# Patient Record
Sex: MALE | Race: WHITE | NOT HISPANIC OR LATINO
[De-identification: names, ages, dates, MRNs, and addresses within clinical notes are randomized per-mention and may not be internally consistent; named-entity substitution may affect disease eponyms.]

---

## 2021-04-09 ENCOUNTER — APPOINTMENT (OUTPATIENT)
Dept: UROLOGY | Facility: CLINIC | Age: 61
End: 2021-04-09
Payer: COMMERCIAL

## 2021-04-09 PROCEDURE — 99203 OFFICE O/P NEW LOW 30 MIN: CPT

## 2021-04-09 PROCEDURE — 99072 ADDL SUPL MATRL&STAF TM PHE: CPT

## 2021-04-09 NOTE — REVIEW OF SYSTEMS
[Feeling Tired] : feeling tired [Recent Weight Gain (___ Lbs)] : recent [unfilled] ~Ulb weight gain [Fever] : no fever [Chills] : no chills [Chest Pain] : no chest pain [Shortness Of Breath] : no shortness of breath [Cough] : no cough [Abdominal Pain] : no abdominal pain [Vomiting] : no vomiting [Constipation] : no constipation [Diarrhea] : no diarrhea [Confused] : no confusion [Dizziness] : no dizziness

## 2021-04-09 NOTE — ASSESSMENT
[FreeTextEntry1] : Patient is a 60 year old male with chief complaint of low sex drive. Patient states he is able to get erections without medications. Patient states he is able to maintain the erections. \par \par Patient reports recent weight gain and low energy. \par \par PSA is followed by primary care doctor. \par \par No urinary complaints at this time. \par \par mild erectile dysfunction

## 2021-04-09 NOTE — HISTORY OF PRESENT ILLNESS
[None] : no symptoms [FreeTextEntry1] : Patient is a 60 year old male with chief complaint of low sex drive. Patient states he is able to get erections without medications. Patient states he is able to maintain the erections. \par \par Patient reports recent weight gain and low energy. \par \par PSA is followed by primary care doctor. \par \par No urinary complaints at this time. \par \par mild erectile dysfunction \par no morning erections

## 2021-04-09 NOTE — PHYSICAL EXAM
[Normal Appearance] : normal appearance [Well Groomed] : well groomed [General Appearance - In No Acute Distress] : no acute distress [Edema] : no peripheral edema [] : no respiratory distress [Normal Station and Gait] : the gait and station were normal for the patient's age [Skin Color & Pigmentation] : normal skin color and pigmentation [No Focal Deficits] : no focal deficits [Oriented To Time, Place, And Person] : oriented to person, place, and time

## 2021-04-16 LAB
TESTOST SERPL-MCNC: 401 NG/DL
TSH SERPL-ACNC: 0.32 UIU/ML

## 2021-04-26 ENCOUNTER — EMERGENCY (EMERGENCY)
Facility: HOSPITAL | Age: 61
LOS: 0 days | Discharge: HOME | End: 2021-04-26
Attending: EMERGENCY MEDICINE
Payer: OTHER MISCELLANEOUS

## 2021-04-26 VITALS
DIASTOLIC BLOOD PRESSURE: 86 MMHG | RESPIRATION RATE: 18 BRPM | OXYGEN SATURATION: 98 % | SYSTOLIC BLOOD PRESSURE: 179 MMHG | HEART RATE: 77 BPM | TEMPERATURE: 97 F
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
## 2021-04-26 DIAGNOSIS — M79.89 OTHER SPECIFIED SOFT TISSUE DISORDERS: ICD-10-CM
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
## 2021-04-26 DIAGNOSIS — M25.571 PAIN IN RIGHT ANKLE AND JOINTS OF RIGHT FOOT: ICD-10-CM
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
## 2021-04-26 DIAGNOSIS — S91.011A LACERATION WITHOUT FOREIGN BODY, RIGHT ANKLE, INITIAL ENCOUNTER: ICD-10-CM

## 2021-04-26 DIAGNOSIS — Z88.0 ALLERGY STATUS TO PENICILLIN: ICD-10-CM

## 2021-04-26 PROCEDURE — 73610 X-RAY EXAM OF ANKLE: CPT | Mod: 26,RT

## 2021-04-26 PROCEDURE — 99283 EMERGENCY DEPT VISIT LOW MDM: CPT

## 2021-04-26 RX ORDER — TETANUS TOXOID, REDUCED DIPHTHERIA TOXOID AND ACELLULAR PERTUSSIS VACCINE, ADSORBED 5; 2.5; 8; 8; 2.5 [IU]/.5ML; [IU]/.5ML; UG/.5ML; UG/.5ML; UG/.5ML
0.5 SUSPENSION INTRAMUSCULAR ONCE
Refills: 0 | Status: COMPLETED | OUTPATIENT
Start: 2021-04-26 | End: 2021-04-26

## 2021-04-26 RX ADMIN — TETANUS TOXOID, REDUCED DIPHTHERIA TOXOID AND ACELLULAR PERTUSSIS VACCINE, ADSORBED 0.5 MILLILITER(S): 5; 2.5; 8; 8; 2.5 SUSPENSION INTRAMUSCULAR at 11:41

## 2021-04-26 NOTE — ED PROVIDER NOTE - NS ED ROS FT
Constitutional: (-) fever  Eyes/ENT: (-) blurry vision, (-) epistaxis  Cardiovascular: (-) chest pain, (-) syncope  Respiratory: (-) cough, (-) shortness of breath  Gastrointestinal: (-) vomiting, (-) diarrhea  Musculoskeletal: (-) neck pain, (-) back pain, (-) joint pain  Integumentary: (-) rash, (-) edema (+) abrasion (+) pain   Neurological: (-) headache, (-) altered mental status (-) ambulatory dysfunction  Psychiatric: (-) hallucinations  Allergic/Immunologic: (-) pruritus

## 2021-04-26 NOTE — ED PROVIDER NOTE - CARE PROVIDER_API CALL
Pablito Odell (MD)  Orthopaedic Surgery  [address] [place] [address]  Phone: [phone]  Fax: [phone]  Follow Up Time:     Baldomero Washington)  Neuromuscular Medicine  [address] [place] [address], [address]  [place], [place] [id number]  Phone: [phone]  Fax: [phone]  Follow Up Time:

## 2021-04-26 NOTE — ED PROVIDER NOTE - OBJECTIVE STATEMENT
60 y.o. male no pmh presenting s/p having 500 lb crate dropped on his R ankle while being removed from rear of truck. +bleeding + pain  with some trace ankle swelling. Applied betadine to clean abrasion. No ambulatory dysfunction. Tdap not up to date. No fever, chills ,redness, cp ,sob, abdominal pain, N/V, dizziness, lightheadedness. Notes some like tingling around area of abraison.

## 2021-04-26 NOTE — ED PROVIDER NOTE - CARE PROVIDERS DIRECT ADDRESSES
[email or web address]

## 2021-04-26 NOTE — ED PROVIDER NOTE - PATIENT PORTAL LINK FT
You can access the FollowMyHealth Patient Portal offered by [facility] by registering at the following website: [email or web address]. By joining [employer]’s FollowMyHealth portal, you will also be able to view your health information using other applications (apps) compatible with our system.

## 2021-04-26 NOTE — ED PROVIDER NOTE - CARE PLAN
Principal Discharge DX:	Abrasion   Principal Discharge DX:	Abrasion  Secondary Diagnosis:	Ankle pain

## 2021-04-26 NOTE — ED PROVIDER NOTE - NSFOLLOWUPINSTRUCTIONS_ED_ALL_ED_FT
Make sure to adhere to the proper wound care. Wash with soap and water. Make an appointment with the orthopedist and the neurologist within 1-3 days of your ER visit. Return to the ER if you develop pain ,worsened swelling , fever ,chills, redness of your leg, Make sure to adhere to the proper wound care. Wash with soap and water. Make an appointment with the orthopedist and the neurologist within 1-3 days of your ER visit. Return to the ER if you develop pain ,worsened swelling , fever ,chills, redness of your leg,    Abrasion  ImageAn abrasion is a cut or a scrape on the outer surface of the skin. An abrasion does not go through all the layers of the skin. It is important to care for your abrasion properly to prevent infection.    What are the causes?  This condition is caused by falling on or gliding across the ground or another surface. When your skin rubs on something, the outer and inner layers of skin may rub off.    What are the signs or symptoms?  The main symptom of this condition is a cut or a scrape. The scrape may be bleeding, or it may appear red or pink. If the abrasion was caused by a fall, there may be a bruise under the cut or scrape.    How is this diagnosed?  An abrasion is diagnosed with a physical exam.    How is this treated?  Treatment for this condition depends on how large and deep the abrasion is. In most cases:    Your abrasion will be cleaned with water and mild soap. This is done to remove any dirt or debris (such as particles of glass or rock) that may be stuck in the wound.  An antibiotic ointment may be applied to the abrasion to help prevent infection.  A bandage (dressing) may be placed on the abrasion to keep it clean.    You may also need a tetanus shot.    Follow these instructions at home:  Medicines     Take or apply over-the-counter and prescription medicines only as told by your health care provider.  If you were prescribed an antibiotic medicine, apply it as told by your health care provider.  Wound care     Clean the wound 2–3 times a day, or as directed by your health care provider. To do this, wash the wound with mild soap and water, rinse off the soap, and pat the wound dry with a clean towel. Do not rub the wound.  Keep the dressing clean and dry as told by your health care provider.  There are many different ways to close and cover a wound. Follow instructions from your health care provider about:    Caring for your wound.  Changing and removing your dressing. You may have to change your dressing one or more times a day, or as directed by your health care provider.    Check your wound every day for signs of infection. Check for:    Redness, particularly a red streak that spreads out from the wound.  Swelling or increased pain.  Warmth.  Fluid, pus, or a bad smell.    If directed, put ice on the injured area to reduce pain and swelling:    Put ice in a plastic bag.   Place a towel between your skin and the bag.   Leave the ice on for 20 minutes, 2–3 times a day.    General instructions     Do not take baths, swim, or use a hot tub until your health care provider says it is okay to do so.  If possible, raise (elevate) the injured area above the level of your heart while you are sitting or lying down. This will reduce pain and swelling.  Keep all follow-up visits as directed by your health care provider. This is important.  Contact a health care provider if:  You received a tetanus shot, and you have swelling, severe pain, redness, or bleeding at the injection site.  Your pain is not controlled with medicine.  You have redness, swelling, or more pain at the site of your wound.  Get help right away if:  You have a red streak spreading away from your wound.  You have a fever.  You have fluid, blood, or pus coming from your wound.  You notice a bad smell coming from your wound or your dressing.  Summary  An abrasion is a cut or a scrape on the outer surface of the skin. An abrasion does not go through all the layers of the skin.  Care for your abrasion properly to prevent infection.  Clean the wound with mild soap and water 2–3 times a day. Follow instructions from your health care provider about taking medicines and changing your bandage (dressing).  Contact your health care provider if you have redness, swelling or more pain in the wound area.  Get help right away if you have a fever or if you have fluid, blood, pus, a bad smell, or a red streak coming from the wound.  This information is not intended to replace advice given to you by your health care provider. Make sure you discuss any questions you have with your health care provider.

## 2021-04-26 NOTE — ED PROVIDER NOTE - PHYSICAL EXAMINATION
Physical Exam    Vital Signs: I have reviewed the initial vital signs.  Constitutional: well-nourished, appears stated age, no acute distress  Cardiovascular: S1 and S2, regular rate, regular rhythm, well-perfused extremities, radial pulses equal and 2+  Respiratory: unlabored respiratory effort, clear to auscultation bilaterally no wheezing, rales and rhonchi  Musculoskeletal: supple neck, no lower extremity edema, no midline tenderness, some swelling noted over right ankle, non ttp, no foot drop   Integumentary: warm, dry, no rash, 5 cm abrasion linear, over anterior shin, some whiteness of skin secondary to cleaning with betadine, no arterial bleeds noted. 2 cm abrasion to left anterior ankle. no foreign  bodies  Neurologic: awake, alert, cranial nerves II-XII grossly intact, extremities’ motor and sensory functions grossly intact Physical Exam    Vital Signs: I have reviewed the initial vital signs.  Constitutional: well-nourished, appears stated age, no acute distress  Cardiovascular: S1 and S2, regular rate, regular rhythm, well-perfused extremities, radial pulses equal and 2+  Respiratory: unlabored respiratory effort, clear to auscultation bilaterally no wheezing, rales and rhonchi  Musculoskeletal: supple neck, no lower extremity edema, no midline tenderness, some swelling noted over right ankle, non ttp, no foot drop   Integumentary: warm, dry, no rash, 5 cm linear skin avulsion, over anterior shin, some whiteness of skin secondary to cleaning with betadine, no arterial bleeds noted. 2 cm abrasion to left anterior ankle. no foreign  bodies  Neurologic: awake, alert, cranial nerves II-XII grossly intact, extremities’ motor and sensory functions grossly intact

## 2021-04-26 NOTE — ED PROVIDER NOTE - ATTENDING CONTRIBUTION TO CARE
56 y/o male denies PMH, PSH of hernia repair and spinal fusion, non-smoker, allergy to PCN presents s/p injury at work. States heavy crate fell onto b/l ankle, c/o skin injury and pain, sx are constant, denies modifying factors or fever, tactile temp, chills, paresthesias, focal weakness, or other associated complaints at present.     No old chart available for review.  I have reviewed and agree with the nursing note, except as documented in my note.    VSS, awake, alert, in NAD, LLE: small superficial abrasion to ant and posterior ankle, RLE superficial abrasion and punctate superficial lac to ant ankle, no fb suspected in wound, no bone or joint violation, no apparent tendon injury, no jewelry present. BL LE; no hip, knee (including prox tib-fib) tenderness, ankle/foot; appears symmetrical, no gross deformity, crepitus, swelling or tenderness, ROM intact, no joint laxity appreciated, motor and sensation intact distally, NV intact with 2+ DP pulses. There is no pain on palpation of the achilles tendon. Compartments not tense. Able to ambulate in ED w/o difficulty. Compartment syndrome precautions discussed with patient rec return 24 for re-assessment due to mechanism.

## 2021-06-02 ENCOUNTER — APPOINTMENT (OUTPATIENT)
Dept: UROLOGY | Facility: CLINIC | Age: 61
End: 2021-06-02
Payer: COMMERCIAL

## 2021-06-02 VITALS
DIASTOLIC BLOOD PRESSURE: 79 MMHG | WEIGHT: 235 LBS | TEMPERATURE: 98 F | BODY MASS INDEX: 31.83 KG/M2 | SYSTOLIC BLOOD PRESSURE: 135 MMHG | HEART RATE: 78 BPM | HEIGHT: 72 IN

## 2021-06-02 DIAGNOSIS — N52.01 ERECTILE DYSFUNCTION DUE TO ARTERIAL INSUFFICIENCY: ICD-10-CM

## 2021-06-02 DIAGNOSIS — R68.82 DECREASED LIBIDO: ICD-10-CM

## 2021-06-02 PROCEDURE — 99072 ADDL SUPL MATRL&STAF TM PHE: CPT

## 2021-06-02 PROCEDURE — 99213 OFFICE O/P EST LOW 20 MIN: CPT

## 2021-06-02 RX ORDER — SILDENAFIL 100 MG/1
100 TABLET, FILM COATED ORAL
Qty: 10 | Refills: 10 | Status: ACTIVE | COMMUNITY
Start: 2021-06-02 | End: 1900-01-01

## 2021-06-02 NOTE — ASSESSMENT
[FreeTextEntry1] : Patient is a 60 year old male with chief complaint of low sex drive. Patient states he is able to get erections without medications. Patient states he is able to maintain the erections. \par \par Patient reports recent weight gain and low energy. \par \par PSA is followed by primary care doctor. \par \par No urinary complaints at this time. \par \par mild erectile dysfunction. \par \par  April 2021\par Testosterone, Total - 401 ([phone])\par Thyroid Stimulating Hormone,  0.32

## 2021-08-11 ENCOUNTER — APPOINTMENT (OUTPATIENT)
Dept: UROLOGY | Facility: CLINIC | Age: 61
End: 2021-08-11

## 2022-12-02 ENCOUNTER — INPATIENT (INPATIENT)
Facility: HOSPITAL | Age: 62
LOS: 3 days | Discharge: HOME | End: 2022-12-06
Attending: INTERNAL MEDICINE | Admitting: INTERNAL MEDICINE
Payer: COMMERCIAL

## 2022-12-02 VITALS
SYSTOLIC BLOOD PRESSURE: 123 MMHG | HEART RATE: 105 BPM | RESPIRATION RATE: 18 BRPM | TEMPERATURE: 98 F | WEIGHT: 207.9 LBS | OXYGEN SATURATION: 96 % | DIASTOLIC BLOOD PRESSURE: 75 MMHG

## 2022-12-02 LAB
ALBUMIN SERPL ELPH-MCNC: 3.6 G/DL — SIGNIFICANT CHANGE UP (ref 3.5–5.2)
ALP SERPL-CCNC: 64 U/L — SIGNIFICANT CHANGE UP (ref 30–115)
ALT FLD-CCNC: 8 U/L — SIGNIFICANT CHANGE UP (ref 0–41)
ANION GAP SERPL CALC-SCNC: 17 MMOL/L — HIGH (ref 7–14)
AST SERPL-CCNC: 12 U/L — SIGNIFICANT CHANGE UP (ref 0–41)
BASOPHILS # BLD AUTO: 0.09 K/UL — SIGNIFICANT CHANGE UP (ref 0–0.2)
BASOPHILS NFR BLD AUTO: 0.9 % — SIGNIFICANT CHANGE UP (ref 0–1)
BILIRUB SERPL-MCNC: 0.3 MG/DL — SIGNIFICANT CHANGE UP (ref 0.2–1.2)
BUN SERPL-MCNC: 13 MG/DL — SIGNIFICANT CHANGE UP (ref 10–20)
CALCIUM SERPL-MCNC: 8.7 MG/DL — SIGNIFICANT CHANGE UP (ref 8.4–10.5)
CHLORIDE SERPL-SCNC: 91 MMOL/L — LOW (ref 98–110)
CO2 SERPL-SCNC: 21 MMOL/L — SIGNIFICANT CHANGE UP (ref 17–32)
CREAT SERPL-MCNC: 1.1 MG/DL — SIGNIFICANT CHANGE UP (ref 0.7–1.5)
EGFR: 77 ML/MIN/1.73M2 — SIGNIFICANT CHANGE UP
EOSINOPHIL # BLD AUTO: 0.13 K/UL — SIGNIFICANT CHANGE UP (ref 0–0.7)
EOSINOPHIL NFR BLD AUTO: 1.2 % — SIGNIFICANT CHANGE UP (ref 0–8)
GLUCOSE SERPL-MCNC: 113 MG/DL — HIGH (ref 70–99)
HCT VFR BLD CALC: 40.9 % — LOW (ref 42–52)
HGB BLD-MCNC: 14.3 G/DL — SIGNIFICANT CHANGE UP (ref 14–18)
IMM GRANULOCYTES NFR BLD AUTO: 0.8 % — HIGH (ref 0.1–0.3)
LACTATE SERPL-SCNC: 1.1 MMOL/L — SIGNIFICANT CHANGE UP (ref 0.7–2)
LIDOCAIN IGE QN: 49 U/L — SIGNIFICANT CHANGE UP (ref 7–60)
LYMPHOCYTES # BLD AUTO: 1.42 K/UL — SIGNIFICANT CHANGE UP (ref 1.2–3.4)
LYMPHOCYTES # BLD AUTO: 13.4 % — LOW (ref 20.5–51.1)
MCHC RBC-ENTMCNC: 31.2 PG — HIGH (ref 27–31)
MCHC RBC-ENTMCNC: 35 G/DL — SIGNIFICANT CHANGE UP (ref 32–37)
MCV RBC AUTO: 89.3 FL — SIGNIFICANT CHANGE UP (ref 80–94)
MONOCYTES # BLD AUTO: 1.4 K/UL — HIGH (ref 0.1–0.6)
MONOCYTES NFR BLD AUTO: 13.2 % — HIGH (ref 1.7–9.3)
NEUTROPHILS # BLD AUTO: 7.46 K/UL — HIGH (ref 1.4–6.5)
NEUTROPHILS NFR BLD AUTO: 70.5 % — SIGNIFICANT CHANGE UP (ref 42.2–75.2)
NRBC # BLD: 0 /100 WBCS — SIGNIFICANT CHANGE UP (ref 0–0)
PLATELET # BLD AUTO: 488 K/UL — HIGH (ref 130–400)
POTASSIUM SERPL-MCNC: 4.2 MMOL/L — SIGNIFICANT CHANGE UP (ref 3.5–5)
POTASSIUM SERPL-SCNC: 4.2 MMOL/L — SIGNIFICANT CHANGE UP (ref 3.5–5)
PROT SERPL-MCNC: 6.1 G/DL — SIGNIFICANT CHANGE UP (ref 6–8)
RBC # BLD: 4.58 M/UL — LOW (ref 4.7–6.1)
RBC # FLD: 12.2 % — SIGNIFICANT CHANGE UP (ref 11.5–14.5)
SODIUM SERPL-SCNC: 129 MMOL/L — LOW (ref 135–146)
WBC # BLD: 10.58 K/UL — SIGNIFICANT CHANGE UP (ref 4.8–10.8)
WBC # FLD AUTO: 10.58 K/UL — SIGNIFICANT CHANGE UP (ref 4.8–10.8)

## 2022-12-02 PROCEDURE — 99285 EMERGENCY DEPT VISIT HI MDM: CPT

## 2022-12-02 PROCEDURE — 74177 CT ABD & PELVIS W/CONTRAST: CPT | Mod: 26,MA

## 2022-12-02 RX ORDER — SODIUM CHLORIDE 9 MG/ML
1000 INJECTION INTRAMUSCULAR; INTRAVENOUS; SUBCUTANEOUS ONCE
Refills: 0 | Status: COMPLETED | OUTPATIENT
Start: 2022-12-02 | End: 2022-12-02

## 2022-12-02 RX ORDER — CIPROFLOXACIN LACTATE 400MG/40ML
400 VIAL (ML) INTRAVENOUS ONCE
Refills: 0 | Status: COMPLETED | OUTPATIENT
Start: 2022-12-02 | End: 2022-12-02

## 2022-12-02 RX ORDER — METRONIDAZOLE 500 MG
500 TABLET ORAL ONCE
Refills: 0 | Status: COMPLETED | OUTPATIENT
Start: 2022-12-02 | End: 2022-12-02

## 2022-12-02 RX ORDER — ONDANSETRON 8 MG/1
4 TABLET, FILM COATED ORAL ONCE
Refills: 0 | Status: COMPLETED | OUTPATIENT
Start: 2022-12-02 | End: 2022-12-02

## 2022-12-02 RX ADMIN — SODIUM CHLORIDE 1000 MILLILITER(S): 9 INJECTION INTRAMUSCULAR; INTRAVENOUS; SUBCUTANEOUS at 21:18

## 2022-12-02 RX ADMIN — Medication 200 MILLIGRAM(S): at 23:49

## 2022-12-02 RX ADMIN — ONDANSETRON 4 MILLIGRAM(S): 8 TABLET, FILM COATED ORAL at 21:19

## 2022-12-02 NOTE — ED PROVIDER NOTE - PHYSICAL EXAMINATION
CONSTITUTIONAL: in NAD.  SKIN: warm, dry. no rashes.  HEAD: Normocephalic; atraumatic.  EYES: PERRLA, EOMI, no conjunctival erythema.  ENT: No nasal discharge; airway clear. MMM.  NECK: Supple; non tender.  CARD: S1, S2 normal; no murmurs, gallops, or rubs. Regular rate and rhythm.   RESP: No wheezes, rales, or rhonchi. lungs ctab.  ABD: soft, nd, (+) diffuse abdominal ttp. No CVAT b/l.  EXT: Normal ROM. No clubbing, cyanosis, or edema.  NEURO: Alert, oriented, grossly unremarkable. nml motor, strength, and sensation. no focal neuro. deficits. ambulating with a steady gait.  PSYCH: Cooperative, appropriate.

## 2022-12-02 NOTE — ED ADULT NURSE NOTE - OBJECTIVE STATEMENT
Pt from [place] back from [place] on 11/17 C/O bloody  diarrhea  nausea vomiting ,pt was see out patient with PMD placed on po antibiotic not helpful still filling the same .

## 2022-12-02 NOTE — ED ADULT NURSE NOTE - CHIEF COMPLAINT QUOTE
pt sts was in [place] on nov 14th. has had diarrhea since. 10-15 times per day. unable to keep food down

## 2022-12-02 NOTE — ED PROVIDER NOTE - ATTENDING CONTRIBUTION TO CARE
61-year-old male with history of hypertension, hernia status postrepair presenting to the ED for bloody diarrhea for approximately 2 weeks.  Patient states that after traveling to [place] he developed initially nonbloody diarrhea that progressed to bright red blood diarrhea approximately 9-10 episodes daily.  Patient has been treated with Flagyl by his PMD for the last week.  Denies any fevers.  Endorses generalized abdominal pain.  Endorses nausea and increased belching since last night.  Has not been able to eat or drink anything today. Patient had a stool culture, stool ova and parasite testing by his PMD that were both negative.      vss, nontoxic, well appearing, pink conj, anicteric, MMM, neck supple, CTAB, RRR, equal radial pulses bilat, abd soft/nd, diffuse tenderness throughout b[place] along left quadrants, no cva tend. no calves tend, no edema, no fnd. no rashes.

## 2022-12-02 NOTE — ED PROVIDER NOTE - CARE PLAN
Principal Discharge DX:	Pancolitis  Secondary Diagnosis:	Dehydration   1 Principal Discharge DX:	Pancolitis  Assessment and plan of treatment:	bloody diarrhea  labs, imaging, fluids, reassess.  Secondary Diagnosis:	Dehydration

## 2022-12-02 NOTE — ED PROVIDER NOTE - OBJECTIVE STATEMENT
61 year old male presenting to the ED for 10-15 episodes of profuse watery diarrhea each day since returning from [place] on Nov. 14th. Completed course of flagyl outpt. Has been unable to tolerate any PO at home, reports nausea but no vomiting. 61 year old male presenting to the ED for 10-15 episodes of profuse watery diarrhea each day since returning from [place] on Nov. 14th. Completed course of flagyl outpatient. Has been unable to tolerate any PO at home, reports nausea but no vomiting.

## 2022-12-02 NOTE — ED PROVIDER NOTE - CLINICAL SUMMARY MEDICAL DECISION MAKING FREE TEXT BOX
Patient with history as documented presenting with 2 weeks of bloody diarrhea.  Labs reviewed and stable.  Imaging with pancolitis, patient has on approximately 1 week of Flagyl without improvement in his symptoms.  Offered changing antibiotics and outpatient management versus inpatient IV antibiotics, family preferred inpatient management as has already failed oral antibiotics and has not been able to tolerate oral intake x1 day.  Patient given IV dose of Flagyl and ciprofloxacin.  Admitted for further management.

## 2022-12-02 NOTE — ED ADULT TRIAGE NOTE - CHIEF COMPLAINT QUOTE
pt sts was in [place] on nov 14th. has had diarrhea since. 10-15 times per day. unable to keep food down

## 2022-12-03 LAB
ALBUMIN SERPL ELPH-MCNC: 3.4 G/DL — LOW (ref 3.5–5.2)
ALP SERPL-CCNC: 55 U/L — SIGNIFICANT CHANGE UP (ref 30–115)
ALT FLD-CCNC: 8 U/L — SIGNIFICANT CHANGE UP (ref 0–41)
ANION GAP SERPL CALC-SCNC: 16 MMOL/L — HIGH (ref 7–14)
APPEARANCE UR: CLEAR — SIGNIFICANT CHANGE UP
AST SERPL-CCNC: 11 U/L — SIGNIFICANT CHANGE UP (ref 0–41)
BACTERIA # UR AUTO: NEGATIVE — SIGNIFICANT CHANGE UP
BASOPHILS # BLD AUTO: 0.06 K/UL — SIGNIFICANT CHANGE UP (ref 0–0.2)
BASOPHILS NFR BLD AUTO: 0.6 % — SIGNIFICANT CHANGE UP (ref 0–1)
BILIRUB SERPL-MCNC: 0.3 MG/DL — SIGNIFICANT CHANGE UP (ref 0.2–1.2)
BILIRUB UR-MCNC: NEGATIVE — SIGNIFICANT CHANGE UP
BUN SERPL-MCNC: 9 MG/DL — LOW (ref 10–20)
C DIFF BY PCR RESULT: SIGNIFICANT CHANGE UP
CALCIUM SERPL-MCNC: 8.5 MG/DL — SIGNIFICANT CHANGE UP (ref 8.4–10.4)
CHLORIDE SERPL-SCNC: 97 MMOL/L — LOW (ref 98–110)
CO2 SERPL-SCNC: 22 MMOL/L — SIGNIFICANT CHANGE UP (ref 17–32)
COLOR SPEC: YELLOW — SIGNIFICANT CHANGE UP
CREAT SERPL-MCNC: 0.9 MG/DL — SIGNIFICANT CHANGE UP (ref 0.7–1.5)
CRP SERPL-MCNC: 144.8 MG/L — HIGH
DIFF PNL FLD: NEGATIVE — SIGNIFICANT CHANGE UP
EGFR: 98 ML/MIN/1.73M2 — SIGNIFICANT CHANGE UP
EOSINOPHIL # BLD AUTO: 0.22 K/UL — SIGNIFICANT CHANGE UP (ref 0–0.7)
EOSINOPHIL NFR BLD AUTO: 2.3 % — SIGNIFICANT CHANGE UP (ref 0–8)
EPEC DNA STL QL NAA+PROBE: DETECTED
EPI CELLS # UR: 2 /HPF — SIGNIFICANT CHANGE UP (ref 0–5)
ERYTHROCYTE [SEDIMENTATION RATE] IN BLOOD: 49 MM/HR — HIGH (ref 0–10)
GI PCR PANEL: DETECTED
GLUCOSE SERPL-MCNC: 101 MG/DL — HIGH (ref 70–99)
GLUCOSE UR QL: NEGATIVE — SIGNIFICANT CHANGE UP
HCT VFR BLD CALC: 38.4 % — LOW (ref 42–52)
HGB BLD-MCNC: 13 G/DL — LOW (ref 14–18)
HYALINE CASTS # UR AUTO: 14 /LPF — HIGH (ref 0–7)
IMM GRANULOCYTES NFR BLD AUTO: 0.7 % — HIGH (ref 0.1–0.3)
KETONES UR-MCNC: ABNORMAL
LEUKOCYTE ESTERASE UR-ACNC: NEGATIVE — SIGNIFICANT CHANGE UP
LYMPHOCYTES # BLD AUTO: 1 K/UL — LOW (ref 1.2–3.4)
LYMPHOCYTES # BLD AUTO: 10.4 % — LOW (ref 20.5–51.1)
MAGNESIUM SERPL-MCNC: 1.7 MG/DL — LOW (ref 1.8–2.4)
MCHC RBC-ENTMCNC: 31 PG — SIGNIFICANT CHANGE UP (ref 27–31)
MCHC RBC-ENTMCNC: 33.9 G/DL — SIGNIFICANT CHANGE UP (ref 32–37)
MCV RBC AUTO: 91.6 FL — SIGNIFICANT CHANGE UP (ref 80–94)
MONOCYTES # BLD AUTO: 1.39 K/UL — HIGH (ref 0.1–0.6)
MONOCYTES NFR BLD AUTO: 14.4 % — HIGH (ref 1.7–9.3)
NEUTROPHILS # BLD AUTO: 6.9 K/UL — HIGH (ref 1.4–6.5)
NEUTROPHILS NFR BLD AUTO: 71.6 % — SIGNIFICANT CHANGE UP (ref 42.2–75.2)
NITRITE UR-MCNC: NEGATIVE — SIGNIFICANT CHANGE UP
NRBC # BLD: 0 /100 WBCS — SIGNIFICANT CHANGE UP (ref 0–0)
PH UR: 6 — SIGNIFICANT CHANGE UP (ref 5–8)
PLATELET # BLD AUTO: 423 K/UL — HIGH (ref 130–400)
POTASSIUM SERPL-MCNC: 3.8 MMOL/L — SIGNIFICANT CHANGE UP (ref 3.5–5)
POTASSIUM SERPL-SCNC: 3.8 MMOL/L — SIGNIFICANT CHANGE UP (ref 3.5–5)
PROT SERPL-MCNC: 5.6 G/DL — LOW (ref 6–8)
PROT UR-MCNC: ABNORMAL
RBC # BLD: 4.19 M/UL — LOW (ref 4.7–6.1)
RBC # FLD: 12.1 % — SIGNIFICANT CHANGE UP (ref 11.5–14.5)
RBC CASTS # UR COMP ASSIST: 1 /HPF — SIGNIFICANT CHANGE UP (ref 0–4)
SARS-COV-2 RNA SPEC QL NAA+PROBE: SIGNIFICANT CHANGE UP
SODIUM SERPL-SCNC: 135 MMOL/L — SIGNIFICANT CHANGE UP (ref 135–146)
SP GR SPEC: >1.05 (ref 1.01–1.03)
UROBILINOGEN FLD QL: SIGNIFICANT CHANGE UP
WBC # BLD: 9.64 K/UL — SIGNIFICANT CHANGE UP (ref 4.8–10.8)
WBC # FLD AUTO: 9.64 K/UL — SIGNIFICANT CHANGE UP (ref 4.8–10.8)
WBC UR QL: 3 /HPF — SIGNIFICANT CHANGE UP (ref 0–5)

## 2022-12-03 PROCEDURE — 99233 SBSQ HOSP IP/OBS HIGH 50: CPT

## 2022-12-03 PROCEDURE — 99223 1ST HOSP IP/OBS HIGH 75: CPT

## 2022-12-03 RX ORDER — CIPROFLOXACIN LACTATE 400MG/40ML
400 VIAL (ML) INTRAVENOUS
Refills: 0 | Status: DISCONTINUED | OUTPATIENT
Start: 2022-12-03 | End: 2022-12-06

## 2022-12-03 RX ORDER — SODIUM CHLORIDE 9 MG/ML
1000 INJECTION INTRAMUSCULAR; INTRAVENOUS; SUBCUTANEOUS
Refills: 0 | Status: DISCONTINUED | OUTPATIENT
Start: 2022-12-03 | End: 2022-12-06

## 2022-12-03 RX ORDER — MAGNESIUM SULFATE 500 MG/ML
2 VIAL (ML) INJECTION ONCE
Refills: 0 | Status: COMPLETED | OUTPATIENT
Start: 2022-12-03 | End: 2022-12-03

## 2022-12-03 RX ORDER — ONDANSETRON 8 MG/1
4 TABLET, FILM COATED ORAL EVERY 8 HOURS
Refills: 0 | Status: DISCONTINUED | OUTPATIENT
Start: 2022-12-03 | End: 2022-12-06

## 2022-12-03 RX ORDER — METRONIDAZOLE 500 MG
500 TABLET ORAL EVERY 8 HOURS
Refills: 0 | Status: DISCONTINUED | OUTPATIENT
Start: 2022-12-03 | End: 2022-12-06

## 2022-12-03 RX ADMIN — Medication 200 MILLIGRAM(S): at 10:39

## 2022-12-03 RX ADMIN — SODIUM CHLORIDE 75 MILLILITER(S): 9 INJECTION INTRAMUSCULAR; INTRAVENOUS; SUBCUTANEOUS at 10:55

## 2022-12-03 RX ADMIN — Medication 100 MILLIGRAM(S): at 10:40

## 2022-12-03 RX ADMIN — Medication 100 MILLIGRAM(S): at 14:08

## 2022-12-03 RX ADMIN — ONDANSETRON 4 MILLIGRAM(S): 8 TABLET, FILM COATED ORAL at 19:41

## 2022-12-03 RX ADMIN — Medication 100 MILLIGRAM(S): at 02:08

## 2022-12-03 RX ADMIN — ONDANSETRON 4 MILLIGRAM(S): 8 TABLET, FILM COATED ORAL at 04:19

## 2022-12-03 RX ADMIN — Medication 100 MILLIGRAM(S): at 22:06

## 2022-12-03 RX ADMIN — Medication 200 MILLIGRAM(S): at 17:41

## 2022-12-03 RX ADMIN — Medication 25 GRAM(S): at 11:19

## 2022-12-03 RX ADMIN — SODIUM CHLORIDE 75 MILLILITER(S): 9 INJECTION INTRAMUSCULAR; INTRAVENOUS; SUBCUTANEOUS at 04:19

## 2022-12-03 NOTE — CONSULT NOTE ADULT - SUBJECTIVE AND OBJECTIVE BOX
Gastroenterology Consultation:    Patient is a 61y old  Male who presents with a chief complaint of Bloody Diarrhea (03 Dec 2022 11:51)        Admitted on: 12-02-22      HPI:    60 yo male pt w PMH of HTN presenting for diarrhea. Pt recently went to a trip to [place] on the 8th of Nov and returned on the 14th. Pt was asymptomatic during the trip, but on the 15th of November started having watery non bloody diarrhea that developed into bloody diarrhea on the 25th. Pt has since been having almost 10 episodes of bloody diarrhea waking him up at night associated with crampy abdominal pain prior to diarrhea episode. Pt was prescribed flagyl on the 29th with no improvement of his symptoms. However, diarrheal episodes started spacing out today. As per pt, he has lost almost 12 pounds since the start of his diarrheal episodes.  ROS also +ve for nausea but with no vomiting for which he attributes to the dicylomine he was started on and inability to tolerate PO diet. Pt was traveling with a group of 130 ppl none of who had similar symptoms. Pt never has bloody diarrhea before. Pt had a colonoscopy done with Dr. Drummond 5 yrs ago that was normal as per pt. FH +ve for colon cancer in father at the age of 70 and Crohn's disease in his sister. Denies smoking or drinking hx. denies Vomiting, red blood per mouth or weight loss    PAST MEDICAL & SURGICAL HISTORY:  No pertinent past medical history            FAMILY HISTORY:  father colon cancer at 70  sister with crohn's disease    Social History:   Tobacco: denies   Alcohol: denies   Drugs: denies    Home Medications:  olmesartan 5 mg oral tablet: 1 tab(s) orally once a day (03 Dec 2022 03:53)        MEDICATIONS  (STANDING):  ciprofloxacin   IVPB 400 milliGRAM(s) IV Intermittent two times a day  metroNIDAZOLE  IVPB 500 milliGRAM(s) IV Intermittent every 8 hours  sodium chloride 0.9%. 1000 milliLiter(s) (75 mL/Hr) IV Continuous <Continuous>    MEDICATIONS  (PRN):  ondansetron Injectable 4 milliGRAM(s) IV Push every 8 hours PRN Nausea and/or Vomiting      Allergies  penicillin (Rash)      Review of Systems:   Constitutional:  No Fever, No Chills  ENT/Mouth:  No Hearing Changes,  No Difficulty Swallowing  Eyes:  No Eye Pain, No Vision Changes  Cardiovascular:  No Chest Pain, No Palpitations  Respiratory:  No Cough, No Dyspnea  Gastrointestinal:  As described in HPI          Physical Examination:  T(C): 37.1 (12-03-22 @ 08:44), Max: 37.1 (12-03-22 @ 08:44)  HR: 88 (12-03-22 @ 08:44) (87 - 105)  BP: 118/58 (12-03-22 @ 08:44) (118/58 - 137/63)  RR: 18 (12-03-22 @ 08:44) (18 - 18)  SpO2: 97% (12-03-22 @ 08:44) (95% - 97%)    Weight (kg): 94.3 (12-02-22 @ 19:22)        GENERAL: AAOx3, no acute distress.  HEAD:  Atraumatic, Normocephalic  EYES: conjunctiva and sclera clear  CHEST/LUNG: Clear to auscultation bilaterally; No wheeze, rhonchi, or rales  HEART: Regular rate and rhythm; normal S1, S2, No murmurs.  ABDOMEN: Soft, nontender, nondistended; Bowel sounds present  SKIN: Intact, no jaundice        Data:                        13.0   9.64  )-----------( 423      ( 03 Dec 2022 08:19 )             38.4     Hgb Trend:  13.0  12-03-22 @ 08:19  14.3  12-02-22 @ 20:54      12-03    135  |  97<L>  |  9<L>  ----------------------------<  101<H>  3.8   |  22  |  0.9    Ca    8.5      03 Dec 2022 08:19  Mg     1.7     12-03    TPro  5.6<L>  /  Alb  3.4<L>  /  TBili  0.3  /  DBili  x   /  AST  11  /  ALT  8   /  AlkPhos  55  12-03    Liver panel trend:  TBili 0.3   /   AST 11   /   ALT 8   /   AlkP 55   /   Tptn 5.6   /   Alb 3.4    /   DBili --      12-03  TBili 0.3   /   AST 12   /   ALT 8   /   AlkP 64   /   Tptn 6.1   /   Alb 3.6    /   DBili --      12-02              Radiology:  CT Abdomen and Pelvis w/ IV Cont:   ACC: [id number] EXAM:  CT ABDOMEN AND PELVIS IC                          PROCEDURE DATE:  12/02/2022          INTERPRETATION:  Clinical History / Reason for exam: Abdominal pain and   diarrhea, recently returned from [place].    Technique:  Contiguousaxial CT images of the abdomen and pelvis were   obtained following administration of intravenous contrast.  Oral contrast   was not administered.  Reformatted images in the coronal and sagittal   planes were acquired.    Comparison CT: 2/11/2005    FINDINGS:    LOWER CHEST: Unremarkable.    HEPATOBILIARY: The liver is normal in appearance.  No evidence of intra   or extrahepatic biliary dilatation. The gallbladder is suboptimally   imaged.    SPLEEN: Unremarkable.    PANCREAS: Unremarkable.    ADRENAL GLANDS: Unremarkable.    KIDNEYS: Symmetric pattern of renal enhancement. No evidence of a mass,   hydronephrosis or hydroureter.    ABDOMINOPELVIC NODES: There are prominent likely reactive lymph nodes   predominantly in the right lower quadrant.    PELVIC ORGANS: Unremarkable.    PERITONEUM/MESENTERY/BOWEL: No bowel obstruction, ascites or free   intraperitoneal air. The appendix is normal in caliber. There is   circumferential wall thickening of the entire colon with prominence of   the vasculature in the region of the rectum and sigmoid segments.    BONES/SOFT TISSUES: There are degenerative changes of the spine and both   hips.    VASCULAR:  The aorta is normal in caliber. Mild to moderate   atherosclerotic calcifications extend into the major branches.      IMPRESSION:      Pancolitis.    --- End of Report ---            SKINNY DANG MD; Attending Radiologist  This document has been electronically signed. Dec  2 2022 10:32PM (12-02-22 @ 22:07)       Gastroenterology Consultation:    Patient is a 61y old  Male who presents with a chief complaint of Bloody Diarrhea (03 Dec 2022 11:51)        Admitted on: 12-02-22      HPI:    62 yo male pt w PMH of HTN presenting for diarrhea. Pt recently went to a trip to [place] on the 8th of Nov and returned on the 14th. Pt was asymptomatic during the trip, but on the 15th of November started having watery non bloody diarrhea that developed into bloody diarrhea on the 25th. Pt has since been having almost 10 episodes of bloody diarrhea waking him up at night associated with crampy abdominal pain prior to diarrhea episode. Pt was prescribed flagyl on the 29th with no improvement of his symptoms. However, diarrheal episodes started spacing out today. As per pt, he has lost almost 12 pounds since the start of his diarrheal episodes.  ROS also +ve for nausea but with no vomiting for which he attributes to the dicylomine he was started on and inability to tolerate PO diet. Pt was traveling with a group of 130 ppl none of who had similar symptoms. Pt never has bloody diarrhea before.     Pt had a colonoscopy done with Dr. Drummond 5 yrs ago that was normal as per pt.     FH +ve for colon cancer in father at the age of 70 and Crohn's disease in his sister. Denies smoking or drinking hx. denies Vomiting, red blood per mouth or weight loss    PAST MEDICAL & SURGICAL HISTORY:  No pertinent past medical history            FAMILY HISTORY:  father colon cancer at 70  sister with crohn's disease    Social History:   Tobacco: denies   Alcohol: denies   Drugs: denies    Home Medications:  olmesartan 5 mg oral tablet: 1 tab(s) orally once a day (03 Dec 2022 03:53)        MEDICATIONS  (STANDING):  ciprofloxacin   IVPB 400 milliGRAM(s) IV Intermittent two times a day  metroNIDAZOLE  IVPB 500 milliGRAM(s) IV Intermittent every 8 hours  sodium chloride 0.9%. 1000 milliLiter(s) (75 mL/Hr) IV Continuous <Continuous>    MEDICATIONS  (PRN):  ondansetron Injectable 4 milliGRAM(s) IV Push every 8 hours PRN Nausea and/or Vomiting      Allergies  penicillin (Rash)      Review of Systems:   Constitutional:  No Fever, No Chills  ENT/Mouth:  No Hearing Changes,  No Difficulty Swallowing  Eyes:  No Eye Pain, No Vision Changes  Cardiovascular:  No Chest Pain, No Palpitations  Respiratory:  No Cough, No Dyspnea  Gastrointestinal:  As described in HPI          Physical Examination:  T(C): 37.1 (12-03-22 @ 08:44), Max: 37.1 (12-03-22 @ 08:44)  HR: 88 (12-03-22 @ 08:44) (87 - 105)  BP: 118/58 (12-03-22 @ 08:44) (118/58 - 137/63)  RR: 18 (12-03-22 @ 08:44) (18 - 18)  SpO2: 97% (12-03-22 @ 08:44) (95% - 97%)    Weight (kg): 94.3 (12-02-22 @ 19:22)        GENERAL: AAOx3, no acute distress.  HEAD:  Atraumatic, Normocephalic  EYES: conjunctiva and sclera clear  CHEST/LUNG: Clear to auscultation bilaterally; No wheeze, rhonchi, or rales  HEART: Regular rate and rhythm; normal S1, S2, No murmurs.  ABDOMEN: Soft, nontender, nondistended; Bowel sounds present  SKIN: Intact, no jaundice        Data:                        13.0   9.64  )-----------( 423      ( 03 Dec 2022 08:19 )             38.4     Hgb Trend:  13.0  12-03-22 @ 08:19  14.3  12-02-22 @ 20:54      12-03    135  |  97<L>  |  9<L>  ----------------------------<  101<H>  3.8   |  22  |  0.9    Ca    8.5      03 Dec 2022 08:19  Mg     1.7     12-03    TPro  5.6<L>  /  Alb  3.4<L>  /  TBili  0.3  /  DBili  x   /  AST  11  /  ALT  8   /  AlkPhos  55  12-03    Liver panel trend:  TBili 0.3   /   AST 11   /   ALT 8   /   AlkP 55   /   Tptn 5.6   /   Alb 3.4    /   DBili --      12-03  TBili 0.3   /   AST 12   /   ALT 8   /   AlkP 64   /   Tptn 6.1   /   Alb 3.6    /   DBili --      12-02              Radiology:  CT Abdomen and Pelvis w/ IV Cont:   ACC: [id number] EXAM:  CT ABDOMEN AND PELVIS IC                          PROCEDURE DATE:  12/02/2022          INTERPRETATION:  Clinical History / Reason for exam: Abdominal pain and   diarrhea, recently returned from [place].    Technique:  Contiguousaxial CT images of the abdomen and pelvis were   obtained following administration of intravenous contrast.  Oral contrast   was not administered.  Reformatted images in the coronal and sagittal   planes were acquired.    Comparison CT: 2/11/2005    FINDINGS:    LOWER CHEST: Unremarkable.    HEPATOBILIARY: The liver is normal in appearance.  No evidence of intra   or extrahepatic biliary dilatation. The gallbladder is suboptimally   imaged.    SPLEEN: Unremarkable.    PANCREAS: Unremarkable.    ADRENAL GLANDS: Unremarkable.    KIDNEYS: Symmetric pattern of renal enhancement. No evidence of a mass,   hydronephrosis or hydroureter.    ABDOMINOPELVIC NODES: There are prominent likely reactive lymph nodes   predominantly in the right lower quadrant.    PELVIC ORGANS: Unremarkable.    PERITONEUM/MESENTERY/BOWEL: No bowel obstruction, ascites or free   intraperitoneal air. The appendix is normal in caliber. There is   circumferential wall thickening of the entire colon with prominence of   the vasculature in the region of the rectum and sigmoid segments.    BONES/SOFT TISSUES: There are degenerative changes of the spine and both   hips.    VASCULAR:  The aorta is normal in caliber. Mild to moderate   atherosclerotic calcifications extend into the major branches.      IMPRESSION:      Pancolitis.    --- End of Report ---            SKINNY DANG MD; Attending Radiologist  This document has been electronically signed. Dec  2 2022 10:32PM (12-02-22 @ 22:07)

## 2022-12-03 NOTE — H&P ADULT - NSHPLABSRESULTS_GEN_ALL_CORE
14.3   10.58 )-----------( 488      ( 02 Dec 2022 20:54 )             40.9     12-02    129<L>  |  91<L>  |  13  ----------------------------<  113<H>  4.2   |  21  |  1.1    Ca    8.7      02 Dec 2022 20:54    TPro  6.1  /  Alb  3.6  /  TBili  0.3  /  DBili  x   /  AST  12  /  ALT  8   /  AlkPhos  64  12-02

## 2022-12-03 NOTE — H&P ADULT - ASSESSMENT
60 yo male pt w PMH of HTN presenting for bloody diarrhea sp recent trip to [place].     # Acute bloody diarrhea with associated pancolitis likely 2/2 Infectious vs less likely Inflammatory disease  - afebrile, not septic on admission, HD stable   - CT AP w IVC: pancolitis  - colonoscopy done with Dr. Drummond 5 yrs ago that was normal as per pt  - recent trip to [place]   - FH +ve for Colon Ca in father (age 70) and Crohn's in his sister  - GI pcr with shiga toxin, C diff  - ESR. CRP, lactoferrin, fecal calprotectin     - no need for pain management   - NPO  - GI c/s  - ID c/s  - will hold off ABx for now  - IVF: NS @ 75cc/hr      # hypovolemic hyponatremia 2/2 diarrhea  - Na 129 on admission  - hypovolemic on exam    - IVF: NS @ 75 cc/hr  - obtain urine studies if no improvement       # HTN   - will hold off olmesartan for now       # DVT: NI  # Diet: Npo

## 2022-12-03 NOTE — CONSULT NOTE ADULT - ASSESSMENT
60 yo male pt w PMH of HTN presenting for diarrhea. Pt recently went to a trip to [place] on the 8th of Nov and returned on the 14th. Pt was asymptomatic during the trip, but on the 15th of November started having watery non bloody diarrhea that developed into bloody diarrhea on the 25th. Pt has since been having almost 10 episodes of bloody diarrhea waking him up at night associated with crampy abdominal pain prior to diarrhea episode. Pt was prescribed flagyl on the 29th with no improvement of his symptoms. However, diarrheal episodes started spacing out today. As per pt, he has lost almost 12 pounds since the start of his diarrheal episodes.  ROS also +ve for nausea but with no vomiting for which he attributes to the dicylomine he was started on and inability to tolerate PO diet. GI was consulted for management    #Persistent Watery -> Bloody Diarrhea - r/o infectious vs inflammatory etiology  #High risk for colon cancer   - Hb 14 - at baseline  - Colonoscopy 5 yeras ago per patient- wnl  - no evidence of sepsis  - CT: evidence of pancolitis and RLQ LN+    Plan  - please send ESR, CRP  - please send C. Diff, GI PCR, fecal calprotectin and lactoferrin  - monitor bms  - clear liquid diet  - pain control  - IV fluids if not tolerating diet  - if infectious workup is negative and persistent diarrhea pt will need diagnostic flex sig  60 yo male pt w PMH of HTN presenting for diarrhea. Pt recently went to a trip to [employer] on the 8th of Nov and returned on the 14th. Pt was asymptomatic during the trip, but on the 15th of November started having watery non bloody diarrhea that developed into bloody diarrhea on the 25th. Pt has since been having almost 10 episodes of bloody diarrhea waking him up at night associated with crampy abdominal pain prior to diarrhea episode. Pt was prescribed flagyl on the 29th with no improvement of his symptoms. However, diarrheal episodes started spacing out today. As per pt, he has lost almost 12 pounds since the start of his diarrheal episodes.  ROS also +ve for nausea but with no vomiting for which he attributes to the dicylomine he was started on and inability to tolerate PO diet. GI was consulted for management    #Persistent Watery -> Bloody Diarrhea - r/o infectious vs inflammatory etiology  #High risk for colon cancer   - Hb 14 - at baseline  - Colonoscopy 5 yeras ago per patient- wnl  - no evidence of sepsis  - CT: evidence of pancolitis and RLQ LN+    Plan  - please send ESR, CRP  - please send C. Diff, GI PCR, fecal calprotectin and lactoferrin  - monitor bms  - clear liquid diet  - pain control  - IV fluids if not tolerating diet  - if infectious workup is negative and persistent diarrhea pt will need diagnostic flex sig. otherwise can have elective outpatient colonoscopy 60 yo male pt w PMH of HTN presenting for diarrhea. Pt recently went to a trip to [place] on the 8th of Nov and returned on the 14th. Pt was asymptomatic during the trip, but on the 15th of November started having watery non bloody diarrhea that developed into bloody diarrhea on the 25th. Pt has since been having almost 10 episodes of bloody diarrhea waking him up at night associated with crampy abdominal pain prior to diarrhea episode. Pt was prescribed flagyl on the 29th with no improvement of his symptoms. However, diarrheal episodes started spacing out today. As per pt, he has lost almost 12 pounds since the start of his diarrheal episodes.  ROS also +ve for nausea but with no vomiting for which he attributes to the dicylomine he was started on and inability to tolerate PO diet. GI was consulted for management    #Persistent Watery -> Bloody Diarrhea - r/o infectious vs inflammatory etiology  #High risk for colon cancer   - Hb 14 - at baseline  - Colonoscopy 5 yeras ago per patient- wnl  - no evidence of sepsis  - CT: evidence of pancolitis and RLQ LN+    Plan  - please send ESR, CRP  - please send C. Diff, GI PCR, fecal calprotectin and lactoferrin  - monitor bms  - clear liquid diet today then advance tomorrow as tolerated if pt improving  - pain control  - IV fluids if not tolerating diet  - continue IV antibiotics (worsening bloody diarrhea) - appreciate ID recs  - if infectious workup is negative and persistent diarrhea pt will need diagnostic flex sig. otherwise can have elective outpatient colonoscopy

## 2022-12-03 NOTE — CONSULT NOTE ADULT - SUBJECTIVE AND OBJECTIVE BOX
BEATRIZ DRUMMOND  61y, Male  Allergy: penicillin (Rash)      CHIEF COMPLAINT:   Bloody Diarrhea (03 Dec 2022 03:38)      LOS  1d    HPI  HPI:  60 yo male pt w PMH of HTN presenting for diarrhea. Pt recently went to a trip to [place] on the 8th of Nov and returned on the 14th. Pt was asymptomatic during the trip, but on the 15th of November started having watery non bloody diarrhea that developed into bloody diarrhea on the 25th. Pt has since been having almost 10 episodes of bloody diarrhea waking him up at night associated with crampy abdominal pain prior to diarrhea episode. Pt was prescribed flagyl on the 29th with no improvement of his symptoms. However, diarrheal episodes started spacing out today. As per pt, he has lost almost 12 pounds since the start of his diarrheal episodes.  ROS also +ve for nausea but with no vomiting for which he attributes to the dicylomine he was started on and inability to tolerate PO diet. Pt was traveling with a group of 130 ppl none of who had similar symptoms. Pt never has bloody diarrhea before. Pt had a colonoscopy done with Dr. Drummond 5 yrs ago that was normal as per pt. FH +ve for colon cancer in father at the age of 70 and Crohn's disease in his sister. Denies smoking or drinking hx.     ED course:   VS: afebrile, HR 87, /63/ SPO2 100% on RA      LABS:                        14.3   10.58 )-----------( 488                 40.9       129<L>  |  91<L>  |  13  ----------------------------<  113<H>  4.2   |  21  |  1.1    Ca    8.7         TPro  6.1  /  Alb  3.6  /  TBili  0.3  /  DBili  x   /  AST  12  /  ALT  8   /  AlkPhos  64  12-02    Imaging: CT AP w IVC: pancolitis       sp cipro and flagyl in ED        (03 Dec 2022 03:38)      INFECTIOUS DISEASE HISTORY:  ID consulted for blood diarrhea  failed outpatient cipro  recent travel to [place] 11/8-14    Currently ordered for:  ciprofloxacin   IVPB 400 milliGRAM(s) IV Intermittent two times a day  metroNIDAZOLE  IVPB 500 milliGRAM(s) IV Intermittent every 8 hours      PMH  PAST MEDICAL & SURGICAL HISTORY:  No pertinent past medical history          FAMILY HISTORY  as noted above     SOCIAL HISTORY  Social History:  Denies smoking or alcohol hx  in a monogamous relationship with his wife (03 Dec 2022 03:38)        ROS  ***    VITALS:  T(F): 98.7, Max: 98.7 (12-03-22 @ 08:44)  HR: 88  BP: 118/58  RR: 18Vital Signs Last 24 Hrs  T(C): 37.1 (03 Dec 2022 08:44), Max: 37.1 (03 Dec 2022 08:44)  T(F): 98.7 (03 Dec 2022 08:44), Max: 98.7 (03 Dec 2022 08:44)  HR: 88 (03 Dec 2022 08:44) (87 - 105)  BP: 118/58 (03 Dec 2022 08:44) (118/58 - 137/63)  BP(mean): --  RR: 18 (03 Dec 2022 08:44) (18 - 18)  SpO2: 97% (03 Dec 2022 08:44) (95% - 97%)    Parameters below as of 03 Dec 2022 08:44  Patient On (Oxygen Delivery Method): room air        PHYSICAL EXAM:  ***    TESTS & MEASUREMENTS:                        13.0   9.64  )-----------( 423      ( 03 Dec 2022 08:19 )             38.4     12-03    135  |  97<L>  |  9<L>  ----------------------------<  101<H>  3.8   |  22  |  0.9    Ca    8.5      03 Dec 2022 08:19  Mg     1.7     12-03    TPro  5.6<L>  /  Alb  3.4<L>  /  TBili  0.3  /  DBili  x   /  AST  11  /  ALT  8   /  AlkPhos  55  12-03      LIVER FUNCTIONS - ( 03 Dec 2022 08:19 )  Alb: 3.4 g/dL / Pro: 5.6 g/dL / ALK PHOS: 55 U/L / ALT: 8 U/L / AST: 11 U/L / GGT: x           Urinalysis Basic - ( 02 Dec 2022 23:30 )    Color: Yellow / Appearance: Clear / SG: >1.050 / pH: x  Gluc: x / Ketone: Large  / Bili: Negative / Urobili: <2 mg/dL   Blood: x / Protein: 30 mg/dL / Nitrite: Negative   Leuk Esterase: Negative / RBC: 1 /HPF / WBC 3 /HPF   Sq Epi: x / Non Sq Epi: 2 /HPF / Bacteria: Negative          Lactate, Blood: 1.1 mmol/L (12-02-22 @ 20:54)      INFECTIOUS DISEASES TESTING  COVID-19 PCR: NotDetec (12-02-22 @ 23:37)      INFLAMMATORY MARKERS  Sedimentation Rate, Erythrocyte: 49 mm/Hr (12-03-22 @ 08:19)      RADIOLOGY & ADDITIONAL TESTS:  I have personally reviewed the last Chest xray  CXR      CT  CT Abdomen and Pelvis w/ IV Cont:   ACC: [id number] EXAM:  CT ABDOMEN AND PELVIS IC                          PROCEDURE DATE:  12/02/2022          INTERPRETATION:  Clinical History / Reason for exam: Abdominal pain and   diarrhea, recently returned from [place].    Technique:  Contiguousaxial CT images of the abdomen and pelvis were   obtained following administration of intravenous contrast.  Oral contrast   was not administered.  Reformatted images in the coronal and sagittal   planes were acquired.    Comparison CT: 2/11/2005    FINDINGS:    LOWER CHEST: Unremarkable.    HEPATOBILIARY: The liver is normal in appearance.  No evidence of intra   or extrahepatic biliary dilatation. The gallbladder is suboptimally   imaged.    SPLEEN: Unremarkable.    PANCREAS: Unremarkable.    ADRENAL GLANDS: Unremarkable.    KIDNEYS: Symmetric pattern of renal enhancement. No evidence of a mass,   hydronephrosis or hydroureter.    ABDOMINOPELVIC NODES: There are prominent likely reactive lymph nodes   predominantly in the right lower quadrant.    PELVIC ORGANS: Unremarkable.    PERITONEUM/MESENTERY/BOWEL: No bowel obstruction, ascites or free   intraperitoneal air. The appendix is normal in caliber. There is   circumferential wall thickening of the entire colon with prominence of   the vasculature in the region of the rectum and sigmoid segments.    BONES/SOFT TISSUES: There are degenerative changes of the spine and both   hips.    VASCULAR:  The aorta is normal in caliber. Mild to moderate   atherosclerotic calcifications extend into the major branches.      IMPRESSION:      Pancolitis.    --- End of Report ---            SKINNY DANG MD; Attending Radiologist  This document has been electronically signed. Dec  2 2022 10:32PM (12-02-22 @ 22:07)      CARDIOLOGY TESTING      MEDICATIONS  ciprofloxacin   IVPB 400 IV Intermittent two times a day  metroNIDAZOLE  IVPB 500 IV Intermittent every 8 hours  sodium chloride 0.9%. 1000 IV Continuous <Continuous>        ANTIBIOTICS:  ciprofloxacin   IVPB 400 milliGRAM(s) IV Intermittent two times a day  metroNIDAZOLE  IVPB 500 milliGRAM(s) IV Intermittent every 8 hours      ALLERGIES:  penicillin (Rash)       BEATRIZ DRUMMOND  61y, Male  Allergy: penicillin (Rash)      CHIEF COMPLAINT:   Bloody Diarrhea (03 Dec 2022 03:38)      LOS  1d    HPI  HPI:  60 yo male pt w PMH of HTN presenting for diarrhea. Pt recently went to a trip to [place] on the 8th of Nov and returned on the 14th. Pt was asymptomatic during the trip, but on the 15th of November started having watery non bloody diarrhea that developed into bloody diarrhea on the 25th. Pt has since been having almost 10 episodes of bloody diarrhea waking him up at night associated with crampy abdominal pain prior to diarrhea episode. Pt was prescribed flagyl on the 29th with no improvement of his symptoms. However, diarrheal episodes started spacing out today. As per pt, he has lost almost 12 pounds since the start of his diarrheal episodes.  ROS also +ve for nausea but with no vomiting for which he attributes to the dicylomine he was started on and inability to tolerate PO diet. Pt was traveling with a group of 130 ppl none of who had similar symptoms. Pt never has bloody diarrhea before. Pt had a colonoscopy done with Dr. Drummond 5 yrs ago that was normal as per pt. FH +ve for colon cancer in father at the age of 70 and Crohn's disease in his sister. Denies smoking or drinking hx.     ED course:   VS: afebrile, HR 87, /63/ SPO2 100% on RA      LABS:                        14.3   10.58 )-----------( 488                 40.9       129<L>  |  91<L>  |  13  ----------------------------<  113<H>  4.2   |  21  |  1.1    Ca    8.7         TPro  6.1  /  Alb  3.6  /  TBili  0.3  /  DBili  x   /  AST  12  /  ALT  8   /  AlkPhos  64  12-02    Imaging: CT AP w IVC: pancolitis       sp cipro and flagyl in ED        (03 Dec 2022 03:38)      INFECTIOUS DISEASE HISTORY:  ID consulted for blood diarrhea  failed outpatient cipro  recent travel to [place] 11/8-14, stayed at a resort in [facility], did not leave    Currently ordered for:  ciprofloxacin   IVPB 400 milliGRAM(s) IV Intermittent two times a day  metroNIDAZOLE  IVPB 500 milliGRAM(s) IV Intermittent every 8 hours      PMH  PAST MEDICAL & SURGICAL HISTORY:  No pertinent past medical history          FAMILY HISTORY  as noted above     SOCIAL HISTORY  Social History:  Denies smoking or alcohol hx  in a monogamous relationship with his wife (03 Dec 2022 03:38)        ROS  General: Denies rigors, nightsweats  HEENT: Denies headache, rhinorrhea, sore throat, eye pain  CV: Denies CP, palpitations  PULM: Denies wheezing, hemoptysis  GI: as noted above   : Denies discharge, hematuria  MSK: Denies arthralgias, myalgias  SKIN: Denies rash, lesions  NEURO: Denies paresthesias, weakness  PSYCH: Denies depression, anxiety     VITALS:  T(F): 98.7, Max: 98.7 (12-03-22 @ 08:44)  HR: 88  BP: 118/58  RR: 18Vital Signs Last 24 Hrs  T(C): 37.1 (03 Dec 2022 08:44), Max: 37.1 (03 Dec 2022 08:44)  T(F): 98.7 (03 Dec 2022 08:44), Max: 98.7 (03 Dec 2022 08:44)  HR: 88 (03 Dec 2022 08:44) (87 - 105)  BP: 118/58 (03 Dec 2022 08:44) (118/58 - 137/63)  BP(mean): --  RR: 18 (03 Dec 2022 08:44) (18 - 18)  SpO2: 97% (03 Dec 2022 08:44) (95% - 97%)    Parameters below as of 03 Dec 2022 08:44  Patient On (Oxygen Delivery Method): room air        PHYSICAL EXAM:  Gen: NAD, resting in bed  HEENT: Normocephalic, atraumatic  Neck: supple, no lymphadenopathy  CV: Regular rate & regular rhythm  Lungs: decreased BS at bases, no fremitus  Abdomen: Soft, BS present, mild tenderness  Ext: Warm, well perfused  Neuro: non focal, awake  Skin: no rash, no erythema  Lines: no phlebitis     TESTS & MEASUREMENTS:                        13.0   9.64  )-----------( 423      ( 03 Dec 2022 08:19 )             38.4     12-03    135  |  97<L>  |  9<L>  ----------------------------<  101<H>  3.8   |  22  |  0.9    Ca    8.5      03 Dec 2022 08:19  Mg     1.7     12-03    TPro  5.6<L>  /  Alb  3.4<L>  /  TBili  0.3  /  DBili  x   /  AST  11  /  ALT  8   /  AlkPhos  55  12-03      LIVER FUNCTIONS - ( 03 Dec 2022 08:19 )  Alb: 3.4 g/dL / Pro: 5.6 g/dL / ALK PHOS: 55 U/L / ALT: 8 U/L / AST: 11 U/L / GGT: x           Urinalysis Basic - ( 02 Dec 2022 23:30 )    Color: Yellow / Appearance: Clear / SG: >1.050 / pH: x  Gluc: x / Ketone: Large  / Bili: Negative / Urobili: <2 mg/dL   Blood: x / Protein: 30 mg/dL / Nitrite: Negative   Leuk Esterase: Negative / RBC: 1 /HPF / WBC 3 /HPF   Sq Epi: x / Non Sq Epi: 2 /HPF / Bacteria: Negative          Lactate, Blood: 1.1 mmol/L (12-02-22 @ 20:54)      INFECTIOUS DISEASES TESTING  COVID-19 PCR: NotDetec (12-02-22 @ 23:37)      INFLAMMATORY MARKERS  Sedimentation Rate, Erythrocyte: 49 mm/Hr (12-03-22 @ 08:19)      RADIOLOGY & ADDITIONAL TESTS:  I have personally reviewed the last Chest xray  CXR      CT  CT Abdomen and Pelvis w/ IV Cont:   ACC: [id number] EXAM:  CT ABDOMEN AND PELVIS IC                          PROCEDURE DATE:  12/02/2022          INTERPRETATION:  Clinical History / Reason for exam: Abdominal pain and   diarrhea, recently returned from [place].    Technique:  Contiguousaxial CT images of the abdomen and pelvis were   obtained following administration of intravenous contrast.  Oral contrast   was not administered.  Reformatted images in the coronal and sagittal   planes were acquired.    Comparison CT: 2/11/2005    FINDINGS:    LOWER CHEST: Unremarkable.    HEPATOBILIARY: The liver is normal in appearance.  No evidence of intra   or extrahepatic biliary dilatation. The gallbladder is suboptimally   imaged.    SPLEEN: Unremarkable.    PANCREAS: Unremarkable.    ADRENAL GLANDS: Unremarkable.    KIDNEYS: Symmetric pattern of renal enhancement. No evidence of a mass,   hydronephrosis or hydroureter.    ABDOMINOPELVIC NODES: There are prominent likely reactive lymph nodes   predominantly in the right lower quadrant.    PELVIC ORGANS: Unremarkable.    PERITONEUM/MESENTERY/BOWEL: No bowel obstruction, ascites or free   intraperitoneal air. The appendix is normal in caliber. There is   circumferential wall thickening of the entire colon with prominence of   the vasculature in the region of the rectum and sigmoid segments.    BONES/SOFT TISSUES: There are degenerative changes of the spine and both   hips.    VASCULAR:  The aorta is normal in caliber. Mild to moderate   atherosclerotic calcifications extend into the major branches.      IMPRESSION:      Pancolitis.    --- End of Report ---            SKINNY DANG MD; Attending Radiologist  This document has been electronically signed. Dec  2 2022 10:32PM (12-02-22 @ 22:07)      CARDIOLOGY TESTING      MEDICATIONS  ciprofloxacin   IVPB 400 IV Intermittent two times a day  metroNIDAZOLE  IVPB 500 IV Intermittent every 8 hours  sodium chloride 0.9%. 1000 IV Continuous <Continuous>        ANTIBIOTICS:  ciprofloxacin   IVPB 400 milliGRAM(s) IV Intermittent two times a day  metroNIDAZOLE  IVPB 500 milliGRAM(s) IV Intermittent every 8 hours      ALLERGIES:  penicillin (Rash)

## 2022-12-03 NOTE — H&P ADULT - NSHPPHYSICALEXAM_GEN_ALL_CORE
General: NAD  Lung: CTA  Heart: nrl S1S2  Abdomen: soft, hyperactive BS, mild diffuse tenderness  No LLE  AOX3

## 2022-12-03 NOTE — CONSULT NOTE ADULT - ATTENDING COMMENTS
Time-based billing (NON-critical care). 70 minutes spent on total encounter; more than 50% of the visit was spent counseling and / or coordinating care by the attending physician.  The necessity of the time spent during the encounter on this date of service was due to: Coordination of care.

## 2022-12-03 NOTE — H&P ADULT - TIME BILLING
HPI as above.  Interval history: Pt seen and examined at bedside. No cp or sob.  Pt still having diarrhea, not eating   Vital Signs (24 Hrs):  T(C): 37.1 (12-03-22 @ 08:44), Max: 37.1 (12-03-22 @ 08:44)  HR: 88 (12-03-22 @ 08:44) (87 - 105)  BP: 118/58 (12-03-22 @ 08:44) (118/58 - 137/63)  RR: 18 (12-03-22 @ 08:44) (18 - 18)  SpO2: 97% (12-03-22 @ 08:44) (95% - 97%)  Wt(kg): --  Daily     Daily     I&O's Summary    PHYSICAL EXAM:  GENERAL: NAD, well-developed  HEAD:  Atraumatic, Normocephalic  EYES: EOMI, PERRLA, conjunctiva and sclera clear  NECK: Supple, No JVD  CHEST/LUNG: Clear to auscultation bilaterally; No wheeze  HEART: Regular rate and rhythm; No murmurs, rubs, or gallops  ABDOMEN: Soft, Nontender, Nondistended; Bowel sounds present  EXTREMITIES:  2+ Peripheral Pulses, No clubbing, cyanosis, or edema  PSYCH: AAOx3  NEUROLOGY: non-focal  SKIN: No rashes or lesions  Labs reviewed  Imaging reviewed independently and reviewed read  < from: CT Abdomen and Pelvis w/ IV Cont (12.02.22 @ 22:07) >    IMPRESSION:      Pancolitis.    < end of copied text >    Plan  #abd pain 2/2 diarrhea 2/2 pancolitis- Gi consult, Cipro flagyl, advanced diet as tolerated, GI pcr, cdiff, IVF   #hypona- improved  #mag deff- 2/2 diarrhea- repleted     #Progress Note Handoff  Pending (specify): GI, follow up stool count  Family discussion: dw pt at bedside  Disposition: home 24-48 hrs    time spent on review of labs, imaging studies, old records, obtaining history, personally examining patient, counselling and communicating with patient, entering orders for medications/tests/etc, discussions with other health care providers, documentation in electronic health records, independent interpretation of labs, imaging/procedure results and care coordination. Isotretinoin Counseling: Patient should get monthly blood tests, not donate blood, not drive at night if vision affected, not share medication, and not undergo elective surgery for 6 months after tx completed. Side effects reviewed, pt to contact office should one occur.

## 2022-12-03 NOTE — CONSULT NOTE ADULT - ASSESSMENT
ASSESSMENT  62 yo male pt w PMH of HTN presenting for diarrhea. Pt recently went to a trip to [place] on the 8th of Nov and returned on the 14th. Pt was asymptomatic during the trip, but on the 15th of November started having watery non bloody diarrhea that developed into bloody diarrhea on the 25th. Pt has since been having almost 10 episodes of bloody diarrhea waking him up at night associated with crampy abdominal pain prior to diarrhea episode. Pt was prescribed flagyl on the 29th with no improvement of his symptoms. However, diarrheal episodes started spacing out today. As per pt, he has lost almost 12 pounds since the start of his diarrheal episodes.  ROS also +ve for nausea but with no vomiting for which he attributes to the dicylomine he was started on and inability to tolerate PO diet. Pt was traveling with a group of 130 ppl none of who had similar symptoms. Pt never has bloody diarrhea before. Pt had a colonoscopy done with Dr. Drummond 5 yrs ago that was normal as per pt. FH +ve for colon cancer in father at the age of 70 and Crohn's disease in his sister. Denies smoking or drinking hx.     IMPRESSION  #    CTAP Pancolitis  #Sepsis ruled out on admission   #Hyponatremia   #Abx allergy: penicillin (Rash)    Creatinine, Serum: 0.9 (12-03-22 @ 08:19)    Weight (kg): 94.3 (12-02-22 @ 19:22)    RECOMMENDATIONS  This is an incomplete consult note. All final recommendations to follow after interview and examination of the patient. Please follow recommendations noted below.    If any questions, please call or send a message on [employer] Teams  Please continue to update ID with any pertinent new laboratory or radiographic findings  #[id number]     ASSESSMENT  62 yo male pt w PMH of HTN presenting for diarrhea. Pt recently went to a trip to [place] on the 8th of Nov and returned on the 14th. Pt was asymptomatic during the trip, but on the 15th of November started having watery non bloody diarrhea that developed into bloody diarrhea on the 25th. Pt has since been having almost 10 episodes of bloody diarrhea waking him up at night associated with crampy abdominal pain prior to diarrhea episode. Pt was prescribed flagyl on the 29th with no improvement of his symptoms. However, diarrheal episodes started spacing out today. As per pt, he has lost almost 12 pounds since the start of his diarrheal episodes.  ROS also +ve for nausea but with no vomiting for which he attributes to the dicylomine he was started on and inability to tolerate PO diet. Pt was traveling with a group of 130 ppl none of who had similar symptoms. Pt never has bloody diarrhea before. Pt had a colonoscopy done with Dr. Drummond 5 yrs ago that was normal as per pt. FH +ve for colon cancer in father at the age of 70 and Crohn's disease in his sister. Denies smoking or drinking hx.     IMPRESSION  #Bloody diarrhea     CTAP Pancolitis  #Sepsis ruled out on admission   #Hyponatremia   #Abx allergy: penicillin (Rash)    Creatinine, Serum: 0.9 (12-03-22 @ 08:19)    Weight (kg): 94.3 (12-02-22 @ 19:22)    RECOMMENDATIONS  - GI PCR panel, stool O&P  - on cipro/flagyl for severe diarrhea  - f/u GI consult    If any questions, please call or send a message on [employer]  Please continue to update ID with any pertinent new laboratory or radiographic findings  #[id number]

## 2022-12-03 NOTE — H&P ADULT - HISTORY OF PRESENT ILLNESS
60 yo male pt w PMH of HTN presenting for diarrhea. Pt recently went to a trip to [place] on the 8th of Nov and returned on the 14th. Pt was asymptomatic during the trip, but on the 15th of November started having watery non bloody diarrhea that developed into bloody diarrhea on the 25th. Pt has since been having almost 10 episodes of bloody diarrhea waking him up at night associated with crampy abdominal pain prior to diarrhea episode. Pt was prescribed flagyl on the 29th with no improvement of his symptoms. However, diarrheal episodes started spacing out today. As per pt, he has lost almost 12 pounds since the start of his diarrheal episodes.  ROS also +ve for nausea but with no vomiting for which he attributes to the dicylomine he was started on and inability to tolerate PO diet. Pt was traveling with a group of 130 ppl none of who had similar symptoms. Pt never has bloody diarrhea before. Pt had a colonoscopy done with Dr. Drummond 5 yrs ago that was normal as per pt. FH +ve for colon cancer in father at the age of 70 and Crohn's disease in his sister. Denies smoking or drinking hx.     ED course:   VS: afebrile, HR 87, /63/ SPO2 100% on RA      LABS:                        14.3   10.58 )-----------( 488                 40.9       129<L>  |  91<L>  |  13  ----------------------------<  113<H>  4.2   |  21  |  1.1    Ca    8.7         TPro  6.1  /  Alb  3.6  /  TBili  0.3  /  DBili  x   /  AST  12  /  ALT  8   /  AlkPhos  64  12-02    Imaging: CT AP w IVC: pancolitis       sp cipro and flagyl in ED

## 2022-12-04 LAB
ALBUMIN SERPL ELPH-MCNC: 3.3 G/DL — LOW (ref 3.5–5.2)
ALP SERPL-CCNC: 53 U/L — SIGNIFICANT CHANGE UP (ref 30–115)
ALT FLD-CCNC: 8 U/L — SIGNIFICANT CHANGE UP (ref 0–41)
ANION GAP SERPL CALC-SCNC: 15 MMOL/L — HIGH (ref 7–14)
AST SERPL-CCNC: 11 U/L — SIGNIFICANT CHANGE UP (ref 0–41)
BASOPHILS # BLD AUTO: 0.1 K/UL — SIGNIFICANT CHANGE UP (ref 0–0.2)
BASOPHILS NFR BLD AUTO: 0.9 % — SIGNIFICANT CHANGE UP (ref 0–1)
BILIRUB SERPL-MCNC: <0.2 MG/DL — SIGNIFICANT CHANGE UP (ref 0.2–1.2)
BUN SERPL-MCNC: 6 MG/DL — LOW (ref 10–20)
CALCIUM SERPL-MCNC: 8.3 MG/DL — LOW (ref 8.4–10.5)
CHLORIDE SERPL-SCNC: 100 MMOL/L — SIGNIFICANT CHANGE UP (ref 98–110)
CMV IGM FLD-ACNC: <8 AU/ML — SIGNIFICANT CHANGE UP
CMV IGM SERPL QL: NEGATIVE — SIGNIFICANT CHANGE UP
CO2 SERPL-SCNC: 24 MMOL/L — SIGNIFICANT CHANGE UP (ref 17–32)
CREAT SERPL-MCNC: 0.8 MG/DL — SIGNIFICANT CHANGE UP (ref 0.7–1.5)
CRP SERPL-MCNC: 149 MG/L — HIGH
CULTURE RESULTS: SIGNIFICANT CHANGE UP
EGFR: 101 ML/MIN/1.73M2 — SIGNIFICANT CHANGE UP
EOSINOPHIL # BLD AUTO: 0.26 K/UL — SIGNIFICANT CHANGE UP (ref 0–0.7)
EOSINOPHIL NFR BLD AUTO: 2.4 % — SIGNIFICANT CHANGE UP (ref 0–8)
ERYTHROCYTE [SEDIMENTATION RATE] IN BLOOD: 66 MM/HR — HIGH (ref 0–10)
GLUCOSE SERPL-MCNC: 100 MG/DL — HIGH (ref 70–99)
HCT VFR BLD CALC: 39.5 % — LOW (ref 42–52)
HGB BLD-MCNC: 12.9 G/DL — LOW (ref 14–18)
HIV 1+2 AB+HIV1 P24 AG SERPL QL IA: SIGNIFICANT CHANGE UP
IMM GRANULOCYTES NFR BLD AUTO: 1 % — HIGH (ref 0.1–0.3)
LYMPHOCYTES # BLD AUTO: 1.42 K/UL — SIGNIFICANT CHANGE UP (ref 1.2–3.4)
LYMPHOCYTES # BLD AUTO: 13 % — LOW (ref 20.5–51.1)
MAGNESIUM SERPL-MCNC: 1.9 MG/DL — SIGNIFICANT CHANGE UP (ref 1.8–2.4)
MCHC RBC-ENTMCNC: 30.3 PG — SIGNIFICANT CHANGE UP (ref 27–31)
MCHC RBC-ENTMCNC: 32.7 G/DL — SIGNIFICANT CHANGE UP (ref 32–37)
MCV RBC AUTO: 92.7 FL — SIGNIFICANT CHANGE UP (ref 80–94)
MONOCYTES # BLD AUTO: 1.3 K/UL — HIGH (ref 0.1–0.6)
MONOCYTES NFR BLD AUTO: 11.9 % — HIGH (ref 1.7–9.3)
NEUTROPHILS # BLD AUTO: 7.7 K/UL — HIGH (ref 1.4–6.5)
NEUTROPHILS NFR BLD AUTO: 70.8 % — SIGNIFICANT CHANGE UP (ref 42.2–75.2)
NRBC # BLD: 0 /100 WBCS — SIGNIFICANT CHANGE UP (ref 0–0)
PLATELET # BLD AUTO: 429 K/UL — HIGH (ref 130–400)
POTASSIUM SERPL-MCNC: 4 MMOL/L — SIGNIFICANT CHANGE UP (ref 3.5–5)
POTASSIUM SERPL-SCNC: 4 MMOL/L — SIGNIFICANT CHANGE UP (ref 3.5–5)
PROT SERPL-MCNC: 5.3 G/DL — LOW (ref 6–8)
RBC # BLD: 4.26 M/UL — LOW (ref 4.7–6.1)
RBC # FLD: 12.2 % — SIGNIFICANT CHANGE UP (ref 11.5–14.5)
SODIUM SERPL-SCNC: 139 MMOL/L — SIGNIFICANT CHANGE UP (ref 135–146)
SPECIMEN SOURCE: SIGNIFICANT CHANGE UP
WBC # BLD: 10.89 K/UL — HIGH (ref 4.8–10.8)
WBC # FLD AUTO: 10.89 K/UL — HIGH (ref 4.8–10.8)

## 2022-12-04 PROCEDURE — 99233 SBSQ HOSP IP/OBS HIGH 50: CPT

## 2022-12-04 RX ORDER — ENOXAPARIN SODIUM 100 MG/ML
40 INJECTION SUBCUTANEOUS EVERY 24 HOURS
Refills: 0 | Status: DISCONTINUED | OUTPATIENT
Start: 2022-12-04 | End: 2022-12-06

## 2022-12-04 RX ADMIN — Medication 100 MILLIGRAM(S): at 05:03

## 2022-12-04 RX ADMIN — Medication 200 MILLIGRAM(S): at 17:27

## 2022-12-04 RX ADMIN — ONDANSETRON 4 MILLIGRAM(S): 8 TABLET, FILM COATED ORAL at 05:05

## 2022-12-04 RX ADMIN — ONDANSETRON 4 MILLIGRAM(S): 8 TABLET, FILM COATED ORAL at 16:43

## 2022-12-04 RX ADMIN — ENOXAPARIN SODIUM 40 MILLIGRAM(S): 100 INJECTION SUBCUTANEOUS at 23:01

## 2022-12-04 RX ADMIN — Medication 100 MILLIGRAM(S): at 14:27

## 2022-12-04 RX ADMIN — Medication 100 MILLIGRAM(S): at 22:43

## 2022-12-04 RX ADMIN — SODIUM CHLORIDE 75 MILLILITER(S): 9 INJECTION INTRAMUSCULAR; INTRAVENOUS; SUBCUTANEOUS at 23:50

## 2022-12-04 RX ADMIN — Medication 200 MILLIGRAM(S): at 05:03

## 2022-12-04 RX ADMIN — SODIUM CHLORIDE 75 MILLILITER(S): 9 INJECTION INTRAMUSCULAR; INTRAVENOUS; SUBCUTANEOUS at 10:00

## 2022-12-04 NOTE — PROGRESS NOTE ADULT - ASSESSMENT
2 yo male pt w PMH of HTN presenting for diarrhea. Pt recently went to a trip to [place] on the 8th of Nov and returned on the 14th. Pt was asymptomatic during the trip, but on the 15th of November started having watery non bloody diarrhea that developed into bloody diarrhea on the 25th. Pt has since been having almost 10 episodes of bloody diarrhea waking him up at night associated with crampy abdominal pain prior to diarrhea episode. Pt was prescribed flagyl on the 29th with no improvement of his symptoms. However, diarrheal episodes started spacing out today. As per pt, he has lost almost 12 pounds since the start of his diarrheal episodes.  ROS also +ve for nausea but with no vomiting for which he attributes to the dicylomine he was started on and inability to tolerate PO diet. GI was consulted for management    #Persistent Watery -> Bloody Diarrhea -d/t EPEC  #High risk for colon cancer   - Hb 14 - at baseline  - Colonoscopy 5 yeras ago per patient- wnl  - no evidence of sepsis  - CT: evidence of pancolitis and RLQ LN+    Plan  - full liquids today and advance as tolerated  - complete 7 day cipro flagyl course per ID  - pain control  - pt will need elective outpatient colonoscopy  - Follow up with our [facility] located at [address] [place] [address]. Phone Number: [phone]   or with Dr. Drummond

## 2022-12-04 NOTE — PROGRESS NOTE ADULT - ASSESSMENT
62 yo male pt w PMH of HTN presenting for bloody diarrhea sp recent trip to [place].     # Acute bloody diarrhea with associated pancolitis likely 2/2 Enteropathogenic E. coli (EPEC)  - afebrile, not septic on admission, HD stable   - CT AP w IVC: pancolitis  - colonoscopy done with Dr. Drummond 5 yrs ago that was normal as per pt  - recent trip to [place]   - FH +ve for Colon Ca in father (age 70) and Crohn's in his sister  - GI pcr + , cdiff neg  - ESR. CRP, lactoferrin, fecal calprotectin     - no need for pain management   - adanced diet to full now regular tonight   - GI c/s appreciated  - ID c/s appreciated  - continue cipro/flagyl   - IVF: NS @ 75cc/hr  - had 1 bout of bloody diarrhea in evening, now clear      # hypovolemic hyponatremia- resolved   - Na 129 on admission  - hypovolemic on exam    - IVF: NS @ 75 cc/hr      # HTN   - will hold off olmesartan for now       #Progress Note Handoff  Pending (specify):  advance diet   Disposition: home  Anticipated date: 24 hrs

## 2022-12-04 NOTE — PROGRESS NOTE ADULT - ASSESSMENT
ASSESSMENT  62 yo male pt w PMH of HTN presenting for diarrhea. Pt recently went to a trip to [place] on the 8th of Nov and returned on the 14th. Pt was asymptomatic during the trip, but on the 15th of November started having watery non bloody diarrhea that developed into bloody diarrhea on the 25th. Pt has since been having almost 10 episodes of bloody diarrhea waking him up at night associated with crampy abdominal pain prior to diarrhea episode. Pt was prescribed flagyl on the 29th with no improvement of his symptoms. However, diarrheal episodes started spacing out today. As per pt, he has lost almost 12 pounds since the start of his diarrheal episodes.  ROS also +ve for nausea but with no vomiting for which he attributes to the dicylomine he was started on and inability to tolerate PO diet. Pt was traveling with a group of 130 ppl none of who had similar symptoms. Pt never has bloody diarrhea before. Pt had a colonoscopy done with Dr. Drummond 5 yrs ago that was normal as per pt. FH +ve for colon cancer in father at the age of 70 and Crohn's disease in his sister. Denies smoking or drinking hx.     IMPRESSION  #Bloody diarrhea - EPEC    CTAP Pancolitis  #Sepsis ruled out on admission   #Hyponatremia   #Abx allergy: penicillin (Rash)    Creatinine, Serum: 0.9 (12-03-22 @ 08:19)    Weight (kg): 94.3 (12-02-22 @ 19:22)    RECOMMENDATIONS  - on cipro/flagyl for severe diarrhea x 7 days PO on D/C  - f/u GI consult    If any questions, please call or send a message on [employer] Teams  Please continue to update ID with any pertinent new laboratory or radiographic findings  #[id number]

## 2022-12-04 NOTE — PROGRESS NOTE ADULT - TIME BILLING
time spent on review of labs, imaging studies, old records, obtaining history, personally examining patient, counselling and communicating with patient, entering orders for medications/tests/etc, discussions with other health care providers, documentation in electronic health records, independent interpretation of labs, imaging/procedure results and care coordination.
I have personally seen and examined this patient.  I have reviewed all pertinent clinical information and reviewed all relevant imaging and diagnostic studies personally.   If possible, I counseled the patient about diagnostic testing and treatment plan.   I discussed my recommendations with the primary team.

## 2022-12-05 LAB
ALBUMIN SERPL ELPH-MCNC: 3.2 G/DL — LOW (ref 3.5–5.2)
ALP SERPL-CCNC: 49 U/L — SIGNIFICANT CHANGE UP (ref 30–115)
ALT FLD-CCNC: 8 U/L — SIGNIFICANT CHANGE UP (ref 0–41)
ANION GAP SERPL CALC-SCNC: 13 MMOL/L — SIGNIFICANT CHANGE UP (ref 7–14)
AST SERPL-CCNC: 12 U/L — SIGNIFICANT CHANGE UP (ref 0–41)
BASOPHILS # BLD AUTO: 0.08 K/UL — SIGNIFICANT CHANGE UP (ref 0–0.2)
BASOPHILS NFR BLD AUTO: 0.8 % — SIGNIFICANT CHANGE UP (ref 0–1)
BILIRUB SERPL-MCNC: <0.2 MG/DL — SIGNIFICANT CHANGE UP (ref 0.2–1.2)
BUN SERPL-MCNC: 4 MG/DL — LOW (ref 10–20)
CALCIUM SERPL-MCNC: 8.3 MG/DL — LOW (ref 8.4–10.4)
CHLORIDE SERPL-SCNC: 99 MMOL/L — SIGNIFICANT CHANGE UP (ref 98–110)
CO2 SERPL-SCNC: 26 MMOL/L — SIGNIFICANT CHANGE UP (ref 17–32)
CREAT SERPL-MCNC: 0.8 MG/DL — SIGNIFICANT CHANGE UP (ref 0.7–1.5)
EGFR: 101 ML/MIN/1.73M2 — SIGNIFICANT CHANGE UP
EOSINOPHIL # BLD AUTO: 0.26 K/UL — SIGNIFICANT CHANGE UP (ref 0–0.7)
EOSINOPHIL NFR BLD AUTO: 2.6 % — SIGNIFICANT CHANGE UP (ref 0–8)
GLUCOSE SERPL-MCNC: 106 MG/DL — HIGH (ref 70–99)
HCT VFR BLD CALC: 38.6 % — LOW (ref 42–52)
HCV AB S/CO SERPL IA: 0.03 COI — SIGNIFICANT CHANGE UP
HCV AB SERPL-IMP: SIGNIFICANT CHANGE UP
HGB BLD-MCNC: 12.7 G/DL — LOW (ref 14–18)
IMM GRANULOCYTES NFR BLD AUTO: 1.2 % — HIGH (ref 0.1–0.3)
LYMPHOCYTES # BLD AUTO: 1.68 K/UL — SIGNIFICANT CHANGE UP (ref 1.2–3.4)
LYMPHOCYTES # BLD AUTO: 16.7 % — LOW (ref 20.5–51.1)
MAGNESIUM SERPL-MCNC: 1.7 MG/DL — LOW (ref 1.8–2.4)
MCHC RBC-ENTMCNC: 30.3 PG — SIGNIFICANT CHANGE UP (ref 27–31)
MCHC RBC-ENTMCNC: 32.9 G/DL — SIGNIFICANT CHANGE UP (ref 32–37)
MCV RBC AUTO: 92.1 FL — SIGNIFICANT CHANGE UP (ref 80–94)
MONOCYTES # BLD AUTO: 1.12 K/UL — HIGH (ref 0.1–0.6)
MONOCYTES NFR BLD AUTO: 11.1 % — HIGH (ref 1.7–9.3)
NEUTROPHILS # BLD AUTO: 6.81 K/UL — HIGH (ref 1.4–6.5)
NEUTROPHILS NFR BLD AUTO: 67.6 % — SIGNIFICANT CHANGE UP (ref 42.2–75.2)
NRBC # BLD: 0 /100 WBCS — SIGNIFICANT CHANGE UP (ref 0–0)
PLATELET # BLD AUTO: 437 K/UL — HIGH (ref 130–400)
POTASSIUM SERPL-MCNC: 3.6 MMOL/L — SIGNIFICANT CHANGE UP (ref 3.5–5)
POTASSIUM SERPL-SCNC: 3.6 MMOL/L — SIGNIFICANT CHANGE UP (ref 3.5–5)
PROT SERPL-MCNC: 5.1 G/DL — LOW (ref 6–8)
RBC # BLD: 4.19 M/UL — LOW (ref 4.7–6.1)
RBC # FLD: 12.3 % — SIGNIFICANT CHANGE UP (ref 11.5–14.5)
SODIUM SERPL-SCNC: 138 MMOL/L — SIGNIFICANT CHANGE UP (ref 135–146)
WBC # BLD: 10.07 K/UL — SIGNIFICANT CHANGE UP (ref 4.8–10.8)
WBC # FLD AUTO: 10.07 K/UL — SIGNIFICANT CHANGE UP (ref 4.8–10.8)

## 2022-12-05 PROCEDURE — 99233 SBSQ HOSP IP/OBS HIGH 50: CPT

## 2022-12-05 RX ORDER — MAGNESIUM SULFATE 500 MG/ML
2 VIAL (ML) INJECTION ONCE
Refills: 0 | Status: COMPLETED | OUTPATIENT
Start: 2022-12-05 | End: 2022-12-05

## 2022-12-05 RX ADMIN — Medication 100 MILLIGRAM(S): at 05:52

## 2022-12-05 RX ADMIN — Medication 200 MILLIGRAM(S): at 17:46

## 2022-12-05 RX ADMIN — ONDANSETRON 4 MILLIGRAM(S): 8 TABLET, FILM COATED ORAL at 06:53

## 2022-12-05 RX ADMIN — ENOXAPARIN SODIUM 40 MILLIGRAM(S): 100 INJECTION SUBCUTANEOUS at 22:28

## 2022-12-05 RX ADMIN — Medication 200 MILLIGRAM(S): at 05:54

## 2022-12-05 RX ADMIN — ONDANSETRON 4 MILLIGRAM(S): 8 TABLET, FILM COATED ORAL at 17:46

## 2022-12-05 RX ADMIN — Medication 100 MILLIGRAM(S): at 21:25

## 2022-12-05 RX ADMIN — Medication 25 GRAM(S): at 12:22

## 2022-12-05 RX ADMIN — Medication 100 MILLIGRAM(S): at 15:14

## 2022-12-05 NOTE — PROGRESS NOTE ADULT - SUBJECTIVE AND OBJECTIVE BOX
BEATRIZ DRUMMOND  61y, Male  Allergy: penicillin (Rash)      LOS  2d    CHIEF COMPLAINT: Bloody Diarrhea (03 Dec 2022 12:08)      INTERVAL EVENTS/HPI  - No acute events overnight, reports no change in stools  - T(F): , Max: 98.8 (12-03-22 @ 21:39)  - Denies any worsening symptoms  - Tolerating medication  - WBC Count: 10.89 (12-04-22 @ 06:18)  WBC Count: 9.64 (12-03-22 @ 08:19)     - Creatinine, Serum: 0.8 (12-04-22 @ 06:18)  Creatinine, Serum: 0.9 (12-03-22 @ 08:19)       ROS  General: Denies rigors, nightsweats  HEENT: Denies headache, rhinorrhea, sore throat, eye pain  CV: Denies CP, palpitations  PULM: Denies wheezing, hemoptysis  GI: Denies hematemesis, hematochezia, melena  : Denies discharge, hematuria  MSK: Denies arthralgias, myalgias  SKIN: Denies rash, lesions  NEURO: Denies paresthesias, weakness  PSYCH: Denies depression, anxiety    VITALS:  T(F): 97.3, Max: 98.8 (12-03-22 @ 21:39)  HR: 93  BP: 126/63  RR: 20Vital Signs Last 24 Hrs  T(C): 36.3 (04 Dec 2022 05:23), Max: 37.1 (03 Dec 2022 21:39)  T(F): 97.3 (04 Dec 2022 05:23), Max: 98.8 (03 Dec 2022 21:39)  HR: 93 (04 Dec 2022 05:23) (76 - 93)  BP: 126/63 (04 Dec 2022 05:23) (112/57 - 147/71)  BP(mean): --  RR: 20 (04 Dec 2022 05:23) (18 - 20)  SpO2: 96% (04 Dec 2022 05:23) (96% - 97%)    Parameters below as of 04 Dec 2022 05:23  Patient On (Oxygen Delivery Method): room air        PHYSICAL EXAM:  Gen: NAD, resting in bed  HEENT: Normocephalic, atraumatic  Neck: supple, no lymphadenopathy  CV: Regular rate & regular rhythm  Lungs: decreased BS at bases, no fremitus  Abdomen: Soft, BS present  Ext: Warm, well perfused  Neuro: non focal, awake  Skin: no rash, no erythema  Lines: no phlebitis    FH: Non-contributory  Social Hx: Non-contributory    TESTS & MEASUREMENTS:                        12.9   10.89 )-----------( 429      ( 04 Dec 2022 06:18 )             39.5     12-04    139  |  100  |  6<L>  ----------------------------<  100<H>  4.0   |  24  |  0.8    Ca    8.3<L>      04 Dec 2022 06:18  Mg     1.9     12-04    TPro  5.3<L>  /  Alb  3.3<L>  /  TBili  <0.2  /  DBili  x   /  AST  11  /  ALT  8   /  AlkPhos  53  12-04      LIVER FUNCTIONS - ( 04 Dec 2022 06:18 )  Alb: 3.3 g/dL / Pro: 5.3 g/dL / ALK PHOS: 53 U/L / ALT: 8 U/L / AST: 11 U/L / GGT: x           Urinalysis Basic - ( 02 Dec 2022 23:30 )    Color: Yellow / Appearance: Clear / SG: >1.050 / pH: x  Gluc: x / Ketone: Large  / Bili: Negative / Urobili: <2 mg/dL   Blood: x / Protein: 30 mg/dL / Nitrite: Negative   Leuk Esterase: Negative / RBC: 1 /HPF / WBC 3 /HPF   Sq Epi: x / Non Sq Epi: 2 /HPF / Bacteria: Negative        Culture - Urine (collected 12-02-22 @ 23:30)  Source: Clean Catch Clean Catch (Midstream)  Final Report (12-04-22 @ 11:47):    <10,000 CFU/mL Normal Urogenital Mellisa        Lactate, Blood: 1.1 mmol/L (12-02-22 @ 20:54)      INFECTIOUS DISEASES TESTING  HIV-1/2 Combo Result: Nonreact (12-03-22 @ 08:19)  COVID-19 PCR: NotDetec (12-02-22 @ 23:37)      INFLAMMATORY MARKERS  Sedimentation Rate, Erythrocyte: 66 mm/Hr (12-04-22 @ 06:18)  C-Reactive Protein, Serum: 149.0 mg/L (12-04-22 @ 06:18)  Sedimentation Rate, Erythrocyte: 49 mm/Hr (12-03-22 @ 08:19)  C-Reactive Protein, Serum: 144.8 mg/L (12-03-22 @ 08:19)      RADIOLOGY & ADDITIONAL TESTS:  I have personally reviewed the last available Chest xray  CXR      CT  CT Abdomen and Pelvis w/ IV Cont:   ACC: [id number] EXAM:  CT ABDOMEN AND PELVIS IC                          PROCEDURE DATE:  12/02/2022          INTERPRETATION:  Clinical History / Reason for exam: Abdominal pain and   diarrhea, recently returned from [place].    Technique:  Contiguousaxial CT images of the abdomen and pelvis were   obtained following administration of intravenous contrast.  Oral contrast   was not administered.  Reformatted images in the coronal and sagittal   planes were acquired.    Comparison CT: 2/11/2005    FINDINGS:    LOWER CHEST: Unremarkable.    HEPATOBILIARY: The liver is normal in appearance.  No evidence of intra   or extrahepatic biliary dilatation. The gallbladder is suboptimally   imaged.    SPLEEN: Unremarkable.    PANCREAS: Unremarkable.    ADRENAL GLANDS: Unremarkable.    KIDNEYS: Symmetric pattern of renal enhancement. No evidence of a mass,   hydronephrosis or hydroureter.    ABDOMINOPELVIC NODES: There are prominent likely reactive lymph nodes   predominantly in the right lower quadrant.    PELVIC ORGANS: Unremarkable.    PERITONEUM/MESENTERY/BOWEL: No bowel obstruction, ascites or free   intraperitoneal air. The appendix is normal in caliber. There is   circumferential wall thickening of the entire colon with prominence of   the vasculature in the region of the rectum and sigmoid segments.    BONES/SOFT TISSUES: There are degenerative changes of the spine and both   hips.    VASCULAR:  The aorta is normal in caliber. Mild to moderate   atherosclerotic calcifications extend into the major branches.      IMPRESSION:      Pancolitis.    --- End of Report ---            SKINNY DANG MD; Attending Radiologist  This document has been electronically signed. Dec  2 2022 10:32PM (12-02-22 @ 22:07)      CARDIOLOGY TESTING      MEDICATIONS  ciprofloxacin   IVPB 400 IV Intermittent two times a day  metroNIDAZOLE  IVPB 500 IV Intermittent every 8 hours  sodium chloride 0.9%. 1000 IV Continuous <Continuous>      WEIGHT  Weight (kg): 94.347 (12-03-22 @ 17:58)  Creatinine, Serum: 0.8 mg/dL (12-04-22 @ 06:18)      ANTIBIOTICS:  ciprofloxacin   IVPB 400 milliGRAM(s) IV Intermittent two times a day  metroNIDAZOLE  IVPB 500 milliGRAM(s) IV Intermittent every 8 hours      All available historical records have been reviewed      
pt seen and examined  still has bloody diarrhea     ROS: no cp, no sob, no n/v, no fever    Vital Signs Last 24 Hrs  T(C): 36.4 (05 Dec 2022 04:46), Max: 37.4 (04 Dec 2022 13:05)  T(F): 97.6 (05 Dec 2022 04:46), Max: 99.3 (04 Dec 2022 13:05)  HR: 66 (05 Dec 2022 04:46) (66 - 84)  BP: 110/62 (05 Dec 2022 04:46) (110/62 - 131/59)  BP(mean): --  RR: 20 (05 Dec 2022 04:46) (19 - 20)  SpO2: 95% (05 Dec 2022 04:46) (95% - 95%)    Parameters below as of 05 Dec 2022 04:46  Patient On (Oxygen Delivery Method): room air    physical exam  constitutional NAD, AAOX3, Respiratory  lungs CTA, CVS heart RRR, GI: abdomen Soft NT, ND, BS+, skin: intact  neuro exam Motor, sensory and CN normal, no deficit     MEDICATIONS  (STANDING):  ciprofloxacin   IVPB 400 milliGRAM(s) IV Intermittent two times a day  enoxaparin Injectable 40 milliGRAM(s) SubCutaneous every 24 hours  metroNIDAZOLE  IVPB 500 milliGRAM(s) IV Intermittent every 8 hours  sodium chloride 0.9%. 1000 milliLiter(s) (75 mL/Hr) IV Continuous <Continuous>    MEDICATIONS  (PRN):  ondansetron Injectable 4 milliGRAM(s) IV Push every 8 hours PRN Nausea and/or Vomiting                          12.7   10.07 )-----------( 437      ( 05 Dec 2022 07:13 )             38.6     12-05    138  |  99  |  4<L>  ----------------------------<  106<H>  3.6   |  26  |  0.8    Ca    8.3<L>      05 Dec 2022 07:13  Mg     1.7     12-05    TPro  5.1<L>  /  Alb  3.2<L>  /  TBili  <0.2  /  DBili  x   /  AST  12  /  ALT  8   /  AlkPhos  49  12-05    COVID-19 PCR: NotDetec (12-02-22 @ 23:37)    Culture - Blood (collected 03 Dec 2022 08:19)  Source: .Blood None  Preliminary Report (04 Dec 2022 19:02):    No growth to date.    Culture - Blood (collected 03 Dec 2022 08:19)  Source: .Blood None  Preliminary Report (04 Dec 2022 19:02):    No growth to date.    Culture - Urine (collected 02 Dec 2022 23:30)  Source: Clean Catch Clean Catch (Midstream)  Final Report (04 Dec 2022 11:47):    <10,000 CFU/mL Normal Urogenital Mellisa    GI PCR Panel Stool (12.03.22 @ 05:33)    GI PCR Panel: Detected: GI Panel PCR evaluates for:  Campylobacter, Plesiomonas shigelloides, Salmonella, Vibrio, Yersinia  enterocolitica, Enteroaggregative Escherichia (EAEC), Enteropathogenic E.  coli (EPEC), Enterotoxigenic E. coli (ETEC), Shiga-like toxin producing  E.coli (STEC), E. coli O157, Shigella/Enteroinvasive E. coli (EIEC),  Adenovirus, Astrovirus, Norovirus, Rotavirus, Sapovirus, Cryptosporidium,  Cyclospora cayetanensis, Entamoeba histolytica, Giardia lamblia.  For culture and susceptibility reports refer to “reflex stool culture”.    Enteropathogenic E. coli (EPEC): Detected    a/p  # Gastroenteritis, hemorrhagic diarreha,  EPEC, sepsis ruled out on admission, h/h stable   # hyponatremia resolved  # mag deficiency replace   # hypertension, controlled, not on medication at this time     #Progress Note Handoff  Pending (specify): clinical improvement   Family discussion: [place] pt   Disposition: Home, possible dc in am if medically stable              
Gastroenterology progress note:     Patient is a 61y old  Male who presents with a chief complaint of Bloody Diarrhea (04 Dec 2022 16:59)       Admitted on: 12-02-22    We are following the patient for:       Interval History:    No acute events overnight.   - Diet - tolerating clears  - last BM - 3 today blood with stool  - Abdominal pain - none      PAST MEDICAL & SURGICAL HISTORY:  HTN (hypertension)      No significant past surgical history          MEDICATIONS  (STANDING):  ciprofloxacin   IVPB 400 milliGRAM(s) IV Intermittent two times a day  enoxaparin Injectable 40 milliGRAM(s) SubCutaneous every 24 hours  metroNIDAZOLE  IVPB 500 milliGRAM(s) IV Intermittent every 8 hours  sodium chloride 0.9%. 1000 milliLiter(s) (75 mL/Hr) IV Continuous <Continuous>    MEDICATIONS  (PRN):  ondansetron Injectable 4 milliGRAM(s) IV Push every 8 hours PRN Nausea and/or Vomiting      Allergies  penicillin (Rash)      Review of Systems:   Cardiovascular:  No Chest Pain, No Palpitations  Respiratory:  No Cough, No Dyspnea  Gastrointestinal:  As described in HPI  Skin:  No Skin Lesions, No Jaundice  Neuro:  No Syncope, No Dizziness    Physical Examination:  T(C): 37.4 (12-04-22 @ 13:05), Max: 37.4 (12-04-22 @ 13:05)  HR: 84 (12-04-22 @ 13:05) (76 - 93)  BP: 131/59 (12-04-22 @ 13:05) (112/57 - 147/71)  RR: 19 (12-04-22 @ 13:05) (18 - 20)  SpO2: 96% (12-04-22 @ 05:23) (96% - 96%)  Weight (kg): 94.347 (12-03-22 @ 17:58)      GENERAL: AAOx3, no acute distress.  HEAD:  Atraumatic, Normocephalic  EYES: conjunctiva and sclera clear  NECK: Supple, no JVD or thyromegaly  CHEST/LUNG: Clear to auscultation bilaterally; No wheeze, rhonchi, or rales  HEART: Regular rate and rhythm; normal S1, S2, No murmurs.  ABDOMEN: Soft, nontender, nondistended; Bowel sounds present  NEUROLOGY: No asterixis or tremor.   SKIN: Intact, no jaundice     Data:                        12.9   10.89 )-----------( 429      ( 04 Dec 2022 06:18 )             39.5     Hgb trend:  12.9  12-04-22 @ 06:18  13.0  12-03-22 @ 08:19  14.3  12-02-22 @ 20:54      12-04    139  |  100  |  6<L>  ----------------------------<  100<H>  4.0   |  24  |  0.8    Ca    8.3<L>      04 Dec 2022 06:18  Mg     1.9     12-04    TPro  5.3<L>  /  Alb  3.3<L>  /  TBili  <0.2  /  DBili  x   /  AST  11  /  ALT  8   /  AlkPhos  53  12-04    Liver panel trend:  TBili <0.2   /   AST 11   /   ALT 8   /   AlkP 53   /   Tptn 5.3   /   Alb 3.3    /   DBili --      12-04  TBili 0.3   /   AST 11   /   ALT 8   /   AlkP 55   /   Tptn 5.6   /   Alb 3.4    /   DBili --      12-03  TBili 0.3   /   AST 12   /   ALT 8   /   AlkP 64   /   Tptn 6.1   /   Alb 3.6    /   DBili --      12-02          Culture - Urine (collected 02 Dec 2022 23:30)  Source: Clean Catch Clean Catch (Midstream)  Final Report (04 Dec 2022 11:47):    <10,000 CFU/mL Normal Urogenital Mellisa         Radiology:      
LENGTH OF HOSPITAL STAY: 3d    Overnight events: none  Complaints: still has diarrhea and bloody stools    CHIEF COMPLAINT:   Patient is a 61y old  Male who presents with a chief complaint of Bloody Diarrhea (05 Dec 2022 11:13)        HISTORY OF PRESENTING ILLNESS:    HPI:  60 yo male pt w PMH of HTN presenting for diarrhea. Pt recently went to a trip to [place] on the 8th of Nov and returned on the 14th. Pt was asymptomatic during the trip, but on the 15th of November started having watery non bloody diarrhea that developed into bloody diarrhea on the 25th. Pt has since been having almost 10 episodes of bloody diarrhea waking him up at night associated with crampy abdominal pain prior to diarrhea episode. Pt was prescribed flagyl on the 29th with no improvement of his symptoms. However, diarrheal episodes started spacing out today. As per pt, he has lost almost 12 pounds since the start of his diarrheal episodes.  ROS also +ve for nausea but with no vomiting for which he attributes to the dicylomine he was started on and inability to tolerate PO diet. Pt was traveling with a group of 130 ppl none of who had similar symptoms. Pt never has bloody diarrhea before. Pt had a colonoscopy done with Dr. Drummond 5 yrs ago that was normal as per pt. FH +ve for colon cancer in father at the age of 70 and Crohn's disease in his sister. Denies smoking or drinking hx.     ED course:   VS: afebrile, HR 87, /63/ SPO2 100% on RA      LABS:                        14.3   10.58 )-----------( 488                 40.9       129<L>  |  91<L>  |  13  ----------------------------<  113<H>  4.2   |  21  |  1.1    Ca    8.7         TPro  6.1  /  Alb  3.6  /  TBili  0.3  /  DBili  x   /  AST  12  /  ALT  8   /  AlkPhos  64  12-02    Imaging: CT AP w IVC: pancolitis       sp cipro and flagyl in ED        (03 Dec 2022 03:38)    PAST MEDICAL & SURGICAL HISTORY  PAST MEDICAL & SURGICAL HISTORY:  HTN (hypertension)      No significant past surgical history        SOCIAL HISTORY:    ALLERGIES:  penicillin (Rash)    MEDICATIONS:  STANDING MEDICATIONS  ciprofloxacin   IVPB 400 milliGRAM(s) IV Intermittent two times a day  enoxaparin Injectable 40 milliGRAM(s) SubCutaneous every 24 hours  metroNIDAZOLE  IVPB 500 milliGRAM(s) IV Intermittent every 8 hours  sodium chloride 0.9%. 1000 milliLiter(s) IV Continuous <Continuous>    PRN MEDICATIONS  ondansetron Injectable 4 milliGRAM(s) IV Push every 8 hours PRN    VITALS:   T(F): 98.4  HR: 83  BP: 128/57  RR: 18  SpO2: 95%    LABS:                        12.7   10.07 )-----------( 437      ( 05 Dec 2022 07:13 )             38.6     12-05    138  |  99  |  4<L>  ----------------------------<  106<H>  3.6   |  26  |  0.8    Ca    8.3<L>      05 Dec 2022 07:13  Mg     1.7     12-05    TPro  5.1<L>  /  Alb  3.2<L>  /  TBili  <0.2  /  DBili  x   /  AST  12  /  ALT  8   /  AlkPhos  49  12-05              Culture - Blood (collected 03 Dec 2022 08:19)  Source: .Blood None  Preliminary Report (04 Dec 2022 19:02):    No growth to date.    Culture - Blood (collected 03 Dec 2022 08:19)  Source: .Blood None  Preliminary Report (04 Dec 2022 19:02):    No growth to date.    Culture - Urine (collected 02 Dec 2022 23:30)  Source: Clean Catch Clean Catch (Midstream)  Final Report (04 Dec 2022 11:47):    <10,000 CFU/mL Normal Urogenital Mellisa            PHYSICAL EXAM:  GEN: No acute distress  LUNGS: Normal respiratory effort. Clear to auscultation bilaterally. on RA  HEART: S1/S2 present. RRR. no murmurs  ABD: Soft, non-tender, non-distended  EXT: no cyanosis or edema  NEURO: AAOX3, no focal deficits    
Pt seen and examined at bedside. No CP or SOB. Pt states that he is hungry but afraid to have diarrhea.           PAST MEDICAL & SURGICAL HISTORY:  HTN (hypertension)    No significant past surgical history        VITAL SIGNS (Last 24 hrs):  T(C): 37.4 (12-04-22 @ 13:05), Max: 37.4 (12-04-22 @ 13:05)  HR: 84 (12-04-22 @ 13:05) (76 - 93)  BP: 131/59 (12-04-22 @ 13:05) (112/57 - 147/71)  RR: 19 (12-04-22 @ 13:05) (18 - 20)  SpO2: 96% (12-04-22 @ 05:23) (96% - 96%)  Wt(kg): --  Daily Height in cm: 182.88 (03 Dec 2022 17:58)    Daily     I&O's Summary      PHYSICAL EXAM:  GENERAL: NAD, well-developed  HEAD:  Atraumatic, Normocephalic  EYES: EOMI, PERRLA, conjunctiva and sclera clear  NECK: Supple, No JVD  CHEST/LUNG: Clear to auscultation bilaterally; No wheeze  HEART: Regular rate and rhythm; No murmurs, rubs, or gallops  ABDOMEN: Soft, Nontender, Nondistended; Bowel sounds present  EXTREMITIES:  2+ Peripheral Pulses, No clubbing, cyanosis, or edema  PSYCH: AAOx3  NEUROLOGY: non-focal  SKIN: No rashes or lesions    Labs Reviewed  Spoke to patient in regards to abnormal labs.    CBC Full  -  ( 04 Dec 2022 06:18 )  WBC Count : 10.89 K/uL  Hemoglobin : 12.9 g/dL  Hematocrit : 39.5 %  Platelet Count - Automated : 429 K/uL  Mean Cell Volume : 92.7 fL  Mean Cell Hemoglobin : 30.3 pg  Mean Cell Hemoglobin Concentration : 32.7 g/dL  Auto Neutrophil # : 7.70 K/uL  Auto Lymphocyte # : 1.42 K/uL  Auto Monocyte # : 1.30 K/uL  Auto Eosinophil # : 0.26 K/uL  Auto Basophil # : 0.10 K/uL  Auto Neutrophil % : 70.8 %  Auto Lymphocyte % : 13.0 %  Auto Monocyte % : 11.9 %  Auto Eosinophil % : 2.4 %  Auto Basophil % : 0.9 %    BMP:    12-04 @ 06:18    Blood Urea Nitrogen - 6  Calcium - 8.3  Carbond Dioxide - 24  Chloride - 100  Creatinine - 0.8  Glucose - 100  Potassium - 4.0  Sodium - 139      Hemoglobin A1c -     Urine Culture:  12-02 @ 23:30 Urine culture: --    Culture Results:   <10,000 CFU/mL Normal Urogenital Mellisa  Method Type: --  Organism: --  Organism Identification: --  Specimen Source: Clean Catch Clean Catch (Midstream)        COVID Labs  CRP:  149.0 (12-04-22)  144.8 (12-03-22)      D-Dimer:      MEDICATIONS  (STANDING):  ciprofloxacin   IVPB 400 milliGRAM(s) IV Intermittent two times a day  metroNIDAZOLE  IVPB 500 milliGRAM(s) IV Intermittent every 8 hours  sodium chloride 0.9%. 1000 milliLiter(s) (75 mL/Hr) IV Continuous <Continuous>    MEDICATIONS  (PRN):  ondansetron Injectable 4 milliGRAM(s) IV Push every 8 hours PRN Nausea and/or Vomiting      GI PCR Panel Stool (12.03.22 @ 05:33)    GI PCR Panel: Detected: GI Panel PCR evaluates for:  Campylobacter, Plesiomonas shigelloides, Salmonella, Vibrio, Yersinia  enterocolitica, Enteroaggregative Escherichia (EAEC), Enteropathogenic E.  coli (EPEC), Enterotoxigenic E. coli (ETEC), Shiga-like toxin producing  E.coli (STEC), E. coli O157, Shigella/Enteroinvasive E. coli (EIEC),  Adenovirus, Astrovirus, Norovirus, Rotavirus, Sapovirus, Cryptosporidium,  Cyclospora cayetanensis, Entamoeba histolytica, Giardia lamblia.  For culture and susceptibility reports refer to “reflex stool culture”.    Enteropathogenic E. coli (EPEC): Detected

## 2022-12-05 NOTE — PROGRESS NOTE ADULT - ASSESSMENT
62 yo male pt w PMH of HTN presenting for bloody diarrhea sp recent trip to [place].     # Acute bloody diarrhea with associated pancolitis likely 2/2 Enteropathogenic E. coli (EPEC)  - CT AP w IVC showed pancolitis  - GI pcr + , cdiff neg  - full liquids and advance as tolerated  - complete 7 day cipro/flagyl course per ID  - pain control  - pt will need elective outpatient colonoscopy  - Follow up with our [facility] located at [address] [place] [address]. Phone Number: [phone]   or with Dr. Drummond    # hypovolemic hyponatremia- resolved   - Na 129 on admission  - hypovolemic on exam          # HTN   - will hold off olmesartan for now       #Progress Note Handoff  Pending (specify):  advance diet   Disposition: home  Anticipated date: 24 hrs   62 yo male pt w PMH of HTN presenting for bloody diarrhea sp recent trip to [place].     # Acute bloody diarrhea with associated pancolitis likely 2/2 Enteropathogenic E. coli (EPEC)  - CT AP w IVC showed pancolitis  - GI pcr + , cdiff neg  - complete 7 day cipro/flagyl course per ID  - pain control  - pt will need elective outpatient colonoscopy  - Follow up with [facility] located at [address] [place] [address]. Phone Number: [phone]   or with Dr. Drummond    # hypovolemic hyponatremia- resolved   - Na 129 on admission  - euvolemic on exam    # HTN   - will hold off olmesartan for now     DVT ppx: lovenox  GI ppx: none  Diet: DASH  Dispo: DC tomorrow

## 2022-12-06 ENCOUNTER — TRANSCRIPTION ENCOUNTER (OUTPATIENT)
Age: 62
End: 2022-12-06

## 2022-12-06 VITALS
SYSTOLIC BLOOD PRESSURE: 121 MMHG | OXYGEN SATURATION: 98 % | TEMPERATURE: 98 F | RESPIRATION RATE: 18 BRPM | DIASTOLIC BLOOD PRESSURE: 73 MMHG | HEART RATE: 79 BPM

## 2022-12-06 LAB
ALBUMIN SERPL ELPH-MCNC: 3 G/DL — LOW (ref 3.5–5.2)
ALP SERPL-CCNC: 41 U/L — SIGNIFICANT CHANGE UP (ref 30–115)
ALT FLD-CCNC: 8 U/L — SIGNIFICANT CHANGE UP (ref 0–41)
ANION GAP SERPL CALC-SCNC: 10 MMOL/L — SIGNIFICANT CHANGE UP (ref 7–14)
AST SERPL-CCNC: 13 U/L — SIGNIFICANT CHANGE UP (ref 0–41)
BASOPHILS # BLD AUTO: 0.05 K/UL — SIGNIFICANT CHANGE UP (ref 0–0.2)
BASOPHILS NFR BLD AUTO: 0.6 % — SIGNIFICANT CHANGE UP (ref 0–1)
BILIRUB SERPL-MCNC: <0.2 MG/DL — SIGNIFICANT CHANGE UP (ref 0.2–1.2)
BUN SERPL-MCNC: 4 MG/DL — LOW (ref 10–20)
CALCIUM SERPL-MCNC: 8.4 MG/DL — SIGNIFICANT CHANGE UP (ref 8.4–10.4)
CALPROTECTIN STL-MCNT: 3497 UG/G — HIGH (ref 0–120)
CHLORIDE SERPL-SCNC: 103 MMOL/L — SIGNIFICANT CHANGE UP (ref 98–110)
CO2 SERPL-SCNC: 29 MMOL/L — SIGNIFICANT CHANGE UP (ref 17–32)
CREAT SERPL-MCNC: 0.7 MG/DL — SIGNIFICANT CHANGE UP (ref 0.7–1.5)
EGFR: 105 ML/MIN/1.73M2 — SIGNIFICANT CHANGE UP
EOSINOPHIL # BLD AUTO: 0.26 K/UL — SIGNIFICANT CHANGE UP (ref 0–0.7)
EOSINOPHIL NFR BLD AUTO: 3.1 % — SIGNIFICANT CHANGE UP (ref 0–8)
GLUCOSE SERPL-MCNC: 104 MG/DL — HIGH (ref 70–99)
HCT VFR BLD CALC: 35.2 % — LOW (ref 42–52)
HGB BLD-MCNC: 11.5 G/DL — LOW (ref 14–18)
IMM GRANULOCYTES NFR BLD AUTO: 1.2 % — HIGH (ref 0.1–0.3)
LYMPHOCYTES # BLD AUTO: 1.57 K/UL — SIGNIFICANT CHANGE UP (ref 1.2–3.4)
LYMPHOCYTES # BLD AUTO: 18.6 % — LOW (ref 20.5–51.1)
MAGNESIUM SERPL-MCNC: 1.8 MG/DL — SIGNIFICANT CHANGE UP (ref 1.8–2.4)
MCHC RBC-ENTMCNC: 29.9 PG — SIGNIFICANT CHANGE UP (ref 27–31)
MCHC RBC-ENTMCNC: 32.7 G/DL — SIGNIFICANT CHANGE UP (ref 32–37)
MCV RBC AUTO: 91.7 FL — SIGNIFICANT CHANGE UP (ref 80–94)
MONOCYTES # BLD AUTO: 1.17 K/UL — HIGH (ref 0.1–0.6)
MONOCYTES NFR BLD AUTO: 13.8 % — HIGH (ref 1.7–9.3)
NEUTROPHILS # BLD AUTO: 5.31 K/UL — SIGNIFICANT CHANGE UP (ref 1.4–6.5)
NEUTROPHILS NFR BLD AUTO: 62.7 % — SIGNIFICANT CHANGE UP (ref 42.2–75.2)
NRBC # BLD: 0 /100 WBCS — SIGNIFICANT CHANGE UP (ref 0–0)
PLATELET # BLD AUTO: 382 K/UL — SIGNIFICANT CHANGE UP (ref 130–400)
POTASSIUM SERPL-MCNC: 4 MMOL/L — SIGNIFICANT CHANGE UP (ref 3.5–5)
POTASSIUM SERPL-SCNC: 4 MMOL/L — SIGNIFICANT CHANGE UP (ref 3.5–5)
PROT SERPL-MCNC: 4.9 G/DL — LOW (ref 6–8)
RBC # BLD: 3.84 M/UL — LOW (ref 4.7–6.1)
RBC # FLD: 12.2 % — SIGNIFICANT CHANGE UP (ref 11.5–14.5)
SODIUM SERPL-SCNC: 142 MMOL/L — SIGNIFICANT CHANGE UP (ref 135–146)
WBC # BLD: 8.46 K/UL — SIGNIFICANT CHANGE UP (ref 4.8–10.8)
WBC # FLD AUTO: 8.46 K/UL — SIGNIFICANT CHANGE UP (ref 4.8–10.8)

## 2022-12-06 PROCEDURE — 99239 HOSP IP/OBS DSCHRG MGMT >30: CPT

## 2022-12-06 RX ORDER — CIPROFLOXACIN LACTATE 400MG/40ML
1 VIAL (ML) INTRAVENOUS
Qty: 6 | Refills: 0
Start: 2022-12-06 | End: 2022-12-08

## 2022-12-06 RX ORDER — METRONIDAZOLE 500 MG
1 TABLET ORAL
Qty: 9 | Refills: 0
Start: 2022-12-06 | End: 2022-12-08

## 2022-12-06 RX ORDER — ONDANSETRON 8 MG/1
1 TABLET, FILM COATED ORAL
Qty: 6 | Refills: 0
Start: 2022-12-06 | End: 2022-12-08

## 2022-12-06 RX ORDER — CIPROFLOXACIN LACTATE 400MG/40ML
1 VIAL (ML) INTRAVENOUS
Qty: 3 | Refills: 0
Start: 2022-12-06 | End: 2022-12-08

## 2022-12-06 RX ORDER — MAGNESIUM OXIDE 400 MG ORAL TABLET 241.3 MG
400 TABLET ORAL
Refills: 0 | Status: DISCONTINUED | OUTPATIENT
Start: 2022-12-06 | End: 2022-12-06

## 2022-12-06 RX ADMIN — MAGNESIUM OXIDE 400 MG ORAL TABLET 400 MILLIGRAM(S): 241.3 TABLET ORAL at 12:27

## 2022-12-06 RX ADMIN — Medication 100 MILLIGRAM(S): at 05:18

## 2022-12-06 RX ADMIN — Medication 200 MILLIGRAM(S): at 05:19

## 2022-12-06 RX ADMIN — Medication 100 MILLIGRAM(S): at 15:01

## 2022-12-06 NOTE — DISCHARGE NOTE PROVIDER - NSDCCPCAREPLAN_GEN_ALL_CORE_FT
PRINCIPAL DISCHARGE DIAGNOSIS  Diagnosis: Pancolitis  Assessment and Plan of Treatment: Diarrhea is frequent loose or watery bowel movements that has many causes. Diarrhea can make you feel weak and cause you to become dehydrated. Diarrhea typically lasts 2–3 days, but can last longer if it is a sign of something more serious. Drink clear fluids to prevent dehydration. Eat bland, easy-to-digest foods as tolerated.   Your diarrhea was found to be secondary to a gut infection, which was treated with antibiotics. You were also given IV hydration during your stay which was changed to PO hydration prior to discharge.  SEEK IMMEDIATE MEDICAL CARE IF YOU HAVE ANY OF THE FOLLOWING SYMPTOMS: high fevers, lightheadedness/dizziness, chest pain, black or bloody stools, shortness of breath, severe abdominal or back pain, or any signs of dehydration.        SECONDARY DISCHARGE DIAGNOSES  Diagnosis: Dehydration  Assessment and Plan of Treatment:

## 2022-12-06 NOTE — DISCHARGE NOTE PROVIDER - NSDCMRMEDTOKEN_GEN_ALL_CORE_FT
olmesartan 5 mg oral tablet: 1 tab(s) orally once a day   Cipro 500 mg oral tablet: 1 tab(s) orally once a day   olmesartan 5 mg oral tablet: 1 tab(s) orally once a day   Cipro 500 mg oral tablet: 1 tab(s) orally 2 times a day   metroNIDAZOLE 500 mg oral tablet: 1 tab(s) orally 3 times a day   olmesartan 5 mg oral tablet: 1 tab(s) orally once a day  ondansetron 4 mg oral tablet: 1 tab(s) orally 2 times a day     Please medication as needed

## 2022-12-06 NOTE — DISCHARGE NOTE NURSING/CASE MANAGEMENT/SOCIAL WORK - NSDCPEFALRISK_GEN_ALL_CORE
For information on Fall & Injury Prevention, visit: [email or web address] OR  [email or web address] OR  https://www.cdc.gov/steadi/patient.html

## 2022-12-06 NOTE — DISCHARGE NOTE PROVIDER - ATTENDING DISCHARGE PHYSICAL EXAMINATION:
Vital Signs Last 24 Hrs  T(F): 98.2 (06 Dec 2022 05:03), Max: 99.5 (05 Dec 2022 19:55)  HR: 65 (06 Dec 2022 05:03) (65 - 83)  BP: 110/75 (06 Dec 2022 05:03) (95/53 - 128/57)  RR: 18 (06 Dec 2022 05:03) (18 - 18)  SpO2: 98% (06 Dec 2022 08:08) (98% - 98%)    PHYSICAL EXAM:  GENERAL: NAD, well-groomed, well-developed  HEAD:  Atraumatic, Normocephalic  EYES: EOMI, conjunctiva and sclera clear  ENMT: Moist mucous membranes, Good dentition, no thrush  NECK: Supple, No JVD  CHEST/LUNG: Clear to auscultation bilaterally, good air entry, non-labored breathing  HEART: RRR; S1/S2, No murmur  ABDOMEN: Soft, +tender, Nondistended; Bowel sounds present  VASCULAR: Normal pulses, Normal capillary refill  EXTREMITIES: No calf tenderness, No cyanosis, No edema  LYMPH: Normal; No lymphadenopathy noted  SKIN: Warm, Intact  PSYCH: Normal mood, Normal affect  NERVOUS SYSTEM:  A/O x3, Good concentration; CN 2-12 intact, No focal deficits

## 2022-12-06 NOTE — DISCHARGE NOTE NURSING/CASE MANAGEMENT/SOCIAL WORK - PATIENT PORTAL LINK FT
You can access the FollowMyHealth Patient Portal offered by [facility] by registering at the following website: [email or web address]. By joining [facility]’s FollowMyHealth portal, you will also be able to view your health information using other applications (apps) compatible with our system.

## 2022-12-06 NOTE — DISCHARGE NOTE NURSING/CASE MANAGEMENT/SOCIAL WORK - NSDCVIVACCINE_GEN_ALL_CORE_FT
Tdap; 26-Apr-2021 11:41; Fabiola Vargas (RN); [employer]; [id number] (Exp. Date: 18-Nov-2022); IntraMuscular; Deltoid Right.; 0.5 milliLiter(s); VIS (VIS Published: 09-May-2013, VIS Presented: 26-Apr-2021);

## 2022-12-06 NOTE — DISCHARGE NOTE PROVIDER - PROVIDER TOKENS
PROVIDER:[TOKEN:[[id number]],FOLLOWUP:[1 week]] PROVIDER:[TOKEN:[[id number]],FOLLOWUP:[1 week]],PROVIDER:[TOKEN:[[id number]],FOLLOWUP:[1 month]]

## 2022-12-06 NOTE — DISCHARGE NOTE PROVIDER - HOSPITAL COURSE
62 yo male pt w PMH of HTN presenting for diarrhea. Pt recently went to a trip to [place] on the 8th of Nov and returned on the 14th. Pt was asymptomatic during the trip, but on the 15th of November started having watery non bloody diarrhea that developed into bloody diarrhea on the 25th. Pt has since been having almost 10 episodes of bloody diarrhea waking him up at night associated with crampy abdominal pain prior to diarrhea episode. Pt was prescribed flagyl on the 29th with no improvement of his symptoms. However, diarrheal episodes started spacing out today. As per pt, he has lost almost 12 pounds since the start of his diarrheal episodes.  ROS also +ve for nausea but with no vomiting for which he attributes to the dicylomine he was started on and inability to tolerate PO diet. Pt was traveling with a group of 130 ppl none of who had similar symptoms. Pt never has bloody diarrhea before. Pt had a colonoscopy done with Dr. Drummond 5 yrs ago that was normal as per pt. FH +ve for colon cancer in father at the age of 70 and Crohn's disease in his sister. Denies smoking or drinking hx.     ED course:   VS: afebrile, HR 87, /63/ SPO2 100% on RA      LABS:                        14.3   10.58 )-----------( 488                 40.9       129<L>  |  91<L>  |  13  ----------------------------<  113<H>  4.2   |  21  |  1.1    Ca    8.7         TPro  6.1  /  Alb  3.6  /  TBili  0.3  /  DBili  x   /  AST  12  /  ALT  8   /  AlkPhos  64  12-02    Imaging: CT AP w IVC: pancolitis       sp cipro and flagyl in ED     Hospital Course:    62 yo male pt w PMH of HTN presenting for bloody diarrhea sp recent trip to [place].     # Acute bloody diarrhea with associated pancolitis likely 2/2 Enteropathogenic E. coli (EPEC)  - CT AP w IVC showed pancolitis  - GI pcr + , cdiff neg  - complete 7 day cipro/flagyl course per ID  - pain control  - pt will need elective outpatient colonoscopy  - [facility] up with [facility] located at [address] [place] [address]. Phone Number: [phone]   or with Dr. Drummond    # hypovolemic hyponatremia- resolved   - Na 129 on admission  - euvolemic on exam    # HTN   - will hold off olmesartan for now     DVT ppx: lovenox  GI ppx: none  Diet: DASH  Dispo: DC tomorrow    Patient has been on IV cipro/flagyl. He is currently AAOx3, in NAD, HDS. Patient is approved for discharge home.    Patient instructed to complete prescribed course of oral abx upon discharge.    Patient instructed to follow up with GI as outpatient for routine follow-up/screening colonoscopy

## 2022-12-06 NOTE — DISCHARGE NOTE PROVIDER - CARE PROVIDER_API CALL
Dara Acosta  Internal Medicine  [address] [place] [address]  Phone: [phone]  Fax: ()-  Follow Up Time: 1 week   Dara Acosta  Internal Medicine  [address] [place] [address]  Phone: [phone]  Fax: ()-  Follow Up Time: 1 week    Eric Drummond)  Gastroenterology; Internal Medicine  [address] [place] [address]  Phone: [phone]  Fax: [phone]  Follow Up Time: 1 month

## 2022-12-06 NOTE — DISCHARGE NOTE NURSING/CASE MANAGEMENT/SOCIAL WORK - NSDCPNDISPN_GEN_ALL_CORE
Pt has voided clear yellow urine several times since admission, 400cc each time.      Pt asks what he has to do to get out of here,   Explained again he would need ok by md and a responsible , other than cab or uber Education provided on the pain management plan of care/Side effects of pain management treatment

## 2022-12-06 NOTE — DISCHARGE NOTE PROVIDER - NSDCFUADDINST_GEN_ALL_CORE_FT
Please follow instructions as provided by your healthcare team.    Please complete antibiotic course as prescribed.

## 2022-12-08 LAB
CULTURE RESULTS: SIGNIFICANT CHANGE UP
CULTURE RESULTS: SIGNIFICANT CHANGE UP
E COLI SXT STL QL IA: NEGATIVE — SIGNIFICANT CHANGE UP
SPECIMEN SOURCE: SIGNIFICANT CHANGE UP
SPECIMEN SOURCE: SIGNIFICANT CHANGE UP

## 2022-12-09 LAB — LACTOFERRIN STL-MCNC: 253.52 CD:794062635 — HIGH (ref 0–7.24)

## 2022-12-10 ENCOUNTER — INPATIENT (INPATIENT)
Facility: HOSPITAL | Age: 62
LOS: 9 days | Discharge: HOME | End: 2022-12-20
Attending: HOSPITALIST | Admitting: HOSPITALIST
Payer: COMMERCIAL

## 2022-12-10 VITALS
HEART RATE: 102 BPM | SYSTOLIC BLOOD PRESSURE: 128 MMHG | OXYGEN SATURATION: 99 % | TEMPERATURE: 98 F | RESPIRATION RATE: 18 BRPM | DIASTOLIC BLOOD PRESSURE: 77 MMHG | HEIGHT: 72 IN

## 2022-12-10 DIAGNOSIS — A04.0 ENTEROPATHOGENIC ESCHERICHIA COLI INFECTION: ICD-10-CM

## 2022-12-10 DIAGNOSIS — A41.9 SEPSIS, UNSPECIFIED ORGANISM: ICD-10-CM

## 2022-12-10 DIAGNOSIS — K51.014 ULCERATIVE (CHRONIC) PANCOLITIS WITH ABSCESS: ICD-10-CM

## 2022-12-10 DIAGNOSIS — K51.011 ULCERATIVE (CHRONIC) PANCOLITIS WITH RECTAL BLEEDING: ICD-10-CM

## 2022-12-10 DIAGNOSIS — Z88.0 ALLERGY STATUS TO PENICILLIN: ICD-10-CM

## 2022-12-10 DIAGNOSIS — E87.6 HYPOKALEMIA: ICD-10-CM

## 2022-12-10 DIAGNOSIS — Z80.0 FAMILY HISTORY OF MALIGNANT NEOPLASM OF DIGESTIVE ORGANS: ICD-10-CM

## 2022-12-10 DIAGNOSIS — E87.1 HYPO-OSMOLALITY AND HYPONATREMIA: ICD-10-CM

## 2022-12-10 DIAGNOSIS — E66.3 OVERWEIGHT: ICD-10-CM

## 2022-12-10 DIAGNOSIS — I10 ESSENTIAL (PRIMARY) HYPERTENSION: ICD-10-CM

## 2022-12-10 LAB
ALBUMIN SERPL ELPH-MCNC: 3.2 G/DL — LOW (ref 3.5–5.2)
ALP SERPL-CCNC: 48 U/L — SIGNIFICANT CHANGE UP (ref 30–115)
ALT FLD-CCNC: 19 U/L — SIGNIFICANT CHANGE UP (ref 0–41)
ANION GAP SERPL CALC-SCNC: 12 MMOL/L — SIGNIFICANT CHANGE UP (ref 7–14)
AST SERPL-CCNC: 17 U/L — SIGNIFICANT CHANGE UP (ref 0–41)
BASOPHILS # BLD AUTO: 0 K/UL — SIGNIFICANT CHANGE UP (ref 0–0.2)
BASOPHILS NFR BLD AUTO: 0 % — SIGNIFICANT CHANGE UP (ref 0–1)
BILIRUB SERPL-MCNC: 0.3 MG/DL — SIGNIFICANT CHANGE UP (ref 0.2–1.2)
BUN SERPL-MCNC: 7 MG/DL — LOW (ref 10–20)
C DIFF BY PCR RESULT: SIGNIFICANT CHANGE UP
CALCIUM SERPL-MCNC: 8.6 MG/DL — SIGNIFICANT CHANGE UP (ref 8.4–10.5)
CHLORIDE SERPL-SCNC: 90 MMOL/L — LOW (ref 98–110)
CO2 SERPL-SCNC: 32 MMOL/L — SIGNIFICANT CHANGE UP (ref 17–32)
CREAT SERPL-MCNC: 1 MG/DL — SIGNIFICANT CHANGE UP (ref 0.7–1.5)
EGFR: 86 ML/MIN/1.73M2 — SIGNIFICANT CHANGE UP
EOSINOPHIL # BLD AUTO: 0.21 K/UL — SIGNIFICANT CHANGE UP (ref 0–0.7)
EOSINOPHIL NFR BLD AUTO: 1.7 % — SIGNIFICANT CHANGE UP (ref 0–8)
GIANT PLATELETS BLD QL SMEAR: PRESENT — SIGNIFICANT CHANGE UP
GLUCOSE SERPL-MCNC: 136 MG/DL — HIGH (ref 70–99)
HCT VFR BLD CALC: 40.1 % — LOW (ref 42–52)
HGB BLD-MCNC: 13.8 G/DL — LOW (ref 14–18)
LIDOCAIN IGE QN: 97 U/L — HIGH (ref 7–60)
LYMPHOCYTES # BLD AUTO: 2.76 K/UL — SIGNIFICANT CHANGE UP (ref 1.2–3.4)
LYMPHOCYTES # BLD AUTO: 22.6 % — SIGNIFICANT CHANGE UP (ref 20.5–51.1)
MANUAL SMEAR VERIFICATION: SIGNIFICANT CHANGE UP
MCHC RBC-ENTMCNC: 30.9 PG — SIGNIFICANT CHANGE UP (ref 27–31)
MCHC RBC-ENTMCNC: 34.4 G/DL — SIGNIFICANT CHANGE UP (ref 32–37)
MCV RBC AUTO: 89.9 FL — SIGNIFICANT CHANGE UP (ref 80–94)
MONOCYTES # BLD AUTO: 1.17 K/UL — HIGH (ref 0.1–0.6)
MONOCYTES NFR BLD AUTO: 9.6 % — HIGH (ref 1.7–9.3)
NEUTROPHILS # BLD AUTO: 7.97 K/UL — HIGH (ref 1.4–6.5)
NEUTROPHILS NFR BLD AUTO: 65.2 % — SIGNIFICANT CHANGE UP (ref 42.2–75.2)
PLAT MORPH BLD: NORMAL — SIGNIFICANT CHANGE UP
PLATELET # BLD AUTO: 518 K/UL — HIGH (ref 130–400)
POLYCHROMASIA BLD QL SMEAR: SLIGHT — SIGNIFICANT CHANGE UP
POTASSIUM SERPL-MCNC: 4 MMOL/L — SIGNIFICANT CHANGE UP (ref 3.5–5)
POTASSIUM SERPL-SCNC: 4 MMOL/L — SIGNIFICANT CHANGE UP (ref 3.5–5)
PROT SERPL-MCNC: 5.9 G/DL — LOW (ref 6–8)
RBC # BLD: 4.46 M/UL — LOW (ref 4.7–6.1)
RBC # FLD: 12.3 % — SIGNIFICANT CHANGE UP (ref 11.5–14.5)
RBC BLD AUTO: NORMAL — SIGNIFICANT CHANGE UP
SARS-COV-2 RNA SPEC QL NAA+PROBE: SIGNIFICANT CHANGE UP
SODIUM SERPL-SCNC: 134 MMOL/L — LOW (ref 135–146)
VARIANT LYMPHS # BLD: 0.9 % — SIGNIFICANT CHANGE UP (ref 0–5)
WBC # BLD: 12.22 K/UL — HIGH (ref 4.8–10.8)
WBC # FLD AUTO: 12.22 K/UL — HIGH (ref 4.8–10.8)

## 2022-12-10 PROCEDURE — 74177 CT ABD & PELVIS W/CONTRAST: CPT | Mod: 26,MA

## 2022-12-10 PROCEDURE — 99285 EMERGENCY DEPT VISIT HI MDM: CPT

## 2022-12-10 PROCEDURE — 99223 1ST HOSP IP/OBS HIGH 75: CPT

## 2022-12-10 RX ORDER — METRONIDAZOLE 500 MG
500 TABLET ORAL EVERY 8 HOURS
Refills: 0 | Status: DISCONTINUED | OUTPATIENT
Start: 2022-12-11 | End: 2022-12-12

## 2022-12-10 RX ORDER — CIPROFLOXACIN LACTATE 400MG/40ML
VIAL (ML) INTRAVENOUS
Refills: 0 | Status: DISCONTINUED | OUTPATIENT
Start: 2022-12-10 | End: 2022-12-12

## 2022-12-10 RX ORDER — METRONIDAZOLE 500 MG
500 TABLET ORAL ONCE
Refills: 0 | Status: COMPLETED | OUTPATIENT
Start: 2022-12-10 | End: 2022-12-10

## 2022-12-10 RX ORDER — CIPROFLOXACIN LACTATE 400MG/40ML
400 VIAL (ML) INTRAVENOUS EVERY 12 HOURS
Refills: 0 | Status: DISCONTINUED | OUTPATIENT
Start: 2022-12-11 | End: 2022-12-12

## 2022-12-10 RX ORDER — CIPROFLOXACIN LACTATE 400MG/40ML
400 VIAL (ML) INTRAVENOUS ONCE
Refills: 0 | Status: COMPLETED | OUTPATIENT
Start: 2022-12-10 | End: 2022-12-10

## 2022-12-10 RX ORDER — SODIUM CHLORIDE 9 MG/ML
1000 INJECTION INTRAMUSCULAR; INTRAVENOUS; SUBCUTANEOUS ONCE
Refills: 0 | Status: COMPLETED | OUTPATIENT
Start: 2022-12-10 | End: 2022-12-10

## 2022-12-10 RX ORDER — LOSARTAN POTASSIUM 100 MG/1
12.5 TABLET, FILM COATED ORAL DAILY
Refills: 0 | Status: DISCONTINUED | OUTPATIENT
Start: 2022-12-10 | End: 2022-12-14

## 2022-12-10 RX ORDER — SODIUM CHLORIDE 9 MG/ML
1000 INJECTION INTRAMUSCULAR; INTRAVENOUS; SUBCUTANEOUS
Refills: 0 | Status: DISCONTINUED | OUTPATIENT
Start: 2022-12-10 | End: 2022-12-13

## 2022-12-10 RX ORDER — ONDANSETRON 8 MG/1
4 TABLET, FILM COATED ORAL
Refills: 0 | Status: DISCONTINUED | OUTPATIENT
Start: 2022-12-10 | End: 2022-12-13

## 2022-12-10 RX ORDER — ENOXAPARIN SODIUM 100 MG/ML
40 INJECTION SUBCUTANEOUS EVERY 24 HOURS
Refills: 0 | Status: DISCONTINUED | OUTPATIENT
Start: 2022-12-10 | End: 2022-12-10

## 2022-12-10 RX ORDER — METRONIDAZOLE 500 MG
TABLET ORAL
Refills: 0 | Status: DISCONTINUED | OUTPATIENT
Start: 2022-12-10 | End: 2022-12-12

## 2022-12-10 RX ORDER — ONDANSETRON 8 MG/1
4 TABLET, FILM COATED ORAL ONCE
Refills: 0 | Status: COMPLETED | OUTPATIENT
Start: 2022-12-10 | End: 2022-12-10

## 2022-12-10 RX ORDER — SODIUM CHLORIDE 9 MG/ML
1000 INJECTION, SOLUTION INTRAVENOUS
Refills: 0 | Status: DISCONTINUED | OUTPATIENT
Start: 2022-12-10 | End: 2022-12-10

## 2022-12-10 RX ADMIN — Medication 100 MILLIGRAM(S): at 20:42

## 2022-12-10 RX ADMIN — ONDANSETRON 4 MILLIGRAM(S): 8 TABLET, FILM COATED ORAL at 16:08

## 2022-12-10 RX ADMIN — SODIUM CHLORIDE 100 MILLILITER(S): 9 INJECTION INTRAMUSCULAR; INTRAVENOUS; SUBCUTANEOUS at 20:43

## 2022-12-10 RX ADMIN — ONDANSETRON 4 MILLIGRAM(S): 8 TABLET, FILM COATED ORAL at 21:41

## 2022-12-10 RX ADMIN — SODIUM CHLORIDE 1000 MILLILITER(S): 9 INJECTION INTRAMUSCULAR; INTRAVENOUS; SUBCUTANEOUS at 16:07

## 2022-12-10 RX ADMIN — SODIUM CHLORIDE 1000 MILLILITER(S): 9 INJECTION INTRAMUSCULAR; INTRAVENOUS; SUBCUTANEOUS at 20:31

## 2022-12-10 RX ADMIN — Medication 200 MILLIGRAM(S): at 20:42

## 2022-12-10 NOTE — ED ADULT TRIAGE NOTE - CHIEF COMPLAINT QUOTE
Patient a+ox4 reports dx with ecoli last week and finished course of antibiotics but still having diarrhea with vomiting. Denies fever

## 2022-12-10 NOTE — H&P ADULT - ASSESSMENT
61 y/o M presenting to ED for unresolved diarrhea.    # Unresolved Bloody Diarrhea - 2/2 EPEC; No Sepsis POA;   - Readmission due to persistent diarrhea despite completing 7 days of Cipro/Flagyl;  - Repeat CTAP: Redemonstrate pancolitis unchanged from last admission;   - Restart Cipro/Flagyl until further ID recommendations;   - OP Colonoscopy (as per GI recommendations);   - IVF for rehydration;     # Essential HTN - Controlled; c/w home ARB equivilant;     Diet: DASH  Activity: Ambulate as Tolerated  DVT ppx: Lovenox SQ  GI ppx: n/a  FULL CODE    Dispo: From home;          61 y/o M presenting to ED for unresolved diarrhea.    # Unresolved Bloody Diarrhea - 2/2 EPEC; No Sepsis POA;   - Readmission due to persistent diarrhea despite completing 7 days of Cipro/Flagyl;  - Repeat CTAP: Redemonstrate pancolitis unchanged from last admission;   - Restart Cipro/Flagyl until further ID recommendations;   - OP Colonoscopy (as per GI recommendations);   - IVF for rehydration;     # Hypovolemic Hyponatremia - c/w NS rehydration; Monitor BMP;     # Essential HTN - Controlled; c/w home ARB equivilant;     Diet: DASH  Activity: Ambulate as Tolerated  DVT ppx: Lovenox SQ  GI ppx: n/a  FULL CODE    Dispo: From home;

## 2022-12-10 NOTE — ED PROVIDER NOTE - ATTENDING APP SHARED VISIT CONTRIBUTION OF CARE
62 year old male, pmhx as documented presenting after recent admission for pancolitis. Patient had been tx at that time with abx and ultimately discharged. States symptoms never resolved and he is still having symptoms so returned to ED for evaluation. Describing pain as diffuse abdomen, non-radiating, no palliative or provocative factors, moderate severity. Otherwise denies fevers, N/V, urinary symptoms or any other complaints.    Vital Signs: I have reviewed the initial vital signs.  Constitutional: NAD, well-nourished, appears stated age, no acute distress.  HEENT: Airway patent, moist MM, no erythema/swelling/deformity of oral structures. EOMI, PERRLA.  CV: regular rate, regular rhythm, well-perfused extremities, 2+ b/l DP and radial pulses equal.  Lungs: BCTA, no increased WOB.  ABD: (+) diffuse tenderness, no guarding or rebound, no pulsatile mass, no hernias.   MSK: Neck supple, nontender, nl ROM, no stepoff. Chest nontender. Back nontender in TLS spine or to b/l bony structures or flanks. Ext nontender, nl rom, no deformity.   INTEG: Skin warm, dry, no rash.  NEURO: A&Ox3, normal strength, nl sensation throughout, normal speech.   PSYCH: Calm, cooperative, normal affect     WIll obtain labs, CT abd/pelvis, symptomatic control PRN, re-eval.

## 2022-12-10 NOTE — ED PROVIDER NOTE - NS ED ATTENDING STATEMENT MOD
This was a shared visit with the TATYANA. I reviewed and verified the documentation and independently performed the documented:

## 2022-12-10 NOTE — ED PROVIDER NOTE - OBJECTIVE STATEMENT
pt with PMHx HTN returns to ED after admission for pancolitis. pt was in [place] 12/8-12/14 and on 15th began experiencing n/v/abd pain that progressed to bloody diarrhea with decreased po intake/dehydration. was admitted, dx with e.coli pancolitis and tx with abx, which were cont orally on DC and completed yesterday without any change in sxs. Pt had a colonoscopy with Dr. Drummond 5 yrs ago that was normal as per pt. FH of colon cancer in father at the age of 70 and Crohn's disease in his sister. Denies fever/chill/HA/dizziness/chest pain/palpitation/sob/black stool/urinary sxs

## 2022-12-10 NOTE — ED PROVIDER NOTE - CLINICAL SUMMARY MEDICAL DECISION MAKING FREE TEXT BOX
Patient presented with known dx pancolitis with worsening symptoms s/p discharge. Otherwise on arrival patient afebrile, HD stable, (+) tender on exam. Obtained labs which were grossly unremarkable including no significant leukocytosis, anemia, signs of dehydration/ERICH, transaminitis or significant electrolyte abnormalities. CT abd/pelvis showed (+) pancolitis but no other emergent pathologies. Given persistent pain and symptoms, will require readmission for further management. Patient agreeable with plan. HD stable at time of admission.

## 2022-12-10 NOTE — H&P ADULT - NSHPLABSRESULTS_GEN_ALL_CORE
13.8   12.22 )-----------( 518      ( 10 Dec 2022 16:00 )             40.1       12-10    134<L>  |  90<L>  |  7<L>  ----------------------------<  136<H>  4.0   |  32  |  1.0    Ca    8.6      10 Dec 2022 16:00    TPro  5.9<L>  /  Alb  3.2<L>  /  TBili  0.3  /  DBili  x   /  AST  17  /  ALT  19  /  AlkPhos  48  12-10

## 2022-12-10 NOTE — H&P ADULT - ATTENDING COMMENTS
Patient seen and examined at bedside independently of the residents. I read the resident's note and agree with the plan with the additions and corrections as noted below.    REVIEW OF SYSTEMS:  CONSTITUTIONAL: No weakness, fevers or chills  EYES/ENT: No visual changes;  No vertigo or throat pain   NECK: No pain or stiffness  RESPIRATORY: No cough, wheezing, hemoptysis; No shortness of breath  CARDIOVASCULAR: No chest pain or palpitations  GASTROINTESTINAL: No abdominal or epigastric pain. No nausea, vomiting, or hematemesis. Bloody diarrhea.   GENITOURINARY: No dysuria, frequency or hematuria  NEUROLOGICAL: No numbness or weakness  MSK: No pain. No weakness.   SKIN: No itching, rashes.     PMH: HTN     FHx: Reviewed. No fhx of asthma/copd, No fhx of liver and pulmonary disease. Positive family hx of Colon cancer from father and Crohn from sister.     Physical Exam:  GEN: No acute distress. Awake, Alert and oriented x 3.   Head: Atraumatic, Normocephalic.   Eye: PEERLA. No sclera icterus. EOMI.   ENT: Normal oropharynx, no thyromegaly, no mass, no lymphadenopathy.   LUNGS: Clear to auscultation bilaterally. No wheeze/rales/crackles.   HEART: Normal. S1/S2 present. RRR. No murmur/gallops.   ABD: Soft, non-tender, non-distended. Bowel sounds present.   EXT: No pitting edema. No erythema. No tenderness.  Integumentary: No rash, No sore, No petechia.   NEURO: CN III-XII intact. Strength: 5/5 b/l ULE. Sensory intact b/l ULE.     Vital Signs Last 24 Hrs  T(C): 36.7 (10 Dec 2022 13:35), Max: 36.7 (10 Dec 2022 13:35)  T(F): 98 (10 Dec 2022 13:35), Max: 98 (10 Dec 2022 13:35)  HR: 102 (10 Dec 2022 13:35) (102 - 102)  BP: 128/77 (10 Dec 2022 13:35) (128/77 - 128/77)  BP(mean): --  RR: 18 (10 Dec 2022 13:35) (18 - 18)  SpO2: 99% (10 Dec 2022 13:35) (99% - 99%)    Parameters below as of 10 Dec 2022 13:35  Patient On (Oxygen Delivery Method): room air      Please see the above notes for Labs and radiology.     Assessment and Plan:     61 yo M with hx of HTN presents to ED for persistent bloody diarrhea.     Sepsis likely 2/2 acute Pancolitis with bloody diarrhea   - CT abdomen shows Diffuse colonic wall thickening, compatible with pancolitis, without significant change since CT abdomen pelvis 2022.  - c/w cipro and flagyl for now (pt was positive for EPEC previously)  - check C.diff PCR and stool Ova and parasite  - c/w IVF   - monitor CBC   - ID evaluation.     Hyponatremia   - likely 2/2 diarrhea  - c/w IVF NS @ 100cc/hr  - check serum Mg and K and replete prn.     HTN - on losartan    DVT ppx: encourage ambulation.   GI ppx: not indicated.   Diet: DASH   Activity: as tolerated.     Date seen by the attendin/10/2022.

## 2022-12-10 NOTE — H&P ADULT - HISTORY OF PRESENT ILLNESS
63 y/o M presenting to ED for persistent diarrhea.     PMHx: HTN   Family Hx: Colon cancer in father at age 70 & Crohn's disease in his sister.   Social Hx: Denies smoking or drinking    HPI starts from 12/3 when patient was first admitted after returning from a trip to [place] on 11/14 for bloody diarrhea. Patient was found to have      Pt had a colonoscopy done with Dr. Drummond 5 yrs ago that was normal as per pt.       ED course:   VS: afebrile, HR 87, /63/ SPO2 100% on RA      LABS:                        14.3   10.58 )-----------( 488                 40.9       129<L>  |  91<L>  |  13  ----------------------------<  113<H>  4.2   |  21  |  1.1    Ca    8.7         TPro  6.1  /  Alb  3.6  /  TBili  0.3  /  DBili  x   /  AST  12  /  ALT  8   /  AlkPhos  64  12-02    Imaging: CT AP w IVC: pancolitis       sp cipro and flagyl in ED     Hospital Course:    60 yo male pt w PMH of HTN presenting for bloody diarrhea sp recent trip to [place].     # Acute bloody diarrhea with associated pancolitis likely 2/2 Enteropathogenic E. coli (EPEC)  - CT AP w IVC showed pancolitis  - GI pcr + , cdiff neg  - complete 7 day cipro/flagyl course per ID  - pain control  - pt will need elective outpatient colonoscopy  - [facility] with [facility] located at [address] [place] [address]. Phone Number: [phone]   or with Dr. Drummond    # hypovolemic hyponatremia- resolved   - Na 129 on admission  - euvolemic on exam    # HTN   - will hold off olmesartan for now     DVT ppx: lovenox  GI ppx: none  Diet: DASH  Dispo: DC tomorrow    Patient has been on IV cipro/flagyl. He is currently AAOx3, in NAD, HDS. Patient is approved for discharge home.    Patient instructed to complete prescribed course of oral abx upon discharge.    Patient instructed to follow up with GI as outpatient for routine follow-up/screening colonoscopy   63 y/o M presenting to ED for persistent diarrhea.     PMHx: HTN   Family Hx: Colon cancer in father at age 70 & Crohn's disease in his sister.   Social Hx: Denies smoking or drinking    HPI starts from 12/3 when patient was first admitted after returning from a trip to [place] on 11/14 for bloody diarrhea. Patient was found to have pancolitis on CTAP & GI PCR was positive for EPEC. He was dc'd home to complete x7days of Cipro/Flagyl (as per ID). Was also seen by GI that recommended OP Colonoscopy.     4 days after discharge, patient returns to ED due to unresolved symptoms.     Pt had a colonoscopy done with Dr. Drummond 5 yrs ago that was normal as per pt.     Denies fever, chest pain, N/V.     ED:   - VS: Unremarkable  - Labs: WBC 12, Na 134,   - CTAP: Diffuse colonic wall thickening, compatible with pancolitis, without significant change since CT abdomen pelvis 12/2/2022.    Admit to Medicine

## 2022-12-11 DIAGNOSIS — K51.011 ULCERATIVE (CHRONIC) PANCOLITIS WITH RECTAL BLEEDING: ICD-10-CM

## 2022-12-11 DIAGNOSIS — Z20.822 CONTACT WITH AND (SUSPECTED) EXPOSURE TO COVID-19: ICD-10-CM

## 2022-12-11 DIAGNOSIS — E86.0 DEHYDRATION: ICD-10-CM

## 2022-12-11 DIAGNOSIS — I10 ESSENTIAL (PRIMARY) HYPERTENSION: ICD-10-CM

## 2022-12-11 DIAGNOSIS — E87.1 HYPO-OSMOLALITY AND HYPONATREMIA: ICD-10-CM

## 2022-12-11 DIAGNOSIS — E83.42 HYPOMAGNESEMIA: ICD-10-CM

## 2022-12-11 DIAGNOSIS — Z88.0 ALLERGY STATUS TO PENICILLIN: ICD-10-CM

## 2022-12-11 LAB
ANION GAP SERPL CALC-SCNC: 11 MMOL/L — SIGNIFICANT CHANGE UP (ref 7–14)
BASOPHILS # BLD AUTO: 0.06 K/UL — SIGNIFICANT CHANGE UP (ref 0–0.2)
BASOPHILS NFR BLD AUTO: 0.8 % — SIGNIFICANT CHANGE UP (ref 0–1)
BUN SERPL-MCNC: 8 MG/DL — LOW (ref 10–20)
CALCIUM SERPL-MCNC: 8 MG/DL — LOW (ref 8.4–10.5)
CHLORIDE SERPL-SCNC: 94 MMOL/L — LOW (ref 98–110)
CO2 SERPL-SCNC: 31 MMOL/L — SIGNIFICANT CHANGE UP (ref 17–32)
CREAT SERPL-MCNC: 0.9 MG/DL — SIGNIFICANT CHANGE UP (ref 0.7–1.5)
EGFR: 97 ML/MIN/1.73M2 — SIGNIFICANT CHANGE UP
EOSINOPHIL # BLD AUTO: 0.33 K/UL — SIGNIFICANT CHANGE UP (ref 0–0.7)
EOSINOPHIL NFR BLD AUTO: 4.2 % — SIGNIFICANT CHANGE UP (ref 0–8)
GLUCOSE SERPL-MCNC: 123 MG/DL — HIGH (ref 70–99)
HCT VFR BLD CALC: 36.2 % — LOW (ref 42–52)
HGB BLD-MCNC: 11.6 G/DL — LOW (ref 14–18)
IMM GRANULOCYTES NFR BLD AUTO: 1.1 % — HIGH (ref 0.1–0.3)
LYMPHOCYTES # BLD AUTO: 1.28 K/UL — SIGNIFICANT CHANGE UP (ref 1.2–3.4)
LYMPHOCYTES # BLD AUTO: 16.2 % — LOW (ref 20.5–51.1)
MAGNESIUM SERPL-MCNC: 1.8 MG/DL — SIGNIFICANT CHANGE UP (ref 1.8–2.4)
MCHC RBC-ENTMCNC: 29.4 PG — SIGNIFICANT CHANGE UP (ref 27–31)
MCHC RBC-ENTMCNC: 32 G/DL — SIGNIFICANT CHANGE UP (ref 32–37)
MCV RBC AUTO: 91.6 FL — SIGNIFICANT CHANGE UP (ref 80–94)
MONOCYTES # BLD AUTO: 1.19 K/UL — HIGH (ref 0.1–0.6)
MONOCYTES NFR BLD AUTO: 15 % — HIGH (ref 1.7–9.3)
NEUTROPHILS # BLD AUTO: 4.96 K/UL — SIGNIFICANT CHANGE UP (ref 1.4–6.5)
NEUTROPHILS NFR BLD AUTO: 62.7 % — SIGNIFICANT CHANGE UP (ref 42.2–75.2)
NRBC # BLD: 0 /100 WBCS — SIGNIFICANT CHANGE UP (ref 0–0)
PLATELET # BLD AUTO: 445 K/UL — HIGH (ref 130–400)
POTASSIUM SERPL-MCNC: 3.7 MMOL/L — SIGNIFICANT CHANGE UP (ref 3.5–5)
POTASSIUM SERPL-SCNC: 3.7 MMOL/L — SIGNIFICANT CHANGE UP (ref 3.5–5)
RBC # BLD: 3.95 M/UL — LOW (ref 4.7–6.1)
RBC # FLD: 12.3 % — SIGNIFICANT CHANGE UP (ref 11.5–14.5)
SODIUM SERPL-SCNC: 136 MMOL/L — SIGNIFICANT CHANGE UP (ref 135–146)
WBC # BLD: 7.91 K/UL — SIGNIFICANT CHANGE UP (ref 4.8–10.8)
WBC # FLD AUTO: 7.91 K/UL — SIGNIFICANT CHANGE UP (ref 4.8–10.8)

## 2022-12-11 PROCEDURE — 99233 SBSQ HOSP IP/OBS HIGH 50: CPT

## 2022-12-11 RX ADMIN — Medication 200 MILLIGRAM(S): at 06:12

## 2022-12-11 RX ADMIN — ONDANSETRON 4 MILLIGRAM(S): 8 TABLET, FILM COATED ORAL at 17:12

## 2022-12-11 RX ADMIN — Medication 100 MILLIGRAM(S): at 04:34

## 2022-12-11 RX ADMIN — Medication 200 MILLIGRAM(S): at 17:10

## 2022-12-11 RX ADMIN — ONDANSETRON 4 MILLIGRAM(S): 8 TABLET, FILM COATED ORAL at 06:16

## 2022-12-11 RX ADMIN — Medication 100 MILLIGRAM(S): at 21:17

## 2022-12-11 RX ADMIN — LOSARTAN POTASSIUM 12.5 MILLIGRAM(S): 100 TABLET, FILM COATED ORAL at 06:12

## 2022-12-11 RX ADMIN — Medication 100 MILLIGRAM(S): at 13:29

## 2022-12-11 NOTE — CONSULT NOTE ADULT - SUBJECTIVE AND OBJECTIVE BOX
BEATRIZ DRUMMOND  62y, Male  Allergy: penicillin (Rash)      All historical available data reviewed.    HPI:  63 y/o M presenting to ED for persistent diarrhea.     PMHx: HTN   Family Hx: Colon cancer in father at age 70 & Crohn's disease in his sister.   Social Hx: Denies smoking or drinking    HPI starts from 12/3 when patient was first admitted after returning from a trip to [place] on 11/14 for bloody diarrhea. Patient was found to have pancolitis on CTAP & GI PCR was positive for EPEC. He was dc'd home to complete x7days of Cipro/Flagyl (as per ID). Was also seen by GI that recommended OP Colonoscopy.     4 days after discharge, patient returns to ED due to unresolved symptoms.     Pt had a colonoscopy done with Dr. Drummond 5 yrs ago that was normal as per pt.     Denies fever, chest pain, N/V.     ED:   - VS: Unremarkable  - Labs: WBC 12, Na 134,   - CTAP: Diffuse colonic wall thickening, compatible with pancolitis, without significant change since CT abdomen pelvis 12/2/2022.    Admit to Medicine       (10 Dec 2022 19:48)    FAMILY HISTORY:    PAST MEDICAL & SURGICAL HISTORY:  HTN (hypertension)      No significant past surgical history            VITALS:  T(F): 98.2, Max: 98.2 (12-10-22 @ 22:16)  HR: 80  BP: 112/66  RR: 17Vital Signs Last 24 Hrs  T(C): 36.8 (10 Dec 2022 22:16), Max: 36.8 (10 Dec 2022 22:16)  T(F): 98.2 (10 Dec 2022 22:16), Max: 98.2 (10 Dec 2022 22:16)  HR: 80 (10 Dec 2022 22:16) (80 - 102)  BP: 112/66 (10 Dec 2022 22:16) (112/66 - 128/77)  BP(mean): --  RR: 17 (10 Dec 2022 22:16) (17 - 18)  SpO2: 99% (10 Dec 2022 22:16) (99% - 99%)    Parameters below as of 10 Dec 2022 22:16  Patient On (Oxygen Delivery Method): room air        TESTS & MEASUREMENTS:                        13.8   12.22 )-----------( 518      ( 10 Dec 2022 16:00 )             40.1     12-10    134<L>  |  90<L>  |  7<L>  ----------------------------<  136<H>  4.0   |  32  |  1.0    Ca    8.6      10 Dec 2022 16:00    TPro  5.9<L>  /  Alb  3.2<L>  /  TBili  0.3  /  DBili  x   /  AST  17  /  ALT  19  /  AlkPhos  48  12-10    LIVER FUNCTIONS - ( 10 Dec 2022 16:00 )  Alb: 3.2 g/dL / Pro: 5.9 g/dL / ALK PHOS: 48 U/L / ALT: 19 U/L / AST: 17 U/L / GGT: x                   RADIOLOGY & ADDITIONAL TESTS:  Personal review of radiological diagnostics performed  Echo and EKG results noted when applicable.     MEDICATIONS:  ciprofloxacin   IVPB 400 milliGRAM(s) IV Intermittent every 12 hours  ciprofloxacin   IVPB      losartan 12.5 milliGRAM(s) Oral daily  metroNIDAZOLE  IVPB      metroNIDAZOLE  IVPB 500 milliGRAM(s) IV Intermittent every 8 hours  ondansetron Injectable 4 milliGRAM(s) IV Push two times a day PRN  sodium chloride 0.9%. 1000 milliLiter(s) IV Continuous <Continuous>      ANTIBIOTICS:  ciprofloxacin   IVPB 400 milliGRAM(s) IV Intermittent every 12 hours  ciprofloxacin   IVPB      metroNIDAZOLE  IVPB      metroNIDAZOLE  IVPB 500 milliGRAM(s) IV Intermittent every 8 hours

## 2022-12-11 NOTE — PROGRESS NOTE ADULT - ASSESSMENT
61 yo M with hx of HTN presents to ED for persistent bloody diarrhea.     #Sepsis, POA  #Persistent diarrhea  #Pancolitis  #Recent EPEC infection, GI PCR +  r/o IBD  Per ID, pt's 7d abx therapy should have resolved the EPEC infection. GI eval recommended for further eval  - CT abdomen shows Diffuse colonic wall thickening, compatible with pancolitis, without significant change since CT abdomen pelvis 12/2/2022.  - c/w cipro and flagyl for now  - check C.diff PCR and stool Ova and parasite  - c/w IVF   - monitor CBC   - GI Eval appreciated, ?colonoscopy    HTN  - on losartan    DVT ppx: encourage ambulation.     #Progress Note Handoff  Pending (specify): GI Eval  Family discussion: d/w pt regarding eval for persistent diarrhea  Disposition: Home

## 2022-12-11 NOTE — CONSULT NOTE ADULT - ASSESSMENT
63 y/o M presenting to ED for persistent diarrhea.   Returned  from a trip to [place]. Pancolitis on CTAP 12/2  & GI PCR was positive for EPEC. He was dc'd home to complete x7days of Cipro/Flagyl.  4 days after discharge, patient returns to ED due to unresolved symptoms.   Denies fever, chest pain, N/V.   12/10 CTAP: Diffuse colonic wall thickening, compatible with pancolitis, without significant change since CT abdomen pelvis 12/2/2022.    IMPRESSION;  61 y/o M presenting to ED for persistent diarrhea.   Returned  from a trip to [place]. Pancolitis on CTAP 12/2  & GI PCR was positive for EPEC. He was dc'd home to complete x7days of Cipro/Flagyl.  4 days after discharge, patient returns to ED due to unresolved symptoms.   Denies fever, chest pain, N/V.   12/10 CTAP: Diffuse colonic wall thickening, compatible with pancolitis, without significant change since CT abdomen pelvis 12/2/2022.    IMPRESSION;   Pancolitis secondary to IBD induced by EPEC ?  EPEC on ABx would not persist this long  Pancolitis unchanged on CT 12/10 c/w 12/2  Further ABx therapy of limited benefit  R/o non infectious inflammatory causes of pancoitis with BRBPR  12/3 Stool EPEC  12/3 BCx NG  HIV NG  12/10 CD NG  COVID-19 NG    RECOMMENDATIONS;  GI for further diagnostic guidance

## 2022-12-11 NOTE — PROGRESS NOTE ADULT - SUBJECTIVE AND OBJECTIVE BOX
BEATRIZ DRUMMOND  62y, Male  Allergy: penicillin (Rash)    Hospital Day: 1d    Patient seen and examined. No acute events overnight    PMH/PSH:  PAST MEDICAL & SURGICAL HISTORY:  HTN (hypertension)      No significant past surgical history    VITALS:  T(F): 98.1 (12-11-22 @ 06:06), Max: 98.2 (12-10-22 @ 22:16)  HR: 72 (12-11-22 @ 06:06)  BP: 126/72 (12-11-22 @ 06:06) (112/66 - 126/72)  RR: 16 (12-11-22 @ 06:06)  SpO2: 96% (12-11-22 @ 06:06)    TESTS & MEASUREMENTS:  Weight (Kg):                       11.6   7.91  )-----------( 445      ( 11 Dec 2022 06:30 )             36.2     12-11    136  |  94<L>  |  8<L>  ----------------------------<  123<H>  3.7   |  31  |  0.9    Ca    8.0<L>      11 Dec 2022 06:30  Mg     1.8     12-11    TPro  5.9<L>  /  Alb  3.2<L>  /  TBili  0.3  /  DBili  x   /  AST  17  /  ALT  19  /  AlkPhos  48  12-10    LIVER FUNCTIONS - ( 10 Dec 2022 16:00 )  Alb: 3.2 g/dL / Pro: 5.9 g/dL / ALK PHOS: 48 U/L / ALT: 19 U/L / AST: 17 U/L / GGT: x           RECENT DIAGNOSTIC ORDERS:  Diet, DASH/TLC:   Sodium & Cholesterol Restricted (12-10-22 @ 20:25)  Ova and Parasites: Routine  Specimen Source: Feces (12-10-22 @ 15:37)  Culture - Stool: Routine  Specimen Source: Feces (12-10-22 @ 15:37)    MEDICATIONS:  MEDICATIONS  (STANDING):  ciprofloxacin   IVPB 400 milliGRAM(s) IV Intermittent every 12 hours  ciprofloxacin   IVPB      losartan 12.5 milliGRAM(s) Oral daily  metroNIDAZOLE  IVPB      metroNIDAZOLE  IVPB 500 milliGRAM(s) IV Intermittent every 8 hours  sodium chloride 0.9%. 1000 milliLiter(s) (100 mL/Hr) IV Continuous <Continuous>    MEDICATIONS  (PRN):  ondansetron Injectable 4 milliGRAM(s) IV Push two times a day PRN Nausea and/or Vomiting    HOME MEDICATIONS:  olmesartan 5 mg oral tablet (12-10)    REVIEW OF SYSTEMS:  All other review of systems is negative unless indicated above.     PHYSICAL EXAM:  PHYSICAL EXAM:  GENERAL: NAD, well-developed  HEAD:  Atraumatic, Normocephalic  NECK: Supple, No JVD  CHEST/LUNG: Clear to auscultation bilaterally; No wheeze  HEART: Regular rate and rhythm; No murmurs, rubs, or gallops  ABDOMEN: Soft, Nontender, Nondistended; Bowel sounds present  EXTREMITIES:  2+ Peripheral Pulses, No clubbing, cyanosis, or edema  SKIN: No rashes or lesions

## 2022-12-12 LAB
ANION GAP SERPL CALC-SCNC: 11 MMOL/L — SIGNIFICANT CHANGE UP (ref 7–14)
BLD GP AB SCN SERPL QL: SIGNIFICANT CHANGE UP
BUN SERPL-MCNC: 5 MG/DL — LOW (ref 10–20)
C DIFF BY PCR RESULT: SIGNIFICANT CHANGE UP
CALCIUM SERPL-MCNC: 7.9 MG/DL — LOW (ref 8.4–10.5)
CHLORIDE SERPL-SCNC: 93 MMOL/L — LOW (ref 98–110)
CO2 SERPL-SCNC: 30 MMOL/L — SIGNIFICANT CHANGE UP (ref 17–32)
CREAT SERPL-MCNC: 0.8 MG/DL — SIGNIFICANT CHANGE UP (ref 0.7–1.5)
CRP SERPL-MCNC: 80.2 MG/L — HIGH
EGFR: 101 ML/MIN/1.73M2 — SIGNIFICANT CHANGE UP
ERYTHROCYTE [SEDIMENTATION RATE] IN BLOOD: 70 MM/HR — HIGH (ref 0–10)
GLUCOSE SERPL-MCNC: 112 MG/DL — HIGH (ref 70–99)
HCT VFR BLD CALC: 34.9 % — LOW (ref 42–52)
HGB BLD-MCNC: 11.2 G/DL — LOW (ref 14–18)
MAGNESIUM SERPL-MCNC: 1.8 MG/DL — SIGNIFICANT CHANGE UP (ref 1.8–2.4)
MCHC RBC-ENTMCNC: 29.6 PG — SIGNIFICANT CHANGE UP (ref 27–31)
MCHC RBC-ENTMCNC: 32.1 G/DL — SIGNIFICANT CHANGE UP (ref 32–37)
MCV RBC AUTO: 92.1 FL — SIGNIFICANT CHANGE UP (ref 80–94)
NRBC # BLD: 0 /100 WBCS — SIGNIFICANT CHANGE UP (ref 0–0)
PLATELET # BLD AUTO: 496 K/UL — HIGH (ref 130–400)
POTASSIUM SERPL-MCNC: 3.4 MMOL/L — LOW (ref 3.5–5)
POTASSIUM SERPL-SCNC: 3.4 MMOL/L — LOW (ref 3.5–5)
RBC # BLD: 3.79 M/UL — LOW (ref 4.7–6.1)
RBC # FLD: 12.4 % — SIGNIFICANT CHANGE UP (ref 11.5–14.5)
SODIUM SERPL-SCNC: 134 MMOL/L — LOW (ref 135–146)
WBC # BLD: 9.48 K/UL — SIGNIFICANT CHANGE UP (ref 4.8–10.8)
WBC # FLD AUTO: 9.48 K/UL — SIGNIFICANT CHANGE UP (ref 4.8–10.8)

## 2022-12-12 PROCEDURE — 99222 1ST HOSP IP/OBS MODERATE 55: CPT

## 2022-12-12 PROCEDURE — 99233 SBSQ HOSP IP/OBS HIGH 50: CPT

## 2022-12-12 RX ORDER — POTASSIUM CHLORIDE 20 MEQ
40 PACKET (EA) ORAL EVERY 4 HOURS
Refills: 0 | Status: COMPLETED | OUTPATIENT
Start: 2022-12-12 | End: 2022-12-12

## 2022-12-12 RX ORDER — AZITHROMYCIN 500 MG/1
500 TABLET, FILM COATED ORAL EVERY 24 HOURS
Refills: 0 | Status: DISCONTINUED | OUTPATIENT
Start: 2022-12-12 | End: 2022-12-13

## 2022-12-12 RX ADMIN — Medication 40 MILLIEQUIVALENT(S): at 10:00

## 2022-12-12 RX ADMIN — ONDANSETRON 4 MILLIGRAM(S): 8 TABLET, FILM COATED ORAL at 05:29

## 2022-12-12 RX ADMIN — Medication 100 MILLIGRAM(S): at 05:28

## 2022-12-12 RX ADMIN — LOSARTAN POTASSIUM 12.5 MILLIGRAM(S): 100 TABLET, FILM COATED ORAL at 05:34

## 2022-12-12 RX ADMIN — AZITHROMYCIN 255 MILLIGRAM(S): 500 TABLET, FILM COATED ORAL at 18:52

## 2022-12-12 RX ADMIN — Medication 40 MILLIEQUIVALENT(S): at 17:52

## 2022-12-12 NOTE — PROGRESS NOTE ADULT - ASSESSMENT
61 y/o M with hx of HTN presents to ED for persistent bloody diarrhea.     #Sepsis, POA  #Chronic diarrhea  #Pancolitis  #Recent EPEC infection, GI PCR +  #r/o IBD  - Reports 15-20 watery BM w/ bright red blood since 11/15/22  - CT A/P shows diffuse colonic wall thickening, compatible with pancolitis, without significant change since CT abdomen pelvis 12/2/2022.  - C. diff -ve  - Gi PCR + for EPEC s/p Flagyl&Cipro x 7 days  - Per ID, recommended GI consult as sxs should have resolved  - C/w IV cefepime + flagyl pending ID follow-up  - F/u ESR/CRP  - F/u Stool O&P  - F/u GI recs    #HTN  -C/w home losartan    #Misc  DVT Ppx:  GI ppx: NI  Diet: DASH  Activity: AAT  Dispo: Home pending GI eval; currently acute

## 2022-12-12 NOTE — CONSULT NOTE ADULT - ASSESSMENT
62 year old male pt w PMH of HTN presenting for diarrhea 62 year old male pt w PMH of HTN presenting for nonresolving bloody diarrhea with CT abdomen showing pancolitis and GI PCR 10 days ago showing EPEC s/p cipro/flagyl with no improvement    # Bloody Diarrhea - pancolitis - r/o viral/bacterial gastroenteritis vs noninfectious causes  - Patient with no improvement after 1 week of cipro/flagyl  - CT abd showing pancolitis  - bloody diarrhea up to 10x daily  - labs showing hb stable with mild normocytic anemia.   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

## 2022-12-12 NOTE — PROGRESS NOTE ADULT - SUBJECTIVE AND OBJECTIVE BOX
BEATRIZ DRUMMOND 62y Male  MRN#: [id number]      Pt is currently admitted with the primary diagnosis of      Hospital Day: 2d  HPI:H&P Adult [AVERY Hu] (12-10-22 @ 19:48)  H&P Adult [CYNTHIA Israel] (12-03-22 @ 03:38)      Overnight events:    Subjective complaints:                                            ----------------------------------------------------------    PAST MEDICAL & SURGICAL HISTORY  HTN (hypertension)    No significant past surgical history                                              -----------------------------------------------------------  ALLERGIES:  penicillin (Rash)                                            ------------------------------------------------------------    HOME MEDICATIONS  Home Medications:  olmesartan 5 mg oral tablet: 1 tab(s) orally once a day (10 Dec 2022 20:12)                           MEDICATIONS:  STANDING MEDICATIONS  ciprofloxacin   IVPB 400 milliGRAM(s) IV Intermittent every 12 hours  ciprofloxacin   IVPB      losartan 12.5 milliGRAM(s) Oral daily  metroNIDAZOLE  IVPB      metroNIDAZOLE  IVPB 500 milliGRAM(s) IV Intermittent every 8 hours  sodium chloride 0.9%. 1000 milliLiter(s) IV Continuous <Continuous>    PRN MEDICATIONS  ondansetron Injectable 4 milliGRAM(s) IV Push two times a day PRN                                            ------------------------------------------------------------  VITAL SIGNS: Last 24 Hours  T(C): 36.8 (12 Dec 2022 00:00), Max: 36.8 (12 Dec 2022 00:00)  T(F): 98.3 (12 Dec 2022 00:00), Max: 98.3 (12 Dec 2022 00:00)  HR: 99 (12 Dec 2022 05:34) (81 - 99)  BP: 123/63 (12 Dec 2022 05:34) (103/55 - 123/63)  BP(mean): --  RR: 18 (12 Dec 2022 05:34) (17 - 18)  SpO2: --                                             --------------------------------------------------------------  LABS:                        11.6   7.91  )-----------( 445      ( 11 Dec 2022 06:30 )             36.2     12-11    136  |  94<L>  |  8<L>  ----------------------------<  123<H>  3.7   |  31  |  0.9    Ca    8.0<L>      11 Dec 2022 06:30  Mg     1.8     12-11    TPro  5.9<L>  /  Alb  3.2<L>  /  TBili  0.3  /  DBili  x   /  AST  17  /  ALT  19  /  AlkPhos  48  12-10                                                                --------------------------------------------------------------    PHYSICAL EXAM:  General: well-appearing in NAD.   HEENT: NCAT  LUNGS: CTAB  HEART: RRR.   ABDOMEN: Nontender, nondistended.   EXT: Nonedematous.  NEURO: Deferred.                GI PPX:  DVT PPX:  DIET:  ACTIVITY:  CODE:  DISPO:             BEATRIZ DRUMMOND 62y Male  MRN#: [id number]      Pt is currently admitted with the primary diagnosis of chronic diarrhea      Hospital Day: 2d  HPI:  63 y/o M presenting to ED for persistent diarrhea.     PMHx: HTN   Family Hx: Colon cancer in father at age 70 & Crohn's disease in his sister.   Social Hx: Denies smoking or drinking    HPI starts from 12/3 when patient was first admitted after returning from a trip to [place] on 11/14 for bloody diarrhea. Patient was found to have pancolitis on CTAP & GI PCR was positive for EPEC. He was dc'd home to complete x7days of Cipro/Flagyl (as per ID). Was also seen by GI that recommended OP Colonoscopy.     4 days after discharge, patient returns to ED due to unresolved symptoms.     Pt had a colonoscopy done with Dr. Drummond 5 yrs ago that was normal as per pt.     Denies fever, chest pain, N/V.     ED:   - VS: Unremarkable  - Labs: WBC 12, Na 134,   - CTAP: Diffuse colonic wall thickening, compatible with pancolitis, without significant change since CT abdomen pelvis 12/2/2022.    Overnight events:  No acute events overnight    Subjective complaints:  Pt was seen and examined at bedside. Reports 15-20 watery BM w/ bright red blood. Denies: abd pain, n/v                                          ----------------------------------------------------------    PAST MEDICAL & SURGICAL HISTORY  HTN (hypertension)    No significant past surgical history                                              -----------------------------------------------------------  ALLERGIES:  penicillin (Rash)                                            ------------------------------------------------------------    HOME MEDICATIONS  Home Medications:  olmesartan 5 mg oral tablet: 1 tab(s) orally once a day (10 Dec 2022 20:12)                           MEDICATIONS:  STANDING MEDICATIONS  ciprofloxacin   IVPB 400 milliGRAM(s) IV Intermittent every 12 hours  ciprofloxacin   IVPB      losartan 12.5 milliGRAM(s) Oral daily  metroNIDAZOLE  IVPB      metroNIDAZOLE  IVPB 500 milliGRAM(s) IV Intermittent every 8 hours  sodium chloride 0.9%. 1000 milliLiter(s) IV Continuous <Continuous>    PRN MEDICATIONS  ondansetron Injectable 4 milliGRAM(s) IV Push two times a day PRN                                            ------------------------------------------------------------  VITAL SIGNS: Last 24 Hours  T(C): 36.8 (12 Dec 2022 00:00), Max: 36.8 (12 Dec 2022 00:00)  T(F): 98.3 (12 Dec 2022 00:00), Max: 98.3 (12 Dec 2022 00:00)  HR: 99 (12 Dec 2022 05:34) (81 - 99)  BP: 123/63 (12 Dec 2022 05:34) (103/55 - 123/63)  BP(mean): --  RR: 18 (12 Dec 2022 05:34) (17 - 18)  SpO2: --                                             --------------------------------------------------------------  LABS:                        11.6   7.91  )-----------( 445      ( 11 Dec 2022 06:30 )             36.2     12-11    136  |  94<L>  |  8<L>  ----------------------------<  123<H>  3.7   |  31  |  0.9    Ca    8.0<L>      11 Dec 2022 06:30  Mg     1.8     12-11    TPro  5.9<L>  /  Alb  3.2<L>  /  TBili  0.3  /  DBili  x   /  AST  17  /  ALT  19  /  AlkPhos  48  12-10                                              --------------------------------------------------------------    PHYSICAL EXAM:  General: well-appearing in NAD.   HEENT: NCAT  LUNGS: CTAB  HEART: RRR.   ABDOMEN: + Diffuse TTP; Soft, nondistended.   EXT: Nonedematous.  NEURO: Deferred.                                              --------------------------------------------------------------

## 2022-12-12 NOTE — PROGRESS NOTE ADULT - SUBJECTIVE AND OBJECTIVE BOX
Patient is a 62y old  Male who presents with a chief complaint of Pancolitis (12 Dec 2022 09:23)    Patient was seen and examined.  C/o multiple episodes of diarrhea  Denies any other complaints.  All systems reviewed positive history as mentioned above.    PAST MEDICAL & SURGICAL HISTORY:  HTN (hypertension)    No significant past surgical history    Allergies  penicillin (Rash)    MEDICATIONS  (STANDING):  ciprofloxacin   IVPB 400 milliGRAM(s) IV Intermittent every 12 hours  ciprofloxacin   IVPB      losartan 12.5 milliGRAM(s) Oral daily  metroNIDAZOLE  IVPB      metroNIDAZOLE  IVPB 500 milliGRAM(s) IV Intermittent every 8 hours  potassium chloride   Powder 40 milliEquivalent(s) Oral every 4 hours  sodium chloride 0.9%. 1000 milliLiter(s) (100 mL/Hr) IV Continuous <Continuous>    MEDICATIONS  (PRN):  ondansetron Injectable 4 milliGRAM(s) IV Push two times a day PRN Nausea and/or Vomiting    Vital Signs Last 24 Hrs  T(C): 36.7  T(F): 98  HR: 72  BP: 123/63  BP(mean): --  RR: 18  SpO2: --    O/E:  Awake, alert, not in distress.  HEENT: atraumatic, EOMI.  Chest: clear.  CVS: SIS2 +, no murmur.  P/A: Soft, BS+  CNS: awake , alert  Ext: no edema feet.  Skin: no rash, no ulcers.  All systems reviewed positive findings as above.                          11.2<L>  9.48  )-----------( 496<H>    ( 12 Dec 2022 08:39 )             34.9<L>                        11.6<L>  7.91  )-----------( 445<H>    ( 11 Dec 2022 06:30 )             36.2<L>    12-12    134<L>  |  93<L>  |  5<L>  ----------------------------<  112<H>  3.4<L>   |  30  |  0.8  12-11    136  |  94<L>  |  8<L>  ----------------------------<  123<H>  3.7   |  31  |  0.9    Ca    7.9<L>      12 Dec 2022 08:39  Ca    8.0<L>      11 Dec 2022 06:30  Ca    8.6      10 Dec 2022 16:00  Mg     1.8     12-11    TPro  5.9<L>  /  Alb  3.2<L>  /  TBili  0.3  /  DBili  x   /  AST  17  /  ALT  19  /  AlkPhos  48  12-10                Culture - Stool (collected 10 Dec 2022 18:51)  Source: .Stool Feces  Preliminary Report (12 Dec 2022 10:28):    No enteric pathogens to date: Final culture pending

## 2022-12-12 NOTE — CONSULT NOTE ADULT - TIME BILLING
I edited the note.
Counseled patient about diagnostic testing and treatment plan. All questions answered. Abnormal lab/radiographical/microbiological data reviewed.

## 2022-12-12 NOTE — CONSULT NOTE ADULT - SUBJECTIVE AND OBJECTIVE BOX
Gastroenterology Consultation:    Patient is a 62y old  Male who presents with a chief complaint of Pancolitis (12 Dec 2022 06:06)        Admitted on: 12-10-22      HPI:  62 year old male pt w PMH of HTN presenting for diarrhea. Pt recently went to a trip to [place] on the 8th of Nov and returned on the 14th. Pt was asymptomatic during the trip, but on the 15th of November started having watery non bloody diarrhea that developed into bloody diarrhea on the 25th. Pt has since been having almost 10 episodes of bloody diarrhea waking him up at night associated with crampy abdominal pain prior to diarrhea episode. Pt was seen by GI team at that time. GI PCR showed EPEC and patient was sent home on flagyl and ciprofloxacin and completed 7 day course. He was discharged on Dec 6th and now he returned Dec 10th because of unresolving symptoms. Patient still having 10-15 episodes of bloody diarrhea ever day. This morning he had watery brownish diarrhea. He also has colicky diffuse abdominal pain and wt loss of 15 pounds in the past month. yesterday he vomited once. no fever or chills. no other symptoms.       Prior EGD:    Prior Colonoscopy:      PAST MEDICAL & SURGICAL HISTORY:  HTN (hypertension)      No significant past surgical history            FAMILY HISTORY:      Social History:  Tobacco:  Alcohol:  Drugs:    Home Medications:  olmesartan 5 mg oral tablet: 1 tab(s) orally once a day (10 Dec 2022 20:12)        MEDICATIONS  (STANDING):  ciprofloxacin   IVPB 400 milliGRAM(s) IV Intermittent every 12 hours  ciprofloxacin   IVPB      losartan 12.5 milliGRAM(s) Oral daily  metroNIDAZOLE  IVPB      metroNIDAZOLE  IVPB 500 milliGRAM(s) IV Intermittent every 8 hours  sodium chloride 0.9%. 1000 milliLiter(s) (100 mL/Hr) IV Continuous <Continuous>    MEDICATIONS  (PRN):  ondansetron Injectable 4 milliGRAM(s) IV Push two times a day PRN Nausea and/or Vomiting      Allergies  penicillin (Rash)      Review of Systems:   Constitutional:  No Fever, No Chills  ENT/Mouth:  No Hearing Changes,  No Difficulty Swallowing  Eyes:  No Eye Pain, No Vision Changes  Cardiovascular:  No Chest Pain, No Palpitations  Respiratory:  No Cough, No Dyspnea  Gastrointestinal:  As described in HPI  Musculoskeletal:  No Joint Swelling, No Back Pain  Skin:  No Skin Lesions, No Jaundice  Neuro:  No Syncope, No Dizziness  Heme/Lymph:  No Bruising, No Bleeding.          Physical Examination:  T(C): 36.7 (12-12-22 @ 07:20), Max: 36.8 (12-12-22 @ 00:00)  HR: 72 (12-12-22 @ 07:20) (72 - 99)  BP: 123/63 (12-12-22 @ 07:20) (103/55 - 123/63)  RR: 18 (12-12-22 @ 07:20) (17 - 18)  SpO2: --          GENERAL:  Appears stated age, well-groomed, well-nourished, no distress  HEENT:  NC/AT,  conjunctivae clear and pink, no thyromegaly, nodules, adenopathy, sclera -anicteric  CHEST:  Full & symmetric excursion, no increased effort, breath sounds clear  HEART:  Regular rhythm, S1, S2, no murmur/rub/S3/S4, no abdominal bruit, no edema  ABDOMEN:  Soft, non-tender, non-distended, normoactive bowel sounds,  no masses ,no hepato-splenomegaly, no signs of chronic liver disease  EXTEREMITIES:  no cyanosis,clubbing or edema  SKIN:  No rash/erythema/ecchymoses/petechiae/wounds/abscess/warm/dry  NEURO:  Alert, oriented, no asterixis, no tremor, no encephalopathy          Data:                        11.6   7.91  )-----------( 445      ( 11 Dec 2022 06:30 )             36.2     Hgb Trend:  11.6  12-11-22 @ 06:30  13.8  12-10-22 @ 16:00      12-11    136  |  94<L>  |  8<L>  ----------------------------<  123<H>  3.7   |  31  |  0.9    Ca    8.0<L>      11 Dec 2022 06:30  Mg     1.8     12-11    TPro  5.9<L>  /  Alb  3.2<L>  /  TBili  0.3  /  DBili  x   /  AST  17  /  ALT  19  /  AlkPhos  48  12-10    Liver panel trend:  TBili 0.3   /   AST 17   /   ALT 19   /   AlkP 48   /   Tptn 5.9   /   Alb 3.2    /   DBili --      12-10  TBili <0.2   /   AST 13   /   ALT 8   /   AlkP 41   /   Tptn 4.9   /   Alb 3.0    /   DBili --      12-06  TBili <0.2   /   AST 12   /   ALT 8   /   AlkP 49   /   Tptn 5.1   /   Alb 3.2    /   DBili --      12-05  TBili <0.2   /   AST 11   /   ALT 8   /   AlkP 53   /   Tptn 5.3   /   Alb 3.3    /   DBili --      12-04  TBili 0.3   /   AST 11   /   ALT 8   /   AlkP 55   /   Tptn 5.6   /   Alb 3.4    /   DBili --      12-03  TBili 0.3   /   AST 12   /   ALT 8   /   AlkP 64   /   Tptn 6.1   /   Alb 3.6    /   DBili --      12-02              Radiology:       Gastroenterology Consultation:    Patient is a 62y old  Male who presents with a chief complaint of Pancolitis (12 Dec 2022 06:06)        Admitted on: 12-10-22      HPI:  62 year old male pt w PMH of HTN presenting for diarrhea. Pt recently went to a trip to [place] on the 8th of Nov and returned on the 14th. Pt was asymptomatic during the trip, but on the 15th of November started having watery non bloody diarrhea that developed into bloody diarrhea on the 25th. Pt has since been having almost 10 episodes of bloody diarrhea waking him up at night associated with crampy abdominal pain prior to diarrhea episode. Pt was seen by GI team at that time. GI PCR showed EPEC and patient was sent home on flagyl and ciprofloxacin and completed 7 day course. He was discharged on Dec 6th and now he returned Dec 10th because of unresolving symptoms. Patient still having 10-15 episodes of bloody diarrhea ever day. This morning he had watery brownish diarrhea. He also has colicky diffuse abdominal pain and wt loss of 15 pounds in the past month. yesterday he vomited once. no fever or chills. no other symptoms. Patient is nonsmoker, no alcohol. no previous episodes. he has FHx sister has crohn's and father has colon cancer at age 70.      Prior Colonoscopy: 5 years ago with Dr. Drummond. negative per patient      PAST MEDICAL & SURGICAL HISTORY:  HTN (hypertension)      No significant past surgical history            FAMILY HISTORY:      Social History:  Tobacco: none  Alcohol: none  Drugs: none     Home Medications:  olmesartan 5 mg oral tablet: 1 tab(s) orally once a day (10 Dec 2022 20:12)        MEDICATIONS  (STANDING):  ciprofloxacin   IVPB 400 milliGRAM(s) IV Intermittent every 12 hours  ciprofloxacin   IVPB      losartan 12.5 milliGRAM(s) Oral daily  metroNIDAZOLE  IVPB      metroNIDAZOLE  IVPB 500 milliGRAM(s) IV Intermittent every 8 hours  sodium chloride 0.9%. 1000 milliLiter(s) (100 mL/Hr) IV Continuous <Continuous>    MEDICATIONS  (PRN):  ondansetron Injectable 4 milliGRAM(s) IV Push two times a day PRN Nausea and/or Vomiting      Allergies  penicillin (Rash)      Review of Systems:   Constitutional:  No Fever, No Chills  ENT/Mouth:  No Hearing Changes,  No Difficulty Swallowing  Eyes:  No Eye Pain, No Vision Changes  Cardiovascular:  No Chest Pain, No Palpitations  Respiratory:  No Cough, No Dyspnea  Gastrointestinal:  As described in HPI  Musculoskeletal:  No Joint Swelling, No Back Pain  Skin:  No Skin Lesions, No Jaundice  Neuro:  No Syncope, No Dizziness  Heme/Lymph:  No Bruising, No Bleeding.          Physical Examination:  T(C): 36.7 (12-12-22 @ 07:20), Max: 36.8 (12-12-22 @ 00:00)  HR: 72 (12-12-22 @ 07:20) (72 - 99)  BP: 123/63 (12-12-22 @ 07:20) (103/55 - 123/63)  RR: 18 (12-12-22 @ 07:20) (17 - 18)  SpO2: --          GENERAL:  Appears stated age, well-groomed, well-nourished, no distress  HEENT:  NC/AT,  conjunctivae clear and pink, no thyromegaly, nodules, adenopathy, sclera -anicteric  CHEST:  Full & symmetric excursion, no increased effort, breath sounds clear  HEART:  Regular rhythm, S1, S2, no murmur/rub/S3/S4, no abdominal bruit, no edema  ABDOMEN:  Soft, mild diffuse tenderness. refused ELENA  EXTEREMITIES:  no cyanosis,clubbing or edema  SKIN:  No rash/erythema/ecchymoses/petechiae/wounds/abscess/warm/dry  NEURO:  Alert, oriented, no asterixis, no tremor, no encephalopathy          Data:                        11.6   7.91  )-----------( 445      ( 11 Dec 2022 06:30 )             36.2     Hgb Trend:  11.6  12-11-22 @ 06:30  13.8  12-10-22 @ 16:00      12-11    136  |  94<L>  |  8<L>  ----------------------------<  123<H>  3.7   |  31  |  0.9    Ca    8.0<L>      11 Dec 2022 06:30  Mg     1.8     12-11    TPro  5.9<L>  /  Alb  3.2<L>  /  TBili  0.3  /  DBili  x   /  AST  17  /  ALT  19  /  AlkPhos  48  12-10    Liver panel trend:  TBili 0.3   /   AST 17   /   ALT 19   /   AlkP 48   /   Tptn 5.9   /   Alb 3.2    /   DBili --      12-10  TBili <0.2   /   AST 13   /   ALT 8   /   AlkP 41   /   Tptn 4.9   /   Alb 3.0    /   DBili --      12-06  TBili <0.2   /   AST 12   /   ALT 8   /   AlkP 49   /   Tptn 5.1   /   Alb 3.2    /   DBili --      12-05  TBili <0.2   /   AST 11   /   ALT 8   /   AlkP 53   /   Tptn 5.3   /   Alb 3.3    /   DBili --      12-04  TBili 0.3   /   AST 11   /   ALT 8   /   AlkP 55   /   Tptn 5.6   /   Alb 3.4    /   DBili --      12-03  TBili 0.3   /   AST 12   /   ALT 8   /   AlkP 64   /   Tptn 6.1   /   Alb 3.6    /   DBili --      12-02              Radiology:       Gastroenterology Consultation:    Patient is a 62y old  Male who presents with a chief complaint of Pancolitis (12 Dec 2022 06:06)        Admitted on: 12-10-22      HPI:  62 year old male pt w PMH of HTN presenting for diarrhea. Pt recently went to a trip to [place] on the 8th of Nov and returned on the 14th. Pt was asymptomatic during the trip, but on the 15th of November started having watery non bloody diarrhea that developed into bloody diarrhea on the 25th. Pt has since been having almost 10 episodes of bloody diarrhea waking him up at night associated with crampy abdominal pain prior to diarrhea episode. Pt was seen by GI team at that time. GI PCR showed EPEC and patient was sent home on flagyl and ciprofloxacin and completed 7 day course. He was discharged on Dec 6th and now he returned Dec 10th because of unresolving symptoms. Patient still having 10-15 episodes of bloody diarrhea ever day. This morning he had watery brownish diarrhea. He also has colicky diffuse abdominal pain and wt loss of 15 pounds in the past month. yesterday he vomited once. no fever or chills. no other symptoms. Patient is nonsmoker, no alcohol. no previous episodes. he has FHx sister has crohn's and father has colon cancer at age 70.      Prior Colonoscopy: 5 years ago with Dr. Drummond. negative per patient      PAST MEDICAL & SURGICAL HISTORY:  HTN (hypertension)      No significant past surgical history            FAMILY HISTORY:      Social History:  Tobacco: none  Alcohol: none  Drugs: none     Home Medications:  olmesartan 5 mg oral tablet: 1 tab(s) orally once a day (10 Dec 2022 20:12)        MEDICATIONS  (STANDING):  ciprofloxacin   IVPB 400 milliGRAM(s) IV Intermittent every 12 hours  ciprofloxacin   IVPB      losartan 12.5 milliGRAM(s) Oral daily  metroNIDAZOLE  IVPB      metroNIDAZOLE  IVPB 500 milliGRAM(s) IV Intermittent every 8 hours  sodium chloride 0.9%. 1000 milliLiter(s) (100 mL/Hr) IV Continuous <Continuous>    MEDICATIONS  (PRN):  ondansetron Injectable 4 milliGRAM(s) IV Push two times a day PRN Nausea and/or Vomiting      Allergies  penicillin (Rash)      Review of Systems:   Constitutional:  No Fever, No Chills  ENT/Mouth:  No Hearing Changes,  No Difficulty Swallowing  Eyes:  No Eye Pain, No Vision Changes  Cardiovascular:  No Chest Pain, No Palpitations  Respiratory:  No Cough, No Dyspnea  Gastrointestinal:  As described in HPI  Musculoskeletal:  No Joint Swelling, No Back Pain  Skin:  No Skin Lesions, No Jaundice  Neuro:  No Syncope, No Dizziness  Heme/Lymph:  No Bruising, No Bleeding.          Physical Examination:  T(C): 36.7 (12-12-22 @ 07:20), Max: 36.8 (12-12-22 @ 00:00)  HR: 72 (12-12-22 @ 07:20) (72 - 99)  BP: 123/63 (12-12-22 @ 07:20) (103/55 - 123/63)  RR: 18 (12-12-22 @ 07:20) (17 - 18)  SpO2: --          GENERAL:  Appears stated age, well-groomed, well-nourished, no distress  HEENT:  NC/AT,  conjunctivae clear and pink, no thyromegaly, nodules, adenopathy, sclera -anicteric  CHEST:  Full & symmetric excursion, no increased effort, breath sounds clear  HEART:  Regular rhythm, S1, S2, no murmur/rub/S3/S4, no abdominal bruit, no edema  ABDOMEN:  Soft, mild diffuse tenderness. refused ELENA  EXTEREMITIES:  no cyanosis,clubbing or edema  SKIN:  No rash/erythema/ecchymoses/petechiae/wounds/abscess/warm/dry  NEURO:  Alert, oriented, no asterixis, no tremor, no encephalopathy          Data:                        11.6   7.91  )-----------( 445      ( 11 Dec 2022 06:30 ) This data was reviewed.              36.2     Hgb Trend: This data was reviewed.   11.6  12-11-22 @ 06:30  13.8  12-10-22 @ 16:00      12-11    136  |  94<L>  |  8<L>  ----------------------------<  123<H> This data was reviewed.   3.7   |  31  |  0.9    Ca    8.0<L>      11 Dec 2022 06:30  Mg     1.8     12-11    TPro  5.9<L>  /  Alb  3.2<L>  /  TBili  0.3  /  DBili  x   /  AST  17  /  ALT  19  /  AlkPhos  48  12-10    Liver panel trend: This data was reviewed.   TBili 0.3   /   AST 17   /   ALT 19   /   AlkP 48   /   Tptn 5.9   /   Alb 3.2    /   DBili --      12-10  TBili <0.2   /   AST 13   /   ALT 8   /   AlkP 41   /   Tptn 4.9   /   Alb 3.0    /   DBili --      12-06  TBili <0.2   /   AST 12   /   ALT 8   /   AlkP 49   /   Tptn 5.1   /   Alb 3.2    /   DBili --      12-05  TBili <0.2   /   AST 11   /   ALT 8   /   AlkP 53   /   Tptn 5.3   /   Alb 3.3    /   DBili --      12-04  TBili 0.3   /   AST 11   /   ALT 8   /   AlkP 55   /   Tptn 5.6   /   Alb 3.4    /   DBili --      12-03  TBili 0.3   /   AST 12   /   ALT 8   /   AlkP 64   /   Tptn 6.1   /   Alb 3.6    /   DBili --      12-02              Radiology:

## 2022-12-12 NOTE — PROGRESS NOTE ADULT - ASSESSMENT
61 y/o M with PMHx: HTN presenting to ED for persistent diarrhea. Family Hx: Colon cancer in father at age 70 & Crohn's disease in his sister. patient was first admitted after returning from a trip to [place] on 11/14 for bloody diarrhea. Patient was found to have pancolitis on CTAP & GI PCR was positive for EPEC. He was dc'd home to complete x7days of Cipro/Flagyl (as per ID). Was also seen by GI that recommended OP Colonoscopy. 4 days after discharge, patient returns to ED due to unresolved symptoms.    # Pancolitis, no evidence od sepsis  # EPEC   - CT Abdomen and Pelvis w/ IV Cont (12.10.22 @ 16:36) >Diffuse colonic wall thickening, compatible with pancolitis, without   significant change since CT abdomen pelvis 12/2/2022.  - stool Culture Results: No enteric pathogens to date: Final culture pending (12.10.22 @ 18:51)  - C Diff by PCR Result: NotDetec:(12.10.22 @ 18:51)  - ID eval: Further ABx therapy of limited benefit, R/o non infectious inflammatory causes of pancoitis with BRBPR  - 12/3 Stool EPEC  - 12/3  blood cultures neg  - HIV neg  - c/w cipro , flagyl  - c/w IV fluids  - lactose free diet  - stool count  - GI eval    # Hypokalemia  - replete k    # Hypertension  - c/w losartan    # DVT prophylaxis    # Full code    #Pending: clinical improvement  # Discussed plan of care with patient  # Disposition: Home when stable

## 2022-12-13 LAB
ALBUMIN SERPL ELPH-MCNC: 2.9 G/DL — LOW (ref 3.5–5.2)
ALP SERPL-CCNC: 40 U/L — SIGNIFICANT CHANGE UP (ref 30–115)
ALT FLD-CCNC: 9 U/L — SIGNIFICANT CHANGE UP (ref 0–41)
ANION GAP SERPL CALC-SCNC: 11 MMOL/L — SIGNIFICANT CHANGE UP (ref 7–14)
ANION GAP SERPL CALC-SCNC: 8 MMOL/L — SIGNIFICANT CHANGE UP (ref 7–14)
APTT BLD: 31.6 SEC — SIGNIFICANT CHANGE UP (ref 27–39.2)
AST SERPL-CCNC: 15 U/L — SIGNIFICANT CHANGE UP (ref 0–41)
BASOPHILS # BLD AUTO: 0.07 K/UL — SIGNIFICANT CHANGE UP (ref 0–0.2)
BASOPHILS NFR BLD AUTO: 0.7 % — SIGNIFICANT CHANGE UP (ref 0–1)
BILIRUB SERPL-MCNC: 0.3 MG/DL — SIGNIFICANT CHANGE UP (ref 0.2–1.2)
BLD GP AB SCN SERPL QL: SIGNIFICANT CHANGE UP
BUN SERPL-MCNC: 3 MG/DL — LOW (ref 10–20)
BUN SERPL-MCNC: 4 MG/DL — LOW (ref 10–20)
CALCIUM SERPL-MCNC: 8 MG/DL — LOW (ref 8.4–10.4)
CALCIUM SERPL-MCNC: 8 MG/DL — LOW (ref 8.4–10.5)
CHLORIDE SERPL-SCNC: 96 MMOL/L — LOW (ref 98–110)
CHLORIDE SERPL-SCNC: 99 MMOL/L — SIGNIFICANT CHANGE UP (ref 98–110)
CO2 SERPL-SCNC: 27 MMOL/L — SIGNIFICANT CHANGE UP (ref 17–32)
CO2 SERPL-SCNC: 30 MMOL/L — SIGNIFICANT CHANGE UP (ref 17–32)
CREAT SERPL-MCNC: 0.7 MG/DL — SIGNIFICANT CHANGE UP (ref 0.7–1.5)
CREAT SERPL-MCNC: 0.7 MG/DL — SIGNIFICANT CHANGE UP (ref 0.7–1.5)
CRP SERPL-MCNC: 113.2 MG/L — HIGH
CULTURE RESULTS: SIGNIFICANT CHANGE UP
CULTURE RESULTS: SIGNIFICANT CHANGE UP
EGFR: 105 ML/MIN/1.73M2 — SIGNIFICANT CHANGE UP
EGFR: 105 ML/MIN/1.73M2 — SIGNIFICANT CHANGE UP
EOSINOPHIL # BLD AUTO: 0.26 K/UL — SIGNIFICANT CHANGE UP (ref 0–0.7)
EOSINOPHIL NFR BLD AUTO: 2.7 % — SIGNIFICANT CHANGE UP (ref 0–8)
ERYTHROCYTE [SEDIMENTATION RATE] IN BLOOD: 72 MM/HR — HIGH (ref 0–10)
GLUCOSE SERPL-MCNC: 136 MG/DL — HIGH (ref 70–99)
GLUCOSE SERPL-MCNC: 98 MG/DL — SIGNIFICANT CHANGE UP (ref 70–99)
HCT VFR BLD CALC: 33.3 % — LOW (ref 42–52)
HCT VFR BLD CALC: 35.2 % — LOW (ref 42–52)
HGB BLD-MCNC: 11.2 G/DL — LOW (ref 14–18)
HGB BLD-MCNC: 11.2 G/DL — LOW (ref 14–18)
IMM GRANULOCYTES NFR BLD AUTO: 1.4 % — HIGH (ref 0.1–0.3)
INR BLD: 1.34 RATIO — HIGH (ref 0.65–1.3)
LYMPHOCYTES # BLD AUTO: 1.65 K/UL — SIGNIFICANT CHANGE UP (ref 1.2–3.4)
LYMPHOCYTES # BLD AUTO: 17.2 % — LOW (ref 20.5–51.1)
MAGNESIUM SERPL-MCNC: 1.7 MG/DL — LOW (ref 1.8–2.4)
MAGNESIUM SERPL-MCNC: 1.8 MG/DL — SIGNIFICANT CHANGE UP (ref 1.8–2.4)
MCHC RBC-ENTMCNC: 29.2 PG — SIGNIFICANT CHANGE UP (ref 27–31)
MCHC RBC-ENTMCNC: 30.9 PG — SIGNIFICANT CHANGE UP (ref 27–31)
MCHC RBC-ENTMCNC: 31.8 G/DL — LOW (ref 32–37)
MCHC RBC-ENTMCNC: 33.6 G/DL — SIGNIFICANT CHANGE UP (ref 32–37)
MCV RBC AUTO: 91.7 FL — SIGNIFICANT CHANGE UP (ref 80–94)
MCV RBC AUTO: 91.9 FL — SIGNIFICANT CHANGE UP (ref 80–94)
MONOCYTES # BLD AUTO: 1.16 K/UL — HIGH (ref 0.1–0.6)
MONOCYTES NFR BLD AUTO: 12.1 % — HIGH (ref 1.7–9.3)
NEUTROPHILS # BLD AUTO: 6.32 K/UL — SIGNIFICANT CHANGE UP (ref 1.4–6.5)
NEUTROPHILS NFR BLD AUTO: 65.9 % — SIGNIFICANT CHANGE UP (ref 42.2–75.2)
NRBC # BLD: 0 /100 WBCS — SIGNIFICANT CHANGE UP (ref 0–0)
NRBC # BLD: 0 /100 WBCS — SIGNIFICANT CHANGE UP (ref 0–0)
PLATELET # BLD AUTO: 477 K/UL — HIGH (ref 130–400)
PLATELET # BLD AUTO: 532 K/UL — HIGH (ref 130–400)
POTASSIUM SERPL-MCNC: 3.6 MMOL/L — SIGNIFICANT CHANGE UP (ref 3.5–5)
POTASSIUM SERPL-MCNC: 3.6 MMOL/L — SIGNIFICANT CHANGE UP (ref 3.5–5)
POTASSIUM SERPL-SCNC: 3.6 MMOL/L — SIGNIFICANT CHANGE UP (ref 3.5–5)
POTASSIUM SERPL-SCNC: 3.6 MMOL/L — SIGNIFICANT CHANGE UP (ref 3.5–5)
PROT SERPL-MCNC: 5.1 G/DL — LOW (ref 6–8)
PROTHROM AB SERPL-ACNC: 15.4 SEC — HIGH (ref 9.95–12.87)
RBC # BLD: 3.63 M/UL — LOW (ref 4.7–6.1)
RBC # BLD: 3.83 M/UL — LOW (ref 4.7–6.1)
RBC # FLD: 12.5 % — SIGNIFICANT CHANGE UP (ref 11.5–14.5)
RBC # FLD: 12.6 % — SIGNIFICANT CHANGE UP (ref 11.5–14.5)
SODIUM SERPL-SCNC: 134 MMOL/L — LOW (ref 135–146)
SODIUM SERPL-SCNC: 137 MMOL/L — SIGNIFICANT CHANGE UP (ref 135–146)
SPECIMEN SOURCE: SIGNIFICANT CHANGE UP
SPECIMEN SOURCE: SIGNIFICANT CHANGE UP
WBC # BLD: 8.72 K/UL — SIGNIFICANT CHANGE UP (ref 4.8–10.8)
WBC # BLD: 9.59 K/UL — SIGNIFICANT CHANGE UP (ref 4.8–10.8)
WBC # FLD AUTO: 8.72 K/UL — SIGNIFICANT CHANGE UP (ref 4.8–10.8)
WBC # FLD AUTO: 9.59 K/UL — SIGNIFICANT CHANGE UP (ref 4.8–10.8)

## 2022-12-13 PROCEDURE — 99232 SBSQ HOSP IP/OBS MODERATE 35: CPT

## 2022-12-13 PROCEDURE — 99233 SBSQ HOSP IP/OBS HIGH 50: CPT

## 2022-12-13 PROCEDURE — 93010 ELECTROCARDIOGRAM REPORT: CPT

## 2022-12-13 RX ORDER — AZITHROMYCIN 500 MG/1
500 TABLET, FILM COATED ORAL EVERY 24 HOURS
Refills: 0 | Status: DISCONTINUED | OUTPATIENT
Start: 2022-12-13 | End: 2022-12-15

## 2022-12-13 RX ORDER — SOD SULF/SODIUM/NAHCO3/KCL/PEG
4000 SOLUTION, RECONSTITUTED, ORAL ORAL ONCE
Refills: 0 | Status: COMPLETED | OUTPATIENT
Start: 2022-12-13 | End: 2022-12-13

## 2022-12-13 RX ORDER — ONDANSETRON 8 MG/1
4 TABLET, FILM COATED ORAL EVERY 6 HOURS
Refills: 0 | Status: DISCONTINUED | OUTPATIENT
Start: 2022-12-13 | End: 2022-12-13

## 2022-12-13 RX ORDER — SODIUM CHLORIDE 9 MG/ML
1000 INJECTION, SOLUTION INTRAVENOUS
Refills: 0 | Status: DISCONTINUED | OUTPATIENT
Start: 2022-12-13 | End: 2022-12-20

## 2022-12-13 RX ORDER — ONDANSETRON 8 MG/1
4 TABLET, FILM COATED ORAL EVERY 6 HOURS
Refills: 0 | Status: DISCONTINUED | OUTPATIENT
Start: 2022-12-13 | End: 2022-12-20

## 2022-12-13 RX ORDER — ENOXAPARIN SODIUM 100 MG/ML
40 INJECTION SUBCUTANEOUS EVERY 24 HOURS
Refills: 0 | Status: DISCONTINUED | OUTPATIENT
Start: 2022-12-13 | End: 2022-12-20

## 2022-12-13 RX ADMIN — SODIUM CHLORIDE 100 MILLILITER(S): 9 INJECTION, SOLUTION INTRAVENOUS at 13:22

## 2022-12-13 RX ADMIN — Medication 20 MILLIGRAM(S): at 21:35

## 2022-12-13 RX ADMIN — AZITHROMYCIN 500 MILLIGRAM(S): 500 TABLET, FILM COATED ORAL at 09:30

## 2022-12-13 RX ADMIN — ONDANSETRON 4 MILLIGRAM(S): 8 TABLET, FILM COATED ORAL at 21:52

## 2022-12-13 RX ADMIN — ONDANSETRON 4 MILLIGRAM(S): 8 TABLET, FILM COATED ORAL at 15:18

## 2022-12-13 RX ADMIN — SODIUM CHLORIDE 100 MILLILITER(S): 9 INJECTION, SOLUTION INTRAVENOUS at 21:35

## 2022-12-13 RX ADMIN — ENOXAPARIN SODIUM 40 MILLIGRAM(S): 100 INJECTION SUBCUTANEOUS at 11:28

## 2022-12-13 RX ADMIN — ONDANSETRON 4 MILLIGRAM(S): 8 TABLET, FILM COATED ORAL at 09:30

## 2022-12-13 RX ADMIN — Medication 4000 MILLILITER(S): at 11:28

## 2022-12-13 NOTE — PROGRESS NOTE ADULT - ASSESSMENT
63 y/o M with PMHx: HTN presenting to ED for persistent diarrhea. Family Hx: Colon cancer in father at age 70 & Crohn's disease in his sister. patient was first admitted after returning from a trip to [place] on 11/14 for bloody diarrhea. Patient was found to have pancolitis on CTAP & GI PCR was positive for EPEC. He was dc'd home to complete x7days of Cipro/Flagyl (as per ID). Was also seen by GI that recommended OP Colonoscopy. 4 days after discharge, patient returns to ED due to unresolved symptoms.    # Pancolitis, no evidence of sepsis  # EPEC   - CT Abdomen and Pelvis w/ IV Cont (12.10.22 @ 16:36) >Diffuse colonic wall thickening, compatible with pancolitis, without   significant change since CT abdomen pelvis 12/2/2022.  - stool Culture Results: No enteric pathogens to date: Final culture pending (12.10.22 @ 18:51)  - C Diff by PCR Result: NotDetec:(12.10.22 @ 18:51)  - ID eval: Further ABx therapy of limited benefit, R/o non infectious inflammatory causes of pancoitis with BRBPR  - 12/3 Stool EPEC  - 12/3  blood cultures neg  - HIV neg  - c/w IV fluids  - lactose free diet  - zofran prn  - c/w zithromax day 1/3  - GI plan for  plan for colonoscopy tomorrow  - keep on clear liquid diet today    # Hypokalemia- resolved    # Hypertension  - c/w losartan    # DVT prophylaxis    # Full code    #Pending: clinical improvement, colonoscopy in AM  # Discussed plan of care with patient  # Disposition: Home when stable

## 2022-12-13 NOTE — PROGRESS NOTE ADULT - SUBJECTIVE AND OBJECTIVE BOX
Patient is a 62y old  Male who presents with a chief complaint of Pancolitis (12 Dec 2022 09:23)    Patient was seen and examined.  Planned for colonoscopy in AM  Pt continues to have multiple episodes of diarrhea  C/o nausea  Denies any other complaints.  All systems reviewed positive history as mentioned above.    PAST MEDICAL & SURGICAL HISTORY:  HTN (hypertension)    No significant past surgical history    Allergies  penicillin (Rash)    MEDICATIONS  (STANDING):  azithromycin   Tablet 500 milliGRAM(s) Oral every 24 hours  bisacodyl 20 milliGRAM(s) Oral at bedtime  enoxaparin Injectable 40 milliGRAM(s) SubCutaneous every 24 hours  lactated ringers. 1000 milliLiter(s) (100 mL/Hr) IV Continuous <Continuous>  losartan 12.5 milliGRAM(s) Oral daily    MEDICATIONS  (PRN):  ondansetron   Disintegrating Tablet 4 milliGRAM(s) Oral every 6 hours PRN Nausea and/or Vomiting    T(C): 36.1 (12-13-22 @ 07:28), Max: 37.3 (12-12-22 @ 15:27)  HR: 78 (12-13-22 @ 07:28) (69 - 78)  BP: 131/65 (12-13-22 @ 07:28) (103/54 - 131/65)  RR: 18 (12-13-22 @ 07:28) (18 - 18)  SpO2: --    O/E:  Awake, alert, not in distress.  HEENT: atraumatic, EOMI.  Chest: clear.  CVS: SIS2 +, no murmur.  P/A: Soft, BS+  CNS: awake , alert  Ext: no edema feet.  Skin: no rash, no ulcers.  All systems reviewed positive findings as above.                                  11.2<L>  8.72  )-----------( 477<H>    ( 13 Dec 2022 08:38 )             33.3<L>                        11.2<L>  9.48  )-----------( 496<H>    ( 12 Dec 2022 08:39 )             34.9<L>    12-13    137  |  99  |  3<L>  ----------------------------<  136<H>  3.6   |  30  |  0.7  12-12    134<L>  |  93<L>  |  5<L>  ----------------------------<  112<H>  3.4<L>   |  30  |  0.8    Ca    8.0<L>      13 Dec 2022 08:38  Ca    7.9<L>      12 Dec 2022 08:39  Mg     1.8     12-13

## 2022-12-13 NOTE — PROGRESS NOTE ADULT - ASSESSMENT
63 y/o M with hx of HTN presents to ED for persistent bloody diarrhea.     #Sepsis, POA  #Chronic diarrhea  #Pancolitis  #Recent EPEC infection, GI PCR +  #r/o IBD  - CT A/P shows diffuse colonic wall thickening, compatible with pancolitis, without significant change since CT abdomen pelvis 12/2/2022.  - C. diff -ve  - Gi PCR + for EPEC s/p Flagyl&Cipro x 7 days  - repeated GI pcr negative  - Per ID, recommended GI consult as sxs should have resolved  - s/p IV cefepime + flagyl, GI recommends switching to Azithromycin 500 mg PO for 3 days  - ESR: 70 increased from 49 on 12/03/2022  - CRP 80 was 144 on 12/03/2022  - Had 11 BM since yesterday 2 pm   - GI recommends colonoscopy, will start clear liquid today and Golytely today for possible colonoscopy tomorrow     #HTN  -C/w home losartan    #Misc  DVT Ppx:  GI ppx: NI  Diet: DASH  Activity: AAT  Dispo: Home pending GI eval; currently acute     63 y/o M with hx of HTN presents to ED for persistent bloody diarrhea.     #Sepsis, POA  #Chronic diarrhea  #Pancolitis  #Recent EPEC infection, GI PCR +  #r/o IBD  - CT A/P shows diffuse colonic wall thickening, compatible with pancolitis, without significant change since CT abdomen pelvis 12/2/2022.  - C. diff -ve  - Gi PCR + for EPEC s/p Flagyl&Cipro x 7 days  - repeated GI pcr negative  - Per ID, recommended GI consult as sxs should have resolved  - s/p IV cefepime + flagyl, GI recommends switching to Azithromycin 500 mg PO for 3 days  - ESR: 70 increased from 49 on 12/03/2022  - CRP 80 was 144 on 12/03/2022  - Had 11 BM since yesterday 2 pm   - GI recommends colonoscopy, will start clear liquid today and Golytely today for colonoscopy tomorrow   - GI recommends to discontinue ARB and can switch to another antihypertensive with no enteropathy as side effect (can do ACE inh)    #HTN  - switch losartan 12,5 to lisinopril 2,5     #Misc  DVT Ppx:  GI ppx: NI  Diet: DASH  Activity: AAT  Dispo: Home pending GI eval; currently acute

## 2022-12-13 NOTE — PROGRESS NOTE ADULT - SUBJECTIVE AND OBJECTIVE BOX
Gastroenterology progress note:     Patient is a 62y old  Male who presents with a chief complaint of Pancolitis (12 Dec 2022 13:10)       Admitted on: 12-10-22    We are following the patient for: bloody diarrhea       Interval History: Patient still having multiple episodes of diarrhea some of them are bloody. still has colicky abd pain. no other symptoms    No acute events overnight.   - Diet - soft  - last BM - this morning brownish  - Abdominal pain - yes      PAST MEDICAL & SURGICAL HISTORY:  HTN (hypertension)      No significant past surgical history          MEDICATIONS  (STANDING):  azithromycin   Tablet 500 milliGRAM(s) Oral every 24 hours  enoxaparin Injectable 40 milliGRAM(s) SubCutaneous every 24 hours  lactated ringers. 1000 milliLiter(s) (100 mL/Hr) IV Continuous <Continuous>  losartan 12.5 milliGRAM(s) Oral daily    MEDICATIONS  (PRN):  ondansetron Injectable 4 milliGRAM(s) IV Push two times a day PRN Nausea and/or Vomiting      Allergies  penicillin (Rash)      Review of Systems:   Cardiovascular:  No Chest Pain, No Palpitations  Respiratory:  No Cough, No Dyspnea  Gastrointestinal:  As described in HPI  Skin:  No Skin Lesions, No Jaundice  Neuro:  No Syncope, No Dizziness    Physical Examination:  T(C): 36.1 (12-13-22 @ 07:28), Max: 37.3 (12-12-22 @ 15:27)  HR: 78 (12-13-22 @ 07:28) (69 - 78)  BP: 131/65 (12-13-22 @ 07:28) (103/54 - 131/65)  RR: 18 (12-13-22 @ 07:28) (18 - 18)  SpO2: --        GENERAL:  Appears stated age, well-groomed, well-nourished, no distress  HEENT:  NC/AT,  conjunctivae clear and pink, no thyromegaly, nodules, adenopathy, sclera -anicteric  CHEST:  Full & symmetric excursion, no increased effort, breath sounds clear  HEART:  Regular rhythm, S1, S2, no murmur/rub/S3/S4, no abdominal bruit, no edema  ABDOMEN:  Soft, mild diffuse tenderness. refused ELENA  EXTEREMITIES:  no cyanosis,clubbing or edema  SKIN:  No rash/erythema/ecchymoses/petechiae/wounds/abscess/warm/dry  NEURO:  Alert, oriented, no asterixis, no tremor, no encephalopathy     Data:                        11.2   9.48  )-----------( 496      ( 12 Dec 2022 08:39 )             34.9     Hgb trend:  11.2  12-12-22 @ 08:39  11.6  12-11-22 @ 06:30  13.8  12-10-22 @ 16:00      12-12    134<L>  |  93<L>  |  5<L>  ----------------------------<  112<H>  3.4<L>   |  30  |  0.8    Ca    7.9<L>      12 Dec 2022 08:39  Mg     1.8     12-12      Liver panel trend:  TBili 0.3   /   AST 17   /   ALT 19   /   AlkP 48   /   Tptn 5.9   /   Alb 3.2    /   DBili --      12-10  TBili <0.2   /   AST 13   /   ALT 8   /   AlkP 41   /   Tptn 4.9   /   Alb 3.0    /   DBili --      12-06  TBili <0.2   /   AST 12   /   ALT 8   /   AlkP 49   /   Tptn 5.1   /   Alb 3.2    /   DBili --      12-05  TBili <0.2   /   AST 11   /   ALT 8   /   AlkP 53   /   Tptn 5.3   /   Alb 3.3    /   DBili --      12-04          Culture - Stool (collected 10 Dec 2022 18:51)  Source: .Stool Feces  Final Report (13 Dec 2022 07:50):    No enteric pathogens isolated.    (Stool culture examined for Salmonella,    Shigella, Campylobacter, Aeromonas, Plesiomonas,    Vibrio, E.coli O157 and Yersinia)         Radiology:       Gastroenterology progress note:     Patient is a 62y old  Male who presents with a chief complaint of Pancolitis (12 Dec 2022 13:10)       Admitted on: 12-10-22    We are following the patient for: bloody diarrhea       Interval History: Patient still having multiple episodes of diarrhea some of them are bloody. still has colicky abd pain. no other symptoms    No acute events overnight.   - Diet - soft  - last BM - this morning brownish  - Abdominal pain - yes      PAST MEDICAL & SURGICAL HISTORY:  HTN (hypertension)      No significant past surgical history          MEDICATIONS  (STANDING):  azithromycin   Tablet 500 milliGRAM(s) Oral every 24 hours  enoxaparin Injectable 40 milliGRAM(s) SubCutaneous every 24 hours  lactated ringers. 1000 milliLiter(s) (100 mL/Hr) IV Continuous <Continuous>  losartan 12.5 milliGRAM(s) Oral daily    MEDICATIONS  (PRN):  ondansetron Injectable 4 milliGRAM(s) IV Push two times a day PRN Nausea and/or Vomiting      Allergies  penicillin (Rash)      Review of Systems:   Cardiovascular:  No Chest Pain, No Palpitations  Respiratory:  No Cough, No Dyspnea  Gastrointestinal:  As described in HPI  Skin:  No Skin Lesions, No Jaundice  Neuro:  No Syncope, No Dizziness    Physical Examination:  T(C): 36.1 (12-13-22 @ 07:28), Max: 37.3 (12-12-22 @ 15:27)  HR: 78 (12-13-22 @ 07:28) (69 - 78)  BP: 131/65 (12-13-22 @ 07:28) (103/54 - 131/65)  RR: 18 (12-13-22 @ 07:28) (18 - 18)  SpO2: --        GENERAL:  Appears stated age, well-groomed, well-nourished, no distress  HEENT:  NC/AT,  conjunctivae clear and pink, no thyromegaly, nodules, adenopathy, sclera -anicteric  CHEST:  Full & symmetric excursion, no increased effort, breath sounds clear  HEART:  Regular rhythm, S1, S2, no murmur/rub/S3/S4, no abdominal bruit, no edema  ABDOMEN:  Soft, mild diffuse tenderness. refused ELENA  EXTEREMITIES:  no cyanosis,clubbing or edema  SKIN:  No rash/erythema/ecchymoses/petechiae/wounds/abscess/warm/dry  NEURO:  Alert, oriented, no asterixis, no tremor, no encephalopathy     Data:                        11.2   9.48  )-----------( 496      ( 12 Dec 2022 08:39 ) This data was reviewed.              34.9     Hgb trend: This data was reviewed.   11.2  12-12-22 @ 08:39  11.6  12-11-22 @ 06:30  13.8  12-10-22 @ 16:00      12-12    134<L>  |  93<L>  |  5<L>  ----------------------------<  112<H>  3.4<L>   |  30  |  0.8    Ca    7.9<L>      12 Dec 2022 08:39  Mg     1.8     12-12      Liver panel trend: This data was reviewed.   TBili 0.3   /   AST 17   /   ALT 19   /   AlkP 48   /   Tptn 5.9   /   Alb 3.2    /   DBili --      12-10  TBili <0.2   /   AST 13   /   ALT 8   /   AlkP 41   /   Tptn 4.9   /   Alb 3.0    /   DBili --      12-06  TBili <0.2   /   AST 12   /   ALT 8   /   AlkP 49   /   Tptn 5.1   /   Alb 3.2    /   DBili --      12-05  TBili <0.2   /   AST 11   /   ALT 8   /   AlkP 53   /   Tptn 5.3   /   Alb 3.3    /   DBili --      12-04          Culture - Stool (collected 10 Dec 2022 18:51)  Source: .Stool Feces  Final Report (13 Dec 2022 07:50):    No enteric pathogens isolated.    (Stool culture examined for Salmonella,    Shigella, Campylobacter, Aeromonas, Plesiomonas,    Vibrio, E.coli O157 and Yersinia)         Radiology:

## 2022-12-13 NOTE — PROGRESS NOTE ADULT - ASSESSMENT
62 year old male pt w PMH of HTN presenting for nonresolving bloody diarrhea with CT abdomen showing pancolitis and GI PCR 10 days ago showing EPEC s/p cipro/flagyl with no improvement    # Bloody Diarrhea - pancolitis - r/o viral/bacterial gastroenteritis vs noninfectious causes  - Patient with no improvement after 1 week of cipro/flagyl  - CT abd showing pancolitis  - bloody diarrhea up to 10x daily  - labs showing hb stable with mild normocytic anemia.   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   - elevated inflammatory markers from last admission    Plan  - repeat GI PCR --> pending, C.diff --> negative, stool culture --> negative, inflammatory markers --> ESR 70, CRP 80  - continue Azithromycin PO D1/3 today  - plan for colonoscopy tomorrow  - keep on clear liquid diet today  - bowel prep Golytly starting 5 pm and dulcolax 20 mg at bedtime  - NPO after midnight  - discontinue ARB and can switch to another antihypertensive with no enteropathy as side effect (can do ACE inh)  - will follow    Plan discussed with Attending

## 2022-12-13 NOTE — PROGRESS NOTE ADULT - SUBJECTIVE AND OBJECTIVE BOX
BEATRIZ DRUMMOND 62y Male  MRN#: [id number]   Hospital Day: 3d    HPI:  61 y/o M presenting to ED for persistent diarrhea.     PMHx: HTN   Family Hx: Colon cancer in father at age 70 & Crohn's disease in his sister.   Social Hx: Denies smoking or drinking    HPI starts from 12/3 when patient was first admitted after returning from a trip to [place] on 11/14 for bloody diarrhea. Patient was found to have pancolitis on CTAP & GI PCR was positive for EPEC. He was dc'd home to complete x7days of Cipro/Flagyl (as per ID). Was also seen by GI that recommended OP Colonoscopy.     4 days after discharge, patient returns to ED due to unresolved symptoms.     Pt had a colonoscopy done with Dr. Drummond 5 yrs ago that was normal as per pt.     Denies fever, chest pain, N/V.     ED:   - VS: Unremarkable  - Labs: WBC 12, Na 134,   - CTAP: Diffuse colonic wall thickening, compatible with pancolitis, without significant change since CT abdomen pelvis 12/2/2022.    Admit to Medicine       (10 Dec 2022 19:48)      SUBJECTIVE  Patient is a 62y old Male who presents with a chief complaint of Pancolitis (13 Dec 2022 09:15)  Currently admitted to medicine with the primary diagnosis of Pancolitis      INTERVAL HPI AND OVERNIGHT EVENTS:  Patient was examined and seen at bedside. This morning he is resting comfortably in bed and reports no issues or overnight events.      OBJECTIVE  PAST MEDICAL & SURGICAL HISTORY  HTN (hypertension)    No significant past surgical history      ALLERGIES:  penicillin (Rash)    MEDICATIONS:  STANDING MEDICATIONS  azithromycin   Tablet 500 milliGRAM(s) Oral every 24 hours  bisacodyl 20 milliGRAM(s) Oral at bedtime  enoxaparin Injectable 40 milliGRAM(s) SubCutaneous every 24 hours  lactated ringers. 1000 milliLiter(s) IV Continuous <Continuous>  losartan 12.5 milliGRAM(s) Oral daily  polyethylene glycol/electrolyte Solution. 4000 milliLiter(s) Oral once    PRN MEDICATIONS  ondansetron   Disintegrating Tablet 4 milliGRAM(s) Oral every 6 hours PRN      VITAL SIGNS: Last 24 Hours  T(C): 36.1 (13 Dec 2022 07:28), Max: 37.3 (12 Dec 2022 15:27)  T(F): 97 (13 Dec 2022 07:28), Max: 99.1 (12 Dec 2022 15:27)  HR: 78 (13 Dec 2022 07:28) (69 - 78)  BP: 131/65 (13 Dec 2022 07:28) (103/54 - 131/65)  BP(mean): --  RR: 18 (13 Dec 2022 07:28) (18 - 18)  SpO2: --    LABS:                        11.2   8.72  )-----------( 477      ( 13 Dec 2022 08:38 )             33.3     12-13    137  |  99  |  3<L>  ----------------------------<  136<H>  3.6   |  30  |  0.7    Ca    8.0<L>      13 Dec 2022 08:38  Mg     1.8     12-13      Sedimentation Rate, Erythrocyte: 70 mm/Hr *H* (12-12-22 @ 11:29)      Culture - Stool (collected 10 Dec 2022 18:51)  Source: .Stool Feces  Final Report (13 Dec 2022 07:50):    No enteric pathogens isolated.    (Stool culture examined for Salmonella,    Shigella, Campylobacter, Aeromonas, Plesiomonas,    Vibrio, E.coli O157 and Yersinia)    RADIOLOGY:    PHYSICAL EXAM:    General: well-appearing in NAD.   HEENT: NCAT  LUNGS: CTAB  HEART: RRR.   ABDOMEN: + Diffuse TTP; Soft, nondistended.   EXT: Nonedematous.

## 2022-12-14 ENCOUNTER — TRANSCRIPTION ENCOUNTER (OUTPATIENT)
Age: 62
End: 2022-12-14

## 2022-12-14 ENCOUNTER — RESULT REVIEW (OUTPATIENT)
Age: 62
End: 2022-12-14

## 2022-12-14 LAB — GI PCR PANEL: SIGNIFICANT CHANGE UP

## 2022-12-14 PROCEDURE — 45380 COLONOSCOPY AND BIOPSY: CPT | Mod: 53

## 2022-12-14 PROCEDURE — 88305 TISSUE EXAM BY PATHOLOGIST: CPT | Mod: 26

## 2022-12-14 PROCEDURE — 99233 SBSQ HOSP IP/OBS HIGH 50: CPT

## 2022-12-14 RX ORDER — POTASSIUM CHLORIDE 20 MEQ
40 PACKET (EA) ORAL ONCE
Refills: 0 | Status: COMPLETED | OUTPATIENT
Start: 2022-12-14 | End: 2022-12-14

## 2022-12-14 RX ORDER — MAGNESIUM SULFATE 500 MG/ML
2 VIAL (ML) INJECTION ONCE
Refills: 0 | Status: COMPLETED | OUTPATIENT
Start: 2022-12-14 | End: 2022-12-14

## 2022-12-14 RX ORDER — GANCICLOVIR SODIUM 50 MG/ML
480 INJECTION, POWDER, LYOPHILIZED, FOR SOLUTION INTRAVENTRICULAR EVERY 12 HOURS
Refills: 0 | Status: DISCONTINUED | OUTPATIENT
Start: 2022-12-14 | End: 2022-12-20

## 2022-12-14 RX ADMIN — GANCICLOVIR SODIUM 100 MILLIGRAM(S): 50 INJECTION, POWDER, LYOPHILIZED, FOR SOLUTION INTRAVENTRICULAR at 21:42

## 2022-12-14 RX ADMIN — AZITHROMYCIN 500 MILLIGRAM(S): 500 TABLET, FILM COATED ORAL at 07:07

## 2022-12-14 RX ADMIN — Medication 25 GRAM(S): at 14:00

## 2022-12-14 RX ADMIN — ENOXAPARIN SODIUM 40 MILLIGRAM(S): 100 INJECTION SUBCUTANEOUS at 13:58

## 2022-12-14 RX ADMIN — Medication 40 MILLIEQUIVALENT(S): at 18:59

## 2022-12-14 RX ADMIN — LOSARTAN POTASSIUM 12.5 MILLIGRAM(S): 100 TABLET, FILM COATED ORAL at 05:18

## 2022-12-14 NOTE — CHART NOTE - NSCHARTNOTEFT_GEN_A_CORE
Findings  Diffuse continuous ulceration, friability, erythema and abnormal vascularity with thick mucoid membrane with contact bleeding were noted in the rectum and sigmoid colon. 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 Deep cratered ulcers were noted suggestive of CMV colitis, biopsies were taken and sent separately. The PCF was advanced till 40-50 cm with continuous ulcerative mucosa so procedure was aborted.    rec  Advance diet as tolerated    Await pathology results.     check HIV, ASCA, ANCA      Start Gancyclovir 5 mg/kg/dose every 12 hours awaiting pathology and ID evaluation      Please resend C diif PCR      GI team will follow    -for questions text me on  teams, call [phone] on weekdays before 5pm or call GI service at [phone]  after 5 pm and on weekends

## 2022-12-14 NOTE — PROGRESS NOTE ADULT - SUBJECTIVE AND OBJECTIVE BOX
BEATRIZ DRUMMOND 62y Male  MRN#: [id number]   Hospital Day: 4d    HPI:  61 y/o M presenting to ED for persistent diarrhea.     PMHx: HTN   Family Hx: Colon cancer in father at age 70 & Crohn's disease in his sister.   Social Hx: Denies smoking or drinking    HPI starts from 12/3 when patient was first admitted after returning from a trip to [place] on 11/14 for bloody diarrhea. Patient was found to have pancolitis on CTAP & GI PCR was positive for EPEC. He was dc'd home to complete x7days of Cipro/Flagyl (as per ID). Was also seen by GI that recommended OP Colonoscopy.     4 days after discharge, patient returns to ED due to unresolved symptoms.     Pt had a colonoscopy done with Dr. Drummond 5 yrs ago that was normal as per pt.     Denies fever, chest pain, N/V.     ED:   - VS: Unremarkable  - Labs: WBC 12, Na 134,   - CTAP: Diffuse colonic wall thickening, compatible with pancolitis, without significant change since CT abdomen pelvis 12/2/2022.    Admit to Medicine     (10 Dec 2022 19:48)    SUBJECTIVE  Patient is a 62y old Male who presents with a chief complaint of Pancolitis (13 Dec 2022 13:17)  Currently admitted to medicine with the primary diagnosis of Pancolitis    INTERVAL HPI AND OVERNIGHT EVENTS:  Patient was examined and seen at bedside. This morning he is resting comfortably in bed and reports no issues or overnight events.    OBJECTIVE  PAST MEDICAL & SURGICAL HISTORY  HTN (hypertension)    No significant past surgical history      ALLERGIES:  penicillin (Rash)    MEDICATIONS:  STANDING MEDICATIONS  azithromycin   Tablet 500 milliGRAM(s) Oral every 24 hours  enoxaparin Injectable 40 milliGRAM(s) SubCutaneous every 24 hours  ganciclovir IVPB 480 milliGRAM(s) IV Intermittent every 12 hours  lactated ringers. 1000 milliLiter(s) IV Continuous <Continuous>  magnesium sulfate  IVPB 2 Gram(s) IV Intermittent once    PRN MEDICATIONS  ondansetron Injectable 4 milliGRAM(s) IV Push every 6 hours PRN      VITAL SIGNS: Last 24 Hours  T(C): 36.2 (14 Dec 2022 10:20), Max: 37.2 (13 Dec 2022 23:44)  T(F): 97.2 (14 Dec 2022 10:20), Max: 98.9 (13 Dec 2022 23:44)  HR: 70 (14 Dec 2022 10:45) (70 - 86)  BP: 130/72 (14 Dec 2022 10:45) (104/65 - 130/72)  BP(mean): 2 (14 Dec 2022 09:19) (2 - 2)  RR: 18 (14 Dec 2022 10:45) (18 - 18)  SpO2: 97% (14 Dec 2022 10:45) (96% - 98%)    LABS:                        11.2   9.59  )-----------( 532      ( 13 Dec 2022 21:04 )             35.2     12-13    134<L>  |  96<L>  |  4<L>  ----------------------------<  98  3.6   |  27  |  0.7    Ca    8.0<L>      13 Dec 2022 21:04  Mg     1.7     12-13    TPro  5.1<L>  /  Alb  2.9<L>  /  TBili  0.3  /  DBili  x   /  AST  15  /  ALT  9   /  AlkPhos  40  12-13    PT/INR - ( 13 Dec 2022 21:04 )   PT: 15.40 sec;   INR: 1.34 ratio         PTT - ( 13 Dec 2022 21:04 )  PTT:31.6 sec    RADIOLOGY:    PHYSICAL EXAM:  General: well-appearing in NAD.   HEENT: NCAT  LUNGS: CTAB  HEART: RRR.   ABDOMEN: + Diffuse TTP; Soft, nondistended.   EXT: Nonedematous.

## 2022-12-14 NOTE — PROGRESS NOTE ADULT - ASSESSMENT
HPI:  61 y/o M presenting to ED for persistent diarrhea.     PMHx: HTN   Family Hx: Colon cancer in father at age 70 & Crohn's disease in his sister.   Social Hx: Denies smoking or drinking    HPI starts from 12/3 when patient was first admitted after returning from a trip to [place] on 11/14 for bloody diarrhea. Patient was found to have pancolitis on CTAP & GI PCR was positive for EPEC. He was dc'd home to complete x7days of Cipro/Flagyl (as per ID). Was also seen by GI that recommended OP Colonoscopy.     4 days after discharge, patient returns to ED due to unresolved symptoms.     Pt had a colonoscopy done with Dr. Drummond 5 yrs ago that was normal as per pt.     Denies fever, chest pain, N/V.     #Diarrhea secondary to traveler's diarrhea secondary to epec complicated by deep cratered ulcers suspicious for cmv colitis   start gancyclovir per gi   id follow up   azithromycin   awaiting biopsy reports     #Overweight BMI 28 patient needs to see dieitian outpatient for further evaluation     #Hyponatremia no intervention     PROGRESS NOTE HANDOFF    Pending:  id follow up  , gancyclovir  , biopsy results ( if improved may follow up outpatient)      Family discussion: patient verbalized understanding and agreeable to plan of care     Disposition: Home

## 2022-12-14 NOTE — PROGRESS NOTE ADULT - ASSESSMENT
63 y/o M with hx of HTN presents to ED for persistent bloody diarrhea.     #Sepsis, POA  #Chronic diarrhea  #Pancolitis  #Recent EPEC infection, GI PCR +  #r/o IBD  - CT A/P shows diffuse colonic wall thickening, compatible with pancolitis, without significant change since CT abdomen pelvis 12/2/2022.  - C. diff -ve  - Gi PCR + for EPEC s/p Flagyl&Cipro x 7 days  - repeated GI pcr negative  - Per ID, recommended GI consult as sxs should have resolved  - s/p IV cefepime + flagyl, GI recommends switching to Azithromycin 500 mg PO for 3 days  - ESR: 70 increased from 49 on 12/03/2022  - CRP 80 was 144 on 12/03/2022  - Had 11 BM since yesterday 2 pm   - GI recommends to discontinue ARB and can switch to another antihypertensive with no enteropathy as side effect (can do ACE inh)  - Colonoscopy: Diffuse continuous ulceration, friability, erythema and abnormal vascularity with thick mucoid membrane with contact bleeding were noted in the rectum and sigmoid colon. 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 Deep cratered ulcers were noted suggestive of CMV colitis, biopsies were taken and sent separately.   - Start Gancyclovir 5 mg/kg/dose every 12 hours awaiting pathology and ID evaluation      - Resend C-Diff   - check HIV, ASCA, ANCA  - pending biopsy results     #HTN  - D/C losartan as per GI recommendations    #Misc  DVT Ppx:  GI ppx: NI  Diet: DASH  Activity: AAT  Dispo: Home pending GI eval; currently acute

## 2022-12-14 NOTE — PROGRESS NOTE ADULT - SUBJECTIVE AND OBJECTIVE BOX
HODABEATRIZ  62[id number]      Patient is a 62y old  Male who presents with a chief complaint of Pancolitis (14 Dec 2022 10:52)      INTERVAL HPI/OVERNIGHT EVENTS:    no acute events overnight     REVIEW OF SYSTEMS:  CONSTITUTIONAL: No fever, weight loss, or fatigue  EYES: No eye pain, visual disturbances, or discharge  ENMT:  No difficulty hearing, tinnitus, vertigo; No sinus or throat pain  NECK: No pain or stiffness  BREASTS: No pain, masses, or nipple discharge  RESPIRATORY: No cough, wheezing, chills or hemoptysis; No shortness of breath  CARDIOVASCULAR: No chest pain, palpitations, dizziness, or leg swelling  GASTROINTESTINAL: + DIarrhea overnight from prep   GENITOURINARY: No dysuria, frequency, hematuria, or incontinence  NEUROLOGICAL: No headaches, memory loss, loss of strength, numbness, or tremors  SKIN: No itching, burning, rashes, or lesions   LYMPH NODES: No enlarged glands  ENDOCRINE: No heat or cold intolerance; No hair loss  MUSCULOSKELETAL: No joint pain or swelling; No muscle, back, or extremity pain  PSYCHIATRIC: No depression, anxiety, mood swings, or difficulty sleeping  HEME/LYMPH: No easy bruising, or bleeding gums  ALLERY AND IMMUNOLOGIC: No hives or eczema  FAMILY HISTORY:    T(C): 36.2 (12-14-22 @ 10:20), Max: 37.2 (12-13-22 @ 23:44)  HR: 70 (12-14-22 @ 10:45) (70 - 86)  BP: 130/72 (12-14-22 @ 10:45) (104/65 - 130/72)  RR: 18 (12-14-22 @ 10:45) (18 - 18)  SpO2: 97% (12-14-22 @ 10:45) (96% - 98%)  Wt(kg): --Vital Signs Last 24 Hrs  T(C): 36.2 (14 Dec 2022 10:20), Max: 37.2 (13 Dec 2022 23:44)  T(F): 97.2 (14 Dec 2022 10:20), Max: 98.9 (13 Dec 2022 23:44)  HR: 70 (14 Dec 2022 10:45) (70 - 86)  BP: 130/72 (14 Dec 2022 10:45) (104/65 - 130/72)  BP(mean): 2 (14 Dec 2022 09:19) (2 - 2)  RR: 18 (14 Dec 2022 10:45) (18 - 18)  SpO2: 97% (14 Dec 2022 10:45) (96% - 98%)    Parameters below as of 14 Dec 2022 10:30  Patient On (Oxygen Delivery Method): room air        PHYSICAL EXAM:  GENERAL: NAD, well-groomed, well-developed  HEAD:  Atraumatic, Normocephalic  EYES: EOMI, PERRLA, conjunctiva and sclera clear  ENMT: No tonsillar erythema, exudates, or enlargement; Moist mucous membranes, Good dentition, No lesions  NECK: Supple, No JVD, Normal thyroid  NERVOUS SYSTEM:  Alert & Oriented X3, Good concentration; Motor Strength 5/5 B/L upper and lower extremities; DTRs 2+ intact and symmetric  PULM: Clear to auscultation bilaterally  CARDIAC: Regular rate and rhythm; No murmurs, rubs, or gallops  GI: Soft, Nontender, Nondistended; Bowel sounds present  EXTREMITIES:  2+ Peripheral Pulses, No clubbing, cyanosis, or edema  LYMPH: No lymphadenopathy noted  SKIN: No rashes or lesions    Consultant(s) Notes Reviewed:  [x ] YES  [ ] NO  Care Discussed with Consultants/Other Providers [ x] YES  [ ] NO    LABS:                            11.2   9.59  )-----------( 532      ( 13 Dec 2022 21:04 )             35.2   12-13    134<L>  |  96<L>  |  4<L>  ----------------------------<  98  3.6   |  27  |  0.7    Ca    8.0<L>      13 Dec 2022 21:04  Mg     1.7     12-13    TPro  5.1<L>  /  Alb  2.9<L>  /  TBili  0.3  /  DBili  x   /  AST  15  /  ALT  9   /  AlkPhos  40  12-13            azithromycin   Tablet 500 milliGRAM(s) Oral every 24 hours  enoxaparin Injectable 40 milliGRAM(s) SubCutaneous every 24 hours  ganciclovir IVPB 480 milliGRAM(s) IV Intermittent every 12 hours  lactated ringers. 1000 milliLiter(s) IV Continuous <Continuous>  magnesium sulfate  IVPB 2 Gram(s) IV Intermittent once  ondansetron Injectable 4 milliGRAM(s) IV Push every 6 hours PRN      HEALTH ISSUES - PROBLEM Dx:          Case Discussed with House Staff   45 minutes spent on total encounter; more than 50% of the visit was spent counseling and/or coordinating care by the attending physician.   Spectra [phone]

## 2022-12-14 NOTE — PRE-ANESTHESIA EVALUATION ADULT - NSANTHOSAYNRD_GEN_A_CORE
No. MELISA screening performed.  STOP BANG Legend: 0-2 = LOW Risk; 3-4 = INTERMEDIATE Risk; 5-8 = HIGH Risk

## 2022-12-15 LAB
ALBUMIN SERPL ELPH-MCNC: 2.5 G/DL — LOW (ref 3.5–5.2)
ALP SERPL-CCNC: 36 U/L — SIGNIFICANT CHANGE UP (ref 30–115)
ALT FLD-CCNC: 8 U/L — SIGNIFICANT CHANGE UP (ref 0–41)
ANION GAP SERPL CALC-SCNC: 12 MMOL/L — SIGNIFICANT CHANGE UP (ref 7–14)
AST SERPL-CCNC: 10 U/L — SIGNIFICANT CHANGE UP (ref 0–41)
AUTO DIFF PNL BLD: ABNORMAL
BILIRUB SERPL-MCNC: 0.2 MG/DL — SIGNIFICANT CHANGE UP (ref 0.2–1.2)
BUN SERPL-MCNC: 3 MG/DL — LOW (ref 10–20)
C DIFF BY PCR RESULT: SIGNIFICANT CHANGE UP
C-ANCA SER-ACNC: NEGATIVE — SIGNIFICANT CHANGE UP
CALCIUM SERPL-MCNC: 7.6 MG/DL — LOW (ref 8.4–10.5)
CHLORIDE SERPL-SCNC: 96 MMOL/L — LOW (ref 98–110)
CO2 SERPL-SCNC: 29 MMOL/L — SIGNIFICANT CHANGE UP (ref 17–32)
CREAT SERPL-MCNC: 0.5 MG/DL — LOW (ref 0.7–1.5)
CULTURE RESULTS: SIGNIFICANT CHANGE UP
EGFR: 116 ML/MIN/1.73M2 — SIGNIFICANT CHANGE UP
GLUCOSE SERPL-MCNC: 126 MG/DL — HIGH (ref 70–99)
HCT VFR BLD CALC: 32.4 % — LOW (ref 42–52)
HGB BLD-MCNC: 10.7 G/DL — LOW (ref 14–18)
MAGNESIUM SERPL-MCNC: 2 MG/DL — SIGNIFICANT CHANGE UP (ref 1.8–2.4)
MCHC RBC-ENTMCNC: 30 PG — SIGNIFICANT CHANGE UP (ref 27–31)
MCHC RBC-ENTMCNC: 33 G/DL — SIGNIFICANT CHANGE UP (ref 32–37)
MCV RBC AUTO: 90.8 FL — SIGNIFICANT CHANGE UP (ref 80–94)
MPO AB + PR3 PNL SER: SIGNIFICANT CHANGE UP
NRBC # BLD: 0 /100 WBCS — SIGNIFICANT CHANGE UP (ref 0–0)
P-ANCA SER-ACNC: NEGATIVE — SIGNIFICANT CHANGE UP
PLATELET # BLD AUTO: 500 K/UL — HIGH (ref 130–400)
POTASSIUM SERPL-MCNC: 3.2 MMOL/L — LOW (ref 3.5–5)
POTASSIUM SERPL-SCNC: 3.2 MMOL/L — LOW (ref 3.5–5)
PROT SERPL-MCNC: 4.7 G/DL — LOW (ref 6–8)
RBC # BLD: 3.57 M/UL — LOW (ref 4.7–6.1)
RBC # FLD: 12.4 % — SIGNIFICANT CHANGE UP (ref 11.5–14.5)
SODIUM SERPL-SCNC: 137 MMOL/L — SIGNIFICANT CHANGE UP (ref 135–146)
SPECIMEN SOURCE: SIGNIFICANT CHANGE UP
SURGICAL PATHOLOGY STUDY: SIGNIFICANT CHANGE UP
WBC # BLD: 8.94 K/UL — SIGNIFICANT CHANGE UP (ref 4.8–10.8)
WBC # FLD AUTO: 8.94 K/UL — SIGNIFICANT CHANGE UP (ref 4.8–10.8)

## 2022-12-15 PROCEDURE — 99233 SBSQ HOSP IP/OBS HIGH 50: CPT

## 2022-12-15 PROCEDURE — 99232 SBSQ HOSP IP/OBS MODERATE 35: CPT

## 2022-12-15 RX ORDER — POTASSIUM CHLORIDE 20 MEQ
20 PACKET (EA) ORAL
Refills: 0 | Status: COMPLETED | OUTPATIENT
Start: 2022-12-15 | End: 2022-12-15

## 2022-12-15 RX ADMIN — Medication 20 MILLIEQUIVALENT(S): at 14:35

## 2022-12-15 RX ADMIN — ENOXAPARIN SODIUM 40 MILLIGRAM(S): 100 INJECTION SUBCUTANEOUS at 12:27

## 2022-12-15 RX ADMIN — Medication 20 MILLIGRAM(S): at 15:29

## 2022-12-15 RX ADMIN — Medication 20 MILLIGRAM(S): at 21:14

## 2022-12-15 RX ADMIN — GANCICLOVIR SODIUM 100 MILLIGRAM(S): 50 INJECTION, POWDER, LYOPHILIZED, FOR SOLUTION INTRAVENTRICULAR at 06:06

## 2022-12-15 RX ADMIN — Medication 20 MILLIEQUIVALENT(S): at 14:36

## 2022-12-15 RX ADMIN — GANCICLOVIR SODIUM 100 MILLIGRAM(S): 50 INJECTION, POWDER, LYOPHILIZED, FOR SOLUTION INTRAVENTRICULAR at 19:00

## 2022-12-15 NOTE — PROGRESS NOTE ADULT - SUBJECTIVE AND OBJECTIVE BOX
Gastroenterology progress note:     Patient is a 62y old  Male who presents with a chief complaint of Pancolitis (15 Dec 2022 11:44)       Admitted on: 12-10-22    We are following the patient for: diarrhea       Interval History: Patient still having diarrhea mainly watery but it is improving    No acute events overnight.   - Diet - regular  - last BM - today  - Abdominal pain - yes      PAST MEDICAL & SURGICAL HISTORY:  HTN (hypertension)      No significant past surgical history          MEDICATIONS  (STANDING):  enoxaparin Injectable 40 milliGRAM(s) SubCutaneous every 24 hours  ganciclovir IVPB 480 milliGRAM(s) IV Intermittent every 12 hours  lactated ringers. 1000 milliLiter(s) (100 mL/Hr) IV Continuous <Continuous>  potassium chloride    Tablet ER 20 milliEquivalent(s) Oral every 2 hours    MEDICATIONS  (PRN):  ondansetron Injectable 4 milliGRAM(s) IV Push every 6 hours PRN Nausea and/or Vomiting      Allergies  penicillin (Rash)      Review of Systems:   Cardiovascular:  No Chest Pain, No Palpitations  Respiratory:  No Cough, No Dyspnea  Gastrointestinal:  As described in HPI  Skin:  No Skin Lesions, No Jaundice  Neuro:  No Syncope, No Dizziness    Physical Examination:  T(C): 36.4 (12-15-22 @ 11:35), Max: 36.8 (12-14-22 @ 15:53)  HR: 79 (12-15-22 @ 11:35) (67 - 79)  BP: 112/63 (12-15-22 @ 11:35) (112/53 - 130/62)  RR: 18 (12-15-22 @ 11:35) (18 - 18)  SpO2: --      12-14-22 @ 07:01  -  12-15-22 @ 07:00  --------------------------------------------------------  IN: 1200 mL / OUT: 0 mL / NET: 1200 mL    12-15-22 @ 07:01  -  12-15-22 @ 13:57  --------------------------------------------------------  IN: 360 mL / OUT: 0 mL / NET: 360 mL      GENERAL:  Appears stated age, well-groomed, well-nourished, no distress  HEENT:  NC/AT,  conjunctivae clear and pink, no thyromegaly, nodules, adenopathy, sclera -anicteric  CHEST:  Full & symmetric excursion, no increased effort, breath sounds clear  HEART:  Regular rhythm, S1, S2, no murmur/rub/S3/S4, no abdominal bruit, no edema  ABDOMEN:  Soft, mild diffuse tenderness. refused ELENA  EXTEREMITIES:  no cyanosis,clubbing or edema  SKIN:  No rash/erythema/ecchymoses/petechiae/wounds/abscess/warm/dry  NEURO:  Alert, oriented, no asterixis, no tremor, no encephalopathy        Data:                        10.7   8.94  )-----------( 500      ( 15 Dec 2022 09:15 )             32.4     Hgb trend:  10.7  12-15-22 @ 09:15  11.2  12-13-22 @ 21:04  11.2  12-13-22 @ 08:38        12-15    137  |  96<L>  |  3<L>  ----------------------------<  126<H>  3.2<L>   |  29  |  0.5<L>    Ca    7.6<L>      15 Dec 2022 09:15  Mg     2.0     12-15    TPro  4.7<L>  /  Alb  2.5<L>  /  TBili  0.2  /  DBili  x   /  AST  10  /  ALT  8   /  AlkPhos  36  12-15    Liver panel trend:  TBili 0.2   /   AST 10   /   ALT 8   /   AlkP 36   /   Tptn 4.7   /   Alb 2.5    /   DBili --      12-15  TBili 0.3   /   AST 15   /   ALT 9   /   AlkP 40   /   Tptn 5.1   /   Alb 2.9    /   DBili --      12-13  TBili 0.3   /   AST 17   /   ALT 19   /   AlkP 48   /   Tptn 5.9   /   Alb 3.2    /   DBili --      12-10  TBili <0.2   /   AST 13   /   ALT 8   /   AlkP 41   /   Tptn 4.9   /   Alb 3.0    /   DBili --      12-06      PT/INR - ( 13 Dec 2022 21:04 )   PT: 15.40 sec;   INR: 1.34 ratio         PTT - ( 13 Dec 2022 21:04 )  PTT:31.6 sec    Culture - Stool (collected 12 Dec 2022 18:40)  Source: .Stool Feces  Preliminary Report (14 Dec 2022 17:50):    No enteric pathogens to date: Final culture pending         Radiology:

## 2022-12-15 NOTE — PROGRESS NOTE ADULT - SUBJECTIVE AND OBJECTIVE BOX
BEATRIZ DRUMMOND 62y Male  MRN#: [id number]   Hospital Day: 5d    HPI:  61 y/o M presenting to ED for persistent diarrhea.     PMHx: HTN   Family Hx: Colon cancer in father at age 70 & Crohn's disease in his sister.   Social Hx: Denies smoking or drinking    HPI starts from 12/3 when patient was first admitted after returning from a trip to [place] on 11/14 for bloody diarrhea. Patient was found to have pancolitis on CTAP & GI PCR was positive for EPEC. He was dc'd home to complete x7days of Cipro/Flagyl (as per ID). Was also seen by GI that recommended OP Colonoscopy.     4 days after discharge, patient returns to ED due to unresolved symptoms.     Pt had a colonoscopy done with Dr. Drummond 5 yrs ago that was normal as per pt.     Denies fever, chest pain, N/V.     ED:   - VS: Unremarkable  - Labs: WBC 12, Na 134,   - CTAP: Diffuse colonic wall thickening, compatible with pancolitis, without significant change since CT abdomen pelvis 12/2/2022.    Admit to Medicine       (10 Dec 2022 19:48)      SUBJECTIVE  Patient is a 62y old Male who presents with a chief complaint of Pancolitis (15 Dec 2022 08:43)  Currently admitted to medicine with the primary diagnosis of Pancolitis      INTERVAL HPI AND OVERNIGHT EVENTS:  Patient was examined and seen at bedside. This morning he is resting comfortably in bed and reports no issues or overnight events.    OBJECTIVE  PAST MEDICAL & SURGICAL HISTORY  HTN (hypertension)    No significant past surgical history      ALLERGIES:  penicillin (Rash)    MEDICATIONS:  STANDING MEDICATIONS  enoxaparin Injectable 40 milliGRAM(s) SubCutaneous every 24 hours  ganciclovir IVPB 480 milliGRAM(s) IV Intermittent every 12 hours  lactated ringers. 1000 milliLiter(s) IV Continuous <Continuous>    PRN MEDICATIONS  ondansetron Injectable 4 milliGRAM(s) IV Push every 6 hours PRN      VITAL SIGNS: Last 24 Hours  T(C): 36.5 (15 Dec 2022 07:48), Max: 36.8 (14 Dec 2022 15:53)  T(F): 97.7 (15 Dec 2022 07:48), Max: 98.2 (14 Dec 2022 15:53)  HR: 67 (15 Dec 2022 07:48) (67 - 78)  BP: 130/62 (15 Dec 2022 07:48) (112/53 - 130/62)  BP(mean): --  RR: 18 (15 Dec 2022 07:48) (18 - 18)  SpO2: --    LABS:                        10.7   8.94  )-----------( 500      ( 15 Dec 2022 09:15 )             32.4     12-15    137  |  96<L>  |  3<L>  ----------------------------<  126<H>  3.2<L>   |  29  |  0.5<L>    Ca    7.6<L>      15 Dec 2022 09:15  Mg     2.0     12-15    TPro  4.7<L>  /  Alb  2.5<L>  /  TBili  0.2  /  DBili  x   /  AST  10  /  ALT  8   /  AlkPhos  36  12-15    PT/INR - ( 13 Dec 2022 21:04 )   PT: 15.40 sec;   INR: 1.34 ratio         PTT - ( 13 Dec 2022 21:04 )  PTT:31.6 sec    Culture - Stool (collected 12 Dec 2022 18:40)  Source: .Stool Feces  Preliminary Report (14 Dec 2022 17:50):    No enteric pathogens to date: Final culture pending    RADIOLOGY:    PHYSICAL EXAM:  General: well-appearing in NAD.   HEENT: NCAT  LUNGS: CTAB  HEART: RRR.   ABDOMEN: + Diffuse TTP; Soft, nondistended.   EXT: Nonedematous.

## 2022-12-15 NOTE — PROGRESS NOTE ADULT - SUBJECTIVE AND OBJECTIVE BOX
HODA BEATRIZ  62y  [id number]      Patient is a 62y old  Male who presents with a chief complaint of Pancolitis (15 Dec 2022 10:54)      INTERVAL HPI/OVERNIGHT EVENTS:    no events overnight     REVIEW OF SYSTEMS:  CONSTITUTIONAL: No fever, weight loss, or fatigue  EYES: No eye pain, visual disturbances, or discharge  ENMT:  No difficulty hearing, tinnitus, vertigo; No sinus or throat pain  NECK: No pain or stiffness  BREASTS: No pain, masses, or nipple discharge  RESPIRATORY: No cough, wheezing, chills or hemoptysis; No shortness of breath  CARDIOVASCULAR: No chest pain, palpitations, dizziness, or leg swelling  GASTROINTESTINAL + loose bowel movements   GENITOURINARY: No dysuria, frequency, hematuria, or incontinence  NEUROLOGICAL: No headaches, memory loss, loss of strength, numbness, or tremors  SKIN: No itching, burning, rashes, or lesions   LYMPH NODES: No enlarged glands  ENDOCRINE: No heat or cold intolerance; No hair loss  MUSCULOSKELETAL: No joint pain or swelling; No muscle, back, or extremity pain  PSYCHIATRIC: No depression, anxiety, mood swings, or difficulty sleeping  HEME/LYMPH: No easy bruising, or bleeding gums  ALLERY AND IMMUNOLOGIC: No hives or eczema  FAMILY HISTORY:    T(C): 36.4 (12-15-22 @ 11:35), Max: 36.8 (12-14-22 @ 15:53)  HR: 79 (12-15-22 @ 11:35) (67 - 79)  BP: 112/63 (12-15-22 @ 11:35) (112/53 - 130/62)  RR: 18 (12-15-22 @ 11:35) (18 - 18)  SpO2: --  Wt(kg): --Vital Signs Last 24 Hrs  T(C): 36.4 (15 Dec 2022 11:35), Max: 36.8 (14 Dec 2022 15:53)  T(F): 97.6 (15 Dec 2022 11:35), Max: 98.2 (14 Dec 2022 15:53)  HR: 79 (15 Dec 2022 11:35) (67 - 79)  BP: 112/63 (15 Dec 2022 11:35) (112/53 - 130/62)  BP(mean): --  RR: 18 (15 Dec 2022 11:35) (18 - 18)  SpO2: --        PHYSICAL EXAM:  GENERAL: NAD, well-groomed, well-developed  HEAD:  Atraumatic, Normocephalic  EYES: EOMI, PERRLA, conjunctiva and sclera clear  ENMT: No tonsillar erythema, exudates, or enlargement; Moist mucous membranes, Good dentition, No lesions  NECK: Supple, No JVD, Normal thyroid  NERVOUS SYSTEM:  Alert & Oriented X3, Good concentration; Motor Strength 5/5 B/L upper and lower extremities; DTRs 2+ intact and symmetric  PULM: Clear to auscultation bilaterally  CARDIAC: Regular rate and rhythm; No murmurs, rubs, or gallops  GI: Soft, Nontender, Nondistended; Bowel sounds present  EXTREMITIES:  2+ Peripheral Pulses, No clubbing, cyanosis, or edema  LYMPH: No lymphadenopathy noted  SKIN: No rashes or lesions    Consultant(s) Notes Reviewed:  [x ] YES  [ ] NO  Care Discussed with Consultants/Other Providers [ x] YES  [ ] NO    LABS:                            10.7   8.94  )-----------( 500      ( 15 Dec 2022 09:15 )             32.4   12-15    137  |  96<L>  |  3<L>  ----------------------------<  126<H>  3.2<L>   |  29  |  0.5<L>    Ca    7.6<L>      15 Dec 2022 09:15  Mg     2.0     12-15    TPro  4.7<L>  /  Alb  2.5<L>  /  TBili  0.2  /  DBili  x   /  AST  10  /  ALT  8   /  AlkPhos  36  12-15            Culture - Stool (collected 12 Dec 2022 18:40)  Source: .Stool Feces  Preliminary Report (14 Dec 2022 17:50):    No enteric pathogens to date: Final culture pending      enoxaparin Injectable 40 milliGRAM(s) SubCutaneous every 24 hours  ganciclovir IVPB 480 milliGRAM(s) IV Intermittent every 12 hours  lactated ringers. 1000 milliLiter(s) IV Continuous <Continuous>  ondansetron Injectable 4 milliGRAM(s) IV Push every 6 hours PRN      HEALTH ISSUES - PROBLEM Dx:          Case Discussed with House Staff     Spectra [phone]

## 2022-12-15 NOTE — PROGRESS NOTE ADULT - ASSESSMENT
62 year old male pt w PMH of HTN presenting for nonresolving bloody diarrhea with CT abdomen showing pancolitis and GI PCR 10 days ago showing EPEC s/p cipro/flagyl with no improvement    # Bloody Diarrhea - pancolitis - r/o viral/bacterial gastroenteritis vs noninfectious causes  - Patient with no improvement after 1 week of cipro/flagyl  - CT abd showing pancolitis  - bloody diarrhea up to 10x daily  - labs showing hb stable with mild normocytic anemia.   - elevated inflammatory markers from last admission    Plan  - repeat GI PCR --> negative, C.diff --> negative x2, stool culture --> negative, inflammatory markers --> ESR 70, CRP 80  - finished 3 days of Azithromycin  - plan for colonoscopy --> done on 12/14. findings as below:  Diffuse continuous ulceration, friability, erythema and abnormal vascularity with thick mucoid membrane with contact bleeding were noted in the rectum and sigmoid colon. 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 Deep cratered ulcers were noted suggestive of CMV colitis, biopsies were taken and sent separately. The PCF was advanced till 40-50 cm with continuous ulcerative mucosa so procedure was aborted.  - Advance diet as tolerated    - Await pathology results.     - check HIV --> negative, ASCA, ANCA      - continue Gancyclovir 5 mg/kg/dose every 12 hours awaiting pathology and ID evaluation      - discontinue ARB and can switch to another antihypertensive with no enteropathy as side effect (can do ACE inh)  - will follow    Plan discussed with Attending   62 year old male pt w PMH of HTN presenting for nonresolving bloody diarrhea with CT abdomen showing pancolitis and GI PCR 10 days ago showing EPEC s/p cipro/flagyl with no improvement    # Bloody Diarrhea - pancolitis - r/o viral/bacterial gastroenteritis vs noninfectious causes  - Patient with no improvement after 1 week of cipro/flagyl  - CT abd showing pancolitis  - bloody diarrhea up to 10x daily  - labs showing hb stable with mild normocytic anemia.   - elevated inflammatory markers from last admission    Plan  - repeat GI PCR --> negative, C.diff --> negative x2, stool culture --> negative, inflammatory markers --> ESR 70, CRP 80  - finished 3 days of Azithromycin  - plan for colonoscopy --> done on 12/14. findings as below:  Diffuse continuous ulceration, friability, erythema and abnormal vascularity with thick mucoid membrane with contact bleeding were noted in the rectum and sigmoid colon. 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 Deep cratered ulcers were noted suggestive of CMV colitis, biopsies were taken and sent separately. The PCF was advanced till 40-50 cm with continuous ulcerative mucosa so procedure was aborted.  - Advance diet as tolerated    - Await pathology results.     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

## 2022-12-15 NOTE — PROGRESS NOTE ADULT - SUBJECTIVE AND OBJECTIVE BOX
BEATRIZ DRUMMOND  62y, Male    All available historical data reviewed    OVERNIGHT EVENTS:  s/p colonoscopy    ROS:  General: Denies rigors, nightsweats  HEENT: Denies headache, rhinorrhea, sore throat, eye pain  CV: Denies CP, palpitations  PULM: Denies wheezing, hemoptysis  GI: Denies hematemesis, hematochezia, melena  : Denies discharge, hematuria  MSK: Denies arthralgias, myalgias  SKIN: Denies rash, lesions  NEURO: Denies paresthesias, weakness  PSYCH: Denies depression, anxiety    VITALS:  T(F): 97.7, Max: 98.2 (12-14-22 @ 15:53)  HR: 67  BP: 130/62  RR: 18Vital Signs Last 24 Hrs  T(C): 36.5 (15 Dec 2022 07:48), Max: 36.8 (14 Dec 2022 15:53)  T(F): 97.7 (15 Dec 2022 07:48), Max: 98.2 (14 Dec 2022 15:53)  HR: 67 (15 Dec 2022 07:48) (67 - 81)  BP: 130/62 (15 Dec 2022 07:48) (107/68 - 130/72)  BP(mean): 2 (14 Dec 2022 09:19) (2 - 2)  RR: 18 (15 Dec 2022 07:48) (18 - 18)  SpO2: 97% (14 Dec 2022 10:45) (96% - 98%)    Parameters below as of 14 Dec 2022 10:30  Patient On (Oxygen Delivery Method): room air        TESTS & MEASUREMENTS:                        11.2   9.59  )-----------( 532      ( 13 Dec 2022 21:04 )             35.2     12-13    134<L>  |  96<L>  |  4<L>  ----------------------------<  98  3.6   |  27  |  0.7    Ca    8.0<L>      13 Dec 2022 21:04  Mg     1.7     12-13    TPro  5.1<L>  /  Alb  2.9<L>  /  TBili  0.3  /  DBili  x   /  AST  15  /  ALT  9   /  AlkPhos  40  12-13    LIVER FUNCTIONS - ( 13 Dec 2022 21:04 )  Alb: 2.9 g/dL / Pro: 5.1 g/dL / ALK PHOS: 40 U/L / ALT: 9 U/L / AST: 15 U/L / GGT: x             Culture - Stool (collected 12-12-22 @ 18:40)  Source: .Stool Feces  Preliminary Report (12-14-22 @ 17:50):    No enteric pathogens to date: Final culture pending    Culture - Stool (collected 12-10-22 @ 18:51)  Source: .Stool Feces  Final Report (12-13-22 @ 07:50):    No enteric pathogens isolated.    (Stool culture examined for Salmonella,    Shigella, Campylobacter, Aeromonas, Plesiomonas,    Vibrio, E.coli O157 and Yersinia)            RADIOLOGY & ADDITIONAL TESTS:  Personal review of radiological diagnostics performed  Echo and EKG results noted when applicable.     MEDICATIONS:  enoxaparin Injectable 40 milliGRAM(s) SubCutaneous every 24 hours  ganciclovir IVPB 480 milliGRAM(s) IV Intermittent every 12 hours  lactated ringers. 1000 milliLiter(s) IV Continuous <Continuous>  ondansetron Injectable 4 milliGRAM(s) IV Push every 6 hours PRN      ANTIBIOTICS:  ganciclovir IVPB 480 milliGRAM(s) IV Intermittent every 12 hours

## 2022-12-15 NOTE — PROGRESS NOTE ADULT - ASSESSMENT
HPI:  61 y/o M presenting to ED for persistent diarrhea after being treated for traveler's diarrhea found to cratered ulcers on colonoscopy     #Diarrhea secondary to traveler's diarrhea secondary to epec complicated by deep cratered ulcers suspicious for cmv colitis   gancyclovir   awaiting biopsy results   appreciate id follow up   tolerating diet   maintain on IV hydration for now     #Overweight BMI 28 patient needs to see dieitian outpatient for further evaluation     #Hyponatremia resolved    #Hypokalemia replete     PROGRESS NOTE HANDOFF    Pending: gancyclovir  , biopsy results ( if improved may follow up outpatient)  , monitor bowel movement     Family discussion: patient verbalized understanding and agreeable to plan of care     Disposition: Home

## 2022-12-15 NOTE — PROGRESS NOTE ADULT - ASSESSMENT
· Assessment	  63 y/o M presenting to ED for persistent diarrhea.   Returned  from a trip to [place]. Pancolitis on CTAP 12/2  & GI PCR was positive for EPEC. He was dc'd home to complete x7days of Cipro/Flagyl.  4 days after discharge, patient returns to ED due to unresolved symptoms.   Denies fever, chest pain, N/V.   12/10 CTAP: Diffuse colonic wall thickening, compatible with pancolitis, without significant change since CT abdomen pelvis 12/2/2022.    12/14 Colonoscopy Diffuse continuous ulceration, friability, erythema and abnormal vascularity with thick mucoid membrane with contact bleeding were noted in the rectum and sigmoid colon. 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 Deep cratered ulcers were noted suggestive of CMV colitis, biopsies were taken and sent separately.    IMPRESSION;   Pancolitis.  12/14 Colonoscopy : possible CMV/IBD  Pancolitis unchanged on CT 12/10 c/w 12/2 12/12 CD NG  12/3 Stool EPEC  12/3 BCx NG  HIV NG  12/10 CD NG  COVID-19 NG    RECOMMENDATIONS;  F/u colon Bx  Ganciclovir at 5 mg/kg iv q12h

## 2022-12-15 NOTE — PROGRESS NOTE ADULT - ASSESSMENT
61 y/o M with hx of HTN presents to ED for persistent bloody diarrhea.     #Sepsis, POA  #Chronic diarrhea  #Pancolitis  #Recent EPEC infection, GI PCR +  #r/o IBD  - CT A/P shows diffuse colonic wall thickening, compatible with pancolitis, without significant change since CT abdomen pelvis 12/2/2022.  - C. diff -ve  - Gi PCR + for EPEC s/p Flagyl&Cipro x 7 days  - repeated GI pcr negative  - Per ID, recommended GI consult as sxs should have resolved  - s/p IV cefepime + flagyl, GI recommends switching to Azithromycin 500 mg PO for 3 days  - ESR: 70 increased from 49 on 12/03/2022  - CRP 80 was 144 on 12/03/2022  - Had 11 BM since yesterday 2 pm   - GI recommends to discontinue ARB and can switch to another antihypertensive with no enteropathy as side effect (can do ACE inh)  - Colonoscopy: Diffuse continuous ulceration, friability, erythema and abnormal vascularity with thick mucoid membrane with contact bleeding were noted in the rectum and sigmoid colon. 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 Deep cratered ulcers were noted suggestive of CMV colitis, biopsies were taken and sent separately.   - Start Gancyclovir 5 mg/kg/dose every 12 hours awaiting pathology and ID evaluation      - ASCA, ANCA  - pending biopsy results     #HTN  - D/C losartan as per GI recommendations    #Misc  DVT Ppx:  GI ppx: NI  Diet: DASH  Activity: AAT  Dispo: Home pending GI eval; currently acute     61 y/o M with hx of HTN presents to ED for persistent bloody diarrhea.     #Sepsis, POA  #Chronic diarrhea  #Pancolitis  #Recent EPEC infection, GI PCR +  #r/o IBD  - CT A/P shows diffuse colonic wall thickening, compatible with pancolitis, without significant change since CT abdomen pelvis 12/2/2022.  - C. diff -ve  - Gi PCR + for EPEC s/p Flagyl&Cipro x 7 days  - repeated GI pcr negative  - Per ID, recommended GI consult as sxs should have resolved  - s/p IV cefepime + flagyl, GI recommends switching to Azithromycin 500 mg PO for 3 days  - ESR: 70 increased from 49 on 12/03/2022  - CRP 80 was 144 on 12/03/2022  - Had 11 BM since yesterday 2 pm   - GI recommends to discontinue ARB and can switch to another antihypertensive with no enteropathy as side effect (can do ACE inh)  - Colonoscopy: Diffuse continuous ulceration, friability, erythema and abnormal vascularity with thick mucoid membrane with contact bleeding were noted in the rectum and sigmoid colon. 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 Deep cratered ulcers were noted suggestive of CMV colitis, biopsies were taken and sent separately.   - Start Gancyclovir 5 mg/kg/dose every 12 hours awaiting pathology and ID evaluation      - ASCA, ANCA  - Biopsy most likely ulcerative colitis, will start solumedrol IV 20mg Q8     #HTN  - D/C losartan as per GI recommendations    #Misc  DVT Ppx:  GI ppx: NI  Diet: DASH  Activity: AAT  Dispo: Home pending GI eval; currently acute

## 2022-12-16 LAB
ALBUMIN SERPL ELPH-MCNC: 2.6 G/DL — LOW (ref 3.5–5.2)
ALP SERPL-CCNC: 34 U/L — SIGNIFICANT CHANGE UP (ref 30–115)
ALT FLD-CCNC: 7 U/L — SIGNIFICANT CHANGE UP (ref 0–41)
ANION GAP SERPL CALC-SCNC: 10 MMOL/L — SIGNIFICANT CHANGE UP (ref 7–14)
AST SERPL-CCNC: 7 U/L — SIGNIFICANT CHANGE UP (ref 0–41)
BASOPHILS # BLD AUTO: 0.02 K/UL — SIGNIFICANT CHANGE UP (ref 0–0.2)
BASOPHILS NFR BLD AUTO: 0.4 % — SIGNIFICANT CHANGE UP (ref 0–1)
BILIRUB SERPL-MCNC: <0.2 MG/DL — SIGNIFICANT CHANGE UP (ref 0.2–1.2)
BUN SERPL-MCNC: 6 MG/DL — LOW (ref 10–20)
CALCIUM SERPL-MCNC: 7.8 MG/DL — LOW (ref 8.4–10.5)
CALPROTECTIN STL-MCNT: 6403 UG/G — HIGH (ref 0–120)
CHLORIDE SERPL-SCNC: 97 MMOL/L — LOW (ref 98–110)
CO2 SERPL-SCNC: 28 MMOL/L — SIGNIFICANT CHANGE UP (ref 17–32)
CREAT SERPL-MCNC: 0.5 MG/DL — LOW (ref 0.7–1.5)
EGFR: 116 ML/MIN/1.73M2 — SIGNIFICANT CHANGE UP
EOSINOPHIL # BLD AUTO: 0 K/UL — SIGNIFICANT CHANGE UP (ref 0–0.7)
EOSINOPHIL NFR BLD AUTO: 0 % — SIGNIFICANT CHANGE UP (ref 0–8)
GLUCOSE SERPL-MCNC: 169 MG/DL — HIGH (ref 70–99)
HCT VFR BLD CALC: 31.7 % — LOW (ref 42–52)
HCV AB S/CO SERPL IA: 0.03 COI — SIGNIFICANT CHANGE UP
HCV AB SERPL-IMP: SIGNIFICANT CHANGE UP
HGB BLD-MCNC: 10.5 G/DL — LOW (ref 14–18)
IMM GRANULOCYTES NFR BLD AUTO: 1.1 % — HIGH (ref 0.1–0.3)
LYMPHOCYTES # BLD AUTO: 0.89 K/UL — LOW (ref 1.2–3.4)
LYMPHOCYTES # BLD AUTO: 16.4 % — LOW (ref 20.5–51.1)
MAGNESIUM SERPL-MCNC: 2 MG/DL — SIGNIFICANT CHANGE UP (ref 1.8–2.4)
MCHC RBC-ENTMCNC: 30.2 PG — SIGNIFICANT CHANGE UP (ref 27–31)
MCHC RBC-ENTMCNC: 33.1 G/DL — SIGNIFICANT CHANGE UP (ref 32–37)
MCV RBC AUTO: 91.1 FL — SIGNIFICANT CHANGE UP (ref 80–94)
MONOCYTES # BLD AUTO: 0.43 K/UL — SIGNIFICANT CHANGE UP (ref 0.1–0.6)
MONOCYTES NFR BLD AUTO: 7.9 % — SIGNIFICANT CHANGE UP (ref 1.7–9.3)
NEUTROPHILS # BLD AUTO: 4.02 K/UL — SIGNIFICANT CHANGE UP (ref 1.4–6.5)
NEUTROPHILS NFR BLD AUTO: 74.2 % — SIGNIFICANT CHANGE UP (ref 42.2–75.2)
NRBC # BLD: 0 /100 WBCS — SIGNIFICANT CHANGE UP (ref 0–0)
PLATELET # BLD AUTO: 518 K/UL — HIGH (ref 130–400)
POTASSIUM SERPL-MCNC: 4 MMOL/L — SIGNIFICANT CHANGE UP (ref 3.5–5)
POTASSIUM SERPL-SCNC: 4 MMOL/L — SIGNIFICANT CHANGE UP (ref 3.5–5)
PROT SERPL-MCNC: 4.8 G/DL — LOW (ref 6–8)
RBC # BLD: 3.48 M/UL — LOW (ref 4.7–6.1)
RBC # FLD: 12.4 % — SIGNIFICANT CHANGE UP (ref 11.5–14.5)
SODIUM SERPL-SCNC: 135 MMOL/L — SIGNIFICANT CHANGE UP (ref 135–146)
WBC # BLD: 5.42 K/UL — SIGNIFICANT CHANGE UP (ref 4.8–10.8)
WBC # FLD AUTO: 5.42 K/UL — SIGNIFICANT CHANGE UP (ref 4.8–10.8)

## 2022-12-16 PROCEDURE — 99232 SBSQ HOSP IP/OBS MODERATE 35: CPT

## 2022-12-16 RX ADMIN — GANCICLOVIR SODIUM 100 MILLIGRAM(S): 50 INJECTION, POWDER, LYOPHILIZED, FOR SOLUTION INTRAVENTRICULAR at 06:15

## 2022-12-16 RX ADMIN — Medication 20 MILLIGRAM(S): at 06:15

## 2022-12-16 RX ADMIN — Medication 40 MILLIGRAM(S): at 22:14

## 2022-12-16 RX ADMIN — Medication 40 MILLIGRAM(S): at 13:41

## 2022-12-16 RX ADMIN — ONDANSETRON 4 MILLIGRAM(S): 8 TABLET, FILM COATED ORAL at 18:48

## 2022-12-16 RX ADMIN — ENOXAPARIN SODIUM 40 MILLIGRAM(S): 100 INJECTION SUBCUTANEOUS at 11:21

## 2022-12-16 RX ADMIN — GANCICLOVIR SODIUM 100 MILLIGRAM(S): 50 INJECTION, POWDER, LYOPHILIZED, FOR SOLUTION INTRAVENTRICULAR at 17:25

## 2022-12-16 NOTE — PROGRESS NOTE ADULT - SUBJECTIVE AND OBJECTIVE BOX
Gastroenterology progress note:     Patient is a 62y old  Male who presents with a chief complaint of Pancolitis (16 Dec 2022 09:13)       Admitted on: 12-10-22    We are following the patient for: diarrhea       Interval History: Patient still having diarrhea with some bloody episodes. abd pain is better    No acute events overnight.   - Diet - regular  - last BM - today  - Abdominal pain - better      PAST MEDICAL & SURGICAL HISTORY:  HTN (hypertension)      No significant past surgical history          MEDICATIONS  (STANDING):  enoxaparin Injectable 40 milliGRAM(s) SubCutaneous every 24 hours  ganciclovir IVPB 480 milliGRAM(s) IV Intermittent every 12 hours  lactated ringers. 1000 milliLiter(s) (100 mL/Hr) IV Continuous <Continuous>  methylPREDNISolone sodium succinate Injectable 20 milliGRAM(s) IV Push every 8 hours    MEDICATIONS  (PRN):  ondansetron Injectable 4 milliGRAM(s) IV Push every 6 hours PRN Nausea and/or Vomiting      Allergies  penicillin (Rash)      Review of Systems:   Cardiovascular:  No Chest Pain, No Palpitations  Respiratory:  No Cough, No Dyspnea  Gastrointestinal:  As described in HPI  Skin:  No Skin Lesions, No Jaundice  Neuro:  No Syncope, No Dizziness    Physical Examination:  T(C): 36.4 (12-16-22 @ 09:26), Max: 37.3 (12-15-22 @ 16:09)  HR: 88 (12-16-22 @ 09:26) (70 - 88)  BP: 134/62 (12-16-22 @ 09:26) (112/63 - 134/62)  RR: 18 (12-16-22 @ 09:26) (18 - 18)  SpO2: 98% (12-15-22 @ 16:09) (98% - 98%)      12-15-22 @ 07:01  -  12-16-22 @ 07:00  --------------------------------------------------------  IN: 3240 mL / OUT: 503 mL / NET: 2737 mL        GENERAL:  Appears stated age, well-groomed, well-nourished, no distress  HEENT:  NC/AT,  conjunctivae clear and pink, no thyromegaly, nodules, adenopathy, sclera -anicteric  CHEST:  Full & symmetric excursion, no increased effort, breath sounds clear  HEART:  Regular rhythm, S1, S2, no murmur/rub/S3/S4, no abdominal bruit, no edema  ABDOMEN:  Soft, mild diffuse tenderness. refused ELENA  EXTEREMITIES:  no cyanosis,clubbing or edema  SKIN:  No rash/erythema/ecchymoses/petechiae/wounds/abscess/warm/dry  NEURO:  Alert, oriented, no asterixis, no tremor, no encephalopathy        Data:                        10.5   5.42  )-----------( 518      ( 16 Dec 2022 07:27 )             31.7     Hgb trend:  10.5  12-16-22 @ 07:27  10.7  12-15-22 @ 09:15  11.2  12-13-22 @ 21:04        12-16    135  |  97<L>  |  6<L>  ----------------------------<  169<H>  4.0   |  28  |  0.5<L>    Ca    7.8<L>      16 Dec 2022 07:27  Mg     2.0     12-16    TPro  4.8<L>  /  Alb  2.6<L>  /  TBili  <0.2  /  DBili  x   /  AST  7   /  ALT  7   /  AlkPhos  34  12-16    Liver panel trend:  TBili <0.2   /   AST 7   /   ALT 7   /   AlkP 34   /   Tptn 4.8   /   Alb 2.6    /   DBili --      12-16  TBili 0.2   /   AST 10   /   ALT 8   /   AlkP 36   /   Tptn 4.7   /   Alb 2.5    /   DBili --      12-15  TBili 0.3   /   AST 15   /   ALT 9   /   AlkP 40   /   Tptn 5.1   /   Alb 2.9    /   DBili --      12-13  TBili 0.3   /   AST 17   /   ALT 19   /   AlkP 48   /   Tptn 5.9   /   Alb 3.2    /   DBili --      12-10             Radiology:

## 2022-12-16 NOTE — PROGRESS NOTE ADULT - ASSESSMENT
63 y/o M with hx of HTN presents to ED for persistent bloody diarrhea.     #Sepsis, POA  #Chronic diarrhea  #Pancolitis  #Recent EPEC infection, GI PCR +  #r/o IBD  - CT A/P shows diffuse colonic wall thickening, compatible with pancolitis, without significant change since CT abdomen pelvis 12/2/2022.  - C. diff -ve  - Gi PCR + for EPEC s/p Flagyl&Cipro x 7 days  - repeated GI pcr negative  - Per ID, recommended GI consult as sxs should have resolved`  - s/p IV cefepime + flagyl, GI recommends switching to Azithromycin 500 mg PO for 3 days  - ESR: 70 increased from 49 on 12/03/2022  - CRP 80 was 144 on 12/03/2022  - Had 11 BM since yesterday 2 pm   - GI recommends to discontinue ARB and can switch to another antihypertensive with no enteropathy as side effect (can do ACE inh)  - Colonoscopy: Diffuse continuous ulceration, friability, erythema and abnormal vascularity with thick mucoid membrane with contact bleeding were noted in the rectum and sigmoid colon. 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 Deep cratered ulcers were noted suggestive of CMV colitis, biopsies were taken and sent separately.   - Start Gancyclovir 5 mg/kg/dose every 12 hours awaiting pathology and ID evaluation      - ASCA, ANCA  - Biopsy most likely ulcerative colitis, will start solumedrol IV 20mg Q8   - Pending biopsy stains for CMV  - send Serum : CMV PCR    #HTN  - D/C losartan as per GI recommendations    #Misc  DVT Ppx: lovenox  GI ppx: NI  Diet: DASH  Activity: AAT  Dispo: Home pending GI eval; currently acute     61 y/o M with hx of HTN presents to ED for persistent bloody diarrhea.     #Sepsis, POA  #Chronic diarrhea  #Pancolitis  #Recent EPEC infection, GI PCR +  #r/o IBD  - CT A/P shows diffuse colonic wall thickening, compatible with pancolitis, without significant change since CT abdomen pelvis 12/2/2022.  - C. diff -ve  - Gi PCR + for EPEC s/p Flagyl&Cipro x 7 days  - repeated GI pcr negative  - Per ID, recommended GI consult as sxs should have resolved`  - s/p IV cefepime + flagyl, GI recommends switching to Azithromycin 500 mg PO for 3 days  - ESR: 70 increased from 49 on 12/03/2022  - CRP 80 was 144 on 12/03/2022  - Had 11 BM since yesterday 2 pm   - GI recommends to discontinue ARB and can switch to another antihypertensive with no enteropathy as side effect (can do ACE inh)  - Colonoscopy: Diffuse continuous ulceration, friability, erythema and abnormal vascularity with thick mucoid membrane with contact bleeding were noted in the rectum and sigmoid colon. 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 Deep cratered ulcers were noted suggestive of CMV colitis, biopsies were taken and sent separately.   - Start Gancyclovir 5 mg/kg/dose every 12 hours awaiting pathology and ID evaluation      - ASCA, ANCA  - Biopsy most likely ulcerative colitis, will start solumedrol IV 20mg Q8 --> increase to 40mg Q8   - Pending biopsy stains for CMV  - send Serum : CMV PCR    #HTN  - D/C losartan as per GI recommendations    #Misc  DVT Ppx: lovenox  GI ppx: NI  Diet: DASH  Activity: AAT  Dispo: Home pending GI eval; currently acute

## 2022-12-16 NOTE — PROGRESS NOTE ADULT - SUBJECTIVE AND OBJECTIVE BOX
BEATRIZ DRUMMOND 62y Male  MRN#: [id number]   Hospital Day: 6d    HPI:  63 y/o M presenting to ED for persistent diarrhea.     PMHx: HTN   Family Hx: Colon cancer in father at age 70 & Crohn's disease in his sister.   Social Hx: Denies smoking or drinking    HPI starts from 12/3 when patient was first admitted after returning from a trip to [place] on 11/14 for bloody diarrhea. Patient was found to have pancolitis on CTAP & GI PCR was positive for EPEC. He was dc'd home to complete x7days of Cipro/Flagyl (as per ID). Was also seen by GI that recommended OP Colonoscopy.     4 days after discharge, patient returns to ED due to unresolved symptoms.     Pt had a colonoscopy done with Dr. Drummond 5 yrs ago that was normal as per pt.     Denies fever, chest pain, N/V.     ED:   - VS: Unremarkable  - Labs: WBC 12, Na 134,   - CTAP: Diffuse colonic wall thickening, compatible with pancolitis, without significant change since CT abdomen pelvis 12/2/2022.    Admit to Medicine       (10 Dec 2022 19:48)    SUBJECTIVE  Patient is a 62y old Male who presents with a chief complaint of Pancolitis (16 Dec 2022 09:36)  Currently admitted to medicine with the primary diagnosis of Pancolitis    INTERVAL HPI AND OVERNIGHT EVENTS:  Patient was examined and seen at bedside. This morning he is resting comfortably in bed and reports no issues or overnight events.    OBJECTIVE  PAST MEDICAL & SURGICAL HISTORY  HTN (hypertension)  No significant past surgical history    ALLERGIES:  penicillin (Rash)    MEDICATIONS:  STANDING MEDICATIONS  enoxaparin Injectable 40 milliGRAM(s) SubCutaneous every 24 hours  ganciclovir IVPB 480 milliGRAM(s) IV Intermittent every 12 hours  lactated ringers. 1000 milliLiter(s) IV Continuous <Continuous>  methylPREDNISolone sodium succinate Injectable 20 milliGRAM(s) IV Push every 8 hours    PRN MEDICATIONS  ondansetron Injectable 4 milliGRAM(s) IV Push every 6 hours PRN    VITAL SIGNS: Last 24 Hours  T(C): 36.4 (16 Dec 2022 09:26), Max: 37.3 (15 Dec 2022 16:09)  T(F): 97.6 (16 Dec 2022 09:26), Max: 99.1 (15 Dec 2022 16:09)  HR: 88 (16 Dec 2022 09:26) (70 - 88)  BP: 134/62 (16 Dec 2022 09:26) (112/63 - 134/62)  BP(mean): --  RR: 18 (16 Dec 2022 09:26) (18 - 18)  SpO2: 98% (15 Dec 2022 16:09) (98% - 98%)    LABS:                        10.5   5.42  )-----------( 518      ( 16 Dec 2022 07:27 )             31.7     12-16    135  |  97<L>  |  6<L>  ----------------------------<  169<H>  4.0   |  28  |  0.5<L>    Ca    7.8<L>      16 Dec 2022 07:27  Mg     2.0     12-16    TPro  4.8<L>  /  Alb  2.6<L>  /  TBili  <0.2  /  DBili  x   /  AST  7   /  ALT  7   /  AlkPhos  34  12-16    RADIOLOGY:    PHYSICAL EXAM:    General: well-appearing in NAD.   HEENT: NCAT  LUNGS: CTAB  HEART: RRR.   ABDOMEN: + Diffuse TTP; Soft, nondistended.   EXT: Nonedematous.

## 2022-12-16 NOTE — PROGRESS NOTE ADULT - ASSESSMENT
HPI:  63 y/o M presenting to ED for persistent diarrhea after being treated for traveler's diarrhea found to cratered ulcers on colonoscopy     #Diarrhea secondary to traveler's diarrhea secondary to epec complicated by deep cratered ulcers suspicious for cmv colitis versus ulcerative colitis   gancyclovir   8 bowel movements overnight , not improving  prelim biopsy results show crypt abscess - > suspicious for ulcerative colitis -  > start methylprednisolone  alberto    #Overweight BMI 28 patient needs to see dieitian outpatient for further evaluation     #Hyponatremia resolved    #Hypokalemia resolved    PROGRESS NOTE HANDOFF    Pending: improvement in diarrhea, final biopsy results     Family discussion: patient verbalized understanding and agreeable to plan of care     Disposition: Home

## 2022-12-16 NOTE — PROGRESS NOTE ADULT - SUBJECTIVE AND OBJECTIVE BOX
BEATRIZ DRUMMOND  62y, Male    All available historical data reviewed    OVERNIGHT EVENTS:  no fevers  still with bloody BMs    ROS:  General: Denies rigors, nightsweats  HEENT: Denies headache, rhinorrhea, sore throat, eye pain  CV: Denies CP, palpitations  PULM: Denies wheezing, hemoptysis  GI: BRBPR  : Denies discharge, hematuria  MSK: Denies arthralgias, myalgias  SKIN: Denies rash, lesions  NEURO: Denies paresthesias, weakness  PSYCH: Denies depression, anxiety    VITALS:  T(F): 98.4, Max: 99.1 (12-15-22 @ 16:09)  HR: 70  BP: 116/58  RR: 18Vital Signs Last 24 Hrs  T(C): 36.9 (15 Dec 2022 23:36), Max: 37.3 (15 Dec 2022 16:09)  T(F): 98.4 (15 Dec 2022 23:36), Max: 99.1 (15 Dec 2022 16:09)  HR: 70 (15 Dec 2022 23:36) (70 - 79)  BP: 116/58 (15 Dec 2022 23:36) (112/63 - 127/62)  BP(mean): --  RR: 18 (15 Dec 2022 23:36) (18 - 18)  SpO2: 98% (15 Dec 2022 16:09) (98% - 98%)    Parameters below as of 15 Dec 2022 16:09  Patient On (Oxygen Delivery Method): room air        TESTS & MEASUREMENTS:                        10.5   5.42  )-----------( 518      ( 16 Dec 2022 07:27 )             31.7     12-16    135  |  97<L>  |  6<L>  ----------------------------<  169<H>  4.0   |  28  |  0.5<L>    Ca    7.8<L>      16 Dec 2022 07:27  Mg     2.0     12-16    TPro  4.8<L>  /  Alb  2.6<L>  /  TBili  <0.2  /  DBili  x   /  AST  7   /  ALT  7   /  AlkPhos  34  12-16    LIVER FUNCTIONS - ( 16 Dec 2022 07:27 )  Alb: 2.6 g/dL / Pro: 4.8 g/dL / ALK PHOS: 34 U/L / ALT: 7 U/L / AST: 7 U/L / GGT: x             Culture - Stool (collected 12-12-22 @ 18:40)  Source: .Stool Feces  Final Report (12-15-22 @ 17:45):    No enteric pathogens isolated.    (Stool culture examined for Salmonella,    Shigella, Campylobacter, Aeromonas, Plesiomonas,    Vibrio, E.coli O157 and Yersinia)    Culture - Stool (collected 12-10-22 @ 18:51)  Source: .Stool Feces  Final Report (12-13-22 @ 07:50):    No enteric pathogens isolated.    (Stool culture examined for Salmonella,    Shigella, Campylobacter, Aeromonas, Plesiomonas,    Vibrio, E.coli O157 and Yersinia)            RADIOLOGY & ADDITIONAL TESTS:  Personal review of radiological diagnostics performed  Echo and EKG results noted when applicable.     MEDICATIONS:  enoxaparin Injectable 40 milliGRAM(s) SubCutaneous every 24 hours  ganciclovir IVPB 480 milliGRAM(s) IV Intermittent every 12 hours  lactated ringers. 1000 milliLiter(s) IV Continuous <Continuous>  methylPREDNISolone sodium succinate Injectable 20 milliGRAM(s) IV Push every 8 hours  ondansetron Injectable 4 milliGRAM(s) IV Push every 6 hours PRN      ANTIBIOTICS:  ganciclovir IVPB 480 milliGRAM(s) IV Intermittent every 12 hours

## 2022-12-16 NOTE — PROGRESS NOTE ADULT - ASSESSMENT
62 year old male pt w PMH of HTN presenting for nonresolving bloody diarrhea with CT abdomen showing pancolitis and GI PCR 10 days ago showing EPEC s/p cipro/flagyl with no improvement    # Bloody Diarrhea - pancolitis - r/o viral/bacterial gastroenteritis vs noninfectious causes  - Patient with no improvement after 1 week of cipro/flagyl  - CT abd showing pancolitis  - bloody diarrhea up to 10x daily  - labs showing hb stable with mild normocytic anemia.   - elevated inflammatory markers from last admission    Plan  - repeat GI PCR --> negative, C.diff --> negative x2, stool culture --> negative, inflammatory markers --> ESR 70, CRP 80  - finished 3 days of Azithromycin  - plan for colonoscopy --> done on 12/14. findings as below:  Diffuse continuous ulceration, friability, erythema and abnormal vascularity with thick mucoid membrane with contact bleeding were noted in the rectum and sigmoid colon. 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 Deep cratered ulcers were noted suggestive of CMV colitis, biopsies were taken and sent separately. The PCF was advanced till 40-50 cm with continuous ulcerative mucosa so procedure was aborted.  - Advance diet as tolerated    - check HIV --> negative, ASCA, ANCA      - continue Gancyclovir 5 mg/kg/dose every 12 hours awaiting pathology and ID evaluation      - Pathology prelim report --> crypt abscesses  - Started Solumedrol 20 mg q8hrs D2  - check hepatitis panel, quantiferon, before starting biologics  - discontinue ARB and can switch to another antihypertensive with no enteropathy as side effect (can do ACE inh)  - will follow 62 year old male pt w PMH of HTN presenting for nonresolving bloody diarrhea with CT abdomen showing pancolitis and GI PCR 10 days ago showing EPEC s/p cipro/flagyl with no improvement    # Bloody Diarrhea - pancolitis - r/o viral/bacterial gastroenteritis vs noninfectious causes  - Patient with no improvement after 1 week of cipro/flagyl  - CT abd showing pancolitis  - bloody diarrhea up to 10x daily  - labs showing hb stable with mild normocytic anemia.   - elevated inflammatory markers from last admission    Plan  - repeat GI PCR --> negative, C.diff --> negative x2, stool culture --> negative, inflammatory markers --> ESR 70, CRP 80  - finished 3 days of Azithromycin  - plan for colonoscopy --> done on 12/14. findings as below:  Diffuse continuous ulceration, friability, erythema and abnormal vascularity with thick mucoid membrane with contact bleeding were noted in the rectum and sigmoid colon. 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 Deep cratered ulcers were noted suggestive of CMV colitis, biopsies were taken and sent separately. The PCF was advanced till 40-50 cm with continuous ulcerative mucosa so procedure was aborted.  - Advance diet as tolerated    - check HIV --> negative, ASCA, ANCA      - continue Gancyclovir 5 mg/kg/dose every 12 hours awaiting pathology and ID evaluation      - Pathology prelim report --> crypt abscesses  - Started Solumedrol 20 mg q8hrs. no good response --> increase dose to 40 mg q8hrs starting now  - check hepatitis panel, quantiferon, before starting biologics  - discontinue ARB and can switch to another antihypertensive with no enteropathy as side effect (can do ACE inh)  - will follow

## 2022-12-16 NOTE — PROGRESS NOTE ADULT - ASSESSMENT
· Assessment	  61 y/o M presenting to ED for persistent diarrhea.   Returned  from a trip to [place]. Pancolitis on CTAP 12/2  & GI PCR was positive for EPEC. He was dc'd home to complete x7days of Cipro/Flagyl.  4 days after discharge, patient returns to ED due to unresolved symptoms.   Denies fever, chest pain, N/V.   12/10 CTAP: Diffuse colonic wall thickening, compatible with pancolitis, without significant change since CT abdomen pelvis 12/2/2022.    12/14 Colonoscopy Diffuse continuous ulceration, friability, erythema and abnormal vascularity with thick mucoid membrane with contact bleeding were noted in the rectum and sigmoid colon. 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 Deep cratered ulcers were noted suggestive of CMV colitis, biopsies were taken and sent separately.    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

## 2022-12-16 NOTE — PROGRESS NOTE ADULT - SUBJECTIVE AND OBJECTIVE BOX
HODA BEATRIZ  62y  [id number]      Patient is a 62y old  Male who presents with a chief complaint of Pancolitis (16 Dec 2022 09:36)      INTERVAL HPI/OVERNIGHT EVENTS:      Still complaining of diarrhea   REVIEW OF SYSTEMS:  CONSTITUTIONAL: No fever, weight loss, or fatigue  EYES: No eye pain, visual disturbances, or discharge  ENMT:  No difficulty hearing, tinnitus, vertigo; No sinus or throat pain  NECK: No pain or stiffness  BREASTS: No pain, masses, or nipple discharge  RESPIRATORY: No cough, wheezing, chills or hemoptysis; No shortness of breath  CARDIOVASCULAR: No chest pain, palpitations, dizziness, or leg swelling  GASTROINTESTINAL: + Diarrhea   GENITOURINARY: No dysuria, frequency, hematuria, or incontinence  NEUROLOGICAL: No headaches, memory loss, loss of strength, numbness, or tremors  SKIN: No itching, burning, rashes, or lesions   LYMPH NODES: No enlarged glands  ENDOCRINE: No heat or cold intolerance; No hair loss  MUSCULOSKELETAL: No joint pain or swelling; No muscle, back, or extremity pain  PSYCHIATRIC: No depression, anxiety, mood swings, or difficulty sleeping  HEME/LYMPH: No easy bruising, or bleeding gums  ALLERY AND IMMUNOLOGIC: No hives or eczema  FAMILY HISTORY:    T(C): 36.4 (12-16-22 @ 09:26), Max: 37.3 (12-15-22 @ 16:09)  HR: 88 (12-16-22 @ 09:26) (70 - 88)  BP: 134/62 (12-16-22 @ 09:26) (112/63 - 134/62)  RR: 18 (12-16-22 @ 09:26) (18 - 18)  SpO2: 98% (12-15-22 @ 16:09) (98% - 98%)  Wt(kg): --Vital Signs Last 24 Hrs  T(C): 36.4 (16 Dec 2022 09:26), Max: 37.3 (15 Dec 2022 16:09)  T(F): 97.6 (16 Dec 2022 09:26), Max: 99.1 (15 Dec 2022 16:09)  HR: 88 (16 Dec 2022 09:26) (70 - 88)  BP: 134/62 (16 Dec 2022 09:26) (112/63 - 134/62)  BP(mean): --  RR: 18 (16 Dec 2022 09:26) (18 - 18)  SpO2: 98% (15 Dec 2022 16:09) (98% - 98%)    Parameters below as of 15 Dec 2022 16:09  Patient On (Oxygen Delivery Method): room air        PHYSICAL EXAM:  GENERAL: NAD, well-groomed, well-developed  HEAD:  Atraumatic, Normocephalic  EYES: EOMI, PERRLA, conjunctiva and sclera clear  ENMT: No tonsillar erythema, exudates, or enlargement; Moist mucous membranes, Good dentition, No lesions  NECK: Supple, No JVD, Normal thyroid  NERVOUS SYSTEM:  Alert & Oriented X3, Good concentration; Motor Strength 5/5 B/L upper and lower extremities; DTRs 2+ intact and symmetric  PULM: Clear to auscultation bilaterally  CARDIAC: Regular rate and rhythm; No murmurs, rubs, or gallops  GI: Soft, Nontender, Nondistended; Bowel sounds present  EXTREMITIES:  2+ Peripheral Pulses, No clubbing, cyanosis, or edema  LYMPH: No lymphadenopathy noted  SKIN: No rashes or lesions    Consultant(s) Notes Reviewed:  [x ] YES  [ ] NO  Care Discussed with Consultants/Other Providers [ x] YES  [ ] NO    LABS:                            10.5   5.42  )-----------( 518      ( 16 Dec 2022 07:27 )             31.7   12-16    135  |  97<L>  |  6<L>  ----------------------------<  169<H>  4.0   |  28  |  0.5<L>    Ca    7.8<L>      16 Dec 2022 07:27  Mg     2.0     12-16    TPro  4.8<L>  /  Alb  2.6<L>  /  TBili  <0.2  /  DBili  x   /  AST  7   /  ALT  7   /  AlkPhos  34  12-16            enoxaparin Injectable 40 milliGRAM(s) SubCutaneous every 24 hours  ganciclovir IVPB 480 milliGRAM(s) IV Intermittent every 12 hours  lactated ringers. 1000 milliLiter(s) IV Continuous <Continuous>  methylPREDNISolone sodium succinate Injectable 20 milliGRAM(s) IV Push every 8 hours  ondansetron Injectable 4 milliGRAM(s) IV Push every 6 hours PRN      HEALTH ISSUES - PROBLEM Dx:          Case Discussed with House Staff   Spectra [phone]

## 2022-12-17 LAB
ALBUMIN SERPL ELPH-MCNC: 2.8 G/DL — LOW (ref 3.5–5.2)
ALP SERPL-CCNC: 37 U/L — SIGNIFICANT CHANGE UP (ref 30–115)
ALT FLD-CCNC: 11 U/L — SIGNIFICANT CHANGE UP (ref 0–41)
ANION GAP SERPL CALC-SCNC: 12 MMOL/L — SIGNIFICANT CHANGE UP (ref 7–14)
ANISOCYTOSIS BLD QL: SIGNIFICANT CHANGE UP
AST SERPL-CCNC: 17 U/L — SIGNIFICANT CHANGE UP (ref 0–41)
BASOPHILS # BLD AUTO: 0 K/UL — SIGNIFICANT CHANGE UP (ref 0–0.2)
BASOPHILS NFR BLD AUTO: 0 % — SIGNIFICANT CHANGE UP (ref 0–1)
BILIRUB SERPL-MCNC: <0.2 MG/DL — SIGNIFICANT CHANGE UP (ref 0.2–1.2)
BUN SERPL-MCNC: 8 MG/DL — LOW (ref 10–20)
CALCIUM SERPL-MCNC: 8.2 MG/DL — LOW (ref 8.4–10.5)
CHLORIDE SERPL-SCNC: 96 MMOL/L — LOW (ref 98–110)
CO2 SERPL-SCNC: 29 MMOL/L — SIGNIFICANT CHANGE UP (ref 17–32)
CREAT SERPL-MCNC: 0.7 MG/DL — SIGNIFICANT CHANGE UP (ref 0.7–1.5)
EGFR: 105 ML/MIN/1.73M2 — SIGNIFICANT CHANGE UP
EOSINOPHIL # BLD AUTO: 0 K/UL — SIGNIFICANT CHANGE UP (ref 0–0.7)
EOSINOPHIL NFR BLD AUTO: 0 % — SIGNIFICANT CHANGE UP (ref 0–8)
GIANT PLATELETS BLD QL SMEAR: PRESENT — SIGNIFICANT CHANGE UP
GLUCOSE SERPL-MCNC: 187 MG/DL — HIGH (ref 70–99)
HAV IGG SER QL IA: SIGNIFICANT CHANGE UP
HAV IGM SER-ACNC: SIGNIFICANT CHANGE UP
HBV CORE AB SER-ACNC: SIGNIFICANT CHANGE UP
HBV SURFACE AB SER-ACNC: SIGNIFICANT CHANGE UP
HBV SURFACE AG SER-ACNC: SIGNIFICANT CHANGE UP
HCT VFR BLD CALC: 29.9 % — LOW (ref 42–52)
HGB BLD-MCNC: 10.3 G/DL — LOW (ref 14–18)
LYMPHOCYTES # BLD AUTO: 0.83 K/UL — LOW (ref 1.2–3.4)
LYMPHOCYTES # BLD AUTO: 7.9 % — LOW (ref 20.5–51.1)
MACROCYTES BLD QL: SIGNIFICANT CHANGE UP
MAGNESIUM SERPL-MCNC: 2.1 MG/DL — SIGNIFICANT CHANGE UP (ref 1.8–2.4)
MANUAL SMEAR VERIFICATION: SIGNIFICANT CHANGE UP
MCHC RBC-ENTMCNC: 30.7 PG — SIGNIFICANT CHANGE UP (ref 27–31)
MCHC RBC-ENTMCNC: 34.4 G/DL — SIGNIFICANT CHANGE UP (ref 32–37)
MCV RBC AUTO: 89.3 FL — SIGNIFICANT CHANGE UP (ref 80–94)
METAMYELOCYTES # FLD: 2.6 % — HIGH (ref 0–0)
MONOCYTES # BLD AUTO: 0.18 K/UL — SIGNIFICANT CHANGE UP (ref 0.1–0.6)
MONOCYTES NFR BLD AUTO: 1.7 % — SIGNIFICANT CHANGE UP (ref 1.7–9.3)
MYELOCYTES NFR BLD: 1.8 % — HIGH (ref 0–0)
NEUTROPHILS # BLD AUTO: 8.96 K/UL — HIGH (ref 1.4–6.5)
NEUTROPHILS NFR BLD AUTO: 85.1 % — HIGH (ref 42.2–75.2)
PLAT MORPH BLD: ABNORMAL
PLATELET # BLD AUTO: 554 K/UL — HIGH (ref 130–400)
POLYCHROMASIA BLD QL SMEAR: SIGNIFICANT CHANGE UP
POTASSIUM SERPL-MCNC: 4.1 MMOL/L — SIGNIFICANT CHANGE UP (ref 3.5–5)
POTASSIUM SERPL-SCNC: 4.1 MMOL/L — SIGNIFICANT CHANGE UP (ref 3.5–5)
PROT SERPL-MCNC: 5.1 G/DL — LOW (ref 6–8)
RBC # BLD: 3.35 M/UL — LOW (ref 4.7–6.1)
RBC # FLD: 12.5 % — SIGNIFICANT CHANGE UP (ref 11.5–14.5)
RBC BLD AUTO: ABNORMAL
SODIUM SERPL-SCNC: 137 MMOL/L — SIGNIFICANT CHANGE UP (ref 135–146)
TOXIC GRANULES BLD QL SMEAR: PRESENT — SIGNIFICANT CHANGE UP
VARIANT LYMPHS # BLD: 0.9 % — SIGNIFICANT CHANGE UP (ref 0–5)
WBC # BLD: 10.53 K/UL — SIGNIFICANT CHANGE UP (ref 4.8–10.8)
WBC # FLD AUTO: 10.53 K/UL — SIGNIFICANT CHANGE UP (ref 4.8–10.8)

## 2022-12-17 PROCEDURE — 99233 SBSQ HOSP IP/OBS HIGH 50: CPT

## 2022-12-17 RX ORDER — PANTOPRAZOLE SODIUM 20 MG/1
40 TABLET, DELAYED RELEASE ORAL
Refills: 0 | Status: DISCONTINUED | OUTPATIENT
Start: 2022-12-17 | End: 2022-12-20

## 2022-12-17 RX ADMIN — GANCICLOVIR SODIUM 100 MILLIGRAM(S): 50 INJECTION, POWDER, LYOPHILIZED, FOR SOLUTION INTRAVENTRICULAR at 18:01

## 2022-12-17 RX ADMIN — ENOXAPARIN SODIUM 40 MILLIGRAM(S): 100 INJECTION SUBCUTANEOUS at 11:44

## 2022-12-17 RX ADMIN — Medication 40 MILLIGRAM(S): at 22:03

## 2022-12-17 RX ADMIN — PANTOPRAZOLE SODIUM 40 MILLIGRAM(S): 20 TABLET, DELAYED RELEASE ORAL at 18:03

## 2022-12-17 RX ADMIN — ONDANSETRON 4 MILLIGRAM(S): 8 TABLET, FILM COATED ORAL at 13:31

## 2022-12-17 RX ADMIN — SODIUM CHLORIDE 100 MILLILITER(S): 9 INJECTION, SOLUTION INTRAVENOUS at 13:20

## 2022-12-17 RX ADMIN — ONDANSETRON 4 MILLIGRAM(S): 8 TABLET, FILM COATED ORAL at 22:04

## 2022-12-17 RX ADMIN — Medication 40 MILLIGRAM(S): at 13:31

## 2022-12-17 RX ADMIN — Medication 40 MILLIGRAM(S): at 06:31

## 2022-12-17 RX ADMIN — GANCICLOVIR SODIUM 100 MILLIGRAM(S): 50 INJECTION, POWDER, LYOPHILIZED, FOR SOLUTION INTRAVENTRICULAR at 06:31

## 2022-12-17 RX ADMIN — ONDANSETRON 4 MILLIGRAM(S): 8 TABLET, FILM COATED ORAL at 04:08

## 2022-12-17 NOTE — PROGRESS NOTE ADULT - ASSESSMENT
61 y/o M with hx of HTN presents to ED for persistent bloody diarrhea.     #Sepsis, POA  #Chronic diarrhea  #Pancolitis  #Recent EPEC infection, GI PCR +  #r/o IBD  - CT A/P shows diffuse colonic wall thickening, compatible with pancolitis, without significant change since CT abdomen pelvis 12/2/2022.  - C. diff -ve  - Gi PCR + for EPEC s/p Flagyl&Cipro x 7 days  - repeated GI pcr negative  - Per ID, recommended GI consult as sxs should have resolved`  - s/p IV cefepime + flagyl, GI recommends switching to Azithromycin 500 mg PO for 3 days  - ESR: 70 increased from 49 on 12/03/2022  - CRP 80 was 144 on 12/03/2022  - Had 11 BM since yesterday 2 pm   - GI recommends to discontinue ARB and can switch to another antihypertensive with no enteropathy as side effect (can do ACE inh)  - Colonoscopy: Diffuse continuous ulceration, friability, erythema and abnormal vascularity with thick mucoid membrane with contact bleeding were noted in the rectum and sigmoid colon. 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 Deep cratered ulcers were noted suggestive of CMV colitis, biopsies were taken and sent separately.   - Start Gancyclovir 5 mg/kg/dose every 12 hours awaiting pathology and ID evaluation      - ASCA, ANCA  - Biopsy most likely ulcerative colitis, will start solumedrol IV 20mg Q8 --> increase to 40mg Q8   - Pending biopsy stains for CMV  - send Serum : CMV PCR  - Improvement of diarrhea, 2 BM since yesterday but still bloody + 1 episode of vomiting     #HTN  - D/C losartan as per GI recommendations    #Misc  DVT Ppx: lovenox  GI ppx: protonix  Diet: DASH  Activity: AAT  Dispo: Home pending GI eval; currently acute

## 2022-12-17 NOTE — PROGRESS NOTE ADULT - ASSESSMENT
HPI:  61 y/o M presenting to ED for persistent diarrhea after being treated for traveler's diarrhea found to cratered ulcers on colonoscopy     #Diarrhea secondary to traveler's diarrhea secondary to epec complicated by deep cratered ulcers suspicious for cmv colitis versus ulcerative colitis   gancyclovir   improving with higher dose of methylprednisolone   prelim biopsy results show crypt abscess - > suspicious for ulcerative colitis -  > start methylprednisolone  awaiting final biopsy results     #Overweight BMI 28 patient needs to see dieitian outpatient for further evaluation     #Hyponatremia resolved    #Hypokalemia resolved    PROGRESS NOTE HANDOFF    Pending:  final biopsy results     Family discussion: patient verbalized understanding and agreeable to plan of care     Disposition: Home

## 2022-12-17 NOTE — PROGRESS NOTE ADULT - SUBJECTIVE AND OBJECTIVE BOX
Gastroenterology progress note:     Patient is a 62y old  Male who presents with a chief complaint of Pancolitis (17 Dec 2022 11:26)       Admitted on: 12-10-22    We are following the patient for: IBD       Interval History:    No acute events overnight.   reports pain is better   4 bms  blood in stool is better       PAST MEDICAL & SURGICAL HISTORY:  HTN (hypertension)      No significant past surgical history          MEDICATIONS  (STANDING):  enoxaparin Injectable 40 milliGRAM(s) SubCutaneous every 24 hours  ganciclovir IVPB 480 milliGRAM(s) IV Intermittent every 12 hours  lactated ringers. 1000 milliLiter(s) (100 mL/Hr) IV Continuous <Continuous>  methylPREDNISolone sodium succinate Injectable 40 milliGRAM(s) IV Push every 8 hours  pantoprazole    Tablet 40 milliGRAM(s) Oral before breakfast    MEDICATIONS  (PRN):  ondansetron Injectable 4 milliGRAM(s) IV Push every 6 hours PRN Nausea and/or Vomiting      Allergies  penicillin (Rash)      Review of Systems:   Cardiovascular:  No Chest Pain, No Palpitations  Respiratory:  No Cough, No Dyspnea  Gastrointestinal:  As described in HPI  Skin:  No Skin Lesions, No Jaundice  Neuro:  No Syncope, No Dizziness    Physical Examination:  T(C): 35.9 (12-17-22 @ 09:13), Max: 36.2 (12-17-22 @ 00:00)  HR: 58 (12-17-22 @ 09:13) (58 - 71)  BP: 139/69 (12-17-22 @ 09:13) (119/60 - 139/69)  RR: 18 (12-17-22 @ 09:13) (18 - 18)  SpO2: --      12-16-22 @ 07:01  -  12-17-22 @ 07:00  --------------------------------------------------------  IN: 600 mL / OUT: 0 mL / NET: 600 mL        GENERAL: AAOx3, no acute distress.  HEAD:  Atraumatic, Normocephalic  EYES: conjunctiva and sclera clear  NECK: Supple, no JVD or thyromegaly  CHEST/LUNG: Clear to auscultation bilaterally; No wheeze, rhonchi, or rales  HEART: Regular rate and rhythm; normal S1, S2, No murmurs.  ABDOMEN: Soft, nontender, nondistended; Bowel sounds present  NEUROLOGY: No asterixis or tremor.   SKIN: Intact, no jaundice     Data:                        10.3   10.53 )-----------( 554      ( 17 Dec 2022 08:53 )             29.9     Hgb trend:  10.3  12-17-22 @ 08:53  10.5  12-16-22 @ 07:27  10.7  12-15-22 @ 09:15        12-17    137  |  96<L>  |  8<L>  ----------------------------<  187<H>  4.1   |  29  |  0.7    Ca    8.2<L>      17 Dec 2022 08:53  Mg     2.1     12-17    TPro  5.1<L>  /  Alb  2.8<L>  /  TBili  <0.2  /  DBili  x   /  AST  17  /  ALT  11  /  AlkPhos  37  12-17    Liver panel trend:  TBili <0.2   /   AST 17   /   ALT 11   /   AlkP 37   /   Tptn 5.1   /   Alb 2.8    /   DBili --      12-17  TBili <0.2   /   AST 7   /   ALT 7   /   AlkP 34   /   Tptn 4.8   /   Alb 2.6    /   DBili --      12-16  TBili 0.2   /   AST 10   /   ALT 8   /   AlkP 36   /   Tptn 4.7   /   Alb 2.5    /   DBili --      12-15  TBili 0.3   /   AST 15   /   ALT 9   /   AlkP 40   /   Tptn 5.1   /   Alb 2.9    /   DBili --      12-13  TBili 0.3   /   AST 17   /   ALT 19   /   AlkP 48   /   Tptn 5.9   /   Alb 3.2    /   DBili --      12-10             Radiology:       Gastroenterology progress note:     Patient is a 62y old  Male who presents with a chief complaint of Pancolitis (17 Dec 2022 11:26)       Admitted on: 12-10-22    We are following the patient for: IBD       Interval History:    No acute events overnight.   reports pain is better   4 bms today compared to 10 yesterday   blood in stool is better       PAST MEDICAL & SURGICAL HISTORY:  HTN (hypertension)      No significant past surgical history          MEDICATIONS  (STANDING):  enoxaparin Injectable 40 milliGRAM(s) SubCutaneous every 24 hours  ganciclovir IVPB 480 milliGRAM(s) IV Intermittent every 12 hours  lactated ringers. 1000 milliLiter(s) (100 mL/Hr) IV Continuous <Continuous>  methylPREDNISolone sodium succinate Injectable 40 milliGRAM(s) IV Push every 8 hours  pantoprazole    Tablet 40 milliGRAM(s) Oral before breakfast    MEDICATIONS  (PRN):  ondansetron Injectable 4 milliGRAM(s) IV Push every 6 hours PRN Nausea and/or Vomiting      Allergies  penicillin (Rash)      Review of Systems:   Cardiovascular:  No Chest Pain, No Palpitations  Respiratory:  No Cough, No Dyspnea  Gastrointestinal:  As described in HPI  Skin:  No Skin Lesions, No Jaundice  Neuro:  No Syncope, No Dizziness    Physical Examination:  T(C): 35.9 (12-17-22 @ 09:13), Max: 36.2 (12-17-22 @ 00:00)  HR: 58 (12-17-22 @ 09:13) (58 - 71)  BP: 139/69 (12-17-22 @ 09:13) (119/60 - 139/69)  RR: 18 (12-17-22 @ 09:13) (18 - 18)  SpO2: --      12-16-22 @ 07:01  -  12-17-22 @ 07:00  --------------------------------------------------------  IN: 600 mL / OUT: 0 mL / NET: 600 mL        GENERAL: AAOx3, no acute distress.  HEAD:  Atraumatic, Normocephalic  EYES: conjunctiva and sclera clear  NECK: Supple, no JVD or thyromegaly  CHEST/LUNG: Clear to auscultation bilaterally; No wheeze, rhonchi, or rales  HEART: Regular rate and rhythm; normal S1, S2, No murmurs.  ABDOMEN: Soft, nontender, nondistended; Bowel sounds present  NEUROLOGY: No asterixis or tremor.   SKIN: Intact, no jaundice     Data:                        10.3   10.53 )-----------( 554      ( 17 Dec 2022 08:53 )             29.9     Hgb trend:  10.3  12-17-22 @ 08:53  10.5  12-16-22 @ 07:27  10.7  12-15-22 @ 09:15        12-17    137  |  96<L>  |  8<L>  ----------------------------<  187<H>  4.1   |  29  |  0.7    Ca    8.2<L>      17 Dec 2022 08:53  Mg     2.1     12-17    TPro  5.1<L>  /  Alb  2.8<L>  /  TBili  <0.2  /  DBili  x   /  AST  17  /  ALT  11  /  AlkPhos  37  12-17    Liver panel trend:  TBili <0.2   /   AST 17   /   ALT 11   /   AlkP 37   /   Tptn 5.1   /   Alb 2.8    /   DBili --      12-17  TBili <0.2   /   AST 7   /   ALT 7   /   AlkP 34   /   Tptn 4.8   /   Alb 2.6    /   DBili --      12-16  TBili 0.2   /   AST 10   /   ALT 8   /   AlkP 36   /   Tptn 4.7   /   Alb 2.5    /   DBili --      12-15  TBili 0.3   /   AST 15   /   ALT 9   /   AlkP 40   /   Tptn 5.1   /   Alb 2.9    /   DBili --      12-13  TBili 0.3   /   AST 17   /   ALT 19   /   AlkP 48   /   Tptn 5.9   /   Alb 3.2    /   DBili --      12-10             Radiology:       Detail Level: Generalized Include Location In Plan?: No

## 2022-12-17 NOTE — PROGRESS NOTE ADULT - SUBJECTIVE AND OBJECTIVE BOX
BEATRIZ DRUMMOND 62y Male  MRN#: [id number]   Hospital Day: 7d    HPI:  63 y/o M presenting to ED for persistent diarrhea.     PMHx: HTN   Family Hx: Colon cancer in father at age 70 & Crohn's disease in his sister.   Social Hx: Denies smoking or drinking    HPI starts from 12/3 when patient was first admitted after returning from a trip to [place] on 11/14 for bloody diarrhea. Patient was found to have pancolitis on CTAP & GI PCR was positive for EPEC. He was dc'd home to complete x7days of Cipro/Flagyl (as per ID). Was also seen by GI that recommended OP Colonoscopy.     4 days after discharge, patient returns to ED due to unresolved symptoms.     Pt had a colonoscopy done with Dr. Drummond 5 yrs ago that was normal as per pt.     Denies fever, chest pain, N/V.     ED:   - VS: Unremarkable  - Labs: WBC 12, Na 134,   - CTAP: Diffuse colonic wall thickening, compatible with pancolitis, without significant change since CT abdomen pelvis 12/2/2022.    Admit to Medicine     (10 Dec 2022 19:48)    SUBJECTIVE  Patient is a 62y old Male who presents with a chief complaint of Pancolitis (16 Dec 2022 11:14)  Currently admitted to medicine with the primary diagnosis of Pancolitis    INTERVAL HPI AND OVERNIGHT EVENTS:  Patient was examined and seen at bedside. This morning he is resting comfortably in bed and reports no issues or overnight events.    OBJECTIVE  PAST MEDICAL & SURGICAL HISTORY  HTN (hypertension)    No significant past surgical history      ALLERGIES:  penicillin (Rash)    MEDICATIONS:  STANDING MEDICATIONS  enoxaparin Injectable 40 milliGRAM(s) SubCutaneous every 24 hours  ganciclovir IVPB 480 milliGRAM(s) IV Intermittent every 12 hours  lactated ringers. 1000 milliLiter(s) IV Continuous <Continuous>  methylPREDNISolone sodium succinate Injectable 40 milliGRAM(s) IV Push every 8 hours    PRN MEDICATIONS  ondansetron Injectable 4 milliGRAM(s) IV Push every 6 hours PRN      VITAL SIGNS: Last 24 Hours  T(C): 35.9 (17 Dec 2022 09:13), Max: 36.2 (16 Dec 2022 15:00)  T(F): 96.6 (17 Dec 2022 09:13), Max: 97.2 (16 Dec 2022 15:00)  HR: 58 (17 Dec 2022 09:13) (58 - 76)  BP: 139/69 (17 Dec 2022 09:13) (119/60 - 139/69)  BP(mean): --  RR: 18 (17 Dec 2022 09:13) (18 - 18)  SpO2: 96% (16 Dec 2022 15:00) (96% - 96%)    LABS:                        10.5   5.42  )-----------( 518      ( 16 Dec 2022 07:27 )             31.7     12-16    135  |  97<L>  |  6<L>  ----------------------------<  169<H>  4.0   |  28  |  0.5<L>    Ca    7.8<L>      16 Dec 2022 07:27  Mg     2.0     12-16    TPro  4.8<L>  /  Alb  2.6<L>  /  TBili  <0.2  /  DBili  x   /  AST  7   /  ALT  7   /  AlkPhos  34  12-16    RADIOLOGY:    PHYSICAL EXAM:    General: well-appearing in NAD.   HEENT: NCAT  LUNGS: CTAB  HEART: RRR.   ABDOMEN: + Diffuse TTP; Soft, nondistended.   EXT: Nonedematous.

## 2022-12-17 NOTE — PROGRESS NOTE ADULT - ASSESSMENT
62 year old male pt w PMH of HTN presenting for nonresolving bloody diarrhea with CT abdomen showing pancolitis and GI PCR 10 days ago showing EPEC s/p cipro/flagyl with no improvement    # Bloody Diarrhea  and pancolitis most likely 2/2 newely diagnosed UC, R/O CMV colitis  - Patient with no improvement after 1 week of cipro/flagyl  - CT abd showing pancolitis  - bloody diarrhea up to 10x daily  - labs showing hb stable with mild normocytic anemia.   - repeat GI PCR --> negative, C.diff --> negative x2, stool culture --> negative, inflammatory markers --> ESR 70, CRP 80  - finished 3 days of Azithromycin  - s/p colonoscopy on 12/14: Diffuse continuous ulceration, friability, erythema and abnormal vascularity with thick mucoid membrane with contact bleeding were noted in the rectum and sigmoid colon. 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 Deep cratered ulcers were noted suggestive of CMV colitis, biopsies were taken and sent separately. The PCF was advanced till 40-50 cm with continuous ulcerative mucosa so procedure was aborted.  - pre cid pathology with cryptitis and crypt abscess, pending CMV stain     recommendations:  - advance diet as tolerated   - continue Gancyclovir 5 mg/kg/dose every 12 hours for now - ID is following    - c/w Solumedrol  40 mg q8hrs for now   - add PPI  - if worsening abdominal pain and distention get STAT KUB  - low threshold to start in house biologic therapy if no improvement on steroids (pre ttt work up is ordered)    will follow  62 year old male pt w PMH of HTN presenting for nonresolving bloody diarrhea with CT abdomen showing pancolitis and GI PCR 10 days ago showing EPEC s/p cipro/flagyl with no improvement    # Bloody Diarrhea  and pancolitis most likely 2/2 newely diagnosed UC, R/O CMV colitis  - Patient with no improvement after 1 week of cipro/flagyl  - CT abd showing pancolitis  - bloody diarrhea up to 10x daily  - labs showing hb stable with mild normocytic anemia.   - repeat GI PCR --> negative, C.diff --> negative x2, stool culture --> negative, inflammatory markers --> ESR 70,   - finished 3 days of Azithromycin  - s/p colonoscopy on 12/14: Diffuse continuous ulceration, friability, erythema and abnormal vascularity with thick mucoid membrane with contact bleeding were noted in the rectum and sigmoid colon. 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 Deep cratered ulcers were noted suggestive of CMV colitis, biopsies were taken and sent separately. The PCF was advanced till 40-50 cm with continuous ulcerative mucosa so procedure was aborted.  - pre cid pathology with cryptitis and crypt abscess, pending CMV stain     recommendations:  - advance diet as tolerated   - continue Gancyclovir 5 mg/kg/dose every 12 hours for now - ID is following    - Solumedrol  20 mg q8hrs   - add PPI  - if worsening abdominal pain and distention get STAT KUB  - low threshold to start in house biologic therapy if no improvement on steroids (pre ttt work up is ordered)    will follow

## 2022-12-17 NOTE — PROGRESS NOTE ADULT - SUBJECTIVE AND OBJECTIVE BOX
HODA BEATRIZ  62y  [id number]      Patient is a 62y old  Male who presents with a chief complaint of Pancolitis (17 Dec 2022 10:16)      INTERVAL HPI/OVERNIGHT EVENTS:  less frequency bowel movement 2 overnight       REVIEW OF SYSTEMS:  CONSTITUTIONAL: No fever, weight loss, or fatigue  EYES: No eye pain, visual disturbances, or discharge  ENMT:  No difficulty hearing, tinnitus, vertigo; No sinus or throat pain  NECK: No pain or stiffness  BREASTS: No pain, masses, or nipple discharge  RESPIRATORY: No cough, wheezing, chills or hemoptysis; No shortness of breath  CARDIOVASCULAR: No chest pain, palpitations, dizziness, or leg swelling  GASTROINTESTINAL: + Loose bowel movements but improving   GENITOURINARY: No dysuria, frequency, hematuria, or incontinence  NEUROLOGICAL: No headaches, memory loss, loss of strength, numbness, or tremors  SKIN: No itching, burning, rashes, or lesions   LYMPH NODES: No enlarged glands  ENDOCRINE: No heat or cold intolerance; No hair loss  MUSCULOSKELETAL: No joint pain or swelling; No muscle, back, or extremity pain  PSYCHIATRIC: No depression, anxiety, mood swings, or difficulty sleeping  HEME/LYMPH: No easy bruising, or bleeding gums  ALLERY AND IMMUNOLOGIC: No hives or eczema  FAMILY HISTORY:    T(C): 35.9 (12-17-22 @ 09:13), Max: 36.2 (12-16-22 @ 15:00)  HR: 58 (12-17-22 @ 09:13) (58 - 76)  BP: 139/69 (12-17-22 @ 09:13) (119/60 - 139/69)  RR: 18 (12-17-22 @ 09:13) (18 - 18)  SpO2: 96% (12-16-22 @ 15:00) (96% - 96%)  Wt(kg): --Vital Signs Last 24 Hrs  T(C): 35.9 (17 Dec 2022 09:13), Max: 36.2 (16 Dec 2022 15:00)  T(F): 96.6 (17 Dec 2022 09:13), Max: 97.2 (16 Dec 2022 15:00)  HR: 58 (17 Dec 2022 09:13) (58 - 76)  BP: 139/69 (17 Dec 2022 09:13) (119/60 - 139/69)  BP(mean): --  RR: 18 (17 Dec 2022 09:13) (18 - 18)  SpO2: 96% (16 Dec 2022 15:00) (96% - 96%)    Parameters below as of 16 Dec 2022 15:00  Patient On (Oxygen Delivery Method): room air        PHYSICAL EXAM:  GENERAL: NAD, well-groomed, well-developed  HEAD:  Atraumatic, Normocephalic  EYES: EOMI, PERRLA, conjunctiva and sclera clear  ENMT: No tonsillar erythema, exudates, or enlargement; Moist mucous membranes, Good dentition, No lesions  NECK: Supple, No JVD, Normal thyroid  NERVOUS SYSTEM:  Alert & Oriented X3, Good concentration; Motor Strength 5/5 B/L upper and lower extremities; DTRs 2+ intact and symmetric  PULM: Clear to auscultation bilaterally  CARDIAC: Regular rate and rhythm; No murmurs, rubs, or gallops  GI: Soft, Nontender, Nondistended; Bowel sounds present  EXTREMITIES:  2+ Peripheral Pulses, No clubbing, cyanosis, or edema  LYMPH: No lymphadenopathy noted  SKIN: No rashes or lesions    Consultant(s) Notes Reviewed:  [x ] YES  [ ] NO  Care Discussed with Consultants/Other Providers [ x] YES  [ ] NO    LABS:                            10.3   10.53 )-----------( 554      ( 17 Dec 2022 08:53 )             29.9   12-17    137  |  96<L>  |  8<L>  ----------------------------<  187<H>  4.1   |  29  |  0.7    Ca    8.2<L>      17 Dec 2022 08:53  Mg     2.1     12-17    TPro  5.1<L>  /  Alb  2.8<L>  /  TBili  <0.2  /  DBili  x   /  AST  17  /  ALT  11  /  AlkPhos  37  12-17            enoxaparin Injectable 40 milliGRAM(s) SubCutaneous every 24 hours  ganciclovir IVPB 480 milliGRAM(s) IV Intermittent every 12 hours  lactated ringers. 1000 milliLiter(s) IV Continuous <Continuous>  methylPREDNISolone sodium succinate Injectable 40 milliGRAM(s) IV Push every 8 hours  ondansetron Injectable 4 milliGRAM(s) IV Push every 6 hours PRN      HEALTH ISSUES - PROBLEM Dx:          Case Discussed with House Staff   Spectra [phone]

## 2022-12-18 LAB
ALBUMIN SERPL ELPH-MCNC: 2.9 G/DL — LOW (ref 3.5–5.2)
ALP SERPL-CCNC: 46 U/L — SIGNIFICANT CHANGE UP (ref 30–115)
ALT FLD-CCNC: 17 U/L — SIGNIFICANT CHANGE UP (ref 0–41)
ANION GAP SERPL CALC-SCNC: 12 MMOL/L — SIGNIFICANT CHANGE UP (ref 7–14)
AST SERPL-CCNC: 19 U/L — SIGNIFICANT CHANGE UP (ref 0–41)
BASOPHILS # BLD AUTO: 0.05 K/UL — SIGNIFICANT CHANGE UP (ref 0–0.2)
BASOPHILS NFR BLD AUTO: 0.4 % — SIGNIFICANT CHANGE UP (ref 0–1)
BILIRUB SERPL-MCNC: <0.2 MG/DL — SIGNIFICANT CHANGE UP (ref 0.2–1.2)
BUN SERPL-MCNC: 10 MG/DL — SIGNIFICANT CHANGE UP (ref 10–20)
CALCIUM SERPL-MCNC: 8.1 MG/DL — LOW (ref 8.4–10.5)
CHLORIDE SERPL-SCNC: 99 MMOL/L — SIGNIFICANT CHANGE UP (ref 98–110)
CO2 SERPL-SCNC: 27 MMOL/L — SIGNIFICANT CHANGE UP (ref 17–32)
CREAT SERPL-MCNC: 0.7 MG/DL — SIGNIFICANT CHANGE UP (ref 0.7–1.5)
EGFR: 105 ML/MIN/1.73M2 — SIGNIFICANT CHANGE UP
EOSINOPHIL # BLD AUTO: 0 K/UL — SIGNIFICANT CHANGE UP (ref 0–0.7)
EOSINOPHIL NFR BLD AUTO: 0 % — SIGNIFICANT CHANGE UP (ref 0–8)
GLUCOSE SERPL-MCNC: 161 MG/DL — HIGH (ref 70–99)
HAV IGG SER QL IA: SIGNIFICANT CHANGE UP
HBV SURFACE AB SER-ACNC: SIGNIFICANT CHANGE UP
HCT VFR BLD CALC: 32.4 % — LOW (ref 42–52)
HGB BLD-MCNC: 10.7 G/DL — LOW (ref 14–18)
IMM GRANULOCYTES NFR BLD AUTO: 3.3 % — HIGH (ref 0.1–0.3)
LYMPHOCYTES # BLD AUTO: 1.15 K/UL — LOW (ref 1.2–3.4)
LYMPHOCYTES # BLD AUTO: 10.1 % — LOW (ref 20.5–51.1)
MAGNESIUM SERPL-MCNC: 2.1 MG/DL — SIGNIFICANT CHANGE UP (ref 1.8–2.4)
MCHC RBC-ENTMCNC: 30.3 PG — SIGNIFICANT CHANGE UP (ref 27–31)
MCHC RBC-ENTMCNC: 33 G/DL — SIGNIFICANT CHANGE UP (ref 32–37)
MCV RBC AUTO: 91.8 FL — SIGNIFICANT CHANGE UP (ref 80–94)
MEV IGG SER-ACNC: 181 AU/ML — SIGNIFICANT CHANGE UP
MEV IGG+IGM SER-IMP: POSITIVE — SIGNIFICANT CHANGE UP
MONOCYTES # BLD AUTO: 0.68 K/UL — HIGH (ref 0.1–0.6)
MONOCYTES NFR BLD AUTO: 6 % — SIGNIFICANT CHANGE UP (ref 1.7–9.3)
MUV AB SER-ACNC: POSITIVE — SIGNIFICANT CHANGE UP
MUV IGG FLD-ACNC: 228 AU/ML — SIGNIFICANT CHANGE UP
NEUTROPHILS # BLD AUTO: 9.09 K/UL — HIGH (ref 1.4–6.5)
NEUTROPHILS NFR BLD AUTO: 80.2 % — HIGH (ref 42.2–75.2)
NRBC # BLD: 0 /100 WBCS — SIGNIFICANT CHANGE UP (ref 0–0)
PLATELET # BLD AUTO: 541 K/UL — HIGH (ref 130–400)
POTASSIUM SERPL-MCNC: 4 MMOL/L — SIGNIFICANT CHANGE UP (ref 3.5–5)
POTASSIUM SERPL-SCNC: 4 MMOL/L — SIGNIFICANT CHANGE UP (ref 3.5–5)
PROT SERPL-MCNC: 5.2 G/DL — LOW (ref 6–8)
RBC # BLD: 3.53 M/UL — LOW (ref 4.7–6.1)
RBC # FLD: 12.4 % — SIGNIFICANT CHANGE UP (ref 11.5–14.5)
RUBV IGG SER-ACNC: 17.1 INDEX — SIGNIFICANT CHANGE UP
RUBV IGG SER-IMP: POSITIVE — SIGNIFICANT CHANGE UP
SODIUM SERPL-SCNC: 138 MMOL/L — SIGNIFICANT CHANGE UP (ref 135–146)
VZV IGG FLD QL IA: 1494 INDEX — SIGNIFICANT CHANGE UP
VZV IGG FLD QL IA: POSITIVE — SIGNIFICANT CHANGE UP
WBC # BLD: 11.34 K/UL — HIGH (ref 4.8–10.8)
WBC # FLD AUTO: 11.34 K/UL — HIGH (ref 4.8–10.8)

## 2022-12-18 PROCEDURE — 99232 SBSQ HOSP IP/OBS MODERATE 35: CPT

## 2022-12-18 PROCEDURE — 99233 SBSQ HOSP IP/OBS HIGH 50: CPT

## 2022-12-18 RX ORDER — METOCLOPRAMIDE HCL 10 MG
5 TABLET ORAL THREE TIMES A DAY
Refills: 0 | Status: DISCONTINUED | OUTPATIENT
Start: 2022-12-18 | End: 2022-12-20

## 2022-12-18 RX ADMIN — GANCICLOVIR SODIUM 100 MILLIGRAM(S): 50 INJECTION, POWDER, LYOPHILIZED, FOR SOLUTION INTRAVENTRICULAR at 06:27

## 2022-12-18 RX ADMIN — Medication 20 MILLIGRAM(S): at 14:34

## 2022-12-18 RX ADMIN — Medication 40 MILLIGRAM(S): at 06:27

## 2022-12-18 RX ADMIN — GANCICLOVIR SODIUM 100 MILLIGRAM(S): 50 INJECTION, POWDER, LYOPHILIZED, FOR SOLUTION INTRAVENTRICULAR at 18:14

## 2022-12-18 RX ADMIN — Medication 5 MILLIGRAM(S): at 14:34

## 2022-12-18 RX ADMIN — ENOXAPARIN SODIUM 40 MILLIGRAM(S): 100 INJECTION SUBCUTANEOUS at 11:13

## 2022-12-18 RX ADMIN — Medication 5 MILLIGRAM(S): at 21:37

## 2022-12-18 RX ADMIN — PANTOPRAZOLE SODIUM 40 MILLIGRAM(S): 20 TABLET, DELAYED RELEASE ORAL at 06:28

## 2022-12-18 RX ADMIN — Medication 20 MILLIGRAM(S): at 21:37

## 2022-12-18 RX ADMIN — ONDANSETRON 4 MILLIGRAM(S): 8 TABLET, FILM COATED ORAL at 06:28

## 2022-12-18 NOTE — PROGRESS NOTE ADULT - ASSESSMENT
HPI:  61 y/o M presenting to ED for persistent diarrhea after being treated for traveler's diarrhea found to cratered ulcers on colonoscopy     #Diarrhea secondary to traveler's diarrhea secondary to epec complicated by deep cratered ulcers suspicious for cmv colitis versus ulcerative colitis   gancyclovir   cw methylprednisolon   prelim biopsy results show crypt abscess - > suspicious for ulcerative colitis -  > start methylprednisolone  awaiting final biopsy results     #Overweight BMI 28 patient needs to see dieitian outpatient for further evaluation     #heaving during bowel movement - will add reglan         PROGRESS NOTE HANDOFF    Pending:  final biopsy results     Family discussion: patient verbalized understanding and agreeable to plan of care     Disposition: Home

## 2022-12-18 NOTE — PROGRESS NOTE ADULT - ASSESSMENT
62 year old male pt w PMH of HTN presenting for nonresolving bloody diarrhea with CT abdomen showing pancolitis and GI PCR 10 days ago showing EPEC s/p cipro/flagyl with no improvement    # Bloody Diarrhea  and pancolitis most likely 2/2 newely diagnosed UC, R/O CMV colitis  - Patient with no improvement after 1 week of cipro/flagyl  - CT abd showing pancolitis  - bloody diarrhea up to 10x daily  - labs showing hb stable with mild normocytic anemia.   - repeat GI PCR --> negative, C.diff --> negative x2, stool culture --> negative, inflammatory markers --> ESR 70,   - finished 3 days of Azithromycin  - s/p colonoscopy on 12/14: Diffuse continuous ulceration, friability, erythema and abnormal vascularity with thick mucoid membrane with contact bleeding were noted in the rectum and sigmoid colon. 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 Deep cratered ulcers were noted suggestive of CMV colitis, biopsies were taken and sent separately. The PCF was advanced till 40-50 cm with continuous ulcerative mucosa so procedure was aborted.  - pre cid pathology with cryptitis and crypt abscess, pending CMV stain     recommendations:  - advance diet as tolerated   - continue Gancyclovir 5 mg/kg/dose every 12 hours for now - ID is following    - Solumedrol  20 mg q8hrs - will consider switching to PO steroids soon   - add PPI  - if worsening abdominal pain and distention get STAT KUB  - low threshold to start in house biologic therapy if no improvement on steroids (pre ttt work up is ordered)    will follow  62 year old male pt w PMH of HTN presenting for nonresolving bloody diarrhea with CT abdomen showing pancolitis and GI PCR 10 days ago showing EPEC s/p cipro/flagyl with no improvement    # Bloody Diarrhea  and pancolitis most likely 2/2 newely diagnosed UC, R/O CMV colitis  - CT abd showing pancolitis  - bloody diarrhea up to 10x daily, now improved to formed stool with two bowel movements   - labs showing hb stable with mild normocytic anemia.   - repeat GI PCR --> negative, C.diff --> negative x2, stool culture --> negative, inflammatory markers --> ESR 70,   - s/p colonoscopy on 12/14: Diffuse continuous ulceration, friability, erythema and abnormal vascularity with thick mucoid membrane with contact bleeding were noted in the rectum and sigmoid colon. 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 Deep cratered ulcers were noted suggestive of CMV colitis, biopsies were taken and sent separately. The PCF was advanced till 40-50 cm with continuous ulcerative mucosa so procedure was aborted.  - pre cid pathology with cryptitis and crypt abscess, pending CMV stain     recommendations:  - advance diet as tolerated   - continue Gancyclovir 5 mg/kg/dose every 12 hours for now - ID is following    - Solumedrol  20 mg q8hrs - will consider switching to PO steroids soon   - add PPI  - if worsening abdominal pain and distention get STAT KUB  - low threshold to start in house biologic therapy if no improvement on steroids (pre ttt work up is ordered)    will follow

## 2022-12-18 NOTE — PROGRESS NOTE ADULT - SUBJECTIVE AND OBJECTIVE BOX
Gastroenterology progress note:     Patient is a 62y old  Male who presents with a chief complaint of Pancolitis (18 Dec 2022 09:20)       Admitted on: 12-10-22    We are following the patient for: UC       Interval History:    still complaining of heaving  stool is semi formed with less blood  tolerating diet       PAST MEDICAL & SURGICAL HISTORY:  HTN (hypertension)      No significant past surgical history          MEDICATIONS  (STANDING):  enoxaparin Injectable 40 milliGRAM(s) SubCutaneous every 24 hours  ganciclovir IVPB 480 milliGRAM(s) IV Intermittent every 12 hours  lactated ringers. 1000 milliLiter(s) (100 mL/Hr) IV Continuous <Continuous>  methylPREDNISolone sodium succinate Injectable 20 milliGRAM(s) IV Push three times a day  pantoprazole    Tablet 40 milliGRAM(s) Oral before breakfast    MEDICATIONS  (PRN):  metoclopramide Injectable 5 milliGRAM(s) IV Push three times a day PRN nausea  ondansetron Injectable 4 milliGRAM(s) IV Push every 6 hours PRN Nausea and/or Vomiting      Allergies  penicillin (Rash)      Review of Systems:   Cardiovascular:  No Chest Pain, No Palpitations  Respiratory:  No Cough, No Dyspnea  Gastrointestinal:  As described in HPI  Skin:  No Skin Lesions, No Jaundice  Neuro:  No Syncope, No Dizziness    Physical Examination:  T(C): 36.4 (12-18-22 @ 00:00), Max: 36.4 (12-18-22 @ 00:00)  HR: 62 (12-18-22 @ 00:00) (62 - 66)  BP: 119/59 (12-18-22 @ 00:00) (119/59 - 151/78)  RR: 18 (12-18-22 @ 00:00) (18 - 18)  SpO2: 99% (12-18-22 @ 00:00) (98% - 99%)        GENERAL: AAOx3, no acute distress.  HEAD:  Atraumatic, Normocephalic  EYES: conjunctiva and sclera clear  NECK: Supple, no JVD or thyromegaly  CHEST/LUNG: Clear to auscultation bilaterally; No wheeze, rhonchi, or rales  HEART: Regular rate and rhythm; normal S1, S2, No murmurs.  ABDOMEN: Soft, nontender, nondistended; Bowel sounds present  NEUROLOGY: No asterixis or tremor.   SKIN: Intact, no jaundice     Data:                        10.7   11.34 )-----------( 541      ( 18 Dec 2022 09:57 )             32.4     Hgb trend:  10.7  12-18-22 @ 09:57  10.3  12-17-22 @ 08:53  10.5  12-16-22 @ 07:27        12-18    138  |  99  |  10  ----------------------------<  161<H>  4.0   |  27  |  0.7    Ca    8.1<L>      18 Dec 2022 09:57  Mg     2.1     12-18    TPro  5.2<L>  /  Alb  2.9<L>  /  TBili  <0.2  /  DBili  x   /  AST  19  /  ALT  17  /  AlkPhos  46  12-18    Liver panel trend:  TBili <0.2   /   AST 19   /   ALT 17   /   AlkP 46   /   Tptn 5.2   /   Alb 2.9    /   DBili --      12-18  TBili <0.2   /   AST 17   /   ALT 11   /   AlkP 37   /   Tptn 5.1   /   Alb 2.8    /   DBili --      12-17  TBili <0.2   /   AST 7   /   ALT 7   /   AlkP 34   /   Tptn 4.8   /   Alb 2.6    /   DBili --      12-16  TBili 0.2   /   AST 10   /   ALT 8   /   AlkP 36   /   Tptn 4.7   /   Alb 2.5    /   DBili --      12-15  TBili 0.3   /   AST 15   /   ALT 9   /   AlkP 40   /   Tptn 5.1   /   Alb 2.9    /   DBili --      12-13  TBili 0.3   /   AST 17   /   ALT 19   /   AlkP 48   /   Tptn 5.9   /   Alb 3.2    /   DBili --      12-10

## 2022-12-18 NOTE — PROGRESS NOTE ADULT - SUBJECTIVE AND OBJECTIVE BOX
BEATRIZ DRUMMOND  62y  [id number]      Patient is a 62y old  Male who presents with a chief complaint of Pancolitis (17 Dec 2022 17:23)      INTERVAL HPI/OVERNIGHT EVENTS:   patient noting stool is becoming formed , 2 bm overnight     REVIEW OF SYSTEMS:  CONSTITUTIONAL: No fever, weight loss, or fatigue  EYES: No eye pain, visual disturbances, or discharge  ENMT:  No difficulty hearing, tinnitus, vertigo; No sinus or throat pain  NECK: No pain or stiffness  BREASTS: No pain, masses, or nipple discharge  RESPIRATORY: No cough, wheezing, chills or hemoptysis; No shortness of breath  CARDIOVASCULAR: No chest pain, palpitations, dizziness, or leg swelling  GASTROINTESTINAL: Heaving while having bowel movement   GENITOURINARY: No dysuria, frequency, hematuria, or incontinence  NEUROLOGICAL: No headaches, memory loss, loss of strength, numbness, or tremors  SKIN: No itching, burning, rashes, or lesions   LYMPH NODES: No enlarged glands  ENDOCRINE: No heat or cold intolerance; No hair loss  MUSCULOSKELETAL: No joint pain or swelling; No muscle, back, or extremity pain  PSYCHIATRIC: No depression, anxiety, mood swings, or difficulty sleeping  HEME/LYMPH: No easy bruising, or bleeding gums  ALLERY AND IMMUNOLOGIC: No hives or eczema  FAMILY HISTORY:    T(C): 36.4 (12-18-22 @ 00:00), Max: 36.4 (12-18-22 @ 00:00)  HR: 62 (12-18-22 @ 00:00) (62 - 66)  BP: 119/59 (12-18-22 @ 00:00) (119/59 - 151/78)  RR: 18 (12-18-22 @ 00:00) (18 - 18)  SpO2: 99% (12-18-22 @ 00:00) (98% - 99%)  Wt(kg): --Vital Signs Last 24 Hrs  T(C): 36.4 (18 Dec 2022 00:00), Max: 36.4 (18 Dec 2022 00:00)  T(F): 97.5 (18 Dec 2022 00:00), Max: 97.5 (18 Dec 2022 00:00)  HR: 62 (18 Dec 2022 00:00) (62 - 66)  BP: 119/59 (18 Dec 2022 00:00) (119/59 - 151/78)  BP(mean): --  RR: 18 (18 Dec 2022 00:00) (18 - 18)  SpO2: 99% (18 Dec 2022 00:00) (98% - 99%)    Parameters below as of 18 Dec 2022 00:00  Patient On (Oxygen Delivery Method): room air        PHYSICAL EXAM:  GENERAL: NAD, well-groomed, well-developed  HEAD:  Atraumatic, Normocephalic  EYES: EOMI, PERRLA, conjunctiva and sclera clear  ENMT: No tonsillar erythema, exudates, or enlargement; Moist mucous membranes, Good dentition, No lesions  NECK: Supple, No JVD, Normal thyroid  NERVOUS SYSTEM:  Alert    PULM: Clear to auscultation bilaterally  CARDIAC: Regular rate and rhythm; No murmurs, rubs, or gallops  GI: Soft, Nontender, Nondistended; Bowel sounds present  EXTREMITIES:  2+ Peripheral Pulses, No clubbing, cyanosis, or edema  LYMPH: No lymphadenopathy noted  SKIN: No rashes or lesions    Consultant(s) Notes Reviewed:  [x ] YES  [ ] NO  Care Discussed with Consultants/Other Providers [ x] YES  [ ] NO    LABS:                            10.3   10.53 )-----------( 554      ( 17 Dec 2022 08:53 )             29.9   12-17    137  |  96<L>  |  8<L>  ----------------------------<  187<H>  4.1   |  29  |  0.7    Ca    8.2<L>      17 Dec 2022 08:53  Mg     2.1     12-17    TPro  5.1<L>  /  Alb  2.8<L>  /  TBili  <0.2  /  DBili  x   /  AST  17  /  ALT  11  /  AlkPhos  37  12-17            enoxaparin Injectable 40 milliGRAM(s) SubCutaneous every 24 hours  ganciclovir IVPB 480 milliGRAM(s) IV Intermittent every 12 hours  lactated ringers. 1000 milliLiter(s) IV Continuous <Continuous>  methylPREDNISolone sodium succinate Injectable 40 milliGRAM(s) IV Push every 8 hours  ondansetron Injectable 4 milliGRAM(s) IV Push every 6 hours PRN  pantoprazole    Tablet 40 milliGRAM(s) Oral before breakfast      HEALTH ISSUES - PROBLEM Dx:          Case Discussed with House Staff   Spectra [phone]

## 2022-12-19 LAB
ALBUMIN SERPL ELPH-MCNC: 3 G/DL — LOW (ref 3.5–5.2)
ALP SERPL-CCNC: 45 U/L — SIGNIFICANT CHANGE UP (ref 30–115)
ALT FLD-CCNC: 18 U/L — SIGNIFICANT CHANGE UP (ref 0–41)
ANION GAP SERPL CALC-SCNC: 7 MMOL/L — SIGNIFICANT CHANGE UP (ref 7–14)
AST SERPL-CCNC: 16 U/L — SIGNIFICANT CHANGE UP (ref 0–41)
BASOPHILS # BLD AUTO: 0.05 K/UL — SIGNIFICANT CHANGE UP (ref 0–0.2)
BASOPHILS NFR BLD AUTO: 0.4 % — SIGNIFICANT CHANGE UP (ref 0–1)
BILIRUB SERPL-MCNC: <0.2 MG/DL — SIGNIFICANT CHANGE UP (ref 0.2–1.2)
BUN SERPL-MCNC: 9 MG/DL — LOW (ref 10–20)
CALCIUM SERPL-MCNC: 8.8 MG/DL — SIGNIFICANT CHANGE UP (ref 8.4–10.5)
CHLORIDE SERPL-SCNC: 99 MMOL/L — SIGNIFICANT CHANGE UP (ref 98–110)
CMV DNA CSF QL NAA+PROBE: SIGNIFICANT CHANGE UP
CMV DNA SPEC NAA+PROBE-LOG#: SIGNIFICANT CHANGE UP LOG10IU/ML
CO2 SERPL-SCNC: 31 MMOL/L — SIGNIFICANT CHANGE UP (ref 17–32)
CREAT SERPL-MCNC: 0.7 MG/DL — SIGNIFICANT CHANGE UP (ref 0.7–1.5)
EGFR: 105 ML/MIN/1.73M2 — SIGNIFICANT CHANGE UP
EOSINOPHIL # BLD AUTO: 0 K/UL — SIGNIFICANT CHANGE UP (ref 0–0.7)
EOSINOPHIL NFR BLD AUTO: 0 % — SIGNIFICANT CHANGE UP (ref 0–8)
GLUCOSE SERPL-MCNC: 123 MG/DL — HIGH (ref 70–99)
HCT VFR BLD CALC: 31.9 % — LOW (ref 42–52)
HGB BLD-MCNC: 10.7 G/DL — LOW (ref 14–18)
IMM GRANULOCYTES NFR BLD AUTO: 4.1 % — HIGH (ref 0.1–0.3)
LYMPHOCYTES # BLD AUTO: 1.81 K/UL — SIGNIFICANT CHANGE UP (ref 1.2–3.4)
LYMPHOCYTES # BLD AUTO: 15.8 % — LOW (ref 20.5–51.1)
MAGNESIUM SERPL-MCNC: 2.3 MG/DL — SIGNIFICANT CHANGE UP (ref 1.8–2.4)
MCHC RBC-ENTMCNC: 30.3 PG — SIGNIFICANT CHANGE UP (ref 27–31)
MCHC RBC-ENTMCNC: 33.5 G/DL — SIGNIFICANT CHANGE UP (ref 32–37)
MCV RBC AUTO: 90.4 FL — SIGNIFICANT CHANGE UP (ref 80–94)
MONOCYTES # BLD AUTO: 1.02 K/UL — HIGH (ref 0.1–0.6)
MONOCYTES NFR BLD AUTO: 8.9 % — SIGNIFICANT CHANGE UP (ref 1.7–9.3)
NEUTROPHILS # BLD AUTO: 8.07 K/UL — HIGH (ref 1.4–6.5)
NEUTROPHILS NFR BLD AUTO: 70.8 % — SIGNIFICANT CHANGE UP (ref 42.2–75.2)
NRBC # BLD: 0 /100 WBCS — SIGNIFICANT CHANGE UP (ref 0–0)
PLATELET # BLD AUTO: 522 K/UL — HIGH (ref 130–400)
POTASSIUM SERPL-MCNC: 5.1 MMOL/L — HIGH (ref 3.5–5)
POTASSIUM SERPL-SCNC: 5.1 MMOL/L — HIGH (ref 3.5–5)
PROT SERPL-MCNC: 5.3 G/DL — LOW (ref 6–8)
RBC # BLD: 3.53 M/UL — LOW (ref 4.7–6.1)
RBC # FLD: 12.5 % — SIGNIFICANT CHANGE UP (ref 11.5–14.5)
SODIUM SERPL-SCNC: 137 MMOL/L — SIGNIFICANT CHANGE UP (ref 135–146)
WBC # BLD: 11.42 K/UL — HIGH (ref 4.8–10.8)
WBC # FLD AUTO: 11.42 K/UL — HIGH (ref 4.8–10.8)

## 2022-12-19 PROCEDURE — 99233 SBSQ HOSP IP/OBS HIGH 50: CPT

## 2022-12-19 RX ADMIN — GANCICLOVIR SODIUM 100 MILLIGRAM(S): 50 INJECTION, POWDER, LYOPHILIZED, FOR SOLUTION INTRAVENTRICULAR at 06:05

## 2022-12-19 RX ADMIN — Medication 20 MILLIGRAM(S): at 13:01

## 2022-12-19 RX ADMIN — ONDANSETRON 4 MILLIGRAM(S): 8 TABLET, FILM COATED ORAL at 13:02

## 2022-12-19 RX ADMIN — GANCICLOVIR SODIUM 100 MILLIGRAM(S): 50 INJECTION, POWDER, LYOPHILIZED, FOR SOLUTION INTRAVENTRICULAR at 17:03

## 2022-12-19 RX ADMIN — Medication 5 MILLIGRAM(S): at 17:04

## 2022-12-19 RX ADMIN — Medication 20 MILLIGRAM(S): at 06:04

## 2022-12-19 RX ADMIN — ENOXAPARIN SODIUM 40 MILLIGRAM(S): 100 INJECTION SUBCUTANEOUS at 11:02

## 2022-12-19 RX ADMIN — ONDANSETRON 4 MILLIGRAM(S): 8 TABLET, FILM COATED ORAL at 20:28

## 2022-12-19 RX ADMIN — PANTOPRAZOLE SODIUM 40 MILLIGRAM(S): 20 TABLET, DELAYED RELEASE ORAL at 06:05

## 2022-12-19 RX ADMIN — SODIUM CHLORIDE 100 MILLILITER(S): 9 INJECTION, SOLUTION INTRAVENOUS at 11:02

## 2022-12-19 RX ADMIN — Medication 20 MILLIGRAM(S): at 20:27

## 2022-12-19 NOTE — PROGRESS NOTE ADULT - ASSESSMENT
63 y/o M with hx of HTN presents to ED for persistent bloody diarrhea.     #Sepsis, POA  #Chronic diarrhea  #Pancolitis  #Recent EPEC infection, GI PCR +  #r/o IBD  - CT A/P shows diffuse colonic wall thickening, compatible with pancolitis, without significant change since CT abdomen pelvis 12/2/2022.  - C. diff -ve  - Gi PCR + for EPEC s/p Flagyl&Cipro x 7 days  - repeated GI pcr negative  - s/p IV cefepime + flagyl, and Azithromycin  - ESR: 70 increased from 49 on 12/03/2022  - CRP 80 was 144 on 12/03/2022  - GI recommends to discontinue ARB and can switch to another antihypertensive with no enteropathy as side effect (can do ACE inh)  - Colonoscopy: Diffuse continuous ulceration, friability, erythema and abnormal vascularity with thick mucoid membrane with contact bleeding were noted in the rectum and sigmoid colon. 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 Deep cratered ulcers were noted suggestive of CMV colitis, biopsies were taken and sent separately.   - c/w Gancyclovir 5 mg/kg/dose every 12 hours awaiting pathology cmv stain     - ASCA, ANCA pending  - Biopsy most likely ulcerative colitis, will start solumedrol IV 20mg Q8 --> increase to 40mg Q8 ---> decrease dose to 20mg Q8  - Pending biopsy stains for CMV  - Serum CMV PCR pending  - Improvement of diarrhea, 1 BM since yesterday +1 episode of vomiting   - if abdominal distension, order KUB     #HTN  - D/C losartan as per GI recommendations    #Misc  DVT Ppx: lovenox  GI ppx: protonix  Diet: DASH  Activity: AAT  Dispo: Home pending GI eval; currently acute     63 y/o M with hx of HTN presents to ED for persistent bloody diarrhea.     #Sepsis, POA  #Chronic diarrhea  #Pancolitis  #Recent EPEC infection, GI PCR +  #r/o IBD  - CT A/P shows diffuse colonic wall thickening, compatible with pancolitis, without significant change since CT abdomen pelvis 12/2/2022.  - C. diff -ve  - Gi PCR + for EPEC s/p Flagyl&Cipro x 7 days  - repeated GI pcr negative  - s/p IV cefepime + flagyl, and Azithromycin  - ESR: 70 increased from 49 on 12/03/2022  - CRP 80 was 144 on 12/03/2022  - GI recommends to discontinue ARB and can switch to another antihypertensive with no enteropathy as side effect (can do ACE inh)  - Colonoscopy: Diffuse continuous ulceration, friability, erythema and abnormal vascularity with thick mucoid membrane with contact bleeding were noted in the rectum and sigmoid colon. 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 Deep cratered ulcers were noted suggestive of CMV colitis, biopsies were taken and sent separately.   - c/w Gancyclovir 5 mg/kg/dose every 12 hours awaiting pathology cmv stain for 5 days then switch to 5mg/kg Q24    - ASCA, ANCA pending  - Biopsy most likely ulcerative colitis, will start solumedrol IV 20mg Q8 --> increase to 40mg Q8 ---> decrease dose to 20mg Q8  - Pending biopsy stains for CMV  - Serum CMV PCR pending  - Improvement of diarrhea, 1 BM since yesterday +1 episode of vomiting   - if abdominal distension, order KUB     #HTN  - D/C losartan as per GI recommendations    #Misc  DVT Ppx: lovenox  GI ppx: protonix  Diet: DASH  Activity: AAT  Dispo: Home pending GI eval; currently acute

## 2022-12-19 NOTE — PROGRESS NOTE ADULT - SKIN
warm and dry/color normal/normal/no rashes/no ulcers

## 2022-12-19 NOTE — PROGRESS NOTE ADULT - SUBJECTIVE AND OBJECTIVE BOX
BEATRIZ DRUMMOND 62y Male  MRN#: [id number]   Hospital Day: 9d    HPI:  63 y/o M presenting to ED for persistent diarrhea.     PMHx: HTN   Family Hx: Colon cancer in father at age 70 & Crohn's disease in his sister.   Social Hx: Denies smoking or drinking    HPI starts from 12/3 when patient was first admitted after returning from a trip to [place] on 11/14 for bloody diarrhea. Patient was found to have pancolitis on CTAP & GI PCR was positive for EPEC. He was dc'd home to complete x7days of Cipro/Flagyl (as per ID). Was also seen by GI that recommended OP Colonoscopy.     4 days after discharge, patient returns to ED due to unresolved symptoms.     Pt had a colonoscopy done with Dr. Drummond 5 yrs ago that was normal as per pt.     Denies fever, chest pain, N/V.     ED:   - VS: Unremarkable  - Labs: WBC 12, Na 134,   - CTAP: Diffuse colonic wall thickening, compatible with pancolitis, without significant change since CT abdomen pelvis 12/2/2022.    Admit to Medicine     (10 Dec 2022 19:48)    SUBJECTIVE  Patient is a 62y old Male who presents with a chief complaint of Pancolitis (18 Dec 2022 15:45)  Currently admitted to medicine with the primary diagnosis of Pancolitis    INTERVAL HPI AND OVERNIGHT EVENTS:  Patient was examined and seen at bedside. This morning he is resting comfortably in bed and reports no issues or overnight events.    OBJECTIVE  PAST MEDICAL & SURGICAL HISTORY  HTN (hypertension)  No significant past surgical history    ALLERGIES:  penicillin (Rash)    MEDICATIONS:  STANDING MEDICATIONS  enoxaparin Injectable 40 milliGRAM(s) SubCutaneous every 24 hours  ganciclovir IVPB 480 milliGRAM(s) IV Intermittent every 12 hours  lactated ringers. 1000 milliLiter(s) IV Continuous <Continuous>  methylPREDNISolone sodium succinate Injectable 20 milliGRAM(s) IV Push three times a day  pantoprazole    Tablet 40 milliGRAM(s) Oral before breakfast    PRN MEDICATIONS  metoclopramide Injectable 5 milliGRAM(s) IV Push three times a day PRN  ondansetron Injectable 4 milliGRAM(s) IV Push every 6 hours PRN    VITAL SIGNS: Last 24 Hours  T(C): 37.1 (19 Dec 2022 00:00), Max: 37.1 (19 Dec 2022 00:00)  T(F): 98.8 (19 Dec 2022 00:00), Max: 98.8 (19 Dec 2022 00:00)  HR: 99 (19 Dec 2022 00:00) (99 - 99)  BP: 129/76 (19 Dec 2022 00:00) (129/76 - 129/76)  BP(mean): --  RR: 18 (19 Dec 2022 00:00) (18 - 18)  SpO2: 99% (19 Dec 2022 00:00) (99% - 99%)    LABS:                        10.7   11.42 )-----------( 522      ( 19 Dec 2022 06:42 )             31.9     12-19    137  |  99  |  9<L>  ----------------------------<  123<H>  5.1<H>   |  31  |  0.7    Ca    8.8      19 Dec 2022 06:42  Mg     2.3     12-19    TPro  5.3<L>  /  Alb  3.0<L>  /  TBili  <0.2  /  DBili  x   /  AST  16  /  ALT  18  /  AlkPhos  45  12-19    RADIOLOGY:    PHYSICAL EXAM:  General: well-appearing in NAD.   HEENT: NCAT  LUNGS: CTAB  HEART: RRR.   ABDOMEN: ; Soft, nondistended.   EXT: Nonedematous.

## 2022-12-19 NOTE — PROGRESS NOTE ADULT - SUBJECTIVE AND OBJECTIVE BOX
Gastroenterology progress note:     Patient is a 62y old  Male who presents with a chief complaint of Pancolitis (19 Dec 2022 12:51)       Admitted on: 12-10-22    We are following the patient for: colitis        Interval History:    No acute events overnight.   slowoly improving  still complains of nausea  denies pain  less bm and more formed  less bleeding       PAST MEDICAL & SURGICAL HISTORY:  HTN (hypertension)      No significant past surgical history          MEDICATIONS  (STANDING):  enoxaparin Injectable 40 milliGRAM(s) SubCutaneous every 24 hours  ganciclovir IVPB 480 milliGRAM(s) IV Intermittent every 12 hours  lactated ringers. 1000 milliLiter(s) (100 mL/Hr) IV Continuous <Continuous>  methylPREDNISolone sodium succinate Injectable 20 milliGRAM(s) IV Push three times a day  pantoprazole    Tablet 40 milliGRAM(s) Oral before breakfast    MEDICATIONS  (PRN):  metoclopramide Injectable 5 milliGRAM(s) IV Push three times a day PRN nausea  ondansetron Injectable 4 milliGRAM(s) IV Push every 6 hours PRN Nausea and/or Vomiting      Allergies  penicillin (Rash)      Review of Systems:   Cardiovascular:  No Chest Pain, No Palpitations  Respiratory:  No Cough, No Dyspnea  Gastrointestinal:  As described in HPI  Skin:  No Skin Lesions, No Jaundice  Neuro:  No Syncope, No Dizziness    Physical Examination:  T(C): 36.2 (12-19-22 @ 17:15), Max: 37.1 (12-19-22 @ 00:00)  HR: 60 (12-19-22 @ 17:15) (51 - 99)  BP: 132/60 (12-19-22 @ 17:15) (129/76 - 135/64)  RR: 18 (12-19-22 @ 17:15) (18 - 18)  SpO2: 99% (12-19-22 @ 00:00) (99% - 99%)      12-19-22 @ 07:01  -  12-19-22 @ 20:35  --------------------------------------------------------  IN: 1100 mL / OUT: 0 mL / NET: 1100 mL        GENERAL: AAOx3, no acute distress.  HEAD:  Atraumatic, Normocephalic  EYES: conjunctiva and sclera clear  NECK: Supple, no JVD or thyromegaly  CHEST/LUNG: Clear to auscultation bilaterally; No wheeze, rhonchi, or rales  HEART: Regular rate and rhythm; normal S1, S2, No murmurs.  ABDOMEN: Soft, nontender, nondistended; Bowel sounds present  NEUROLOGY: No asterixis or tremor.   SKIN: Intact, no jaundice     Data:                        10.7   11.42 )-----------( 522      ( 19 Dec 2022 06:42 )             31.9     Hgb trend:  10.7  12-19-22 @ 06:42  10.7  12-18-22 @ 09:57  10.3  12-17-22 @ 08:53        12-19    137  |  99  |  9<L>  ----------------------------<  123<H>  5.1<H>   |  31  |  0.7    Ca    8.8      19 Dec 2022 06:42  Mg     2.3     12-19    TPro  5.3<L>  /  Alb  3.0<L>  /  TBili  <0.2  /  DBili  x   /  AST  16  /  ALT  18  /  AlkPhos  45  12-19    Liver panel trend:  TBili <0.2   /   AST 16   /   ALT 18   /   AlkP 45   /   Tptn 5.3   /   Alb 3.0    /   DBili --      12-19  TBili <0.2   /   AST 19   /   ALT 17   /   AlkP 46   /   Tptn 5.2   /   Alb 2.9    /   DBili --      12-18  TBili <0.2   /   AST 17   /   ALT 11   /   AlkP 37   /   Tptn 5.1   /   Alb 2.8    /   DBili --      12-17  TBili <0.2   /   AST 7   /   ALT 7   /   AlkP 34   /   Tptn 4.8   /   Alb 2.6    /   DBili --      12-16  TBili 0.2   /   AST 10   /   ALT 8   /   AlkP 36   /   Tptn 4.7   /   Alb 2.5    /   DBili --      12-15  TBili 0.3   /   AST 15   /   ALT 9   /   AlkP 40   /   Tptn 5.1   /   Alb 2.9    /   DBili --      12-13  TBili 0.3   /   AST 17   /   ALT 19   /   AlkP 48   /   Tptn 5.9   /   Alb 3.2    /   DBili --      12-10           normal

## 2022-12-19 NOTE — PROGRESS NOTE ADULT - SUBJECTIVE AND OBJECTIVE BOX
BEATRIZ DRUMMOND  62y, Male    All available historical data reviewed    OVERNIGHT EVENTS:    no fevers  frequency of stools decreased  not watery  ROS:  General: Denies rigors, nightsweats  HEENT: Denies headache, rhinorrhea, sore throat, eye pain  CV: Denies CP, palpitations  PULM: Denies wheezing, hemoptysis  GI: Denies hematemesis, hematochezia, melena  : Denies discharge, hematuria  MSK: Denies arthralgias, myalgias  SKIN: Denies rash, lesions  NEURO: Denies paresthesias, weakness  PSYCH: Denies depression, anxiety    VITALS:  T(F): 98.7, Max: 98.8 (12-19-22 @ 00:00)  HR: 51  BP: 135/64  RR: 18Vital Signs Last 24 Hrs  T(C): 37.1 (19 Dec 2022 10:06), Max: 37.1 (19 Dec 2022 00:00)  T(F): 98.7 (19 Dec 2022 10:06), Max: 98.8 (19 Dec 2022 00:00)  HR: 51 (19 Dec 2022 10:06) (51 - 99)  BP: 135/64 (19 Dec 2022 10:06) (129/76 - 135/64)  BP(mean): --  RR: 18 (19 Dec 2022 00:00) (18 - 18)  SpO2: 99% (19 Dec 2022 00:00) (99% - 99%)    Parameters below as of 19 Dec 2022 00:00  Patient On (Oxygen Delivery Method): room air        TESTS & MEASUREMENTS:                        10.7   11.42 )-----------( 522      ( 19 Dec 2022 06:42 )             31.9     12-19    137  |  99  |  9<L>  ----------------------------<  123<H>  5.1<H>   |  31  |  0.7    Ca    8.8      19 Dec 2022 06:42  Mg     2.3     12-19    TPro  5.3<L>  /  Alb  3.0<L>  /  TBili  <0.2  /  DBili  x   /  AST  16  /  ALT  18  /  AlkPhos  45  12-19    LIVER FUNCTIONS - ( 19 Dec 2022 06:42 )  Alb: 3.0 g/dL / Pro: 5.3 g/dL / ALK PHOS: 45 U/L / ALT: 18 U/L / AST: 16 U/L / GGT: x             Culture - Stool (collected 12-12-22 @ 18:40)  Source: .Stool Feces  Final Report (12-15-22 @ 17:45):    No enteric pathogens isolated.    (Stool culture examined for Salmonella,    Shigella, Campylobacter, Aeromonas, Plesiomonas,    Vibrio, E.coli O157 and Yersinia)            RADIOLOGY & ADDITIONAL TESTS:  Personal review of radiological diagnostics performed  Echo and EKG results noted when applicable.     MEDICATIONS:  enoxaparin Injectable 40 milliGRAM(s) SubCutaneous every 24 hours  ganciclovir IVPB 480 milliGRAM(s) IV Intermittent every 12 hours  lactated ringers. 1000 milliLiter(s) IV Continuous <Continuous>  methylPREDNISolone sodium succinate Injectable 20 milliGRAM(s) IV Push three times a day  metoclopramide Injectable 5 milliGRAM(s) IV Push three times a day PRN  ondansetron Injectable 4 milliGRAM(s) IV Push every 6 hours PRN  pantoprazole    Tablet 40 milliGRAM(s) Oral before breakfast      ANTIBIOTICS:  ganciclovir IVPB 480 milliGRAM(s) IV Intermittent every 12 hours

## 2022-12-19 NOTE — PROGRESS NOTE ADULT - GASTROINTESTINAL
discomfort/soft/no guarding/no rigidity
soft/nontender/no guarding/no rigidity
soft/nontender/nondistended/no guarding/no rigidity

## 2022-12-19 NOTE — PROGRESS NOTE ADULT - ASSESSMENT
· Assessment	  61 y/o M presenting to ED for persistent diarrhea.   Returned  from a trip to [place]. Pancolitis on CTAP 12/2  & GI PCR was positive for EPEC. He was dc'd home to complete x7days of Cipro/Flagyl.  4 days after discharge, patient returns to ED due to unresolved symptoms.   Denies fever, chest pain, N/V.   12/10 CTAP: Diffuse colonic wall thickening, compatible with pancolitis, without significant change since CT abdomen pelvis 12/2/2022.    12/14 Colonoscopy Diffuse continuous ulceration, friability, erythema and abnormal vascularity with thick mucoid membrane with contact bleeding were noted in the rectum and sigmoid colon. 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 Deep cratered ulcers were noted suggestive of CMV colitis, biopsies were taken and sent separately.    IMPRESSION;   Clinically resolving pancolitis which in all probability is not infectious in nature  12/14 Colonoscopy : possible CMV/IBD  12/14 Bx : cryptitis with crypt abscesses. Special stains for CMV still p  12/3 CMV : IgG/IgM NG  12/17 Serum CMV PCR NG  Pancolitis unchanged on CT 12/10 c/w 12/2 12/12,14 CD NG  12/3 Stool EPEC  12/3 BCx NG  HIV NG  12/10 CD NG  COVID-19 NG    RECOMMENDATIONS;  F/u colon Bx : stains for CMV  Ganciclovir at 5 mg/kg iv q12h for 5 days and then 5 mg /kg iv q24h  Started on Solumedrol 20 mg iv q8h on 12/15 and presently dosis reduced

## 2022-12-19 NOTE — PROGRESS NOTE ADULT - RESPIRATORY
normal/clear to auscultation bilaterally/no wheezes/no rales/no rhonchi

## 2022-12-19 NOTE — PROGRESS NOTE ADULT - ASSESSMENT
62 year old male pt w PMH of HTN presenting for nonresolving bloody diarrhea with CT abdomen showing pancolitis and GI PCR 10 days ago showing EPEC s/p cipro/flagyl with no improvement    # Bloody Diarrhea  and pancolitis most likely 2/2 newely diagnosed UC, R/O CMV colitis  - CT abd showing pancolitis  - bloody diarrhea up to 10x daily, now improved to formed stool with two bowel movements   - labs showing hb stable with mild normocytic anemia.   - repeat GI PCR --> negative, C.diff --> negative x2, stool culture --> negative, inflammatory markers --> ESR 70,   - s/p colonoscopy on 12/14: Diffuse continuous ulceration, friability, erythema and abnormal vascularity with thick mucoid membrane with contact bleeding were noted in the rectum and sigmoid colon. 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 Deep cratered ulcers were noted suggestive of CMV colitis, biopsies were taken and sent separately. The PCF was advanced till 40-50 cm with continuous ulcerative mucosa so procedure was aborted.  - pre cid pathology with cryptitis and crypt abscess, pending CMV stain     recommendations:  - advance diet as tolerated   - continue Gancyclovir 5 mg/kg/dose every 12 hours for now - ID is following    - Solumedrol  20 mg q8hrs - will consider switching to PO steroids soon   - add PPI  - if worsening abdominal pain and distention get STAT KUB  - low threshold to start in house biologic therapy if no improvement on steroids (pre ttt work up is ordered)    will follow    62 year old male pt w PMH of HTN presenting for nonresolving bloody diarrhea with CT abdomen showing pancolitis and GI PCR 10 days ago showing EPEC s/p cipro/flagyl with no improvement    # Bloody Diarrhea  and pancolitis most likely 2/2 newly diagnosed UC, R/O CMV colitis  - CT abd showing pancolitis  - bloody diarrhea up to 10x daily, now improved to formed stool with two bowel movements   - labs showing hb stable with mild normocytic anemia.   - repeat GI PCR --> negative, C.diff --> negative x2, stool culture --> negative, inflammatory markers --> ESR 70,   - s/p colonoscopy on 12/14: Diffuse continuous ulceration, friability, erythema and abnormal vascularity with thick mucoid membrane with contact bleeding were noted in the rectum and sigmoid colon. 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 Deep cratered ulcers were noted suggestive of CMV colitis, biopsies were taken and sent separately. The PCF was advanced till 40-50 cm with continuous ulcerative mucosa so procedure was aborted.  - pre cid pathology with cryptitis and crypt abscess, pending CMV stain     recommendations:  - advance diet as tolerated   - continue Gancyclovir 5 mg/kg/dose every 12 hours for now - ID is following    - Solumedrol  20 mg q8hrs - will consider switching to PO steroids soon   - add PPI  - if worsening abdominal pain and distention get STAT KUB  - low threshold to start in house biologic therapy if no improvement on steroids (pre ttt work up is ordered)    will follow

## 2022-12-19 NOTE — PROGRESS NOTE ADULT - NEUROLOGICAL
normal/cranial nerves II-XII intact/sensation intact

## 2022-12-20 ENCOUNTER — TRANSCRIPTION ENCOUNTER (OUTPATIENT)
Age: 62
End: 2022-12-20

## 2022-12-20 VITALS
HEART RATE: 91 BPM | SYSTOLIC BLOOD PRESSURE: 152 MMHG | RESPIRATION RATE: 18 BRPM | TEMPERATURE: 98 F | DIASTOLIC BLOOD PRESSURE: 69 MMHG

## 2022-12-20 LAB
ALBUMIN SERPL ELPH-MCNC: 2.8 G/DL — LOW (ref 3.5–5.2)
ALP SERPL-CCNC: 52 U/L — SIGNIFICANT CHANGE UP (ref 30–115)
ALT FLD-CCNC: 26 U/L — SIGNIFICANT CHANGE UP (ref 0–41)
ANION GAP SERPL CALC-SCNC: 7 MMOL/L — SIGNIFICANT CHANGE UP (ref 7–14)
AST SERPL-CCNC: 17 U/L — SIGNIFICANT CHANGE UP (ref 0–41)
BAKER'S YEAST IGA QN IA: >170 UNITS — HIGH
BAKER'S YEAST IGA QN IA: POSITIVE
BAKER'S YEAST IGG QN IA: 62.1 UNITS — HIGH
BAKER'S YEAST IGG QN IA: POSITIVE
BASOPHILS # BLD AUTO: 0.05 K/UL — SIGNIFICANT CHANGE UP (ref 0–0.2)
BASOPHILS NFR BLD AUTO: 0.5 % — SIGNIFICANT CHANGE UP (ref 0–1)
BILIRUB SERPL-MCNC: 0.2 MG/DL — SIGNIFICANT CHANGE UP (ref 0.2–1.2)
BUN SERPL-MCNC: 11 MG/DL — SIGNIFICANT CHANGE UP (ref 10–20)
CALCIUM SERPL-MCNC: 8.1 MG/DL — LOW (ref 8.4–10.4)
CHLORIDE SERPL-SCNC: 97 MMOL/L — LOW (ref 98–110)
CO2 SERPL-SCNC: 29 MMOL/L — SIGNIFICANT CHANGE UP (ref 17–32)
CREAT SERPL-MCNC: 0.7 MG/DL — SIGNIFICANT CHANGE UP (ref 0.7–1.5)
EGFR: 105 ML/MIN/1.73M2 — SIGNIFICANT CHANGE UP
EOSINOPHIL # BLD AUTO: 0.01 K/UL — SIGNIFICANT CHANGE UP (ref 0–0.7)
EOSINOPHIL NFR BLD AUTO: 0.1 % — SIGNIFICANT CHANGE UP (ref 0–8)
GAMMA INTERFERON BACKGROUND BLD IA-ACNC: 0.01 IU/ML — SIGNIFICANT CHANGE UP
GLUCOSE SERPL-MCNC: 118 MG/DL — HIGH (ref 70–99)
HCT VFR BLD CALC: 33.1 % — LOW (ref 42–52)
HGB BLD-MCNC: 11 G/DL — LOW (ref 14–18)
IMM GRANULOCYTES NFR BLD AUTO: 2.6 % — HIGH (ref 0.1–0.3)
LYMPHOCYTES # BLD AUTO: 1.53 K/UL — SIGNIFICANT CHANGE UP (ref 1.2–3.4)
LYMPHOCYTES # BLD AUTO: 13.9 % — LOW (ref 20.5–51.1)
M TB IFN-G BLD-IMP: ABNORMAL
M TB IFN-G CD4+ BCKGRND COR BLD-ACNC: 0 IU/ML — SIGNIFICANT CHANGE UP
M TB IFN-G CD4+CD8+ BCKGRND COR BLD-ACNC: 0 IU/ML — SIGNIFICANT CHANGE UP
MAGNESIUM SERPL-MCNC: 2.1 MG/DL — SIGNIFICANT CHANGE UP (ref 1.8–2.4)
MCHC RBC-ENTMCNC: 30.1 PG — SIGNIFICANT CHANGE UP (ref 27–31)
MCHC RBC-ENTMCNC: 33.2 G/DL — SIGNIFICANT CHANGE UP (ref 32–37)
MCV RBC AUTO: 90.4 FL — SIGNIFICANT CHANGE UP (ref 80–94)
MONOCYTES # BLD AUTO: 0.56 K/UL — SIGNIFICANT CHANGE UP (ref 0.1–0.6)
MONOCYTES NFR BLD AUTO: 5.1 % — SIGNIFICANT CHANGE UP (ref 1.7–9.3)
NEUTROPHILS # BLD AUTO: 8.57 K/UL — HIGH (ref 1.4–6.5)
NEUTROPHILS NFR BLD AUTO: 77.8 % — HIGH (ref 42.2–75.2)
NRBC # BLD: 0 /100 WBCS — SIGNIFICANT CHANGE UP (ref 0–0)
PLATELET # BLD AUTO: 497 K/UL — HIGH (ref 130–400)
POTASSIUM SERPL-MCNC: 4.2 MMOL/L — SIGNIFICANT CHANGE UP (ref 3.5–5)
POTASSIUM SERPL-SCNC: 4.2 MMOL/L — SIGNIFICANT CHANGE UP (ref 3.5–5)
PROT SERPL-MCNC: 5.4 G/DL — LOW (ref 6–8)
QUANT TB PLUS MITOGEN MINUS NIL: 0.01 IU/ML — SIGNIFICANT CHANGE UP
RBC # BLD: 3.66 M/UL — LOW (ref 4.7–6.1)
RBC # FLD: 12.5 % — SIGNIFICANT CHANGE UP (ref 11.5–14.5)
SODIUM SERPL-SCNC: 133 MMOL/L — LOW (ref 135–146)
WBC # BLD: 11.01 K/UL — HIGH (ref 4.8–10.8)
WBC # FLD AUTO: 11.01 K/UL — HIGH (ref 4.8–10.8)

## 2022-12-20 PROCEDURE — 99233 SBSQ HOSP IP/OBS HIGH 50: CPT

## 2022-12-20 RX ORDER — ONDANSETRON 8 MG/1
1 TABLET, FILM COATED ORAL
Qty: 180 | Refills: 0
Start: 2022-12-20 | End: 2023-02-17

## 2022-12-20 RX ORDER — PANTOPRAZOLE SODIUM 20 MG/1
1 TABLET, DELAYED RELEASE ORAL
Qty: 60 | Refills: 0
Start: 2022-12-20 | End: 2023-02-17

## 2022-12-20 RX ADMIN — ENOXAPARIN SODIUM 40 MILLIGRAM(S): 100 INJECTION SUBCUTANEOUS at 11:10

## 2022-12-20 RX ADMIN — SODIUM CHLORIDE 100 MILLILITER(S): 9 INJECTION, SOLUTION INTRAVENOUS at 08:30

## 2022-12-20 RX ADMIN — GANCICLOVIR SODIUM 100 MILLIGRAM(S): 50 INJECTION, POWDER, LYOPHILIZED, FOR SOLUTION INTRAVENTRICULAR at 05:06

## 2022-12-20 RX ADMIN — Medication 20 MILLIGRAM(S): at 13:57

## 2022-12-20 RX ADMIN — ONDANSETRON 4 MILLIGRAM(S): 8 TABLET, FILM COATED ORAL at 13:58

## 2022-12-20 RX ADMIN — Medication 20 MILLIGRAM(S): at 05:06

## 2022-12-20 RX ADMIN — Medication 5 MILLIGRAM(S): at 05:06

## 2022-12-20 RX ADMIN — PANTOPRAZOLE SODIUM 40 MILLIGRAM(S): 20 TABLET, DELAYED RELEASE ORAL at 05:06

## 2022-12-20 NOTE — PROGRESS NOTE ADULT - SUBJECTIVE AND OBJECTIVE BOX
BEATRIZ DRUMMOND 62y Male  MRN#: [id number]   Hospital Day: 10d    HPI:  61 y/o M presenting to ED for persistent diarrhea.     PMHx: HTN   Family Hx: Colon cancer in father at age 70 & Crohn's disease in his sister.   Social Hx: Denies smoking or drinking    HPI starts from 12/3 when patient was first admitted after returning from a trip to [place] on 11/14 for bloody diarrhea. Patient was found to have pancolitis on CTAP & GI PCR was positive for EPEC. He was dc'd home to complete x7days of Cipro/Flagyl (as per ID). Was also seen by GI that recommended OP Colonoscopy.     4 days after discharge, patient returns to ED due to unresolved symptoms.     Pt had a colonoscopy done with Dr. Drummond 5 yrs ago that was normal as per pt.     Denies fever, chest pain, N/V.   ED:   - VS: Unremarkable  - Labs: WBC 12, Na 134,   - CTAP: Diffuse colonic wall thickening, compatible with pancolitis, without significant change since CT abdomen pelvis 12/2/2022.  Admit to Medicine     (10 Dec 2022 19:48)    SUBJECTIVE  Patient is a 62y old Male who presents with a chief complaint of Pancolitis (20 Dec 2022 11:19)  Currently admitted to medicine with the primary diagnosis of Pancolitis    INTERVAL HPI AND OVERNIGHT EVENTS:  Patient was examined and seen at bedside. This morning he is resting comfortably in bed and reports no issues or overnight events.    OBJECTIVE  PAST MEDICAL & SURGICAL HISTORY  HTN (hypertension)  No significant past surgical history    ALLERGIES:  penicillin (Rash)    MEDICATIONS:  STANDING MEDICATIONS  enoxaparin Injectable 40 milliGRAM(s) SubCutaneous every 24 hours  ganciclovir IVPB 480 milliGRAM(s) IV Intermittent every 12 hours  lactated ringers. 1000 milliLiter(s) IV Continuous <Continuous>  methylPREDNISolone sodium succinate Injectable 20 milliGRAM(s) IV Push three times a day  pantoprazole    Tablet 40 milliGRAM(s) Oral before breakfast    PRN MEDICATIONS  metoclopramide Injectable 5 milliGRAM(s) IV Push three times a day PRN  ondansetron Injectable 4 milliGRAM(s) IV Push every 6 hours PRN      VITAL SIGNS: Last 24 Hours  T(C): 36 (20 Dec 2022 08:00), Max: 36.2 (19 Dec 2022 17:15)  T(F): 96.8 (20 Dec 2022 08:00), Max: 97.1 (19 Dec 2022 17:15)  HR: 57 (20 Dec 2022 08:00) (54 - 60)  BP: 136/63 (20 Dec 2022 08:00) (132/60 - 136/63)  BP(mean): --  RR: 18 (20 Dec 2022 08:00) (18 - 18)  SpO2: 98% (20 Dec 2022 08:00) (97% - 98%)    LABS:                        11.0   11.01 )-----------( 497      ( 20 Dec 2022 06:46 )             33.1     12-20    133<L>  |  97<L>  |  11  ----------------------------<  118<H>  4.2   |  29  |  0.7    Ca    8.1<L>      20 Dec 2022 06:46  Mg     2.1     12-20    TPro  5.4<L>  /  Alb  2.8<L>  /  TBili  0.2  /  DBili  x   /  AST  17  /  ALT  26  /  AlkPhos  52  12-20    PHYSICAL EXAM:  General: well-appearing in NAD.   HEENT: NCAT  LUNGS: CTAB  HEART: RRR.   ABDOMEN: ; Soft, nondistended.   EXT: Nonedematous.

## 2022-12-20 NOTE — DISCHARGE NOTE NURSING/CASE MANAGEMENT/SOCIAL WORK - NSDCPEFALRISK_GEN_ALL_CORE
For information on Fall & Injury Prevention, visit: [email or web address] OR  [email or web address] OR  https://www.cdc.gov/steadi/patient.html

## 2022-12-20 NOTE — PROGRESS NOTE ADULT - ATTENDING COMMENTS
I edited the note
Colonoscopy findings concerning for UC though path showing active colitis possibly co-existing post infectious source vs early manifestation of IBD.   Clinically improving on solumedrol and gancyclovir. Will await final pathology results. Transition to po prednisone and taper based on symptoms.
I edited the note.
I edited the note
I edited the note.
Pt clinically improving, can change to prednisone 40mg daily and taper by 5mg weekly based on symptoms. Pt will resume GI follow up with Dr. Drummond.

## 2022-12-20 NOTE — DISCHARGE NOTE NURSING/CASE MANAGEMENT/SOCIAL WORK - PATIENT PORTAL LINK FT
You can access the FollowMyHealth Patient Portal offered by [facility] by registering at the following website: [email or web address]. By joining [facility]’s FollowMyHealth portal, you will also be able to view your health information using other applications (apps) compatible with our system.
37.8

## 2022-12-20 NOTE — PROGRESS NOTE ADULT - ASSESSMENT
61 y/o M with hx of HTN presents to ED for persistent bloody diarrhea.     #Sepsis, POA  #Chronic diarrhea  #Pancolitis  #Recent EPEC infection, GI PCR +  #r/o IBD  - CT A/P shows diffuse colonic wall thickening, compatible with pancolitis, without significant change since CT abdomen pelvis 12/2/2022.  - C. diff -ve  - Gi PCR + for EPEC s/p Flagyl&Cipro x 7 days  - repeated GI pcr negative  - s/p IV cefepime + flagyl, and Azithromycin  - ESR: 70 increased from 49 on 12/03/2022  - CRP 80 was 144 on 12/03/2022  - GI recommends to discontinue ARB and can switch to another antihypertensive with no enteropathy as side effect (can do ACE inh)  - Colonoscopy: Diffuse continuous ulceration, friability, erythema and abnormal vascularity with thick mucoid membrane with contact bleeding were noted in the rectum and sigmoid colon. 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 Deep cratered ulcers were noted suggestive of CMV colitis, biopsies were taken and sent separately.   - c/w Gancyclovir 5 mg/kg/dose every 12 hours awaiting pathology cmv stain for 5 days then switch to 5mg/kg Q24    - ASCA, ANCA pending  - Biopsy most likely ulcerative colitis, will start solumedrol IV 20mg Q8 --> increase to 40mg Q8 ---> decrease dose to 20mg Q8  - Pending biopsy stains for CMV  - Serum CMV PCR negative`  - Improvement of diarrhea, 1 BM since yesterday  - if abdominal distension, order KUB     #HTN  - D/C losartan as per GI recommendations    #Misc  DVT Ppx: lovenox  GI ppx: protonix  Diet: DASH  Activity: AAT  Dispo: Home pending GI eval; currently acute

## 2022-12-20 NOTE — DISCHARGE NOTE PROVIDER - HOSPITAL COURSE
No 61 y/o M presenting to ED for persistent diarrhea. HPI starts from 12/3 when patient was first admitted after returning from a trip to [place] on 11/14 for bloody diarrhea. Patient was found to have pancolitis on CTAP & GI PCR was positive for EPEC. He was dc'd home to complete x7days of Cipro/Flagyl (as per ID). Was also seen by GI that recommended OP Colonoscopy. 4 days after discharge, patient returns to ED due to unresolved symptoms. Pt had a colonoscopy done with Dr. Drummond 5 yrs ago that was normal as per pt.   Denies fever, chest pain, N/V.     In ED, VS: Unremarkable, Labs: WBC 12, Na 134, CTAP: Diffuse colonic wall thickening, compatible with pancolitis, without significant change since CT abdomen pelvis 12/2/2022.    Admit to Medicine for further management, colonoscopy was done it showed Diffuse continuous ulceration, friability, erythema and abnormal vascularity with thick mucoid membrane with contact bleeding were noted in the rectum and sigmoid colon. 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 Deep cratered ulcers were noted suggestive of CMV colitis, biopsies were taken and sent separately. Prelim results of biopsy were in favor of ulcerative colitis.         63 y/o M presenting to ED for persistent diarrhea. HPI starts from 12/3 when patient was first admitted after returning from a trip to [place] on 11/14 for bloody diarrhea. Patient was found to have pancolitis on CTAP & GI PCR was positive for EPEC. He was dc'd home to complete x7days of Cipro/Flagyl (as per ID). Was also seen by GI that recommended OP Colonoscopy. 4 days after discharge, patient returns to ED due to unresolved symptoms. Pt had a colonoscopy done with Dr. Drummond 5 yrs ago that was normal as per pt.   Denies fever, chest pain, N/V.     In ED, VS: Unremarkable, Labs: WBC 12, Na 134, CTAP: Diffuse colonic wall thickening, compatible with pancolitis, without significant change since CT abdomen pelvis 12/2/2022.    Admit to Medicine for further management, colonoscopy was done it showed Diffuse continuous ulceration, friability, erythema and abnormal vascularity with thick mucoid membrane with contact bleeding were noted in the rectum and sigmoid colon. 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 Deep cratered ulcers were noted suggestive of CMV colitis, biopsies were taken and sent separately. Prelim results of biopsy were in favor of ulcerative colitis. biopsy was sent for CMV stain to rule out cmv colitis. Patient was started on solumedrol IV and will continue PO prednisone on discharged. He also took a course of Ganciclovir. Diarrhea and abdominal pain improved significantly on steroids. Patient will f/u with GI as outpatient for further management.    Patient seen and examined at the bedside today. Patient clinically stable at this time. No longer requires acute in hospital level of care. Can be continually followed/managed as an outpatient. Please see today's progress note for further information including today's vitals and physical exam.

## 2022-12-20 NOTE — DISCHARGE NOTE PROVIDER - NSDCMRMEDTOKEN_GEN_ALL_CORE_FT
olmesartan 5 mg oral tablet: 1 tab(s) orally once a day   olmesartan 5 mg oral tablet: 1 tab(s) orally once a day  ondansetron 4 mg oral tablet: 1 tab(s) orally every 8 hours   predniSONE 10 mg oral tablet: 3.5 tab(s) orally once a day  for week 2  predniSONE 10 mg oral tablet: 3 tab(s) orally once a day week 3  predniSONE 10 mg oral tablet: 2.5 tab(s) orally once a day week 4  predniSONE 10 mg oral tablet: 2 tab(s) orally once a day week 5  predniSONE 20 mg oral tablet: 40 mg ( 2x 20mg) once a day for  week 1. Then decrease the dose by 5 mg each week.   predniSONE 5 mg oral tablet: 3 tab(s) orally once a day week 6  predniSONE 5 mg oral tablet: 2 tab(s) orally once a day week 7  predniSONE 5 mg oral tablet: 1 tab(s) orally once a day week 8  Protonix 40 mg oral delayed release tablet: 1 tab(s) orally once a day (before a meal)

## 2022-12-20 NOTE — PROGRESS NOTE ADULT - PROVIDER SPECIALTY LIST ADULT
Gastroenterology
Gastroenterology
Hospitalist
Internal Medicine
Gastroenterology
Gastroenterology
Hospitalist
Hospitalist
Internal Medicine
Internal Medicine
Gastroenterology
Gastroenterology
Hospitalist
Internal Medicine
Gastroenterology
Internal Medicine
Hospitalist
Hospitalist
Infectious Disease

## 2022-12-20 NOTE — PROGRESS NOTE ADULT - SUBJECTIVE AND OBJECTIVE BOX
Gastroenterology progress note:     Patient is a 62y old  Male who presents with a chief complaint of Pancolitis (20 Dec 2022 12:56)       Admitted on: 12-10-22    We are following the patient for: colitis        Interval History:    No acute events overnight.   minimal pain  1 bm  no blood       PAST MEDICAL & SURGICAL HISTORY:  HTN (hypertension)      No significant past surgical history          MEDICATIONS  (STANDING):  enoxaparin Injectable 40 milliGRAM(s) SubCutaneous every 24 hours  ganciclovir IVPB 480 milliGRAM(s) IV Intermittent every 12 hours  lactated ringers. 1000 milliLiter(s) (100 mL/Hr) IV Continuous <Continuous>  methylPREDNISolone sodium succinate Injectable 20 milliGRAM(s) IV Push three times a day  pantoprazole    Tablet 40 milliGRAM(s) Oral before breakfast    MEDICATIONS  (PRN):  metoclopramide Injectable 5 milliGRAM(s) IV Push three times a day PRN nausea  ondansetron Injectable 4 milliGRAM(s) IV Push every 6 hours PRN Nausea and/or Vomiting      Allergies  penicillin (Rash)      Review of Systems:   Cardiovascular:  No Chest Pain, No Palpitations  Respiratory:  No Cough, No Dyspnea  Gastrointestinal:  As described in HPI  Skin:  No Skin Lesions, No Jaundice  Neuro:  No Syncope, No Dizziness    Physical Examination:  T(C): 36 (12-20-22 @ 08:00), Max: 36.2 (12-19-22 @ 17:15)  HR: 57 (12-20-22 @ 08:00) (54 - 60)  BP: 136/63 (12-20-22 @ 08:00) (132/60 - 136/63)  RR: 18 (12-20-22 @ 08:00) (18 - 18)  SpO2: 98% (12-20-22 @ 08:00) (97% - 98%)      12-19-22 @ 07:01  -  12-20-22 @ 07:00  --------------------------------------------------------  IN: 1100 mL / OUT: 0 mL / NET: 1100 mL        GENERAL: AAOx3, no acute distress.  HEAD:  Atraumatic, Normocephalic  EYES: conjunctiva and sclera clear  NECK: Supple, no JVD or thyromegaly  CHEST/LUNG: Clear to auscultation bilaterally; No wheeze, rhonchi, or rales  HEART: Regular rate and rhythm; normal S1, S2, No murmurs.  ABDOMEN: Soft, nontender, nondistended; Bowel sounds present  NEUROLOGY: No asterixis or tremor.   SKIN: Intact, no jaundice     Data:                        11.0   11.01 )-----------( 497      ( 20 Dec 2022 06:46 )             33.1     Hgb trend:  11.0  12-20-22 @ 06:46  10.7  12-19-22 @ 06:42  10.7  12-18-22 @ 09:57        12-20    133<L>  |  97<L>  |  11  ----------------------------<  118<H>  4.2   |  29  |  0.7    Ca    8.1<L>      20 Dec 2022 06:46  Mg     2.1     12-20    TPro  5.4<L>  /  Alb  2.8<L>  /  TBili  0.2  /  DBili  x   /  AST  17  /  ALT  26  /  AlkPhos  52  12-20    Liver panel trend:  TBili 0.2   /   AST 17   /   ALT 26   /   AlkP 52   /   Tptn 5.4   /   Alb 2.8    /   DBili --      12-20  TBili <0.2   /   AST 16   /   ALT 18   /   AlkP 45   /   Tptn 5.3   /   Alb 3.0    /   DBili --      12-19  TBili <0.2   /   AST 19   /   ALT 17   /   AlkP 46   /   Tptn 5.2   /   Alb 2.9    /   DBili --      12-18  TBili <0.2   /   AST 17   /   ALT 11   /   AlkP 37   /   Tptn 5.1   /   Alb 2.8    /   DBili --      12-17  TBili <0.2   /   AST 7   /   ALT 7   /   AlkP 34   /   Tptn 4.8   /   Alb 2.6    /   DBili --      12-16  TBili 0.2   /   AST 10   /   ALT 8   /   AlkP 36   /   Tptn 4.7   /   Alb 2.5    /   DBili --      12-15  TBili 0.3   /   AST 15   /   ALT 9   /   AlkP 40   /   Tptn 5.1   /   Alb 2.9    /   DBili --      12-13  TBili 0.3   /   AST 17   /   ALT 19   /   AlkP 48   /   Tptn 5.9   /   Alb 3.2    /   DBili --      12-10             Radiology:

## 2022-12-20 NOTE — PROGRESS NOTE ADULT - ASSESSMENT
HPI:  61 y/o M presenting to ED for persistent diarrhea after being treated for traveler's diarrhea found to cratered ulcers on colonoscopy     #Diarrhea secondary to traveler's diarrhea secondary to epec complicated by deep cratered ulcers suspicious for cmv colitis versus ulcerative colitis   gancyclovir   cw methylprednisolon   cmv pcr negative   awaiting final biopsy results   gi and id - for final recommendations -> need for gancyclovir?     #Overweight BMI 28 patient needs to see dieitian outpatient for further evaluation     #heaving during bowel movement -reglan prn and zofran prn    PROGRESS NOTE HANDOFF    Pending:  final biopsy results , dc plan , likely within 24 hours     Family discussion: patient verbalized understanding and agreeable to plan of care     Disposition: Home

## 2022-12-20 NOTE — DISCHARGE NOTE PROVIDER - NSDCCPCAREPLAN_GEN_ALL_CORE_FT
PRINCIPAL DISCHARGE DIAGNOSIS  Diagnosis: Ulcerative colitis  Assessment and Plan of Treatment: Ulcerative colitis is a chronic disease of the colon (large intestine). Inflammation and ulcers form on the inner lining of your colon. Ulcerative colitis is a type of inflammatory bowel disease. It usually starts between the ages of 15 and 30 years old.  What are the signs and symptoms of ulcerative colitis?  -Diarrhea that may have mucus or blood  -Bleeding from your rectum  -Urgent bowel movements  -Abdominal tenderness and bloating  -Fever, poor appetite, weight loss, fatigue  How is ulcerative colitis diagnosed?  A rectal exam may show blood. Your healthcare provider will put a gloved finger inside your rectum. He or she will feel for inflammation or blockage.  A CT or MRI may show a blockage, an abscess, inflammation, or abnormal connection. You may be given contrast liquid to help your colon show up better in the pictures. Tell the healthcare provider if you have ever had an allergic reaction to contrast liquid. Do not enter the MRI room with anything metal. Metal can cause serious injury. Tell the healthcare provider if you have any metal in or on your body.  Endoscopy is a procedure used to find the cause of your ulcerative colitis. An endoscope is a bendable tube with a light and camera on the end. A small sample of tissue and bowel movement may be removed. These are sent to a lab for testing.  How is ulcerative colitis treated?  Medicines may be given to help decrease inflammation or control your immune system. You may need to take more than 1 medicine to treat your ulcerative colitis.  Surgery may be needed to remove part or all of your colon. Ask about the different kinds of surgery that can be done to help your symptoms.  Call, or have someone call, your local emergency number (284 in the [place]) if:  You have sudden trouble breathing.You have a fast heart rate, fast breathing, or are too dizzy to stand up.

## 2022-12-20 NOTE — PROGRESS NOTE ADULT - ASSESSMENT
62 year old male pt w PMH of HTN presenting for nonresolving bloody diarrhea with CT abdomen showing pancolitis and GI PCR 10 days ago showing EPEC s/p cipro/flagyl with no improvement    # Bloody Diarrhea  and pancolitis most likely 2/2 newly diagnosed UC, R/O CMV colitis  - CT abd showing pancolitis  - bloody diarrhea up to 10x daily, now improved to formed stool with two bowel movements   - labs showing hb stable with mild normocytic anemia.   - repeat GI PCR --> negative, C.diff --> negative x2, stool culture --> negative, inflammatory markers --> ESR 70,   - s/p colonoscopy on 12/14: Diffuse continuous ulceration, friability, erythema and abnormal vascularity with thick mucoid membrane with contact bleeding were noted in the rectum and sigmoid colon. 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 Deep cratered ulcers were noted suggestive of CMV colitis, biopsies were taken and sent separately. The PCF was advanced till 40-50 cm with continuous ulcerative mucosa so procedure was aborted.  - pre cid pathology with cryptitis and crypt abscess, pending CMV stain   - CMV PCR, IgM, IgG are negative    recommendations:  - discussed with ID, d/c ganciclovir   - switch to PO prednisone 40 mg po daily, taper by 5 mg every week then stop  - patient wants to follow up with Dr. Drummond (primary GI) as outpatient - already has an appointment   - PPI daily    62 year old male pt w PMH of HTN presenting for nonresolving bloody diarrhea with CT abdomen showing pancolitis and GI PCR 10 days ago showing EPEC s/p cipro/flagyl with no improvement    # Bloody Diarrhea  and pancolitis most likely 2/2 newly diagnosed UC, R/O CMV colitis  - CT abd showing pancolitis  - bloody diarrhea up to 10x daily, now improved to formed stool with two bowel movements   - labs showing hb stable with mild normocytic anemia.   - repeat GI PCR --> negative, C.diff --> negative x2, stool culture --> negative, inflammatory markers --> ESR 70,   - s/p colonoscopy on 12/14: Diffuse continuous ulceration, friability, erythema and abnormal vascularity with thick mucoid membrane with contact bleeding were noted in the rectum and sigmoid colon. 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 Deep cratered ulcers were noted suggestive of CMV colitis, biopsies were taken and sent separately. The PCF was advanced till 40-50 cm with continuous ulcerative mucosa so procedure was aborted.  - pre cid pathology with cryptitis and crypt abscess, CMV stain is negative   - CMV PCR, IgM, IgG are negative    recommendations:  - discussed with ID, d/c ganciclovir   - switch to PO prednisone 40 mg po daily, taper by 5 mg every week then stop  - patient wants to follow up with Dr. Drummond (primary GI) as outpatient - already has an appointment   - PPI daily    62 year old male pt w PMH of HTN presenting for nonresolving bloody diarrhea with CT abdomen showing pancolitis and GI PCR 10 days ago showing EPEC s/p cipro/flagyl with no improvement    # Bloody Diarrhea  and pancolitis most likely 2/2 newly diagnosed UC, R/O CMV colitis  - CT abd showing pancolitis  - bloody diarrhea up to 10x daily, now improved to formed stool with two bowel movements   - labs showing hb stable with mild normocytic anemia.   - repeat GI PCR --> negative, C.diff --> negative x2, stool culture --> negative, inflammatory markers --> ESR 70,   - s/p colonoscopy on 12/14: Diffuse continuous ulceration, friability, erythema and abnormal vascularity with thick mucoid membrane with contact bleeding were noted in the rectum and sigmoid colon. 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 Deep cratered ulcers were noted suggestive of CMV colitis, biopsies were taken and sent separately. The PCF was advanced till 40-50 cm with continuous ulcerative mucosa so procedure was aborted.  - pre cid pathology with cryptitis and crypt abscess, CMV stain is negative   - CMV PCR, IgM, IgG are negative    recommendations:  - discussed with ID, d/c ganciclovir   - switch to PO prednisone 40 mg po daily, taper by 5 mg every week then stop based on continued symptom improvement  - patient wants to follow up with Dr. Drummond (primary GI) as outpatient - already has an appointment   - PPI daily

## 2022-12-20 NOTE — DISCHARGE NOTE PROVIDER - CARE PROVIDER_API CALL
Dara Acosta  Internal Medicine  [address] [place] [address]  Phone: [phone]  Fax: [phone])-  Follow Up Time: 1 week

## 2022-12-20 NOTE — PROGRESS NOTE ADULT - SUBJECTIVE AND OBJECTIVE BOX
HODA BEATRIZ  62y  [id number]      Patient is a 62y old  Male who presents with a chief complaint of Pancolitis (19 Dec 2022 20:35)      INTERVAL HPI/OVERNIGHT EVENTS:    no acute events overnight , stool is formed     REVIEW OF SYSTEMS:  CONSTITUTIONAL: No fever, weight loss, or fatigue  EYES: No eye pain, visual disturbances, or discharge  ENMT:  No difficulty hearing, tinnitus, vertigo; No sinus or throat pain  NECK: No pain or stiffness  BREASTS: No pain, masses, or nipple discharge  RESPIRATORY: No cough, wheezing, chills or hemoptysis; No shortness of breath  CARDIOVASCULAR: No chest pain, palpitations, dizziness, or leg swelling  GASTROINTESTINAL: Episodes of heaving and nausea   GENITOURINARY: No dysuria, frequency, hematuria, or incontinence  NEUROLOGICAL: No headaches, memory loss, loss of strength, numbness, or tremors  SKIN: No itching, burning, rashes, or lesions   LYMPH NODES: No enlarged glands  ENDOCRINE: No heat or cold intolerance; No hair loss  MUSCULOSKELETAL: No joint pain or swelling; No muscle, back, or extremity pain  PSYCHIATRIC: No depression, anxiety, mood swings, or difficulty sleeping  HEME/LYMPH: No easy bruising, or bleeding gums  ALLERY AND IMMUNOLOGIC: No hives or eczema  FAMILY HISTORY:    T(C): 36 (12-20-22 @ 08:00), Max: 36.2 (12-19-22 @ 17:15)  HR: 57 (12-20-22 @ 08:00) (54 - 60)  BP: 136/63 (12-20-22 @ 08:00) (132/60 - 136/63)  RR: 18 (12-20-22 @ 08:00) (18 - 18)  SpO2: 98% (12-20-22 @ 08:00) (97% - 98%)  Wt(kg): --Vital Signs Last 24 Hrs  T(C): 36 (20 Dec 2022 08:00), Max: 36.2 (19 Dec 2022 17:15)  T(F): 96.8 (20 Dec 2022 08:00), Max: 97.1 (19 Dec 2022 17:15)  HR: 57 (20 Dec 2022 08:00) (54 - 60)  BP: 136/63 (20 Dec 2022 08:00) (132/60 - 136/63)  BP(mean): --  RR: 18 (20 Dec 2022 08:00) (18 - 18)  SpO2: 98% (20 Dec 2022 08:00) (97% - 98%)    Parameters below as of 20 Dec 2022 08:00  Patient On (Oxygen Delivery Method): room air        PHYSICAL EXAM:  GENERAL: NAD, well-groomed, well-developed  HEAD:  Atraumatic, Normocephalic  EYES: EOMI, PERRLA, conjunctiva and sclera clear  ENMT: No tonsillar erythema, exudates, or enlargement; Moist mucous membranes, Good dentition, No lesions  NECK: Supple, No JVD, Normal thyroid  NERVOUS SYSTEM:  Alert & Oriented X3, Good concentration; Motor Strength 5/5 B/L upper and lower extremities; DTRs 2+ intact and symmetric  PULM: Clear to auscultation bilaterally  CARDIAC: Regular rate and rhythm; No murmurs, rubs, or gallops  GI: Soft, Nontender, Nondistended; Bowel sounds present  EXTREMITIES:  2+ Peripheral Pulses, No clubbing, cyanosis, or edema  LYMPH: No lymphadenopathy noted  SKIN: No rashes or lesions    Consultant(s) Notes Reviewed:  [x ] YES  [ ] NO  Care Discussed with Consultants/Other Providers [ x] YES  [ ] NO    LABS:                            11.0   11.01 )-----------( 497      ( 20 Dec 2022 06:46 )             33.1   12-20    133<L>  |  97<L>  |  11  ----------------------------<  118<H>  4.2   |  29  |  0.7    Ca    8.1<L>      20 Dec 2022 06:46  Mg     2.1     12-20    TPro  5.4<L>  /  Alb  2.8<L>  /  TBili  0.2  /  DBili  x   /  AST  17  /  ALT  26  /  AlkPhos  52  12-20            enoxaparin Injectable 40 milliGRAM(s) SubCutaneous every 24 hours  ganciclovir IVPB 480 milliGRAM(s) IV Intermittent every 12 hours  lactated ringers. 1000 milliLiter(s) IV Continuous <Continuous>  methylPREDNISolone sodium succinate Injectable 20 milliGRAM(s) IV Push three times a day  metoclopramide Injectable 5 milliGRAM(s) IV Push three times a day PRN  ondansetron Injectable 4 milliGRAM(s) IV Push every 6 hours PRN  pantoprazole    Tablet 40 milliGRAM(s) Oral before breakfast      HEALTH ISSUES - PROBLEM Dx:          Case Discussed with House Staff   Spectra [phone]

## 2022-12-20 NOTE — PROGRESS NOTE ADULT - TIME BILLING
Counseled patient about diagnostic testing and treatment plan. All questions answered. Abnormal lab/radiographical/microbiological data reviewed.
Counseled patient about diagnostic testing and treatment plan. All questions answered. Abnormal lab/radiographical/microbiological data reviewed.
Direct patient bri. Discussed on rounds with Housestaff. Discussed with GI fellow
Direct patient care. Discussed on rounds with Housestaff
Coordination of care
Counseled patient about diagnostic testing and treatment plan. All questions answered. Abnormal lab/radiographical/microbiological data reviewed.
Coordination of care
coordination of care

## 2022-12-20 NOTE — PROGRESS NOTE ADULT - REASON FOR ADMISSION
Pancolitis

## 2022-12-21 LAB
GAMMA INTERFERON BACKGROUND BLD IA-ACNC: 0.02 IU/ML — SIGNIFICANT CHANGE UP
M TB IFN-G BLD-IMP: ABNORMAL
M TB IFN-G CD4+ BCKGRND COR BLD-ACNC: 0 IU/ML — SIGNIFICANT CHANGE UP
M TB IFN-G CD4+CD8+ BCKGRND COR BLD-ACNC: 0 IU/ML — SIGNIFICANT CHANGE UP
QUANT TB PLUS MITOGEN MINUS NIL: 0 IU/ML — SIGNIFICANT CHANGE UP

## 2022-12-21 NOTE — CDI QUERY NOTE - NSCDIOTHERTXTBX_GEN_ALL_CORE_HH
CLINICAL INDICATORS  12/10 H&P Adult: …  Attestation Statements: … Sepsis likely 2/2 acute Pancolitis with bloody diarrhea    12/11 Progress Note Adult-Hospitalist Attending: … Sepsis, POA    12/12-12/20 Progress Note Adult-Internal Medicine Resident: … Sepsis, POA … s/p IV cefepime + flagyl, and Azithromycin- ESR: 70 increased from 49 on 12/03/2022- CRP 80 was 144 on 12/03/2022 12/13 Progress Note Adult-Hospitalist Attending: … Pancolitis, no evidence of sepsis    12/19 Clinically resolving pancolitis which in all probability is not infectious in nature    12/19 Progress Note Adult-Infectious Disease Attending: … 12/14 Colonoscopy : possible CMV/IBD. 12/14 Bx : cryptitis with crypt abscesses. Special stains for CMV … RECOMMENDATIONS; F/u colon Bx : stains for CMV. Ganciclovir at 5 mg/kg iv q12h for 5 days and then 5 mg /kg iv q24h    12/20 Progress Note Adult-Gastroenterology Fellow/Attending: … Bloody Diarrhea  and pancolitis most likely 2/2 newly diagnosed UC, R/O CMV colitis … pre cid pathology with cryptitis and crypt abscess    12/20 Discharge Note Provider: ... PRINCIPAL DISCHARGE DIAGNOSIS: Ulcerative colitis    Lab findings: 12/10 WBC-12.22    Nursing VS flowsheet: 12/10 HR-102    Per MAR: Flagyl IVPB. Indication: . Administered 12/10-12/12                 Cipro IVPB. Indication: . Administered 12/10-12/11                 Zithromax IVPB. Indication: diarrhea. Administered 12/12                 Ganciclovir IVPB. Indication: colitis. Administered 12/14-12/20    Based on your professional judgment and the clinical indicators, please clarify if the continued documentation of sepsis by Internal Medicine Resident can be further specified as:   • Sepsis on admission was evaluated and ruled in   • Sepsis on admission was evaluated and ruled out    • Sepsis on admission was evaluated and unable to be ruled out    • Other (please specify):   • Clinically unable to determine     Thank you,  Alvina Cartagena RN [facility]  [phone]

## 2022-12-22 LAB
DIAGNOSTIC IMP SPEC-IMP: SIGNIFICANT CHANGE UP
LABORATORY COMMENT REPORT: SIGNIFICANT CHANGE UP
TPMT GENE MUT ANL BLD/T: SIGNIFICANT CHANGE UP
TPMT GENETICS TEST: SIGNIFICANT CHANGE UP

## 2023-01-03 ENCOUNTER — INPATIENT (INPATIENT)
Facility: HOSPITAL | Age: 63
LOS: 11 days | Discharge: HOME | End: 2023-01-15
Attending: STUDENT IN AN ORGANIZED HEALTH CARE EDUCATION/TRAINING PROGRAM | Admitting: STUDENT IN AN ORGANIZED HEALTH CARE EDUCATION/TRAINING PROGRAM
Payer: COMMERCIAL

## 2023-01-03 VITALS
TEMPERATURE: 96 F | RESPIRATION RATE: 20 BRPM | SYSTOLIC BLOOD PRESSURE: 124 MMHG | OXYGEN SATURATION: 98 % | HEIGHT: 72 IN | DIASTOLIC BLOOD PRESSURE: 82 MMHG | HEART RATE: 113 BPM

## 2023-01-03 DIAGNOSIS — E86.0 DEHYDRATION: ICD-10-CM

## 2023-01-03 DIAGNOSIS — K51.90 ULCERATIVE COLITIS, UNSPECIFIED, WITHOUT COMPLICATIONS: ICD-10-CM

## 2023-01-03 DIAGNOSIS — E43 UNSPECIFIED SEVERE PROTEIN-CALORIE MALNUTRITION: ICD-10-CM

## 2023-01-03 DIAGNOSIS — U07.1 COVID-19: ICD-10-CM

## 2023-01-03 DIAGNOSIS — Z88.0 ALLERGY STATUS TO PENICILLIN: ICD-10-CM

## 2023-01-03 DIAGNOSIS — I10 ESSENTIAL (PRIMARY) HYPERTENSION: ICD-10-CM

## 2023-01-03 DIAGNOSIS — E87.5 HYPERKALEMIA: ICD-10-CM

## 2023-01-03 DIAGNOSIS — E87.1 HYPO-OSMOLALITY AND HYPONATREMIA: ICD-10-CM

## 2023-01-03 DIAGNOSIS — D64.9 ANEMIA, UNSPECIFIED: ICD-10-CM

## 2023-01-03 DIAGNOSIS — N17.9 ACUTE KIDNEY FAILURE, UNSPECIFIED: ICD-10-CM

## 2023-01-03 LAB
ALBUMIN SERPL ELPH-MCNC: 2.9 G/DL — LOW (ref 3.5–5.2)
ALP SERPL-CCNC: 85 U/L — SIGNIFICANT CHANGE UP (ref 30–115)
ALT FLD-CCNC: 7 U/L — SIGNIFICANT CHANGE UP (ref 0–41)
ANION GAP SERPL CALC-SCNC: 10 MMOL/L — SIGNIFICANT CHANGE UP (ref 7–14)
APTT BLD: 21.3 SEC — CRITICAL LOW (ref 27–39.2)
AST SERPL-CCNC: 13 U/L — SIGNIFICANT CHANGE UP (ref 0–41)
BASE EXCESS BLDV CALC-SCNC: 4.5 MMOL/L — HIGH (ref -2–3)
BASOPHILS # BLD AUTO: 0.09 K/UL — SIGNIFICANT CHANGE UP (ref 0–0.2)
BASOPHILS NFR BLD AUTO: 0.8 % — SIGNIFICANT CHANGE UP (ref 0–1)
BILIRUB SERPL-MCNC: 0.2 MG/DL — SIGNIFICANT CHANGE UP (ref 0.2–1.2)
BUN SERPL-MCNC: 26 MG/DL — HIGH (ref 10–20)
CALCIUM SERPL-MCNC: 8.4 MG/DL — SIGNIFICANT CHANGE UP (ref 8.4–10.4)
CHLORIDE SERPL-SCNC: 90 MMOL/L — LOW (ref 98–110)
CO2 SERPL-SCNC: 28 MMOL/L — SIGNIFICANT CHANGE UP (ref 17–32)
CREAT SERPL-MCNC: 1 MG/DL — SIGNIFICANT CHANGE UP (ref 0.7–1.5)
EGFR: 86 ML/MIN/1.73M2 — SIGNIFICANT CHANGE UP
EOSINOPHIL # BLD AUTO: 0.01 K/UL — SIGNIFICANT CHANGE UP (ref 0–0.7)
EOSINOPHIL NFR BLD AUTO: 0.1 % — SIGNIFICANT CHANGE UP (ref 0–8)
GLUCOSE BLDC GLUCOMTR-MCNC: 115 MG/DL — HIGH (ref 70–99)
GLUCOSE BLDC GLUCOMTR-MCNC: 171 MG/DL — HIGH (ref 70–99)
GLUCOSE SERPL-MCNC: 145 MG/DL — HIGH (ref 70–99)
HCO3 BLDV-SCNC: 30 MMOL/L — HIGH (ref 22–29)
HCT VFR BLD CALC: 31.2 % — LOW (ref 42–52)
HGB BLD-MCNC: 10.3 G/DL — LOW (ref 14–18)
IMM GRANULOCYTES NFR BLD AUTO: 1.3 % — HIGH (ref 0.1–0.3)
INR BLD: 1.11 RATIO — SIGNIFICANT CHANGE UP (ref 0.65–1.3)
LACTATE BLDV-MCNC: 1.8 MMOL/L — SIGNIFICANT CHANGE UP (ref 0.5–2)
LIDOCAIN IGE QN: 10 U/L — SIGNIFICANT CHANGE UP (ref 7–60)
LYMPHOCYTES # BLD AUTO: 0.74 K/UL — LOW (ref 1.2–3.4)
LYMPHOCYTES # BLD AUTO: 6.4 % — LOW (ref 20.5–51.1)
MCHC RBC-ENTMCNC: 29.8 PG — SIGNIFICANT CHANGE UP (ref 27–31)
MCHC RBC-ENTMCNC: 33 G/DL — SIGNIFICANT CHANGE UP (ref 32–37)
MCV RBC AUTO: 90.2 FL — SIGNIFICANT CHANGE UP (ref 80–94)
MONOCYTES # BLD AUTO: 0.59 K/UL — SIGNIFICANT CHANGE UP (ref 0.1–0.6)
MONOCYTES NFR BLD AUTO: 5.1 % — SIGNIFICANT CHANGE UP (ref 1.7–9.3)
NEUTROPHILS # BLD AUTO: 9.9 K/UL — HIGH (ref 1.4–6.5)
NEUTROPHILS NFR BLD AUTO: 86.3 % — HIGH (ref 42.2–75.2)
NRBC # BLD: 0 /100 WBCS — SIGNIFICANT CHANGE UP (ref 0–0)
PCO2 BLDV: 46 MMHG — SIGNIFICANT CHANGE UP (ref 42–55)
PH BLDV: 7.42 — SIGNIFICANT CHANGE UP (ref 7.32–7.43)
PLATELET # BLD AUTO: 361 K/UL — SIGNIFICANT CHANGE UP (ref 130–400)
PO2 BLDV: 35 MMHG — SIGNIFICANT CHANGE UP
POTASSIUM SERPL-MCNC: 5.8 MMOL/L — HIGH (ref 3.5–5)
POTASSIUM SERPL-SCNC: 5.8 MMOL/L — HIGH (ref 3.5–5)
PROT SERPL-MCNC: 6 G/DL — SIGNIFICANT CHANGE UP (ref 6–8)
PROTHROM AB SERPL-ACNC: 12.7 SEC — SIGNIFICANT CHANGE UP (ref 9.95–12.87)
RBC # BLD: 3.46 M/UL — LOW (ref 4.7–6.1)
RBC # FLD: 13.1 % — SIGNIFICANT CHANGE UP (ref 11.5–14.5)
SAO2 % BLDV: 53 % — SIGNIFICANT CHANGE UP
SARS-COV-2 RNA SPEC QL NAA+PROBE: SIGNIFICANT CHANGE UP
SODIUM SERPL-SCNC: 128 MMOL/L — LOW (ref 135–146)
WBC # BLD: 11.48 K/UL — HIGH (ref 4.8–10.8)
WBC # FLD AUTO: 11.48 K/UL — HIGH (ref 4.8–10.8)

## 2023-01-03 PROCEDURE — 74177 CT ABD & PELVIS W/CONTRAST: CPT | Mod: 26,MA

## 2023-01-03 PROCEDURE — 99285 EMERGENCY DEPT VISIT HI MDM: CPT

## 2023-01-03 PROCEDURE — 93010 ELECTROCARDIOGRAM REPORT: CPT

## 2023-01-03 RX ORDER — CIPROFLOXACIN LACTATE 400MG/40ML
400 VIAL (ML) INTRAVENOUS ONCE
Refills: 0 | Status: COMPLETED | OUTPATIENT
Start: 2023-01-03 | End: 2023-01-03

## 2023-01-03 RX ORDER — SODIUM CHLORIDE 9 MG/ML
1000 INJECTION INTRAMUSCULAR; INTRAVENOUS; SUBCUTANEOUS
Refills: 0 | Status: DISCONTINUED | OUTPATIENT
Start: 2023-01-03 | End: 2023-01-05

## 2023-01-03 RX ORDER — PANTOPRAZOLE SODIUM 20 MG/1
40 TABLET, DELAYED RELEASE ORAL
Refills: 0 | Status: DISCONTINUED | OUTPATIENT
Start: 2023-01-03 | End: 2023-01-04

## 2023-01-03 RX ORDER — METRONIDAZOLE 500 MG
500 TABLET ORAL ONCE
Refills: 0 | Status: COMPLETED | OUTPATIENT
Start: 2023-01-03 | End: 2023-01-03

## 2023-01-03 RX ORDER — ONDANSETRON 8 MG/1
4 TABLET, FILM COATED ORAL EVERY 8 HOURS
Refills: 0 | Status: DISCONTINUED | OUTPATIENT
Start: 2023-01-03 | End: 2023-01-15

## 2023-01-03 RX ORDER — INSULIN HUMAN 100 [IU]/ML
10 INJECTION, SOLUTION SUBCUTANEOUS ONCE
Refills: 0 | Status: COMPLETED | OUTPATIENT
Start: 2023-01-03 | End: 2023-01-03

## 2023-01-03 RX ORDER — SODIUM CHLORIDE 9 MG/ML
1000 INJECTION, SOLUTION INTRAVENOUS ONCE
Refills: 0 | Status: COMPLETED | OUTPATIENT
Start: 2023-01-03 | End: 2023-01-03

## 2023-01-03 RX ORDER — ACETAMINOPHEN 500 MG
650 TABLET ORAL EVERY 6 HOURS
Refills: 0 | Status: DISCONTINUED | OUTPATIENT
Start: 2023-01-03 | End: 2023-01-15

## 2023-01-03 RX ORDER — DEXTROSE 50 % IN WATER 50 %
50 SYRINGE (ML) INTRAVENOUS ONCE
Refills: 0 | Status: COMPLETED | OUTPATIENT
Start: 2023-01-03 | End: 2023-01-03

## 2023-01-03 RX ORDER — LANOLIN ALCOHOL/MO/W.PET/CERES
3 CREAM (GRAM) TOPICAL AT BEDTIME
Refills: 0 | Status: DISCONTINUED | OUTPATIENT
Start: 2023-01-03 | End: 2023-01-15

## 2023-01-03 RX ORDER — HEPARIN SODIUM 5000 [USP'U]/ML
5000 INJECTION INTRAVENOUS; SUBCUTANEOUS EVERY 12 HOURS
Refills: 0 | Status: DISCONTINUED | OUTPATIENT
Start: 2023-01-03 | End: 2023-01-15

## 2023-01-03 RX ADMIN — Medication 20 MILLIGRAM(S): at 22:28

## 2023-01-03 RX ADMIN — INSULIN HUMAN 10 UNIT(S): 100 INJECTION, SOLUTION SUBCUTANEOUS at 22:28

## 2023-01-03 RX ADMIN — SODIUM CHLORIDE 1000 MILLILITER(S): 9 INJECTION, SOLUTION INTRAVENOUS at 18:20

## 2023-01-03 RX ADMIN — Medication 50 MILLILITER(S): at 22:29

## 2023-01-03 RX ADMIN — Medication 100 MILLIGRAM(S): at 21:01

## 2023-01-03 NOTE — ED PROVIDER NOTE - ATTENDING CONTRIBUTION TO CARE
62-year-old male to ED with weakness and diarrhea.  Patient has a history of recent visits to [place] after which she can had a GI illness.  Patient had E. coli pancolitis this was persistent causing him to have 40 pound weight loss persistent extensive hospital admission.  No fevers.  Since discharge home patient still having continuous persistent diarrhea and weight loss and dehydration so to ED for evaluation.  Exam as noted. Afebrile vital signs stable exam as noted clear lungs bilaterally conjunctiva pink HEENT normal, dry mm, emaciated, tachycardia, scaphoid abdomen soft nontender and neuro nonfocal.

## 2023-01-03 NOTE — ED PROVIDER NOTE - OBJECTIVE STATEMENT
Patient is a 62 year-old male with history of hypertension presenting for evaluation of diarrhea for the last 3 weeks.  Patient states that he was admitted twice for 2 weeks with E. Coli colitis he was discharged with Flagyl and Cipro for 7 days which she finished.  He says that he has been having 3-4 episodes of bloody diarrhea.  Bleeding in stool stopped about 2 to 3 days ago.  He is on the third week of his prednisone course currently taking 30 mg.  He is concerned because he has lost about 40 pounds for the last 1 month and is unable to eat and continues to have intractable diarrhea.  No nausea, no vomiting , no fevers, no chills, chest pain. Patient complains of epigastric abdominal pain when he has a bowel movement.

## 2023-01-03 NOTE — ED PROVIDER NOTE - CLINICAL SUMMARY MEDICAL DECISION MAKING FREE TEXT BOX
ED work-up with dehydration will give IV fluids and admit.  Patient found to have hyperkalemia we will check EKG.

## 2023-01-03 NOTE — H&P ADULT - HISTORY OF PRESENT ILLNESS
63 y/o M presenting to ED for persistent diarrhea. HPI starts from 12/3 when patient was first admitted after returning from a trip to [place] on 11/14 for bloody diarrhea. Patient was found to have pancolitis on CTAP & GI PCR was positive for EPEC. He was dc'd home to complete x7days of Cipro/Flagyl (as per ID). Was also seen by GI that recommended OP Colonoscopy. 4 days after discharge, patient returns to ED due to unresolved symptoms. Colonoscopy was done it showed Diffuse continuous ulceration, friability, erythema and abnormal vascularity with thick mucoid membrane with contact bleeding were noted in the rectum and sigmoid colon. Prelim results of biopsy were in favor of ulcerative colitis. Patient was started on solumedrol IV and will continue PO prednisone on discharged. Diarrhea and abdominal pain improved significantly on steroids. Patient will f/u with GI as outpatient for further management.     In the ED     T(F): 96.5 (03 Jan 2023 14:13), Max: 96.5 (03 Jan 2023 14:13)  HR: 113 (03 Jan 2023 14:13) (113 - 113)  BP: 124/82 (03 Jan 2023 14:13) (124/82 - 124/82)  RR: 20 (03 Jan 2023 14:13) (20 - 20)  SpO2: 98% (03 Jan 2023 14:13) (98% - 98%)    Labs signifcant for WBC 11.48, HGB 10.3( same as last admission- baseline is 14 back in december), Na 125, K 5.8 Cr 1.0 ( baseline is .7)    CT abdomen showing persistent proctocolitis.              61 y/o M presenting to ED for persistent diarrhea. HPI starts from 12/3 when patient was first admitted after returning from a trip to [place] on 11/14 for bloody diarrhea. Patient was found to have pancolitis on CTAP & GI PCR was positive for EPEC. He was dc'd home to complete x7days of Cipro/Flagyl (as per ID). Was also seen by GI that recommended OP Colonoscopy. 4 days after discharge, patient returns to ED due to unresolved symptoms. Colonoscopy was done it showed Diffuse continuous ulceration, friability, erythema and abnormal vascularity with thick mucoid membrane with contact bleeding were noted in the rectum and sigmoid colon. Prelim results of biopsy were in favor of ulcerative colitis. Patient was started on solumedrol IV and will continue PO prednisone on discharged. Diarrhea and abdominal pain improved significantly on steroids. Patient will f/u with GI as outpatient for further management.   Today patient is presenting because since his discharge he has been feeling weak. He states that he is not able to tolerate any po intake. He is still having diarrhea 3-4 times daily. He is still experiencing blood in his stool. Denies any fevers. He is currently on Prednisone 40 mg daily at home and was going to transition to 30 mg tomorrow.     In the ED     T(F): 96.5 (03 Jan 2023 14:13), Max: 96.5 (03 Jan 2023 14:13)  HR: 113 (03 Jan 2023 14:13) (113 - 113)  BP: 124/82 (03 Jan 2023 14:13) (124/82 - 124/82)  RR: 20 (03 Jan 2023 14:13) (20 - 20)  SpO2: 98% (03 Jan 2023 14:13) (98% - 98%)    Labs signifcant for WBC 11.48, HGB 10.3( same as last admission- baseline is 14 back in december), Na 125, K 5.8 Cr 1.0 ( baseline is .7)    CT abdomen showing persistent proctocolitis.

## 2023-01-03 NOTE — H&P ADULT - NSHPPOAPULMEMBOLUS_GEN_A_CORE
We would prior authorize an EPS/SVT ablation procedure through her insurance. Whatever this cost is to her is what we would expect. She would have a risk of heart block so a pacemaker implant is a possibility, but should be covered by insurance. Unknown what her copay/deductible would be.       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 Radiofrequency ablation was not performed secondary to the close proximity of the AP to the His bundle and risk of permanent AV block.     Plan:  1) Trial of flecainide 100 mg BID and continue Metoprolol XL 50 mg daily  2) If patient continues to have medically refractory symptoms, consider future ablation attempt (RF vs possible cryo, and possible general anesthesia) after continued discussion with patient regarding risk vs benefit, given proximity of bypass tract to the fast pathway, risk of permanent AV block, and need for possible permanent pacemaker.   no

## 2023-01-03 NOTE — H&P ADULT - NSHPPHYSICALEXAM_GEN_ALL_CORE
GENERAL: NAD, lying in bed comfortably  CHEST/LUNG: Clear to auscultation bilaterally; No rales, rhonchi, wheezing, or rubs. Unlabored respirations  HEART: Regular rate and rhythm; No murmurs, rubs, or gallops  ABDOMEN: bs present. tender to palpations.   EXTREMITIES:  2+ Peripheral Pulses, brisk capillary refill. No clubbing, cyanosis, or edema  NERVOUS SYSTEM:  Alert & Oriented X3, speech clear. No deficits   MSK: FROM all 4 extremities, full and equal strength  SKIN: No rashes or lesions

## 2023-01-03 NOTE — H&P ADULT - ASSESSMENT
How Severe Are Your Spot(S)?: moderate What Type Of Note Output Would You Prefer (Optional)?: Bullet Format What Is The Reason For Today's Visit?: Full Body Skin Examination What Is The Reason For Today's Visit? (Being Monitored For X): concerning skin lesions on an annual basis Additional History: Pain 0-10 # Bloody Diarrhea  and pancolitis most likely 2/2 newly diagnosed UC, R/O CMV colitis  - CT abd showing pancolitis  - labs showing hb stable with mild normocytic anemia.   - GI PCR --> negative, C.diff --> negative x2, stool culture --> negative, inflammatory markers --> ESR 70,   - s/p colonoscopy on 12/14: Diffuse continuous ulceration, friability, erythema and abnormal vascularity with thick mucoid membrane with contact bleeding were noted in the rectum and sigmoid colon. 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 Deep cratered ulcers were noted suggestive of CMV colitis, biopsies were taken and sent separately. The PCF was advanced till 40-50 cm with continuous ulcerative mucosa so procedure was aborted.  - pre cid pathology with cryptitis and crypt abscess, CMV stain is negative   - CMV PCR, IgM, IgG are negative  - Start solumedrol mg 20 q8   - start normal saline 75 cc/hr   - Diet as tolerated - clear liquid  - check esr, crp , lactoferrin, calprotectin    - Gastro consult    - PPI daily    #ERICH   #hyperkalemia  #Hyponatremia  - insulin d50  - ekg tomorrow  - IVF   - check urine studies, serum osm     #HTN  - D/C olmesarta    #Misc  DVT Ppx: heparin   GI ppx: protonix  Diet: clear  Activity: AAT # Bloody Diarrhea  and pancolitis most likely 2/2 newly diagnosed UC, R/O CMV colitis  - CT abd showing pancolitis  - labs showing hb stable with mild normocytic anemia.   - GI PCR --> negative, C.diff --> negative x2, stool culture --> negative, inflammatory markers --> ESR 70,   - s/p colonoscopy on 12/14: Diffuse continuous ulceration, friability, erythema and abnormal vascularity with thick mucoid membrane with contact bleeding were noted in the rectum and sigmoid colon. 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 Deep cratered ulcers were noted suggestive of CMV colitis, biopsies were taken and sent separately. The PCF was advanced till 40-50 cm with continuous ulcerative mucosa so procedure was aborted.  - pre cid pathology with cryptitis and crypt abscess, CMV stain is negative   - CMV PCR, IgM, IgG are negative  - Start solumedrol 20mg q8   - start normal saline 75 cc/hr   - Diet as tolerated - full liquid  - check esr, crp , lactoferrin, calprotectin    - GIconsult    - PPI daily    #ERICH   #hyperkalemia  #Hyponatremia  - insulin d50  - ekg   - IVF   - check urine studies, serum osm     #HTN  - D/C olmesartan due to instances of microscopic colitis   - start on Amlodipine if needed.     #Misc  DVT Ppx: heparin   GI ppx: protonix  Diet: clear  Activity: AAT

## 2023-01-03 NOTE — ED PROVIDER NOTE - NS ED ROS FT
Review of Systems:CONSTITUTIONAL - No fever, No diaphoresis, No weight change  SKIN - No rash  HEMATOLOGIC - No abnormal bleeding or bruising  EYES - No eye pain, No blurred vision  ENT - No change in hearing, No sore throat, No neck pain, No rhinorrhea, No ear pain  RESPIRATORY - No shortness of breath, No cough  CARDIAC -No chest pain, No palpitations  GI - (+)epigastric abdominal pain, (+) nausea, No vomiting, (+) diarrhea, No constipation, (+) hematochezia. No flank pain.  - No dysuria, frequency, hematuria.  MUSCULOSKELETAL - No joint paint, No swelling, No back pain  NEUROLOGIC - No numbness, No focal weakness, No headache, No dizziness  All other systems negative, unless specified in HPI

## 2023-01-03 NOTE — ED PROVIDER NOTE - PHYSICAL EXAMINATION
(+) epigastric and LUQ abd pain VITAL SIGNS: I have reviewed nursing notes and confirm.  CONSTITUTIONAL: ill-appearing, NAD  SKIN: Warm dry, normal skin turgor  HEAD: NCAT  EYES: EOMI, PERRLA, no scleral icterus  ENT: Dry mucous membranes, normal pharynx with no erythema or exudates  NECK: Supple; non tender.   CARD: RRR, no murmurs, rubs or gallops  RESP: clear to ausculation b/l.  No rales, rhonchi, or wheezing.  ABD: soft, + BS, (+) epigastric and LUQ abd tenderness to palpation. Non-distended, no rebound or guarding. No CVA tenderness  EXT: Full ROM, no pedal edema  NEURO: normal motor. normal sensory. CN II-XII intact.  PSYCH: Cooperative, appropriate.

## 2023-01-04 LAB
A1C WITH ESTIMATED AVERAGE GLUCOSE RESULT: 5.9 % — HIGH (ref 4–5.6)
ALBUMIN SERPL ELPH-MCNC: 3 G/DL — LOW (ref 3.5–5.2)
ALP SERPL-CCNC: 80 U/L — SIGNIFICANT CHANGE UP (ref 30–115)
ALT FLD-CCNC: 8 U/L — SIGNIFICANT CHANGE UP (ref 0–41)
ANION GAP SERPL CALC-SCNC: 12 MMOL/L — SIGNIFICANT CHANGE UP (ref 7–14)
ANION GAP SERPL CALC-SCNC: 18 MMOL/L — HIGH (ref 7–14)
APPEARANCE UR: CLEAR — SIGNIFICANT CHANGE UP
AST SERPL-CCNC: 11 U/L — SIGNIFICANT CHANGE UP (ref 0–41)
BACTERIA # UR AUTO: NEGATIVE — SIGNIFICANT CHANGE UP
BASOPHILS # BLD AUTO: 0.08 K/UL — SIGNIFICANT CHANGE UP (ref 0–0.2)
BASOPHILS NFR BLD AUTO: 0.8 % — SIGNIFICANT CHANGE UP (ref 0–1)
BILIRUB SERPL-MCNC: 0.2 MG/DL — SIGNIFICANT CHANGE UP (ref 0.2–1.2)
BILIRUB UR-MCNC: ABNORMAL
BUN SERPL-MCNC: 23 MG/DL — HIGH (ref 10–20)
BUN SERPL-MCNC: 24 MG/DL — HIGH (ref 10–20)
C DIFF BY PCR RESULT: SIGNIFICANT CHANGE UP
CALCIUM SERPL-MCNC: 8.1 MG/DL — LOW (ref 8.4–10.5)
CALCIUM SERPL-MCNC: 8.6 MG/DL — SIGNIFICANT CHANGE UP (ref 8.4–10.5)
CHLORIDE SERPL-SCNC: 92 MMOL/L — LOW (ref 98–110)
CHLORIDE SERPL-SCNC: 92 MMOL/L — LOW (ref 98–110)
CHOLEST SERPL-MCNC: 126 MG/DL — SIGNIFICANT CHANGE UP
CO2 SERPL-SCNC: 21 MMOL/L — SIGNIFICANT CHANGE UP (ref 17–32)
CO2 SERPL-SCNC: 26 MMOL/L — SIGNIFICANT CHANGE UP (ref 17–32)
COLOR SPEC: YELLOW — SIGNIFICANT CHANGE UP
CREAT ?TM UR-MCNC: 193 MG/DL — SIGNIFICANT CHANGE UP
CREAT SERPL-MCNC: 0.8 MG/DL — SIGNIFICANT CHANGE UP (ref 0.7–1.5)
CREAT SERPL-MCNC: 0.9 MG/DL — SIGNIFICANT CHANGE UP (ref 0.7–1.5)
CRP SERPL-MCNC: 166.3 MG/L — HIGH
CRP SERPL-MCNC: 175.2 MG/L — HIGH
DIFF PNL FLD: NEGATIVE — SIGNIFICANT CHANGE UP
EGFR: 101 ML/MIN/1.73M2 — SIGNIFICANT CHANGE UP
EGFR: 97 ML/MIN/1.73M2 — SIGNIFICANT CHANGE UP
EOSINOPHIL # BLD AUTO: 0.01 K/UL — SIGNIFICANT CHANGE UP (ref 0–0.7)
EOSINOPHIL NFR BLD AUTO: 0.1 % — SIGNIFICANT CHANGE UP (ref 0–8)
EPI CELLS # UR: 5 /HPF — SIGNIFICANT CHANGE UP (ref 0–5)
ERYTHROCYTE [SEDIMENTATION RATE] IN BLOOD: 107 MM/HR — HIGH (ref 0–10)
ESTIMATED AVERAGE GLUCOSE: 123 MG/DL — HIGH (ref 68–114)
GI PCR PANEL: SIGNIFICANT CHANGE UP
GLUCOSE BLDC GLUCOMTR-MCNC: 133 MG/DL — HIGH (ref 70–99)
GLUCOSE SERPL-MCNC: 136 MG/DL — HIGH (ref 70–99)
GLUCOSE SERPL-MCNC: 155 MG/DL — HIGH (ref 70–99)
GLUCOSE UR QL: NEGATIVE — SIGNIFICANT CHANGE UP
HCT VFR BLD CALC: 31.1 % — LOW (ref 42–52)
HDLC SERPL-MCNC: 44 MG/DL — SIGNIFICANT CHANGE UP
HGB BLD-MCNC: 10 G/DL — LOW (ref 14–18)
HYALINE CASTS # UR AUTO: 39 /LPF — HIGH (ref 0–7)
IMM GRANULOCYTES NFR BLD AUTO: 1.3 % — HIGH (ref 0.1–0.3)
KETONES UR-MCNC: ABNORMAL
LEUKOCYTE ESTERASE UR-ACNC: NEGATIVE — SIGNIFICANT CHANGE UP
LIPID PNL WITH DIRECT LDL SERPL: 57 MG/DL — SIGNIFICANT CHANGE UP
LYMPHOCYTES # BLD AUTO: 0.8 K/UL — LOW (ref 1.2–3.4)
LYMPHOCYTES # BLD AUTO: 7.6 % — LOW (ref 20.5–51.1)
MCHC RBC-ENTMCNC: 29 PG — SIGNIFICANT CHANGE UP (ref 27–31)
MCHC RBC-ENTMCNC: 32.2 G/DL — SIGNIFICANT CHANGE UP (ref 32–37)
MCV RBC AUTO: 90.1 FL — SIGNIFICANT CHANGE UP (ref 80–94)
MONOCYTES # BLD AUTO: 0.4 K/UL — SIGNIFICANT CHANGE UP (ref 0.1–0.6)
MONOCYTES NFR BLD AUTO: 3.8 % — SIGNIFICANT CHANGE UP (ref 1.7–9.3)
NEUTROPHILS # BLD AUTO: 9.1 K/UL — HIGH (ref 1.4–6.5)
NEUTROPHILS NFR BLD AUTO: 86.4 % — HIGH (ref 42.2–75.2)
NITRITE UR-MCNC: NEGATIVE — SIGNIFICANT CHANGE UP
NON HDL CHOLESTEROL: 82 MG/DL — SIGNIFICANT CHANGE UP
NRBC # BLD: 0 /100 WBCS — SIGNIFICANT CHANGE UP (ref 0–0)
OSMOLALITY SERPL: 277 MOS/KG — LOW (ref 280–301)
OSMOLALITY UR: 1035 MOS/KG — SIGNIFICANT CHANGE UP (ref 50–1200)
PH UR: 6 — SIGNIFICANT CHANGE UP (ref 5–8)
PLATELET # BLD AUTO: 414 K/UL — HIGH (ref 130–400)
POTASSIUM SERPL-MCNC: 5.4 MMOL/L — HIGH (ref 3.5–5)
POTASSIUM SERPL-MCNC: SIGNIFICANT CHANGE UP MMOL/L (ref 3.5–5)
POTASSIUM SERPL-SCNC: 5.4 MMOL/L — HIGH (ref 3.5–5)
POTASSIUM SERPL-SCNC: SIGNIFICANT CHANGE UP MMOL/L (ref 3.5–5)
POTASSIUM UR-SCNC: 97 MMOL/L — SIGNIFICANT CHANGE UP
PROT ?TM UR-MCNC: 127 MG/DLG/24H — SIGNIFICANT CHANGE UP
PROT SERPL-MCNC: 5.8 G/DL — LOW (ref 6–8)
PROT UR-MCNC: ABNORMAL
PROT/CREAT UR-RTO: 0.7 RATIO — HIGH (ref 0–0.2)
RBC # BLD: 3.45 M/UL — LOW (ref 4.7–6.1)
RBC # FLD: 13.2 % — SIGNIFICANT CHANGE UP (ref 11.5–14.5)
RBC CASTS # UR COMP ASSIST: 5 /HPF — HIGH (ref 0–4)
SODIUM SERPL-SCNC: 130 MMOL/L — LOW (ref 135–146)
SODIUM SERPL-SCNC: 131 MMOL/L — LOW (ref 135–146)
SODIUM UR-SCNC: <20 MMOL/L — SIGNIFICANT CHANGE UP
SP GR SPEC: 1.05 — HIGH (ref 1.01–1.03)
TRIGL SERPL-MCNC: 123 MG/DL — SIGNIFICANT CHANGE UP
UROBILINOGEN FLD QL: SIGNIFICANT CHANGE UP
UUN UR-MCNC: 1854 MG/DL — SIGNIFICANT CHANGE UP
WBC # BLD: 10.53 K/UL — SIGNIFICANT CHANGE UP (ref 4.8–10.8)
WBC # FLD AUTO: 10.53 K/UL — SIGNIFICANT CHANGE UP (ref 4.8–10.8)
WBC UR QL: 4 /HPF — SIGNIFICANT CHANGE UP (ref 0–5)

## 2023-01-04 PROCEDURE — 99223 1ST HOSP IP/OBS HIGH 75: CPT

## 2023-01-04 PROCEDURE — 93010 ELECTROCARDIOGRAM REPORT: CPT

## 2023-01-04 RX ORDER — SODIUM ZIRCONIUM CYCLOSILICATE 10 G/10G
10 POWDER, FOR SUSPENSION ORAL EVERY 8 HOURS
Refills: 0 | Status: COMPLETED | OUTPATIENT
Start: 2023-01-04 | End: 2023-01-05

## 2023-01-04 RX ORDER — NIFEDIPINE 30 MG
30 TABLET, EXTENDED RELEASE 24 HR ORAL DAILY
Refills: 0 | Status: DISCONTINUED | OUTPATIENT
Start: 2023-01-04 | End: 2023-01-15

## 2023-01-04 RX ORDER — PANTOPRAZOLE SODIUM 20 MG/1
40 TABLET, DELAYED RELEASE ORAL
Refills: 0 | Status: DISCONTINUED | OUTPATIENT
Start: 2023-01-04 | End: 2023-01-12

## 2023-01-04 RX ADMIN — HEPARIN SODIUM 5000 UNIT(S): 5000 INJECTION INTRAVENOUS; SUBCUTANEOUS at 05:55

## 2023-01-04 RX ADMIN — Medication 20 MILLIGRAM(S): at 06:00

## 2023-01-04 RX ADMIN — Medication 30 MILLIGRAM(S): at 13:04

## 2023-01-04 RX ADMIN — ONDANSETRON 4 MILLIGRAM(S): 8 TABLET, FILM COATED ORAL at 10:13

## 2023-01-04 RX ADMIN — ONDANSETRON 4 MILLIGRAM(S): 8 TABLET, FILM COATED ORAL at 00:14

## 2023-01-04 RX ADMIN — Medication 200 MILLIGRAM(S): at 00:04

## 2023-01-04 RX ADMIN — Medication 20 MILLIGRAM(S): at 21:28

## 2023-01-04 RX ADMIN — SODIUM ZIRCONIUM CYCLOSILICATE 10 GRAM(S): 10 POWDER, FOR SUSPENSION ORAL at 21:29

## 2023-01-04 RX ADMIN — SODIUM ZIRCONIUM CYCLOSILICATE 10 GRAM(S): 10 POWDER, FOR SUSPENSION ORAL at 13:19

## 2023-01-04 RX ADMIN — PANTOPRAZOLE SODIUM 40 MILLIGRAM(S): 20 TABLET, DELAYED RELEASE ORAL at 13:04

## 2023-01-04 RX ADMIN — PANTOPRAZOLE SODIUM 40 MILLIGRAM(S): 20 TABLET, DELAYED RELEASE ORAL at 05:56

## 2023-01-04 RX ADMIN — HEPARIN SODIUM 5000 UNIT(S): 5000 INJECTION INTRAVENOUS; SUBCUTANEOUS at 13:06

## 2023-01-04 RX ADMIN — Medication 20 MILLIGRAM(S): at 13:05

## 2023-01-04 NOTE — CONSULT NOTE ADULT - ASSESSMENT
63 y/o M presenting to ED for persistent diarrhea. HPI starts from 12/3 when patient was first admitted after returning from a trip to [place] on 11/14 for bloody diarrhea. Patient was found to have pancolitis on CTAP & GI PCR was positive for EPEC. He was dc'd home to complete x7days of Cipro/Flagyl (as per ID). Was also seen by GI that recommended OP Colonoscopy. 4 days after discharge, patient returns to ED due to unresolved symptoms. Colonoscopy was done it showed Diffuse continuous ulceration, friability, erythema and abnormal vascularity with thick mucoid membrane with contact bleeding were noted in the rectum and sigmoid colon. Prelim results of biopsy were in favor of ulcerative colitis. Patient was started on solumedrol IV and will continue PO prednisone on discharged. Diarrhea and abdominal pain improved significantly on steroids. Patient will f/u with GI as outpatient for further management. patient endorses 4 days after discharged home started having abdominal pain and 4-5 liquid stool, occasionally bloody. he was not able to see his primary GI DrRonny Drummond. endorses decrease po intake and fatigue and feeling dehydrated  In the ED CT abdomen showing persistent proctocolitis. GI is called for evaluation.     # Abdominal pain / diarrhea most likely secondary to UC flare: R/O infectious process:  - patient is hemodynamically stable  - no leucocytosis, no fever  - CT with persistent pancolitis  - C. diff is negative  - was taking 40 mg po prednisone daily prior to admission     recommendations:  - clear liquid diet as tolerated  - send GI PCR   - if infectious work up is negative, start solumedrol 20 mg TID  - send ESR / CRP  - send stool calprotectin  61 y/o M  w/ HTN, presenting to ED for persistent diarrhea and colitis seen on CT.   12/3 admitted after returning from a trip to [place] on 11/14 for bloody diarrhea & pancolitis on CTAP; GI PCR was positive for EPEC s/p 7 d Cipro/Flagyl (as per ID).   4 days after discharge, patient returns to ED due to unresolved symptoms s/p Colonoscopy 12/14/22 w/ Diffuse continuous ulceration, friability, erythema and abnormal vascularity with thick mucoid membrane with contact bleeding were noted in the rectum and sigmoid colon. Prelim results of biopsy were in favor of ulcerative colitis.   Patient was started on solumedrol IV and will continue PO prednisone on discharged.   Diarrhea and abdominal pain improved significantly on steroids but returned 4 days after discharged home started having abdominal pain and 4-5 liquid stool, occasionally bloody, decrease po intake and fatigue and feeling dehydrated.  CT abdomen showing persistent proctocolitis.     # Abdominal pain / diarrhea most likely secondary to active UC given chronicity of symptoms and lack of improvement w/ abx - although less likely, but cannot fully exclude infectious process (within 4 w of symptom onset)  - patient is hemodynamically stable  - no leucocytosis, no fever  - CT with persistent pancolitis  - C. diff is negative  - was taking 40 mg po prednisone daily prior to admission   workup from prior admission reviewed - quantiferon was indeterminate    recommendations:  - clear liquid diet as tolerated  - send GI PCR   - if infectious work up is negative, start solumedrol 20 mg TID  - send ESR / CRP  - send stool calprotectin   - pending clinical progression, may plan flex-sig for re-evaluation  - we will follow closely 63 y/o M  w/ HTN, presenting to ED for persistent diarrhea and colitis seen on CT.   12/3 admitted after returning from a trip to [place] on 11/14 for bloody diarrhea & pancolitis on CTAP; GI PCR was positive for EPEC s/p 7 d Cipro/Flagyl (as per ID).   4 days after discharge, patient returns to ED due to unresolved symptoms s/p Colonoscopy 12/14/22 w/ Diffuse continuous ulceration, friability, erythema and abnormal vascularity with thick mucoid membrane with contact bleeding were noted in the rectum and sigmoid colon. Prelim results of biopsy were in favor of ulcerative colitis.   Patient was started on solumedrol IV and will continue PO prednisone on discharged.   Diarrhea and abdominal pain improved significantly on steroids but returned 4 days after discharged home started having abdominal pain and 4-5 liquid stool, occasionally bloody, decrease po intake and fatigue and feeling dehydrated.  hx is also remarkable for unintentional weight loss of 40 lbs since the onset of symptoms.  CT abdomen showing persistent proctocolitis.     # Abdominal pain / diarrhea most likely secondary to active UC given chronicity of symptoms and lack of improvement w/ abx - although less likely, but cannot fully exclude infectious process (within 4 w of symptom onset)  - patient is hemodynamically stable  - no leucocytosis, no fever  - CT with persistent pancolitis  - C. diff is negative  - was taking 40 mg po prednisone daily prior to admission   workup from prior admission reviewed - quantiferon was indeterminate    recommendations:  - clear liquid diet as tolerated  - send GI PCR   - if infectious work up is negative, start solumedrol 20 mg TID  - send ESR / CRP  - send stool calprotectin   - pending clinical progression, may plan flex-sig for re-evaluation  - we will follow closely

## 2023-01-04 NOTE — CONSULT NOTE ADULT - ATTENDING COMMENTS
Clear
I edited the note.   Time-based billing (NON-critical care).   70 minutes spent on total encounter; more than 50% of the visit was spent counseling and / or coordinating care by the attending physician.  The necessity of the time spent during the encounter on this date of service was due to: Coordination of care.

## 2023-01-04 NOTE — CONSULT NOTE ADULT - SUBJECTIVE AND OBJECTIVE BOX
Gastroenterology Consultation:    Patient is a 62y old  Male who presents with a chief complaint of abdominal pain and diarrhea       Admitted on: 01-03-23      HPI:  61 y/o M presenting to ED for persistent diarrhea. HPI starts from 12/3 when patient was first admitted after returning from a trip to [place] on 11/14 for bloody diarrhea. Patient was found to have pancolitis on CTAP & GI PCR was positive for EPEC. He was dc'd home to complete x7days of Cipro/Flagyl (as per ID). Was also seen by GI that recommended OP Colonoscopy. 4 days after discharge, patient returns to ED due to unresolved symptoms. Colonoscopy was done it showed Diffuse continuous ulceration, friability, erythema and abnormal vascularity with thick mucoid membrane with contact bleeding were noted in the rectum and sigmoid colon. Prelim results of biopsy were in favor of ulcerative colitis. Patient was started on solumedrol IV and will continue PO prednisone on discharged. Diarrhea and abdominal pain improved significantly on steroids. Patient will f/u with GI as outpatient for further management. patient endorses 4 days after discharged home started having abdominal pain and 4-5 liquid stool, occasionally bloody. he was not able to see his primary GI Dr. Drummond. endorses decrease po intake and fatigue and feeling dehydrated  In the ED CT abdomen showing persistent proctocolitis. GI is called for evaluation.     Prior EGD: < from: Colonoscopy (12.14.22 @ 08:30) >  Impressions:  Ulceration, friability, erythema and abnormal vascularity with thick mucoid  membrane in the rectum and sigmoid colon compatible with CMV colitis vs UC vs C  diff.  < end of copied text >      Prior Colonoscopy: none on chart       PAST MEDICAL & SURGICAL HISTORY:  HTN (hypertension)      No significant past surgical history            FAMILY HISTORY:  no gi malignancy     Social History:  Tobacco: denies  Alcohol: denies  Drugs: denies     Home Medications:  olmesartan 5 mg oral tablet: 1 tab(s) orally once a day (10 Dec 2022 20:12)        MEDICATIONS  (STANDING):  heparin   Injectable 5000 Unit(s) SubCutaneous every 12 hours  methylPREDNISolone sodium succinate Injectable 20 milliGRAM(s) IV Push every 8 hours  NIFEdipine XL 30 milliGRAM(s) Oral daily  pantoprazole  Injectable 40 milliGRAM(s) IV Push two times a day  sodium chloride 0.9%. 1000 milliLiter(s) (75 mL/Hr) IV Continuous <Continuous>  sodium zirconium cyclosilicate 10 Gram(s) Oral every 8 hours    MEDICATIONS  (PRN):  acetaminophen     Tablet .. 650 milliGRAM(s) Oral every 6 hours PRN Temp greater or equal to 38C (100.4F), Mild Pain (1 - 3)  aluminum hydroxide/magnesium hydroxide/simethicone Suspension 30 milliLiter(s) Oral every 4 hours PRN Dyspepsia  melatonin 3 milliGRAM(s) Oral at bedtime PRN Insomnia  ondansetron Injectable 4 milliGRAM(s) IV Push every 8 hours PRN Nausea and/or Vomiting      Allergies  penicillin (Rash)      Review of Systems:   Constitutional:  No Fever, No Chills  ENT/Mouth:  No Hearing Changes,  No Difficulty Swallowing  Eyes:  No Eye Pain, No Vision Changes  Cardiovascular:  No Chest Pain, No Palpitations  Respiratory:  No Cough, No Dyspnea  Gastrointestinal:  As described in HPI  Musculoskeletal:  No Joint Swelling, No Back Pain  Skin:  No Skin Lesions, No Jaundice  Neuro:  No Syncope, No Dizziness  Heme/Lymph:  No Bruising, No Bleeding.          Physical Examination:  T(C): 36.6 (01-04-23 @ 08:35), Max: 36.6 (01-04-23 @ 00:13)  HR: 64 (01-04-23 @ 08:35) (64 - 74)  BP: 173/81 (01-04-23 @ 08:35) (117/59 - 173/81)  RR: 18 (01-04-23 @ 08:35) (18 - 18)  SpO2: 96% (01-04-23 @ 08:35) (95% - 96%)          GENERAL: AAOx3, no acute distress.  HEAD:  Atraumatic, Normocephalic  EYES: conjunctiva and sclera clear  NECK: Supple, no JVD or thyromegaly  CHEST/LUNG: Clear to auscultation bilaterally; No wheeze, rhonchi, or rales  HEART: Regular rate and rhythm; normal S1, S2, No murmurs.  ABDOMEN: Soft, nontender, nondistended; Bowel sounds present  NEUROLOGY: No asterixis or tremor.   SKIN: Intact, no jaundice        Data:                        10.0   10.53 )-----------( 414      ( 04 Jan 2023 08:33 )             31.1     Hgb Trend:  10.0  01-04-23 @ 08:33  10.3  01-03-23 @ 16:49      01-04    131<L>  |  92<L>  |  24<H>  ----------------------------<  136<H>  5.4<H>   |  21  |  0.8    Ca    8.6      04 Jan 2023 08:33    TPro  5.8<L>  /  Alb  3.0<L>  /  TBili  0.2  /  DBili  x   /  AST  11  /  ALT  8   /  AlkPhos  80  01-04    Liver panel trend:  TBili 0.2   /   AST 11   /   ALT 8   /   AlkP 80   /   Tptn 5.8   /   Alb 3.0    /   DBili --      01-04  TBili 0.2   /   AST 13   /   ALT 7   /   AlkP 85   /   Tptn 6.0   /   Alb 2.9    /   DBili --      01-03      PT/INR - ( 03 Jan 2023 16:49 )   PT: 12.70 sec;   INR: 1.11 ratio         PTT - ( 03 Jan 2023 16:49 )  PTT:21.3 sec        Radiology:  CT Abdomen and Pelvis w/ IV Cont:   ACC: [id number] EXAM:  CT ABDOMEN AND PELVIS IC                          PROCEDURE DATE:  01/03/2023          INTERPRETATION:  CLINICAL STATEMENT: Chronic diarrhea, 40 pound weight   loss.    TECHNIQUE: Contiguous axial CT images were obtained from the lower chest   to the pubic symphysis following administration of 100cc Omnipaque 350   intravenous contrast.  Oral contrast was not administered.  Reformatted   images in the coronal and sagittal planes were acquired.    COMPARISON CT: CT abdomen pelvis dated 12/10/2022    FINDINGS:    LOWER CHEST: Unremarkable.    HEPATOBILIARY: Unremarkable.    SPLEEN: Unremarkable.    PANCREAS: Unremarkable.    ADRENAL GLANDS: Unremarkable.    KIDNEYS: Symmetrically enhancing. No hydronephrosis.    ABDOMINOPELVIC NODES: Unremarkable.    PELVIC ORGANS: Unremarkable.    PERITONEUM/MESENTERY/BOWEL: No evidence of bowel obstruction,   pneumoperitoneum, or ascites. Persistent/recurrent procto-colonic wall   thickening with internal liquid stool and trace pericolonic fat   stranding. No perforation or abscess    BONES/SOFT TISSUES: Degenerative changes of the hips and spine.    OTHER: Vascular calcifications. Small fat-containing umbilical hernia.      IMPRESSION:    Since 12/10/2022, persistent/recurrent pan proctocolitis. No perforation   or abscess.    --- End of Report ---          VIVIANA OLIVAREZ MD; Resident Radiologist  This document has been electronically signed.  KAMAR MOHAN MD; Attending Radiologist  This document has been electronically signed. Brant  3 2023  7:46PM (01-03-23 @ 17:46)       Gastroenterology Consultation:    Patient is a 62y old  Male who presents with a chief complaint of abdominal pain and diarrhea       Admitted on: 01-03-23      HPI:  63 y/o M presenting to ED for persistent diarrhea. HPI starts from 12/3 when patient was first admitted after returning from a trip to [place] on 11/14 for bloody diarrhea. Patient was found to have pancolitis on CTAP & GI PCR was positive for EPEC. He was dc'd home to complete x7days of Cipro/Flagyl (as per ID). Was also seen by GI that recommended OP Colonoscopy. 4 days after discharge, patient returns to ED due to unresolved symptoms. Colonoscopy was done it showed Diffuse continuous ulceration, friability, erythema and abnormal vascularity with thick mucoid membrane with contact bleeding were noted in the rectum and sigmoid colon. Prelim results of biopsy were in favor of ulcerative colitis. Patient was started on solumedrol IV and will continue PO prednisone on discharged. Diarrhea and abdominal pain improved significantly on steroids. Patient was planned to f/u w/ GI as outpatient for further management.   he returned to care 4 days after discharged home as he started having abdominal pain and 4-5 liquid stool, occasionally bloody.   he was not able to see his primary GI Dr. Drummond.   He endorses decrease po intake and fatigue and feeling dehydrated.  In the ED CT abdomen showing persistent proctocolitis. GI is called for evaluation.     Prior EGD: < from: Colonoscopy (12.14.22 @ 08:30) >  Impressions:  Ulceration, friability, erythema and abnormal vascularity with thick mucoid  membrane in the rectum and sigmoid colon compatible with CMV colitis vs UC vs C  diff.  < end of copied text >      Prior Colonoscopy: none on chart       PAST MEDICAL & SURGICAL HISTORY:  HTN (hypertension)  No significant past surgical history    FAMILY HISTORY:  no gi malignancy     Social History:  Tobacco: denies  Alcohol: denies  Drugs: denies     Home Medications:  olmesartan 5 mg oral tablet: 1 tab(s) orally once a day (10 Dec 2022 20:12)        MEDICATIONS  (STANDING):  heparin   Injectable 5000 Unit(s) SubCutaneous every 12 hours  methylPREDNISolone sodium succinate Injectable 20 milliGRAM(s) IV Push every 8 hours  NIFEdipine XL 30 milliGRAM(s) Oral daily  pantoprazole  Injectable 40 milliGRAM(s) IV Push two times a day  sodium chloride 0.9%. 1000 milliLiter(s) (75 mL/Hr) IV Continuous <Continuous>  sodium zirconium cyclosilicate 10 Gram(s) Oral every 8 hours    MEDICATIONS  (PRN):  acetaminophen     Tablet .. 650 milliGRAM(s) Oral every 6 hours PRN Temp greater or equal to 38C (100.4F), Mild Pain (1 - 3)  aluminum hydroxide/magnesium hydroxide/simethicone Suspension 30 milliLiter(s) Oral every 4 hours PRN Dyspepsia  melatonin 3 milliGRAM(s) Oral at bedtime PRN Insomnia  ondansetron Injectable 4 milliGRAM(s) IV Push every 8 hours PRN Nausea and/or Vomiting      Allergies  penicillin (Rash)      Review of Systems:   Constitutional:  No Fever, No Chills  ENT/Mouth:  No Hearing Changes,  No Difficulty Swallowing  Eyes:  No Eye Pain, No Vision Changes  Cardiovascular:  No Chest Pain, No Palpitations  Respiratory:  No Cough, No Dyspnea  Gastrointestinal:  As described in HPI  Musculoskeletal:  No Joint Swelling, No Back Pain  Skin:  No Skin Lesions, No Jaundice  Neuro:  No Syncope, No Dizziness  Heme/Lymph:  No Bruising, No Bleeding.          Physical Examination:  T(C): 36.6 (01-04-23 @ 08:35), Max: 36.6 (01-04-23 @ 00:13)  HR: 64 (01-04-23 @ 08:35) (64 - 74)  BP: 173/81 (01-04-23 @ 08:35) (117/59 - 173/81)  RR: 18 (01-04-23 @ 08:35) (18 - 18)  SpO2: 96% (01-04-23 @ 08:35) (95% - 96%)          GENERAL: AAOx3, no acute distress.  HEAD:  Atraumatic, Normocephalic  EYES: conjunctiva and sclera clear  NECK: Supple, no JVD or thyromegaly  CHEST/LUNG: Clear to auscultation bilaterally; No wheeze, rhonchi, or rales  HEART: Regular rate and rhythm; normal S1, S2, No murmurs.  ABDOMEN: Soft, nontender, nondistended; Bowel sounds present  NEUROLOGY: No asterixis or tremor.   SKIN: Intact, no jaundice        Data:                        10.0   10.53 )-----------( 414      ( 04 Jan 2023 08:33 )             31.1     Hgb Trend:  10.0  01-04-23 @ 08:33  10.3  01-03-23 @ 16:49      01-04    131<L>  |  92<L>  |  24<H>  ----------------------------<  136<H>  5.4<H>   |  21  |  0.8    Ca    8.6      04 Jan 2023 08:33    TPro  5.8<L>  /  Alb  3.0<L>  /  TBili  0.2  /  DBili  x   /  AST  11  /  ALT  8   /  AlkPhos  80  01-04    Liver panel trend:  TBili 0.2   /   AST 11   /   ALT 8   /   AlkP 80   /   Tptn 5.8   /   Alb 3.0    /   DBili --      01-04  TBili 0.2   /   AST 13   /   ALT 7   /   AlkP 85   /   Tptn 6.0   /   Alb 2.9    /   DBili --      01-03      PT/INR - ( 03 Jan 2023 16:49 )   PT: 12.70 sec;   INR: 1.11 ratio         PTT - ( 03 Jan 2023 16:49 )  PTT:21.3 sec        Radiology:  CT Abdomen and Pelvis w/ IV Cont:   ACC: [id number] EXAM:  CT ABDOMEN AND PELVIS IC                          PROCEDURE DATE:  01/03/2023          INTERPRETATION:  CLINICAL STATEMENT: Chronic diarrhea, 40 pound weight   loss.    TECHNIQUE: Contiguous axial CT images were obtained from the lower chest   to the pubic symphysis following administration of 100cc Omnipaque 350   intravenous contrast.  Oral contrast was not administered.  Reformatted   images in the coronal and sagittal planes were acquired.    COMPARISON CT: CT abdomen pelvis dated 12/10/2022    FINDINGS:    LOWER CHEST: Unremarkable.    HEPATOBILIARY: Unremarkable.    SPLEEN: Unremarkable.    PANCREAS: Unremarkable.    ADRENAL GLANDS: Unremarkable.    KIDNEYS: Symmetrically enhancing. No hydronephrosis.    ABDOMINOPELVIC NODES: Unremarkable.    PELVIC ORGANS: Unremarkable.    PERITONEUM/MESENTERY/BOWEL: No evidence of bowel obstruction,   pneumoperitoneum, or ascites. Persistent/recurrent procto-colonic wall   thickening with internal liquid stool and trace pericolonic fat   stranding. No perforation or abscess    BONES/SOFT TISSUES: Degenerative changes of the hips and spine.    OTHER: Vascular calcifications. Small fat-containing umbilical hernia.      IMPRESSION:    Since 12/10/2022, persistent/recurrent pan proctocolitis. No perforation   or abscess.    --- End of Report ---    VIVIANA OLIVAREZ MD; Resident Radiologist  This document has been electronically signed.  KAMAR MOHAN MD; Attending Radiologist  This document has been electronically signed. Brant  3 2023  7:46PM (01-03-23 @ 17:46) Gastroenterology Consultation:    Patient is a 62y old  Male who presents with a chief complaint of abdominal pain and diarrhea       Admitted on: 01-03-23      HPI:  63 y/o M presenting to ED for persistent diarrhea. HPI starts from 12/3 when patient was first admitted after returning from a trip to [place] on 11/14 for bloody diarrhea. Patient was found to have pancolitis on CTAP & GI PCR was positive for EPEC. He was dc'd home to complete x7days of Cipro/Flagyl (as per ID). Was also seen by GI that recommended OP Colonoscopy. 4 days after discharge, patient returns to ED due to unresolved symptoms. Colonoscopy was done it showed Diffuse continuous ulceration, friability, erythema and abnormal vascularity with thick mucoid membrane with contact bleeding were noted in the rectum and sigmoid colon. Prelim results of biopsy were in favor of ulcerative colitis. Patient was started on solumedrol IV and will continue PO prednisone on discharged. Diarrhea and abdominal pain improved significantly on steroids. Patient was planned to f/u w/ GI as outpatient for further management.   he returned to care 4 days after discharged home as he started having abdominal pain and 4-5 liquid stool, occasionally bloody.   he was not able to see his primary GI DrRonny Drummond.   He endorses decrease po intake and fatigue and feeling dehydrated.  hx is also remarkable for unintentional weight loss of 40 lbs since the onset of symptoms.  In the ED CT abdomen showing persistent proctocolitis. GI is called for evaluation.     Prior EGD: < from: Colonoscopy (12.14.22 @ 08:30) >  Impressions:  Ulceration, friability, erythema and abnormal vascularity with thick mucoid  membrane in the rectum and sigmoid colon compatible with CMV colitis vs UC vs C  diff.  < end of copied text >      Prior Colonoscopy: none on chart       PAST MEDICAL & SURGICAL HISTORY:  HTN (hypertension)  No significant past surgical history    FAMILY HISTORY:  no gi malignancy     Social History:  Tobacco: denies  Alcohol: denies  Drugs: denies     Home Medications:  olmesartan 5 mg oral tablet: 1 tab(s) orally once a day (10 Dec 2022 20:12)        MEDICATIONS  (STANDING):  heparin   Injectable 5000 Unit(s) SubCutaneous every 12 hours  methylPREDNISolone sodium succinate Injectable 20 milliGRAM(s) IV Push every 8 hours  NIFEdipine XL 30 milliGRAM(s) Oral daily  pantoprazole  Injectable 40 milliGRAM(s) IV Push two times a day  sodium chloride 0.9%. 1000 milliLiter(s) (75 mL/Hr) IV Continuous <Continuous>  sodium zirconium cyclosilicate 10 Gram(s) Oral every 8 hours    MEDICATIONS  (PRN):  acetaminophen     Tablet .. 650 milliGRAM(s) Oral every 6 hours PRN Temp greater or equal to 38C (100.4F), Mild Pain (1 - 3)  aluminum hydroxide/magnesium hydroxide/simethicone Suspension 30 milliLiter(s) Oral every 4 hours PRN Dyspepsia  melatonin 3 milliGRAM(s) Oral at bedtime PRN Insomnia  ondansetron Injectable 4 milliGRAM(s) IV Push every 8 hours PRN Nausea and/or Vomiting      Allergies  penicillin (Rash)      Review of Systems:   Constitutional:  No Fever, No Chills  ENT/Mouth:  No Hearing Changes,  No Difficulty Swallowing  Eyes:  No Eye Pain, No Vision Changes  Cardiovascular:  No Chest Pain, No Palpitations  Respiratory:  No Cough, No Dyspnea  Gastrointestinal:  As described in HPI  Musculoskeletal:  No Joint Swelling, No Back Pain  Skin:  No Skin Lesions, No Jaundice  Neuro:  No Syncope, No Dizziness  Heme/Lymph:  No Bruising, No Bleeding.          Physical Examination:  T(C): 36.6 (01-04-23 @ 08:35), Max: 36.6 (01-04-23 @ 00:13)  HR: 64 (01-04-23 @ 08:35) (64 - 74)  BP: 173/81 (01-04-23 @ 08:35) (117/59 - 173/81)  RR: 18 (01-04-23 @ 08:35) (18 - 18)  SpO2: 96% (01-04-23 @ 08:35) (95% - 96%)          GENERAL: AAOx3, no acute distress.  HEAD:  Atraumatic, Normocephalic  EYES: conjunctiva and sclera clear  NECK: Supple, no JVD or thyromegaly  CHEST/LUNG: Clear to auscultation bilaterally; No wheeze, rhonchi, or rales  HEART: Regular rate and rhythm; normal S1, S2, No murmurs.  ABDOMEN: Soft, nontender, nondistended; Bowel sounds present  NEUROLOGY: No asterixis or tremor.   SKIN: Intact, no jaundice        Data:                        10.0   10.53 )-----------( 414      ( 04 Jan 2023 08:33 )             31.1     Hgb Trend:  10.0  01-04-23 @ 08:33  10.3  01-03-23 @ 16:49      01-04    131<L>  |  92<L>  |  24<H>  ----------------------------<  136<H>  5.4<H>   |  21  |  0.8    Ca    8.6      04 Jan 2023 08:33    TPro  5.8<L>  /  Alb  3.0<L>  /  TBili  0.2  /  DBili  x   /  AST  11  /  ALT  8   /  AlkPhos  80  01-04    Liver panel trend:  TBili 0.2   /   AST 11   /   ALT 8   /   AlkP 80   /   Tptn 5.8   /   Alb 3.0    /   DBili --      01-04  TBili 0.2   /   AST 13   /   ALT 7   /   AlkP 85   /   Tptn 6.0   /   Alb 2.9    /   DBili --      01-03      PT/INR - ( 03 Jan 2023 16:49 )   PT: 12.70 sec;   INR: 1.11 ratio         PTT - ( 03 Jan 2023 16:49 )  PTT:21.3 sec        Radiology:  CT Abdomen and Pelvis w/ IV Cont:   ACC: [id number] EXAM:  CT ABDOMEN AND PELVIS IC                          PROCEDURE DATE:  01/03/2023          INTERPRETATION:  CLINICAL STATEMENT: Chronic diarrhea, 40 pound weight   loss.    TECHNIQUE: Contiguous axial CT images were obtained from the lower chest   to the pubic symphysis following administration of 100cc Omnipaque 350   intravenous contrast.  Oral contrast was not administered.  Reformatted   images in the coronal and sagittal planes were acquired.    COMPARISON CT: CT abdomen pelvis dated 12/10/2022    FINDINGS:    LOWER CHEST: Unremarkable.    HEPATOBILIARY: Unremarkable.    SPLEEN: Unremarkable.    PANCREAS: Unremarkable.    ADRENAL GLANDS: Unremarkable.    KIDNEYS: Symmetrically enhancing. No hydronephrosis.    ABDOMINOPELVIC NODES: Unremarkable.    PELVIC ORGANS: Unremarkable.    PERITONEUM/MESENTERY/BOWEL: No evidence of bowel obstruction,   pneumoperitoneum, or ascites. Persistent/recurrent procto-colonic wall   thickening with internal liquid stool and trace pericolonic fat   stranding. No perforation or abscess    BONES/SOFT TISSUES: Degenerative changes of the hips and spine.    OTHER: Vascular calcifications. Small fat-containing umbilical hernia.      IMPRESSION:    Since 12/10/2022, persistent/recurrent pan proctocolitis. No perforation   or abscess.    --- End of Report ---    VIVIANA OLIVAREZ MD; Resident Radiologist  This document has been electronically signed.  KAMAR MOHAN MD; Attending Radiologist  This document has been electronically signed. Brant  3 2023  7:46PM (01-03-23 @ 17:46)

## 2023-01-05 LAB — GLUCOSE BLDC GLUCOMTR-MCNC: 115 MG/DL — HIGH (ref 70–99)

## 2023-01-05 PROCEDURE — 99233 SBSQ HOSP IP/OBS HIGH 50: CPT

## 2023-01-05 PROCEDURE — 99231 SBSQ HOSP IP/OBS SF/LOW 25: CPT

## 2023-01-05 RX ORDER — I.V. FAT EMULSION 20 G/100ML
1.54 EMULSION INTRAVENOUS
Qty: 100.03 | Refills: 0 | Status: DISCONTINUED | OUTPATIENT
Start: 2023-01-05 | End: 2023-01-05

## 2023-01-05 RX ORDER — ELECTROLYTE SOLUTION,INJ
1 VIAL (ML) INTRAVENOUS
Refills: 0 | Status: DISCONTINUED | OUTPATIENT
Start: 2023-01-05 | End: 2023-01-05

## 2023-01-05 RX ADMIN — I.V. FAT EMULSION 31.3 GM/KG/DAY: 20 EMULSION INTRAVENOUS at 23:39

## 2023-01-05 RX ADMIN — PANTOPRAZOLE SODIUM 40 MILLIGRAM(S): 20 TABLET, DELAYED RELEASE ORAL at 17:28

## 2023-01-05 RX ADMIN — Medication 1 EACH: at 23:32

## 2023-01-05 RX ADMIN — ONDANSETRON 4 MILLIGRAM(S): 8 TABLET, FILM COATED ORAL at 05:53

## 2023-01-05 RX ADMIN — SODIUM ZIRCONIUM CYCLOSILICATE 10 GRAM(S): 10 POWDER, FOR SUSPENSION ORAL at 05:39

## 2023-01-05 RX ADMIN — Medication 20 MILLIGRAM(S): at 14:27

## 2023-01-05 RX ADMIN — Medication 30 MILLIGRAM(S): at 05:39

## 2023-01-05 RX ADMIN — Medication 20 MILLIGRAM(S): at 23:26

## 2023-01-05 RX ADMIN — Medication 20 MILLIGRAM(S): at 05:38

## 2023-01-05 RX ADMIN — ONDANSETRON 4 MILLIGRAM(S): 8 TABLET, FILM COATED ORAL at 23:28

## 2023-01-05 RX ADMIN — HEPARIN SODIUM 5000 UNIT(S): 5000 INJECTION INTRAVENOUS; SUBCUTANEOUS at 17:28

## 2023-01-05 RX ADMIN — HEPARIN SODIUM 5000 UNIT(S): 5000 INJECTION INTRAVENOUS; SUBCUTANEOUS at 05:39

## 2023-01-05 RX ADMIN — PANTOPRAZOLE SODIUM 40 MILLIGRAM(S): 20 TABLET, DELAYED RELEASE ORAL at 05:38

## 2023-01-05 RX ADMIN — SODIUM CHLORIDE 75 MILLILITER(S): 9 INJECTION INTRAMUSCULAR; INTRAVENOUS; SUBCUTANEOUS at 11:00

## 2023-01-05 NOTE — PROGRESS NOTE ADULT - ASSESSMENT
63 y/o M presenting to ED for persistent diarrhea. HPI starts from 12/3 when patient was first admitted after returning from a trip to [place] on 11/14 for bloody diarrhea. Patient was found to have pancolitis on CTAP & GI PCR was positive for EPEC. He was dc'd home to complete x7days of Cipro/Flagyl (as per ID). Was also seen by GI that recommended OP Colonoscopy. 4 days after discharge, patient returns to ED due to unresolved symptoms. Colonoscopy was done it showed Diffuse continuous ulceration, friability, erythema and abnormal vascularity with thick mucoid membrane with contact bleeding were noted in the rectum and sigmoid colon. Prelim results of biopsy were in favor of ulcerative colitis. Patient was started on solumedrol IV and will continue PO prednisone on discharged. Diarrhea and abdominal pain improved significantly on steroids. Patient will f/u with GI as outpatient for further management. patient endorses 4 days after discharged home started having abdominal pain and 4-5 liquid stool, occasionally bloody. he was not able to see his primary GI DrRonny Drummond. endorses decrease po intake and fatigue and feeling dehydrated  In the ED CT abdomen showing persistent proctocolitis. GI is called for evaluation.     # Abdominal pain / diarrhea most likely secondary to UC flare:    - patient is hemodynamically stable  - no leucocytosis, no fever  - CT with persistent pancolitis  - C. diff and repeat GI PCR is negative  - was taking 40 mg po prednisone daily prior to admission   -  and     recommendations:  - clear liquid diet as tolerated  - c/w solumedrol 20 mg IV TID for now  - f/u stool calprotectin and calprotectin   - will plan for flex sig in AM  - please give 1 tap water enemas before bed time and another one 6 AM tomorrow  - NPO after mid night      63 y/o M presenting to ED for persistent diarrhea. HPI starts from 12/3 when patient was first admitted after returning from a trip to [place] on 11/14 for bloody diarrhea. Patient was found to have pancolitis on CTAP & GI PCR was positive for EPEC. He was dc'd home to complete x7days of Cipro/Flagyl (as per ID). Was also seen by GI that recommended OP Colonoscopy. 4 days after discharge, patient returns to ED due to unresolved symptoms. Colonoscopy was done it showed Diffuse continuous ulceration, friability, erythema and abnormal vascularity with thick mucoid membrane with contact bleeding were noted in the rectum and sigmoid colon. Prelim results of biopsy were in favor of ulcerative colitis. Patient was started on solumedrol IV and will continue PO prednisone on discharged. Diarrhea and abdominal pain improved significantly on steroids. Patient will f/u with GI as outpatient for further management. patient endorses 4 days after discharged home started having abdominal pain and 4-5 liquid stool, occasionally bloody. he was not able to see his primary GI DrRonny Drummond. endorses decrease po intake and fatigue and feeling dehydrated  In the ED CT abdomen showing persistent proctocolitis. GI is called for evaluation.     # Abdominal pain / diarrhea most likely secondary to UC flare:    - patient is hemodynamically stable  - no leucocytosis, no fever  - CT with persistent pancolitis  - C. diff and repeat GI PCR is negative  - was taking 40 mg po prednisone daily prior to admission   -  and     recommendations:  - clear liquid diet as tolerated  - c/w solumedrol 20 mg IV TID for now  - f/u stool calprotectin and calprotectin   - will plan for flex sig in AM  - please give 1 tap water enemas before bed time and another one 6 AM tomorrow  - NPO after mid night   - can use imodium PRN  - encourage PO intake over PPN (pt like ensures)   63 y/o M presenting to ED for persistent diarrhea. HPI starts from 12/3 when patient was first admitted after returning from a trip to [place] on 11/14 for bloody diarrhea. Patient was found to have pancolitis on CTAP & GI PCR was positive for EPEC. He was dc'd home to complete x7days of Cipro/Flagyl (as per ID). Was also seen by GI that recommended OP Colonoscopy. 4 days after discharge, patient returns to ED due to unresolved symptoms. Colonoscopy was done it showed Diffuse continuous ulceration, friability, erythema and abnormal vascularity with thick mucoid membrane with contact bleeding were noted in the rectum and sigmoid colon. Prelim results of biopsy were in favor of ulcerative colitis. Patient was started on solumedrol IV and will continue PO prednisone on discharged. Diarrhea and abdominal pain improved significantly on steroids. Patient will f/u with GI as outpatient for further management. patient endorses 4 days after discharged home started having abdominal pain and 4-5 liquid stool, occasionally bloody. he was not able to see his primary GI DrRonny Drummond. endorses decrease po intake and fatigue and feeling dehydrated  In the ED CT abdomen showing persistent proctocolitis. GI is called for evaluation.     # Abdominal pain / diarrhea most likely secondary to UC flare:    - patient is hemodynamically stable  - no leucocytosis, no fever  - CT with persistent pancolitis  - C. diff and repeat GI PCR is negative  - was taking 40 mg po prednisone daily prior to admission   -  and   - prior work up reviewed - will ask daughter to get confirmation of negative TB test     recommendations:  - clear liquid diet as tolerated  - c/w solumedrol 20 mg IV TID for now  - f/u stool calprotectin and calprotectin   - will plan for flex sig in AM  - please give 1 tap water enemas before bed time and another one 6 AM tomorrow  - NPO after mid night   - can use imodium PRN  - encourage PO intake over PPN (pt like ensures)  - if patient is not improving, will plan for starting inpatient [employer] (risks/benefits/alternatives discussed with patient)  - discussed with medical team , recommend /case management consult for inpatient [employer]icaid     discussed extensively with patient / wife / daughters    61 y/o M  w/ HTN, presenting to ED for persistent diarrhea and colitis seen on CT.   12/3 admitted after returning from a trip to [place] on 11/14 for bloody diarrhea & pancolitis on CTAP; GI PCR was positive for EPEC s/p 7 d Cipro/Flagyl (as per ID).   4 days after discharge, patient returns to ED due to unresolved symptoms s/p Colonoscopy 12/14/22 w/ Diffuse continuous ulceration, friability, erythema and abnormal vascularity with thick mucoid membrane with contact bleeding were noted in the rectum and sigmoid colon. Prelim results of biopsy were in favor of ulcerative colitis.   Patient was started on solumedrol IV and will continue PO prednisone on discharged.   Diarrhea and abdominal pain improved significantly on steroids but returned 4 days after discharged home started having abdominal pain and 4-5 liquid stool, occasionally bloody, decrease po intake and fatigue and feeling dehydrated.  CT abdomen showing persistent proctocolitis.     # Abdominal pain / diarrhea most likely secondary to active UC given chronicity of symptoms and lack of improvement w/ abx   - patient is hemodynamically stable  - no leucocytosis, no fever  - CT with persistent pancolitis  - C. diff and repeat GI PCR is negative  - was taking 40 mg po prednisone daily prior to admission   -  and   - prior work up reviewed pt up to date on all live vaccines but indeterminate quantiferon - will ask daughter to get confirmation of negative TB test done which was recently done    recommendations:  - clear liquid diet as tolerated  - c/w solumedrol 20 mg IV TID for now  - f/u stool calprotectin and calprotectin   - will plan for flex sig in AM  - please give 1 tap water enemas before bed time and another one 6 AM tomorrow  - NPO after mid night   - can use imodium PRN  - encourage PO intake over PPN (pt like ensures)  - if patient is not improving after 3 days of IV steroids, will plan for starting inpatient Infliximab (risks/benefits/alternatives discussed with patient) pending confirmation of recent negative Tb testing  - discussed with medical team , recommend /case management consult for inpatient Infliximab     discussed extensively with patient / wife / daughters  63 y/o M  w/ HTN, presenting to ED for persistent diarrhea and colitis seen on CT.   12/3 admitted after returning from a trip to [place] on 11/14 for bloody diarrhea & pancolitis on CTAP; GI PCR was positive for EPEC s/p 7 d Cipro/Flagyl (as per ID).   4 days after discharge, patient returns to ED due to unresolved symptoms s/p Colonoscopy 12/14/22 w/ Diffuse continuous ulceration, friability, erythema and abnormal vascularity with thick mucoid membrane with contact bleeding were noted in the rectum and sigmoid colon. Prelim results of biopsy were in favor of ulcerative colitis.   Patient was started on solumedrol IV and will continue PO prednisone on discharged.   Diarrhea and abdominal pain improved significantly on steroids but returned 4 days after discharged home started having abdominal pain and 4-5 liquid stool, occasionally bloody, decrease po intake and fatigue and feeling dehydrated.  hx is also remarkable for unintentional weight loss of 40 lbs since the onset of symptoms.  CT abdomen showing persistent proctocolitis.     # Abdominal pain / diarrhea most likely secondary to active UC given chronicity of symptoms and lack of improvement w/ abx   - patient is hemodynamically stable  - no leucocytosis, no fever  - CT with persistent pancolitis  - C. diff and repeat GI PCR is negative  - was taking 40 mg po prednisone daily prior to admission   -  and   - prior work up reviewed pt up to date on all live vaccines but indeterminate quantiferon - will ask daughter to get confirmation of negative TB test done which was recently done    recommendations:  - clear liquid diet as tolerated  - c/w solumedrol 20 mg IV TID for now  - f/u stool calprotectin and calprotectin   - will plan for flex sig in AM  - please give 1 tap water enemas before bed time and another one 6 AM tomorrow  - NPO after mid night   - can use imodium PRN  - encourage PO intake over PPN (pt like ensures)  - if patient is not improving after 3 days of IV steroids, will plan for starting inpatient Infliximab (risks/benefits/alternatives discussed with patient) pending confirmation of recent negative Tb testing  - discussed with medical team , recommend /case management consult for inpatient Infliximab     discussed extensively with patient / wife / daughters

## 2023-01-05 NOTE — CONSULT NOTE ADULT - SUBJECTIVE AND OBJECTIVE BOX
NUTRITION SUPPORT TEAM  -  CONSULT NOTE     ADMISSION HPI:  63 y/o M presenting to ED for persistent diarrhea. HPI starts from 12/3 when patient was first admitted after returning from a trip to [place] on 11/14 for bloody diarrhea. Patient was found to have pancolitis on CTAP & GI PCR was positive for EPEC. He was dc'd home to complete x7days of Cipro/Flagyl (as per ID). Was also seen by GI that recommended OP Colonoscopy. 4 days after discharge, patient returns to ED due to unresolved symptoms. Colonoscopy was done it showed Diffuse continuous ulceration, friability, erythema and abnormal vascularity with thick mucoid membrane with contact bleeding were noted in the rectum and sigmoid colon. Prelim results of biopsy were in favor of ulcerative colitis. Patient was started on solumedrol IV and will continue PO prednisone on discharged. Diarrhea and abdominal pain improved significantly on steroids. Patient will f/u with GI as outpatient for further management.   Today patient is presenting because since his discharge he has been feeling weak. He states that he is not able to tolerate any po intake. He is still having diarrhea 3-4 times daily. He is still experiencing blood in his stool. Denies any fevers. He is currently on Prednisone 40 mg daily at home and was going to transition to 30 mg tomorrow.   T(F): 96.5 (03 Jan 2023 14:13), Max: 96.5 (03 Jan 2023 14:13)  HR: 113 (03 Jan 2023 14:13) (113 - 113)  BP: 124/82 (03 Jan 2023 14:13) (124/82 - 124/82)  RR: 20 (03 Jan 2023 14:13) (20 - 20)  SpO2: 98% (03 Jan 2023 14:13) (98% - 98%)    Labs signifcant for WBC 11.48, HGB 10.3( same as last admission- baseline is 14 back in december), Na 125, K 5.8 Cr 1.0 ( baseline is .7)  CT abdomen showing persistent proctocolitis.    (03 Jan 2023 20:40)  NUTRITION SUPPORT NOTE:    pt stated he has been having diarrhea and rectal bleed  this admission pt denies any bleeding  diarrhea with everything except fruits as per pt  REVIEW OF SYSTEMS:  Negative except as noted above.     PAST MEDICAL/SURGICAL HISTORY:   HTN (hypertension)  No significant past surgical history  ALLERGIES:  penicillin (Rash)  VITALS:  T(F): 96.8 (01-05 @ 05:21), Max: 96.8 (01-05 @ 05:21)  HR: 63 (01-05 @ 05:21) (63 - 63)  BP: 120/61 (01-05 @ 05:21) (120/61 - 120/61)  RR: 18 (01-05 @ 05:21) (18 - 18)    HEIGHT/WEIGHT/BMI:   Height (cm): 182.9 (01-03), 182.9 (12-14), 182.9 (12-03)  Weight (kg): 95.3 (12-14), 94.347 (12-03)  BMI (kg/m2): 28.5 (01-03), 28.5 (12-14), 28.2 (12-03)    PHYSICAL EXAM:   GENERAL: NAD, resting comfortably  family at bedside  HEENT: Moist mucous membranes, Good dentition, No lesions  ABDOMEN: Soft, n/d +tenderness lower abdomen   EXTREMITIES:  No clubbing, cyanosis, or edema  SKIN: warm and well perfused; No obvious rashes or lesions  IV ACCESS:   ENTERAL ACCESS: po diet    STANDING MEDICATIONS:   heparin   Injectable 5000 Unit(s) SubCutaneous every 12 hours  methylPREDNISolone sodium succinate Injectable 20 milliGRAM(s) IV Push every 8 hours  NIFEdipine XL 30 milliGRAM(s) Oral daily  pantoprazole  Injectable 40 milliGRAM(s) IV Push two times a day  sodium chloride 0.9%. 1000 milliLiter(s) IV Continuous <Continuous>      LABS:                         10.0   10.53 )-----------( 414      ( 04 Jan 2023 08:33 )             31.1     131<L>  |  92<L>  |  24<H>  ----------------------------<  136<H>          (01-04-23 @ 08:33)  5.4<H>   |  21  |  0.8    Ca    8.6          (01-04-23 @ 08:33)    TPro  5.8<L>  /  Alb  3.0<L>  /  TBili  0.2  /  DBili  x   /  AST  11  /  ALT  8   /  AlkPhos  80       01-04-23 @ 08:33  Triglycerides, Serum: 123 mg/dL (01-04 @ 08:33)  A1c: 5.9 % (01-04-23 @ 08:33)  Calprotectin, Stool (12.12.22 @ 18:40)   Calprotectin, Stool: 6403: **Results verified by repeat testing**   Clostridium difficile Toxin by PCR (01.04.23 @ 12:15)   C Diff by PCR Result: NotDetec: Method: CDPCR   Blood Glucose (Past 24 hours):  115 mg/dL (01-05 @ 07:23)  133 mg/dL (01-04 @ 16:29)      DIET:   Diet, Soft and Bite Sized:   Fiber/Residue Restricted (01-05-23 @ 09:13) [Active]    RADIOLOGY:   < from: CT Abdomen and Pelvis w/ IV Cont (01.03.23 @ 17:46) >    IMPRESSION:    Since 12/10/2022, persistent/recurrent pan proctocolitis. No perforation   or abscess.    < end of copied text >  < from: CT Abdomen and Pelvis w/ IV Cont (12.10.22 @ 16:36) >  IMPRESSION:  Diffuse colonic wall thickening, compatible with pancolitis, without   significant change since CT abdomen pelvis 12/2/2022.    < from: CT Abdomen and Pelvis w/ IV Cont (12.02.22 @ 22:07) >  IMPRESSION:  Pancolitis.   NUTRITION SUPPORT TEAM  -  CONSULT NOTE     ADMISSION HPI:  61 y/o M presenting to ED for persistent diarrhea. HPI starts from 12/3 when patient was first admitted after returning from a trip to [place] on 11/14 for bloody diarrhea. Patient was found to have pancolitis on CTAP & GI PCR was positive for EPEC. He was dc'd home to complete x7days of Cipro/Flagyl (as per ID). Was also seen by GI that recommended OP Colonoscopy. 4 days after discharge, patient returns to ED due to unresolved symptoms. Colonoscopy was done it showed Diffuse continuous ulceration, friability, erythema and abnormal vascularity with thick mucoid membrane with contact bleeding were noted in the rectum and sigmoid colon. Prelim results of biopsy were in favor of ulcerative colitis. Patient was started on solumedrol IV and will continue PO prednisone on discharged. Diarrhea and abdominal pain improved significantly on steroids. Patient will f/u with GI as outpatient for further management.   Today patient is presenting because since his discharge he has been feeling weak. He states that he is not able to tolerate any po intake. He is still having diarrhea 3-4 times daily. He is still experiencing blood in his stool. Denies any fevers. He is currently on Prednisone 40 mg daily at home and was going to transition to 30 mg tomorrow.   T(F): 96.5 (03 Jan 2023 14:13), Max: 96.5 (03 Jan 2023 14:13)  HR: 113 (03 Jan 2023 14:13) (113 - 113)  BP: 124/82 (03 Jan 2023 14:13) (124/82 - 124/82)  RR: 20 (03 Jan 2023 14:13) (20 - 20)  SpO2: 98% (03 Jan 2023 14:13) (98% - 98%)    Labs signifcant for WBC 11.48, HGB 10.3( same as last admission- baseline is 14 back in december), Na 125, K 5.8 Cr 1.0 ( baseline is .7)  CT abdomen showing persistent proctocolitis.    (03 Jan 2023 20:40)    NUTRITION SUPPORT NOTE:    pt stated he has been having diarrhea and rectal bleeding  He has fecal urgency and painful cramping (particularly ~ 20 min after having a po meal) and passes watery brown stools.   No bloody stools since admission this time.  diarrhea with everything except fruits as per pt  pt reports about 40 lb weight loss since onset of the infectious colitis in November.    REVIEW OF SYSTEMS:  Negative except as noted above.     PAST MEDICAL/SURGICAL HISTORY:   HTN (hypertension)  No significant past surgical history  ALLERGIES:  penicillin (Rash)  VITALS:  T(F): 96.8 (01-05 @ 05:21), Max: 96.8 (01-05 @ 05:21)  HR: 63 (01-05 @ 05:21) (63 - 63)  BP: 120/61 (01-05 @ 05:21) (120/61 - 120/61)  RR: 18 (01-05 @ 05:21) (18 - 18)    HEIGHT/WEIGHT/BMI:   Height (cm): 182.9 (01-03), 182.9 (12-14), 182.9 (12-03)  Weight (kg): 95.3 (12-14), 94.347 (12-03)  BMI (kg/m2): 28.5 (01-03), 28.5 (12-14), 28.2 (12-03)    PHYSICAL EXAM:   GENERAL: weak, + abdominal discomfort lying in bed  family at bedside  HEENT: Moist mucous membranes, Good dentition, No lesions, poor skin turgor, conj pale  ABDOMEN: Soft, n/d +tenderness lower abdomen   EXTREMITIES:  No clubbing, cyanosis, or edema  SKIN: warm and well perfused; No obvious rashes or lesions  + facial, hand wasting, + loss of LBM visible in calves and upper arms  IV ACCESS: R ac    STANDING MEDICATIONS:   heparin   Injectable 5000 Unit(s) SubCutaneous every 12 hours  methylPREDNISolone sodium succinate Injectable 20 milliGRAM(s) IV Push every 8 hours  NIFEdipine XL 30 milliGRAM(s) Oral daily  pantoprazole  Injectable 40 milliGRAM(s) IV Push two times a day  sodium chloride 0.9%. 1000 milliLiter(s) IV Continuous <Continuous>      LABS:                         10.0   10.53 )-----------( 414      ( 04 Jan 2023 08:33 )             31.1     131<L>  |  92<L>  |  24<H>  ----------------------------<  136<H>          (01-04-23 @ 08:33)  5.4<H>   |  21  |  0.8    Ca    8.6          (01-04-23 @ 08:33)    TPro  5.8<L>  /  Alb  3.0<L>  /  TBili  0.2  /  DBili  x   /  AST  11  /  ALT  8   /  AlkPhos  80       01-04-23 @ 08:33  Triglycerides, Serum: 123 mg/dL (01-04 @ 08:33)  A1c: 5.9 % (01-04-23 @ 08:33)  Calprotectin, Stool (12.12.22 @ 18:40)   Calprotectin, Stool: 6403: **Results verified by repeat testing**   Clostridium difficile Toxin by PCR (01.04.23 @ 12:15)   C Diff by PCR Result: NotDetec: Method: CDPCR   Blood Glucose (Past 24 hours):  115 mg/dL (01-05 @ 07:23)  133 mg/dL (01-04 @ 16:29)      DIET:   Diet, Soft and Bite Sized:   Fiber/Residue Restricted (01-05-23 @ 09:13) [Active]    RADIOLOGY:   < from: CT Abdomen and Pelvis w/ IV Cont (01.03.23 @ 17:46) >    IMPRESSION:    Since 12/10/2022, persistent/recurrent pan proctocolitis. No perforation   or abscess.    < end of copied text >  < from: CT Abdomen and Pelvis w/ IV Cont (12.10.22 @ 16:36) >  IMPRESSION:  Diffuse colonic wall thickening, compatible with pancolitis, without   significant change since CT abdomen pelvis 12/2/2022.    < from: CT Abdomen and Pelvis w/ IV Cont (12.02.22 @ 22:07) >  IMPRESSION:  Pancolitis.

## 2023-01-05 NOTE — PROGRESS NOTE ADULT - SUBJECTIVE AND OBJECTIVE BOX
Gastroenterology progress note:     Patient is a 62y old  Male who presents with a chief complaint of abdominal pain/ diarrhea (05 Jan 2023 14:24)    Admitted on: 01-03-23    We are following the patient for: colitis        Interval History:    endorses pain   still can not tolerating po diet  5 liquid bms with no blood over night     PAST MEDICAL & SURGICAL HISTORY:  HTN (hypertension)      No significant past surgical history          MEDICATIONS  (STANDING):  fat emulsion (Fish Oil and Plant Based) 20% Infusion 1.539 Gm/kG/Day (31.3 mL/Hr) IV Continuous <Continuous>  heparin   Injectable 5000 Unit(s) SubCutaneous every 12 hours  methylPREDNISolone sodium succinate Injectable 20 milliGRAM(s) IV Push every 8 hours  NIFEdipine XL 30 milliGRAM(s) Oral daily  pantoprazole  Injectable 40 milliGRAM(s) IV Push two times a day  Parenteral Nutrition - Adult 1 Each (60 mL/Hr) TPN Continuous <Continuous>  sodium chloride 0.9%. 1000 milliLiter(s) (75 mL/Hr) IV Continuous <Continuous>    MEDICATIONS  (PRN):  acetaminophen     Tablet .. 650 milliGRAM(s) Oral every 6 hours PRN Temp greater or equal to 38C (100.4F), Mild Pain (1 - 3)  aluminum hydroxide/magnesium hydroxide/simethicone Suspension 30 milliLiter(s) Oral every 4 hours PRN Dyspepsia  melatonin 3 milliGRAM(s) Oral at bedtime PRN Insomnia  ondansetron Injectable 4 milliGRAM(s) IV Push every 8 hours PRN Nausea and/or Vomiting      Allergies  penicillin (Rash)      Review of Systems:   Cardiovascular:  No Chest Pain, No Palpitations  Respiratory:  No Cough, No Dyspnea  Gastrointestinal:  As described in HPI  Skin:  No Skin Lesions, No Jaundice  Neuro:  No Syncope, No Dizziness    Physical Examination:  T(C): 36.1 (01-05-23 @ 13:00), Max: 36.7 (01-04-23 @ 20:16)  HR: 62 (01-05-23 @ 13:00) (62 - 71)  BP: 127/62 (01-05-23 @ 13:00) (118/65 - 172/76)  RR: 18 (01-05-23 @ 13:00) (18 - 18)  SpO2: 97% (01-04-23 @ 20:16) (97% - 100%)        GENERAL: AAOx3, no acute distress.  HEAD:  Atraumatic, Normocephalic  EYES: conjunctiva and sclera clear  NECK: Supple, no JVD or thyromegaly  CHEST/LUNG: Clear to auscultation bilaterally; No wheeze, rhonchi, or rales  HEART: Regular rate and rhythm; normal S1, S2, No murmurs.  ABDOMEN: Soft, nontender, nondistended; Bowel sounds present  NEUROLOGY: No asterixis or tremor.   SKIN: Intact, no jaundice     Data:                        10.0   10.53 )-----------( 414      ( 04 Jan 2023 08:33 )             31.1     Hgb trend:  10.0  01-04-23 @ 08:33  10.3  01-03-23 @ 16:49      01-04    131<L>  |  92<L>  |  24<H>  ----------------------------<  136<H>  5.4<H>   |  21  |  0.8    Ca    8.6      04 Jan 2023 08:33    TPro  5.8<L>  /  Alb  3.0<L>  /  TBili  0.2  /  DBili  x   /  AST  11  /  ALT  8   /  AlkPhos  80  01-04    Liver panel trend:  TBili 0.2   /   AST 11   /   ALT 8   /   AlkP 80   /   Tptn 5.8   /   Alb 3.0    /   DBili --      01-04  TBili 0.2   /   AST 13   /   ALT 7   /   AlkP 85   /   Tptn 6.0   /   Alb 2.9    /   DBili --      01-03      PT/INR - ( 03 Jan 2023 16:49 )   PT: 12.70 sec;   INR: 1.11 ratio         PTT - ( 03 Jan 2023 16:49 )  PTT:21.3 sec       Radiology:

## 2023-01-05 NOTE — PROGRESS NOTE ADULT - ASSESSMENT
62y M hx HTN, recent EPEC and then biopsy + UC on colonoscopy presents c continued UC flare c dehydration/ prerenal azotemia/erich with hyperkalemia, 2/2 inability to tolerate PO, and subsequent deconditioning    GI eval - > IV steroid  patient wants to challenge with some po intake, but remains inflammed and tender on exam  can trial low residue + appreciated nutrition recs for peptamin sips  ppn in addition  f/u repeat K s/p lokelma  holding ARB for ERICH, f/u cr repeat  vte ppx    #Progress Note Handoff    Pending (specify):  ppn, f/u lytes/zinc lvl. f/u s/p trial of low residue c peptamin sips, c/w iv steroid    Family discussion: d/w wife at bedside    Disposition: likely home

## 2023-01-05 NOTE — CONSULT NOTE ADULT - ASSESSMENT
ASSESSMENT   Abdominal pain / diarrhea most likely secondary to UC flare: R/O infectious process:  -- CT with persistent pancolitis  - C. diff is negative  - was taking 40 mg po prednisone daily prior to admission     recommendations:  - suggest adding vital 1.0 x2 or peptamen 1.5 x2 daily  -check bmp/phos/mg and correct lytes        Abdominal pain / diarrhea most likely secondary to UC flare: R/O infectious process:  -- CT with persistent pancolitis  - HbA1c mildly elevated, pt on prednisone PTA  - severe acute malnutrition    recommendations:  - suggest adding vital 1.0 x2 or Peptamen 1.5 x2 daily po - to sip slowly between meals     explained to pt that taste isn't great because it's hydrolyzed, but that SB will absorb all the nutrients  - check zinc level  - will start PPN tonight - course of treatment ~ 7 days

## 2023-01-05 NOTE — PROGRESS NOTE ADULT - SUBJECTIVE AND OBJECTIVE BOX
HODABEATRIZ SPIECR  62y  Male      Patient is a 62y old  Male who presents with a chief complaint of abdominal pain/ diarrhea    INTERVAL HPI/OVERNIGHT EVENTS: continued abdominal cramping and diarrhea. no bleeding      REVIEW OF SYSTEMS:  limited as above  All other review of systems negative    T(C): 36.1 (23 @ 13:00), Max: 36.7 (23 @ 20:16)  HR: 62 (23 @ 13:00) (62 - 71)  BP: 127/62 (23 @ 13:00) (118/65 - 172/76)  RR: 18 (23 @ 13:00) (18 - 18)  SpO2: 97% (23 @ 20:16) (97% - 100%)  Wt(kg): --Vital Signs Last 24 Hrs  T(C): 36.1 (2023 13:00), Max: 36.7 (2023 20:16)  T(F): 96.9 (2023 13:00), Max: 98.1 (2023 20:16)  HR: 62 (2023 13:00) (62 - 71)  BP: 127/62 (2023 13:00) (118/65 - 172/76)  BP(mean): 87 (2023 20:16) (87 - 87)  RR: 18 (2023 13:00) (18 - 18)  SpO2: 97% (2023 20:16) (97% - 100%)    Parameters below as of 2023 20:16  Patient On (Oxygen Delivery Method): room air            PHYSICAL EXAM:  GENERAL: temporal wasting  PSYCH: no agitation, baseline mentation  NERVOUS SYSTEM:  Alert & Oriented X3, no new focal deficits  PULMONARY: Clear to percussion bilaterally; No rales, rhonchi, wheezing, or rubs  CARDIOVASCULAR: Regular rate and rhythm; No murmurs, rubs, or gallops  GI: diffusely tender, most at LUQ, no rebound +BS   no james  EXTREMITIES:  2+ Peripheral Pulses, No clubbing, cyanosis, or edema  SKIN less dry    Consultant(s) Notes Reviewed:  [x ] YES  [ ] NO    Discussed with Consultants/Other Providers [ x] YES     LABS                          10.0   10.53 )-----------( 414      ( 2023 08:33 )             31.1     01-04    131<L>  |  92<L>  |  24<H>  ----------------------------<  136<H>  5.4<H>   |  21  |  0.8    Ca    8.6      2023 08:33    TPro  5.8<L>  /  Alb  3.0<L>  /  TBili  0.2  /  DBili  x   /  AST  11  /  ALT  8   /  AlkPhos  80  -      Urinalysis Basic - ( 2023 10:10 )    Color: Yellow / Appearance: Clear / S.048 / pH: x  Gluc: x / Ketone: Small  / Bili: Small / Urobili: <2 mg/dL   Blood: x / Protein: 100 mg/dL / Nitrite: Negative   Leuk Esterase: Negative / RBC: 5 /HPF / WBC 4 /HPF   Sq Epi: x / Non Sq Epi: 5 /HPF / Bacteria: Negative      PT/INR - ( 2023 16:49 )   PT: 12.70 sec;   INR: 1.11 ratio         PTT - ( 2023 16:49 )  PTT:21.3 sec  Lactate Trend        CAPILLARY BLOOD GLUCOSE      POCT Blood Glucose.: 115 mg/dL (2023 07:23)        RADIOLOGY & ADDITIONAL TESTS:    Imaging Personally Reviewed:  [ ] YES  [ ] NO    HEALTH ISSUES - PROBLEM Dx:

## 2023-01-05 NOTE — PATIENT PROFILE ADULT - FALL HARM RISK - UNIVERSAL INTERVENTIONS
Bed in lowest position, wheels locked, appropriate side rails in place/Call bell, personal items and telephone in reach/Instruct patient to call for assistance before getting out of bed or chair/Non-slip footwear when patient is out of bed/[place] to call system/Physically safe environment - no spills, clutter or unnecessary equipment/Purposeful Proactive Rounding/Room/bathroom lighting operational, light cord in reach

## 2023-01-06 ENCOUNTER — TRANSCRIPTION ENCOUNTER (OUTPATIENT)
Age: 63
End: 2023-01-06

## 2023-01-06 ENCOUNTER — RESULT REVIEW (OUTPATIENT)
Age: 63
End: 2023-01-06

## 2023-01-06 LAB
ALBUMIN SERPL ELPH-MCNC: 2.9 G/DL — LOW (ref 3.5–5.2)
ALP SERPL-CCNC: 71 U/L — SIGNIFICANT CHANGE UP (ref 30–115)
ALT FLD-CCNC: 10 U/L — SIGNIFICANT CHANGE UP (ref 0–41)
ANION GAP SERPL CALC-SCNC: 11 MMOL/L — SIGNIFICANT CHANGE UP (ref 7–14)
AST SERPL-CCNC: 11 U/L — SIGNIFICANT CHANGE UP (ref 0–41)
BASOPHILS # BLD AUTO: 0.06 K/UL — SIGNIFICANT CHANGE UP (ref 0–0.2)
BASOPHILS NFR BLD AUTO: 0.6 % — SIGNIFICANT CHANGE UP (ref 0–1)
BILIRUB SERPL-MCNC: 0.3 MG/DL — SIGNIFICANT CHANGE UP (ref 0.2–1.2)
BUN SERPL-MCNC: 18 MG/DL — SIGNIFICANT CHANGE UP (ref 10–20)
CALCIUM SERPL-MCNC: 8.3 MG/DL — LOW (ref 8.4–10.5)
CHLORIDE SERPL-SCNC: 94 MMOL/L — LOW (ref 98–110)
CO2 SERPL-SCNC: 26 MMOL/L — SIGNIFICANT CHANGE UP (ref 17–32)
CREAT SERPL-MCNC: 0.6 MG/DL — LOW (ref 0.7–1.5)
EGFR: 110 ML/MIN/1.73M2 — SIGNIFICANT CHANGE UP
EOSINOPHIL # BLD AUTO: 0.03 K/UL — SIGNIFICANT CHANGE UP (ref 0–0.7)
EOSINOPHIL NFR BLD AUTO: 0.3 % — SIGNIFICANT CHANGE UP (ref 0–8)
GLUCOSE SERPL-MCNC: 121 MG/DL — HIGH (ref 70–99)
HCT VFR BLD CALC: 28.2 % — LOW (ref 42–52)
HGB BLD-MCNC: 9.4 G/DL — LOW (ref 14–18)
IMM GRANULOCYTES NFR BLD AUTO: 0.6 % — HIGH (ref 0.1–0.3)
LYMPHOCYTES # BLD AUTO: 0.47 K/UL — LOW (ref 1.2–3.4)
LYMPHOCYTES # BLD AUTO: 5 % — LOW (ref 20.5–51.1)
MAGNESIUM SERPL-MCNC: 2.3 MG/DL — SIGNIFICANT CHANGE UP (ref 1.8–2.4)
MCHC RBC-ENTMCNC: 29.2 PG — SIGNIFICANT CHANGE UP (ref 27–31)
MCHC RBC-ENTMCNC: 33.3 G/DL — SIGNIFICANT CHANGE UP (ref 32–37)
MCV RBC AUTO: 87.6 FL — SIGNIFICANT CHANGE UP (ref 80–94)
MONOCYTES # BLD AUTO: 0.54 K/UL — SIGNIFICANT CHANGE UP (ref 0.1–0.6)
MONOCYTES NFR BLD AUTO: 5.8 % — SIGNIFICANT CHANGE UP (ref 1.7–9.3)
NEUTROPHILS # BLD AUTO: 8.21 K/UL — HIGH (ref 1.4–6.5)
NEUTROPHILS NFR BLD AUTO: 87.7 % — HIGH (ref 42.2–75.2)
NRBC # BLD: 0 /100 WBCS — SIGNIFICANT CHANGE UP (ref 0–0)
PHOSPHATE SERPL-MCNC: 2.6 MG/DL — SIGNIFICANT CHANGE UP (ref 2.1–4.9)
PLATELET # BLD AUTO: 432 K/UL — HIGH (ref 130–400)
POTASSIUM SERPL-MCNC: 4.9 MMOL/L — SIGNIFICANT CHANGE UP (ref 3.5–5)
POTASSIUM SERPL-SCNC: 4.9 MMOL/L — SIGNIFICANT CHANGE UP (ref 3.5–5)
PROT SERPL-MCNC: 5.4 G/DL — LOW (ref 6–8)
RBC # BLD: 3.22 M/UL — LOW (ref 4.7–6.1)
RBC # FLD: 13.2 % — SIGNIFICANT CHANGE UP (ref 11.5–14.5)
SODIUM SERPL-SCNC: 131 MMOL/L — LOW (ref 135–146)
WBC # BLD: 9.37 K/UL — SIGNIFICANT CHANGE UP (ref 4.8–10.8)
WBC # FLD AUTO: 9.37 K/UL — SIGNIFICANT CHANGE UP (ref 4.8–10.8)

## 2023-01-06 PROCEDURE — 88305 TISSUE EXAM BY PATHOLOGIST: CPT | Mod: 26

## 2023-01-06 PROCEDURE — 99232 SBSQ HOSP IP/OBS MODERATE 35: CPT

## 2023-01-06 PROCEDURE — 45331 SIGMOIDOSCOPY AND BIOPSY: CPT

## 2023-01-06 RX ORDER — ELECTROLYTE SOLUTION,INJ
1 VIAL (ML) INTRAVENOUS
Refills: 0 | Status: DISCONTINUED | OUTPATIENT
Start: 2023-01-06 | End: 2023-01-07

## 2023-01-06 RX ORDER — I.V. FAT EMULSION 20 G/100ML
1.54 EMULSION INTRAVENOUS
Qty: 100.03 | Refills: 0 | Status: DISCONTINUED | OUTPATIENT
Start: 2023-01-06 | End: 2023-01-07

## 2023-01-06 RX ADMIN — PANTOPRAZOLE SODIUM 40 MILLIGRAM(S): 20 TABLET, DELAYED RELEASE ORAL at 14:00

## 2023-01-06 RX ADMIN — I.V. FAT EMULSION 31.3 GM/KG/DAY: 20 EMULSION INTRAVENOUS at 21:30

## 2023-01-06 RX ADMIN — Medication 20 MILLIGRAM(S): at 21:36

## 2023-01-06 RX ADMIN — PANTOPRAZOLE SODIUM 40 MILLIGRAM(S): 20 TABLET, DELAYED RELEASE ORAL at 05:31

## 2023-01-06 RX ADMIN — Medication 30 MILLIGRAM(S): at 05:45

## 2023-01-06 RX ADMIN — Medication 1 EACH: at 21:31

## 2023-01-06 RX ADMIN — ONDANSETRON 4 MILLIGRAM(S): 8 TABLET, FILM COATED ORAL at 21:36

## 2023-01-06 RX ADMIN — Medication 20 MILLIGRAM(S): at 05:31

## 2023-01-06 RX ADMIN — Medication 20 MILLIGRAM(S): at 14:00

## 2023-01-06 RX ADMIN — ONDANSETRON 4 MILLIGRAM(S): 8 TABLET, FILM COATED ORAL at 14:00

## 2023-01-06 NOTE — PROGRESS NOTE ADULT - ASSESSMENT
62y M hx HTN, recent EPEC and then biopsy + UC on colonoscopy presents c continued UC flare c dehydration/ prerenal azotemia/erich with hyperkalemia, 2/2 inability to tolerate PO, and subsequent deconditioning    c/w iv steroid for now- if non responder, may need ?remicaid. d/w gi  flex sig today  c/w ppin  held ARB for ERICH, f/u cr repeat improved, reasonable to resume in 1-2 days  vte ppx    #Progress Note Handoff    Pending (specify):  flex sig today, c/w ppn, f/u lytes/zinc lvl. iv steroid for now, with possibility for ?remicaid. LASHAY springer    Family discussion: d/w wife at bedside previously    Disposition: likely home

## 2023-01-06 NOTE — PROGRESS NOTE ADULT - SUBJECTIVE AND OBJECTIVE BOX
HODA BEATRIZ  62y  Male      Patient is a 62y old  Male who presents with a chief complaint of abdominal pain/ diarrhea (05 Jan 2023 14:24)      INTERVAL HPI/OVERNIGHT EVENTS: diarrhea yesterday when tried to advance diet, c lower abdominal cramping      REVIEW OF SYSTEMS:  limited as above  All other review of systems negative    T(C): 36.3 (01-06-23 @ 13:10), Max: 36.3 (01-06-23 @ 13:10)  HR: 68 (01-06-23 @ 13:10) (65 - 68)  BP: 122/63 (01-06-23 @ 13:10) (122/63 - 126/65)  RR: 18 (01-06-23 @ 13:10) (18 - 18)  SpO2: --  Wt(kg): --Vital Signs Last 24 Hrs  T(C): 36.3 (06 Jan 2023 13:10), Max: 36.3 (06 Jan 2023 13:10)  T(F): 97.3 (06 Jan 2023 13:10), Max: 97.3 (06 Jan 2023 13:10)  HR: 68 (06 Jan 2023 13:10) (65 - 68)  BP: 122/63 (06 Jan 2023 13:10) (122/63 - 126/65)  BP(mean): --  RR: 18 (06 Jan 2023 13:10) (18 - 18)  SpO2: --            PHYSICAL EXAM:  GENERAL: NAD  PSYCH: no agitation, baseline mentation  NERVOUS SYSTEM:  Alert & Oriented X3, no new focal deficits  PULMONARY: Clear to percussion bilaterally; No rales, rhonchi, wheezing, or rubs  CARDIOVASCULAR: Regular rate and rhythm; No murmurs, rubs, or gallops  GI: diffuse tenderness without rebound or guarding, worst LUQ +BS  EXTREMITIES:  2+ Peripheral Pulses, No clubbing, cyanosis, or edema  SKIN less dry    Consultant(s) Notes Reviewed:  [x ] YES  [ ] NO    Discussed with Consultants/Other Providers [ x] YES     LABS                          9.4    9.37  )-----------( 432      ( 06 Jan 2023 09:00 )             28.2     01-06    131<L>  |  94<L>  |  18  ----------------------------<  121<H>  4.9   |  26  |  0.6<L>    Ca    8.3<L>      06 Jan 2023 09:00  Phos  2.6     01-06  Mg     2.3     01-06    TPro  5.4<L>  /  Alb  2.9<L>  /  TBili  0.3  /  DBili  x   /  AST  11  /  ALT  10  /  AlkPhos  71  01-06          Lactate Trend        CAPILLARY BLOOD GLUCOSE      POCT Blood Glucose.: 115 mg/dL (05 Jan 2023 07:23)        RADIOLOGY & ADDITIONAL TESTS:    Imaging Personally Reviewed:  [ ] YES  [ ] NO    HEALTH ISSUES - PROBLEM Dx:

## 2023-01-07 LAB
ALBUMIN SERPL ELPH-MCNC: 2.9 G/DL — LOW (ref 3.5–5.2)
ALP SERPL-CCNC: 67 U/L — SIGNIFICANT CHANGE UP (ref 30–115)
ALT FLD-CCNC: 10 U/L — SIGNIFICANT CHANGE UP (ref 0–41)
ANION GAP SERPL CALC-SCNC: 10 MMOL/L — SIGNIFICANT CHANGE UP (ref 7–14)
AST SERPL-CCNC: 11 U/L — SIGNIFICANT CHANGE UP (ref 0–41)
BASOPHILS # BLD AUTO: 0 K/UL — SIGNIFICANT CHANGE UP (ref 0–0.2)
BASOPHILS NFR BLD AUTO: 0 % — SIGNIFICANT CHANGE UP (ref 0–1)
BILIRUB SERPL-MCNC: <0.2 MG/DL — SIGNIFICANT CHANGE UP (ref 0.2–1.2)
BUN SERPL-MCNC: 16 MG/DL — SIGNIFICANT CHANGE UP (ref 10–20)
CALCIUM SERPL-MCNC: 8.2 MG/DL — LOW (ref 8.4–10.5)
CALPROTECTIN STL-MCNT: 1590 UG/G — HIGH (ref 0–120)
CALPROTECTIN STL-MCNT: 8302 UG/G — HIGH (ref 0–120)
CHLORIDE SERPL-SCNC: 92 MMOL/L — LOW (ref 98–110)
CO2 SERPL-SCNC: 27 MMOL/L — SIGNIFICANT CHANGE UP (ref 17–32)
CREAT SERPL-MCNC: 0.6 MG/DL — LOW (ref 0.7–1.5)
EGFR: 110 ML/MIN/1.73M2 — SIGNIFICANT CHANGE UP
EOSINOPHIL # BLD AUTO: 0 K/UL — SIGNIFICANT CHANGE UP (ref 0–0.7)
EOSINOPHIL NFR BLD AUTO: 0 % — SIGNIFICANT CHANGE UP (ref 0–8)
GIANT PLATELETS BLD QL SMEAR: PRESENT — SIGNIFICANT CHANGE UP
GLUCOSE SERPL-MCNC: 133 MG/DL — HIGH (ref 70–99)
HCT VFR BLD CALC: 28 % — LOW (ref 42–52)
HGB BLD-MCNC: 9.2 G/DL — LOW (ref 14–18)
LYMPHOCYTES # BLD AUTO: 0.16 K/UL — LOW (ref 1.2–3.4)
LYMPHOCYTES # BLD AUTO: 1.8 % — LOW (ref 20.5–51.1)
MAGNESIUM SERPL-MCNC: 2.3 MG/DL — SIGNIFICANT CHANGE UP (ref 1.8–2.4)
MANUAL SMEAR VERIFICATION: SIGNIFICANT CHANGE UP
MCHC RBC-ENTMCNC: 29.4 PG — SIGNIFICANT CHANGE UP (ref 27–31)
MCHC RBC-ENTMCNC: 32.9 G/DL — SIGNIFICANT CHANGE UP (ref 32–37)
MCV RBC AUTO: 89.5 FL — SIGNIFICANT CHANGE UP (ref 80–94)
MONOCYTES # BLD AUTO: 0.16 K/UL — SIGNIFICANT CHANGE UP (ref 0.1–0.6)
MONOCYTES NFR BLD AUTO: 1.8 % — SIGNIFICANT CHANGE UP (ref 1.7–9.3)
MYELOCYTES NFR BLD: 0.9 % — HIGH (ref 0–0)
NEUTROPHILS # BLD AUTO: 8.57 K/UL — HIGH (ref 1.4–6.5)
NEUTROPHILS NFR BLD AUTO: 94.6 % — HIGH (ref 42.2–75.2)
PHOSPHATE SERPL-MCNC: 3 MG/DL — SIGNIFICANT CHANGE UP (ref 2.1–4.9)
PLAT MORPH BLD: ABNORMAL
PLATELET # BLD AUTO: 439 K/UL — HIGH (ref 130–400)
POLYCHROMASIA BLD QL SMEAR: SLIGHT — SIGNIFICANT CHANGE UP
POTASSIUM SERPL-MCNC: 4.5 MMOL/L — SIGNIFICANT CHANGE UP (ref 3.5–5)
POTASSIUM SERPL-SCNC: 4.5 MMOL/L — SIGNIFICANT CHANGE UP (ref 3.5–5)
PROT SERPL-MCNC: 5.1 G/DL — LOW (ref 6–8)
RBC # BLD: 3.13 M/UL — LOW (ref 4.7–6.1)
RBC # FLD: 13.2 % — SIGNIFICANT CHANGE UP (ref 11.5–14.5)
RBC BLD AUTO: ABNORMAL
SMUDGE CELLS # BLD: PRESENT — SIGNIFICANT CHANGE UP
SODIUM SERPL-SCNC: 129 MMOL/L — LOW (ref 135–146)
TOXIC GRANULES BLD QL SMEAR: PRESENT — SIGNIFICANT CHANGE UP
VARIANT LYMPHS # BLD: 0.9 % — SIGNIFICANT CHANGE UP (ref 0–5)
WBC # BLD: 9.06 K/UL — SIGNIFICANT CHANGE UP (ref 4.8–10.8)
WBC # FLD AUTO: 9.06 K/UL — SIGNIFICANT CHANGE UP (ref 4.8–10.8)

## 2023-01-07 PROCEDURE — 99232 SBSQ HOSP IP/OBS MODERATE 35: CPT

## 2023-01-07 RX ADMIN — Medication 20 MILLIGRAM(S): at 14:26

## 2023-01-07 RX ADMIN — PANTOPRAZOLE SODIUM 40 MILLIGRAM(S): 20 TABLET, DELAYED RELEASE ORAL at 06:03

## 2023-01-07 RX ADMIN — Medication 20 MILLIGRAM(S): at 06:03

## 2023-01-07 RX ADMIN — HEPARIN SODIUM 5000 UNIT(S): 5000 INJECTION INTRAVENOUS; SUBCUTANEOUS at 06:07

## 2023-01-07 RX ADMIN — ONDANSETRON 4 MILLIGRAM(S): 8 TABLET, FILM COATED ORAL at 06:14

## 2023-01-07 RX ADMIN — Medication 30 MILLIGRAM(S): at 06:08

## 2023-01-07 RX ADMIN — ONDANSETRON 4 MILLIGRAM(S): 8 TABLET, FILM COATED ORAL at 14:29

## 2023-01-07 RX ADMIN — Medication 20 MILLIGRAM(S): at 23:43

## 2023-01-07 RX ADMIN — ONDANSETRON 4 MILLIGRAM(S): 8 TABLET, FILM COATED ORAL at 23:45

## 2023-01-07 NOTE — PROGRESS NOTE ADULT - ASSESSMENT
62y M hx HTN, recent EPEC and then biopsy + UC on colonoscopy presents c continued UC flare c dehydration/ prerenal azotemia/erich with hyperkalemia, 2/2 inability to tolerate PO, and subsequent deconditioning    #Ulcerative colitis   #ERICH - resolved   #HYponatermia   c/w iv steroid for now- if non responder, may need ?remicaid. d/w gi  flex sig 1/6  c/w ppin  held ARB for ERICH, -bp does not require the ARB currently  Was on PPN - stopped 1/7, regular diet started   Monitor sodium with regular diet     #Progress Note Handoff    Pending (specify): Na level, tolerance of diet, discuss with gi steroids     Family discussion: pt is up today    Disposition: likely home

## 2023-01-07 NOTE — PROGRESS NOTE ADULT - SUBJECTIVE AND OBJECTIVE BOX
Patient is a 62y old  Male who presents with a chief complaint of diarrhea (06 Jan 2023 15:00)      Patient seen and examined at bedside.  Patient reports some continued abd cramps, denies any chest pain or shortness of breath.   ALLERGIES:  penicillin (Rash)    MEDICATIONS:  acetaminophen     Tablet .. 650 milliGRAM(s) Oral every 6 hours PRN  aluminum hydroxide/magnesium hydroxide/simethicone Suspension 30 milliLiter(s) Oral every 4 hours PRN  heparin   Injectable 5000 Unit(s) SubCutaneous every 12 hours  melatonin 3 milliGRAM(s) Oral at bedtime PRN  methylPREDNISolone sodium succinate Injectable 20 milliGRAM(s) IV Push every 8 hours  NIFEdipine XL 30 milliGRAM(s) Oral daily  ondansetron Injectable 4 milliGRAM(s) IV Push every 8 hours PRN  pantoprazole  Injectable 40 milliGRAM(s) IV Push two times a day    Vital Signs Last 24 Hrs  T(F): 96.7 (07 Jan 2023 14:04), Max: 97.6 (06 Jan 2023 21:25)  HR: 58 (07 Jan 2023 14:04) (58 - 81)  BP: 113/64 (07 Jan 2023 14:04) (110/59 - 136/64)  RR: 18 (07 Jan 2023 14:04) (12 - 18)  SpO2: 96% (06 Jan 2023 19:25) (95% - 97%)  I&O's Summary    07 Jan 2023 07:01  -  07 Jan 2023 16:44  --------------------------------------------------------  IN: 93.9 mL / OUT: 0 mL / NET: 93.9 mL        PHYSICAL EXAM:  General: NAD, A/O x 3  ENT: MMM  Neck: Supple, No JVD  Lungs: Clear to auscultation bilaterally  Cardio: RRR, S1/S2, No murmurs  Abdomen: Soft, mild tender, Nondistended; Bowel sounds present  Extremities: No cyanosis, No edema    LABS:                        9.2    9.06  )-----------( 439      ( 07 Jan 2023 09:25 )             28.0     01-07    129  |  92  |  16  ----------------------------<  133  4.5   |  27  |  0.6    Ca    8.2      07 Jan 2023 09:25  Phos  3.0     01-07  Mg     2.3     01-07    TPro  5.1  /  Alb  2.9  /  TBili  <0.2  /  DBili  x   /  AST  11  /  ALT  10  /  AlkPhos  67  01-07 01-04 Chol 126 mg/dL LDL -- HDL 44 mg/dL Trig 123 mg/dL                      COVID-19 PCR: NotDetec (01-03-23 @ 16:49)  COVID-19 PCR: NotDetec (12-10-22 @ 19:09)      RADIOLOGY & ADDITIONAL TESTS:    Care Discussed with Consultants/Other Providers:

## 2023-01-08 LAB
ALBUMIN SERPL ELPH-MCNC: 3 G/DL — LOW (ref 3.5–5.2)
ALP SERPL-CCNC: 69 U/L — SIGNIFICANT CHANGE UP (ref 30–115)
ALT FLD-CCNC: 12 U/L — SIGNIFICANT CHANGE UP (ref 0–41)
ANION GAP SERPL CALC-SCNC: 15 MMOL/L — HIGH (ref 7–14)
AST SERPL-CCNC: 13 U/L — SIGNIFICANT CHANGE UP (ref 0–41)
BASOPHILS # BLD AUTO: 0.06 K/UL — SIGNIFICANT CHANGE UP (ref 0–0.2)
BASOPHILS NFR BLD AUTO: 0.5 % — SIGNIFICANT CHANGE UP (ref 0–1)
BILIRUB SERPL-MCNC: <0.2 MG/DL — SIGNIFICANT CHANGE UP (ref 0.2–1.2)
BUN SERPL-MCNC: 20 MG/DL — SIGNIFICANT CHANGE UP (ref 10–20)
CALCIUM SERPL-MCNC: 8.5 MG/DL — SIGNIFICANT CHANGE UP (ref 8.4–10.5)
CHLORIDE SERPL-SCNC: 95 MMOL/L — LOW (ref 98–110)
CO2 SERPL-SCNC: 23 MMOL/L — SIGNIFICANT CHANGE UP (ref 17–32)
CREAT SERPL-MCNC: 0.6 MG/DL — LOW (ref 0.7–1.5)
EGFR: 110 ML/MIN/1.73M2 — SIGNIFICANT CHANGE UP
EOSINOPHIL # BLD AUTO: 0 K/UL — SIGNIFICANT CHANGE UP (ref 0–0.7)
EOSINOPHIL NFR BLD AUTO: 0 % — SIGNIFICANT CHANGE UP (ref 0–8)
GLUCOSE SERPL-MCNC: 150 MG/DL — HIGH (ref 70–99)
HCT VFR BLD CALC: 32.1 % — LOW (ref 42–52)
HGB BLD-MCNC: 10.4 G/DL — LOW (ref 14–18)
IMM GRANULOCYTES NFR BLD AUTO: 1.9 % — HIGH (ref 0.1–0.3)
LYMPHOCYTES # BLD AUTO: 1 K/UL — LOW (ref 1.2–3.4)
LYMPHOCYTES # BLD AUTO: 8.9 % — LOW (ref 20.5–51.1)
MAGNESIUM SERPL-MCNC: 2.2 MG/DL — SIGNIFICANT CHANGE UP (ref 1.8–2.4)
MCHC RBC-ENTMCNC: 28.7 PG — SIGNIFICANT CHANGE UP (ref 27–31)
MCHC RBC-ENTMCNC: 32.4 G/DL — SIGNIFICANT CHANGE UP (ref 32–37)
MCV RBC AUTO: 88.7 FL — SIGNIFICANT CHANGE UP (ref 80–94)
MONOCYTES # BLD AUTO: 0.22 K/UL — SIGNIFICANT CHANGE UP (ref 0.1–0.6)
MONOCYTES NFR BLD AUTO: 1.9 % — SIGNIFICANT CHANGE UP (ref 1.7–9.3)
NEUTROPHILS # BLD AUTO: 9.79 K/UL — HIGH (ref 1.4–6.5)
NEUTROPHILS NFR BLD AUTO: 86.8 % — HIGH (ref 42.2–75.2)
NRBC # BLD: 0 /100 WBCS — SIGNIFICANT CHANGE UP (ref 0–0)
PLATELET # BLD AUTO: 511 K/UL — HIGH (ref 130–400)
POTASSIUM SERPL-MCNC: 4.4 MMOL/L — SIGNIFICANT CHANGE UP (ref 3.5–5)
POTASSIUM SERPL-SCNC: 4.4 MMOL/L — SIGNIFICANT CHANGE UP (ref 3.5–5)
PROT SERPL-MCNC: 5.4 G/DL — LOW (ref 6–8)
RBC # BLD: 3.62 M/UL — LOW (ref 4.7–6.1)
RBC # FLD: 13.4 % — SIGNIFICANT CHANGE UP (ref 11.5–14.5)
SODIUM SERPL-SCNC: 133 MMOL/L — LOW (ref 135–146)
WBC # BLD: 11.29 K/UL — HIGH (ref 4.8–10.8)
WBC # FLD AUTO: 11.29 K/UL — HIGH (ref 4.8–10.8)

## 2023-01-08 PROCEDURE — 99232 SBSQ HOSP IP/OBS MODERATE 35: CPT

## 2023-01-08 PROCEDURE — 99233 SBSQ HOSP IP/OBS HIGH 50: CPT

## 2023-01-08 RX ORDER — TUBERCULIN PURIFIED PROTEIN DERIVATIVE 5 [IU]/.1ML
5 INJECTION, SOLUTION INTRADERMAL ONCE
Refills: 0 | Status: COMPLETED | OUTPATIENT
Start: 2023-01-08 | End: 2023-01-08

## 2023-01-08 RX ADMIN — Medication 20 MILLIGRAM(S): at 06:25

## 2023-01-08 RX ADMIN — HEPARIN SODIUM 5000 UNIT(S): 5000 INJECTION INTRAVENOUS; SUBCUTANEOUS at 16:33

## 2023-01-08 RX ADMIN — TUBERCULIN PURIFIED PROTEIN DERIVATIVE 5 UNIT(S): 5 INJECTION, SOLUTION INTRADERMAL at 16:56

## 2023-01-08 RX ADMIN — Medication 20 MILLIGRAM(S): at 14:46

## 2023-01-08 RX ADMIN — Medication 20 MILLIGRAM(S): at 22:12

## 2023-01-08 RX ADMIN — PANTOPRAZOLE SODIUM 40 MILLIGRAM(S): 20 TABLET, DELAYED RELEASE ORAL at 16:34

## 2023-01-08 RX ADMIN — PANTOPRAZOLE SODIUM 40 MILLIGRAM(S): 20 TABLET, DELAYED RELEASE ORAL at 06:24

## 2023-01-08 RX ADMIN — Medication 30 MILLIGRAM(S): at 06:26

## 2023-01-08 RX ADMIN — HEPARIN SODIUM 5000 UNIT(S): 5000 INJECTION INTRAVENOUS; SUBCUTANEOUS at 06:25

## 2023-01-08 NOTE — PROGRESS NOTE ADULT - SUBJECTIVE AND OBJECTIVE BOX
Patient is a 62y old  Male who presents with a chief complaint of diarrhea (07 Jan 2023 16:43)      HPI:  63 y/o M presenting to ED for persistent diarrhea. HPI starts from 12/3 when patient was first admitted after returning from a trip to [place] on 11/14 for bloody diarrhea. Patient was found to have pancolitis on CTAP & GI PCR was positive for EPEC. He was dc'd home to complete x7days of Cipro/Flagyl (as per ID). Was also seen by GI that recommended OP Colonoscopy. 4 days after discharge, patient returns to ED due to unresolved symptoms. Colonoscopy was done it showed Diffuse continuous ulceration, friability, erythema and abnormal vascularity with thick mucoid membrane with contact bleeding were noted in the rectum and sigmoid colon. Prelim results of biopsy were in favor of ulcerative colitis. Patient was started on solumedrol IV and will continue PO prednisone on discharged. Diarrhea and abdominal pain improved significantly on steroids. Patient will f/u with GI as outpatient for further management.   Today patient is presenting because since his discharge he has been feeling weak. He states that he is not able to tolerate any po intake. He is still having diarrhea 3-4 times daily. He is still experiencing blood in his stool. Denies any fevers. He is currently on Prednisone 40 mg daily at home and was going to transition to 30 mg tomorrow.     In the ED     T(F): 96.5 (03 Jan 2023 14:13), Max: 96.5 (03 Jan 2023 14:13)  HR: 113 (03 Jan 2023 14:13) (113 - 113)  BP: 124/82 (03 Jan 2023 14:13) (124/82 - 124/82)  RR: 20 (03 Jan 2023 14:13) (20 - 20)  SpO2: 98% (03 Jan 2023 14:13) (98% - 98%)    Labs signifcant for WBC 11.48, HGB 10.3( same as last admission- baseline is 14 back in december), Na 125, K 5.8 Cr 1.0 ( baseline is .7)    CT abdomen showing persistent proctocolitis.              (03 Jan 2023 20:40)      PAST MEDICAL & SURGICAL HISTORY:  HTN (hypertension)      No significant past surgical history          Home Medications:  olmesartan 5 mg oral tablet: 1 tab(s) orally once a day (10 Dec 2022 20:12)      .  Tobacco use:   EtOH use:  Illicit drug use:    Living situation:    Allergies:  penicillin (Rash)      FAMILY HISTORY:      Hospital Medications:  acetaminophen     Tablet .. 650 milliGRAM(s) Oral every 6 hours PRN  aluminum hydroxide/magnesium hydroxide/simethicone Suspension 30 milliLiter(s) Oral every 4 hours PRN  heparin   Injectable 5000 Unit(s) SubCutaneous every 12 hours  melatonin 3 milliGRAM(s) Oral at bedtime PRN  methylPREDNISolone sodium succinate Injectable 20 milliGRAM(s) IV Push every 8 hours  NIFEdipine XL 30 milliGRAM(s) Oral daily  ondansetron Injectable 4 milliGRAM(s) IV Push every 8 hours PRN  pantoprazole  Injectable 40 milliGRAM(s) IV Push two times a day      Vital Signs Last 24 Hrs  T(C): 36 (07 Jan 2023 20:21), Max: 36 (07 Jan 2023 20:21)  T(F): 96.8 (07 Jan 2023 20:21), Max: 96.8 (07 Jan 2023 20:21)  HR: 61 (07 Jan 2023 20:21) (58 - 70)  BP: 106/52 (07 Jan 2023 20:21) (106/52 - 136/64)  BP(mean): --  RR: 18 (07 Jan 2023 20:21) (18 - 18)  SpO2: --        I&O's Summary    07 Jan 2023 07:01  -  08 Jan 2023 01:12  --------------------------------------------------------  IN: 93.9 mL / OUT: 0 mL / NET: 93.9 mL        LABS:                        9.2    9.06  )-----------( 439      ( 07 Jan 2023 09:25 )             28.0       01-07    129<L>  |  92<L>  |  16  ----------------------------<  133<H>  4.5   |  27  |  0.6<L>    Ca    8.2<L>      07 Jan 2023 09:25  Phos  3.0     01-07  Mg     2.3     01-07    TPro  5.1<L>  /  Alb  2.9<L>  /  TBili  <0.2  /  DBili  x   /  AST  11  /  ALT  10  /  AlkPhos  67  01-07                PHYSICAL EXAM:  GENERAL: NAD, lying in bed comfortably  HEAD:  Atraumatic, Normocephalic  EYES: EOMI, PERRLA, conjunctiva and sclera clear  ENT: Moist mucous membranes  NECK: Supple, No JVD  CHEST/LUNG: Clear to auscultation bilaterally; No rales, rhonchi, wheezing, or rubs. Unlabored respirations  HEART: Regular rate and rhythm; No murmurs, rubs, or gallops  ABDOMEN: Bowel sounds present; Soft, Nontender, Nondistended. No hepatomegally  EXTREMITIES:  2+ Peripheral Pulses, brisk capillary refill. No clubbing, cyanosis, or edema  NERVOUS SYSTEM:  Alert & Oriented X3, speech clear. No deficits   MSK: FROM all 4 extremities, full and equal strength  SKIN: No rashes or lesions    IMAGES:

## 2023-01-08 NOTE — PROGRESS NOTE ADULT - SUBJECTIVE AND OBJECTIVE BOX
Patient is a 62y old  Male who presents with a chief complaint of diarrhea (07 Jan 2023 16:43)      Patient seen and examined at bedside.    ALLERGIES:  penicillin (Rash)    MEDICATIONS:  acetaminophen     Tablet .. 650 milliGRAM(s) Oral every 6 hours PRN  aluminum hydroxide/magnesium hydroxide/simethicone Suspension 30 milliLiter(s) Oral every 4 hours PRN  heparin   Injectable 5000 Unit(s) SubCutaneous every 12 hours  melatonin 3 milliGRAM(s) Oral at bedtime PRN  methylPREDNISolone sodium succinate Injectable 20 milliGRAM(s) IV Push every 8 hours  NIFEdipine XL 30 milliGRAM(s) Oral daily  ondansetron Injectable 4 milliGRAM(s) IV Push every 8 hours PRN  pantoprazole  Injectable 40 milliGRAM(s) IV Push two times a day  PPD  5 Tuberculin Unit(s) Injectable 5 Unit(s) IntraDermal once    Vital Signs Last 24 Hrs  T(F): 96.7 (08 Jan 2023 13:04), Max: 96.8 (07 Jan 2023 20:21)  HR: 68 (08 Jan 2023 13:04) (58 - 68)  BP: 106/60 (08 Jan 2023 13:04) (106/52 - 139/64)  RR: 18 (08 Jan 2023 13:04) (18 - 18)  SpO2: 99% (08 Jan 2023 05:21) (99% - 99%)  I&O's Summary    07 Jan 2023 07:01  -  08 Jan 2023 07:00  --------------------------------------------------------  IN: 93.9 mL / OUT: 0 mL / NET: 93.9 mL        PHYSICAL EXAM:  General: NAD, A/O x 3  ENT: MMM  Neck: Supple, No JVD  Lungs: Clear to auscultation bilaterally  Cardio: RRR, S1/S2, No murmurs  Abdomen: Soft, Nontender, Nondistended; Bowel sounds present  Extremities: No cyanosis, No edema    LABS:                        10.4   11.29 )-----------( 511      ( 08 Jan 2023 04:30 )             32.1     01-08    133  |  95  |  20  ----------------------------<  150  4.4   |  23  |  0.6    Ca    8.5      08 Jan 2023 04:30  Phos  3.0     01-07  Mg     2.2     01-08    TPro  5.4  /  Alb  3.0  /  TBili  <0.2  /  DBili  x   /  AST  13  /  ALT  12  /  AlkPhos  69  01-08 01-04 Chol 126 mg/dL LDL -- HDL 44 mg/dL Trig 123 mg/dL                      COVID-19 PCR: NotDetec (01-03-23 @ 16:49)  COVID-19 PCR: NotDetec (12-10-22 @ 19:09)      RADIOLOGY & ADDITIONAL TESTS:    Care Discussed with Consultants/Other Providers:

## 2023-01-08 NOTE — PROGRESS NOTE ADULT - ASSESSMENT
63 y/o M  w/ HTN, presenting to ED for persistent diarrhea and colitis seen on CT.   12/3 admitted after returning from a trip to [place] on 11/14 for bloody diarrhea & pancolitis on CTAP; GI PCR was positive for EPEC s/p 7 d Cipro/Flagyl (as per ID).   4 days after discharge, patient returns to ED due to unresolved symptoms s/p Colonoscopy 12/14/22 w/ Diffuse continuous ulceration, friability, erythema and abnormal vascularity with thick mucoid membrane with contact bleeding were noted in the rectum and sigmoid colon. Prelim results of biopsy were in favor of ulcerative colitis.   Patient was started on solumedrol IV and will continue PO prednisone on discharged.   Diarrhea and abdominal pain improved significantly on steroids but returned 4 days after discharged home started having abdominal pain and 4-5 liquid stool, occasionally bloody, decrease po intake and fatigue and feeling dehydrated.  hx is also remarkable for unintentional weight loss of 40 lbs since the onset of symptoms.  CT abdomen showing persistent proctocolitis.     # Abdominal pain / diarrhea most likely secondary to active UC given chronicity of symptoms and lack of improvement w/ abx   - patient is hemodynamically stable  - no leucocytosis, no fever  - CT with persistent pancolitis  - C. diff and repeat GI PCR is negative  - was taking 40 mg po prednisone daily prior to admission   -  and   - prior work up reviewed pt up to date on all live vaccines but indeterminate quantiferon - will ask daughter to get confirmation of negative TB test done which was recently done    recommendations:  Await pathology results  advance diet as tolerated  Continue with IV solumedrol 20 mg q8h  Supportive care per primary team  Will consider starting Remicade if no improvement  - encourage PO intake over PPN (pt like ensures)  - Will plan for starting inpatient Infliximab (risks/benefits/alternatives discussed with patient) pending confirmation of recent negative Tb testing  - discussed with medical team , recommend /case management consult for inpatient Infliximab   - Please send repeat QuantiFeron and TST  - Repeat CRP    63 y/o M  w/ HTN, presenting to ED for persistent diarrhea and colitis seen on CT.   12/3 admitted after returning from a trip to [place] on 11/14 for bloody diarrhea & pancolitis on CTAP; GI PCR was positive for EPEC s/p 7 d Cipro/Flagyl (as per ID).   4 days after discharge, patient returns to ED due to unresolved symptoms s/p Colonoscopy 12/14/22 w/ Diffuse continuous ulceration, friability, erythema and abnormal vascularity with thick mucoid membrane with contact bleeding were noted in the rectum and sigmoid colon. Prelim results of biopsy were in favor of ulcerative colitis.   Patient was started on solumedrol IV and will continue PO prednisone on discharged.   Diarrhea and abdominal pain improved significantly on steroids but returned 4 days after discharged home started having abdominal pain and 4-5 liquid stool, occasionally bloody, decrease po intake and fatigue and feeling dehydrated.  hx is also remarkable for unintentional weight loss of 40 lbs since the onset of symptoms.  CT abdomen showing persistent proctocolitis.     # Abdominal pain / diarrhea most likely secondary to active UC given chronicity of symptoms and lack of improvement w/ abx. partial improvement with steroids   - patient is hemodynamically stable  - no leucocytosis, no fever  - CT with persistent pancolitis  - C. diff and repeat GI PCR is negative  - was taking 40 mg po prednisone daily prior to admission   -  and   - prior work up reviewed pt up to date on all live vaccines but indeterminate quantiferon - will ask daughter to get confirmation of negative TB test done which was recently done    recommendations:  Await pathology results  advance diet as tolerated  Continue with IV solumedrol 20 mg q8h  Supportive care per primary team  Will consider starting Remicade if no improvement  - encourage PO intake over PPN (pt like ensures)  - Will plan for starting inpatient Infliximab (risks/benefits/alternatives discussed with patient) pending confirmation of recent negative Tb testing  - discussed with medical team , recommend /case management consult for inpatient Infliximab   - Please send repeat QuantiFeron and TST  - Repeat CRP

## 2023-01-08 NOTE — PROGRESS NOTE ADULT - ASSESSMENT
62y M hx HTN, recent EPEC and then biopsy + UC on colonoscopy presents c continued UC flare c dehydration/ prerenal azotemia/erich with hyperkalemia, 2/2 inability to tolerate PO, and subsequent deconditioning    #Ulcerative colitis   #ERICH - resolved   #HYponatermia   c/w iv steroid - not responding well  GI planning to start remicade - PPD ordered and QuantiFeron gold   1/6 colonscopy done - multiple ulcerations seen, pathology sent   c/w ppin  held ARB for ERICH, -bp does not require the ARB currently  Was on PPN - stopped 1/7, regular diet started   Monitor sodium with regular diet     #Progress Note Handoff    Pending (specify): Na level, tolerance of diet, quantiferon gold, PPD to be planted by RT 1/8    Family discussion:     Disposition: likely home

## 2023-01-08 NOTE — PROGRESS NOTE ADULT - ASSESSMENT
62y M hx HTN, recent EPEC and then biopsy + UC on colonoscopy presents c continued UC flare c dehydration/ prerenal azotemia/erich with hyperkalemia, 2/2 inability to tolerate PO, and subsequent deconditioning    #Ulcerative colitis   #ERICH - resolved   #HYponatermia     - c/w iv steroid for now- if non responder, may need ?remicaid. d/w gi  - patient had flexible sigmoidoscopy done on 1/6  - PPI  - Off ARBs because of the ERICH, BP is stable   - regular diet restarted, monitor toleration and sodium levels.    Pending (specify): Na level, tolerance of diet, discuss with gi steroids   Disposition: likely home

## 2023-01-08 NOTE — PROGRESS NOTE ADULT - SUBJECTIVE AND OBJECTIVE BOX
Gastroenterology progress note:     Patient is a 62y old  Male who presents with a chief complaint of diarrhea (07 Jan 2023 16:43)    Admitted on: 01-03-23    We are following the patient for   still complaining of abd pain, 5 small watery BMs since yesterday   < from: Flexible Sigmoidoscopy (01.06.23 @ 17:49) >  Colonoscopy was introduced to the proximal ascending colon. Ulceration,  friability, erythema and abnormal vascularity with thick mucoid membrane and  deep ulcers noted in the the whole examined colon compatible with UC - Barbosa  score of 3 throughout the examined colon.    < end of copied text >    PAST MEDICAL & SURGICAL HISTORY:  HTN (hypertension)    No significant past surgical history    MEDICATIONS  (STANDING):  heparin   Injectable 5000 Unit(s) SubCutaneous every 12 hours  methylPREDNISolone sodium succinate Injectable 20 milliGRAM(s) IV Push every 8 hours  NIFEdipine XL 30 milliGRAM(s) Oral daily  pantoprazole  Injectable 40 milliGRAM(s) IV Push two times a day  PPD  5 Tuberculin Unit(s) Injectable 5 Unit(s) IntraDermal once    MEDICATIONS  (PRN):  acetaminophen     Tablet .. 650 milliGRAM(s) Oral every 6 hours PRN Temp greater or equal to 38C (100.4F), Mild Pain (1 - 3)  aluminum hydroxide/magnesium hydroxide/simethicone Suspension 30 milliLiter(s) Oral every 4 hours PRN Dyspepsia  melatonin 3 milliGRAM(s) Oral at bedtime PRN Insomnia  ondansetron Injectable 4 milliGRAM(s) IV Push every 8 hours PRN Nausea and/or Vomiting      Allergies  penicillin (Rash)      Review of Systems:   Cardiovascular:  No Chest Pain, No Palpitations  Respiratory:  No Cough, No Dyspnea  Gastrointestinal:  As described in HPI  Skin:  No Skin Lesions, No Jaundice  Neuro:  No Syncope, No Dizziness    Physical Examination:  T(C): 35.9 (01-08-23 @ 13:04), Max: 36 (01-07-23 @ 20:21)  HR: 68 (01-08-23 @ 13:04) (58 - 68)  BP: 106/60 (01-08-23 @ 13:04) (106/52 - 139/64)  RR: 18 (01-08-23 @ 13:04) (18 - 18)  SpO2: 99% (01-08-23 @ 05:21) (99% - 99%)    01-07-23 @ 07:01  -  01-08-23 @ 07:00  --------------------------------------------------------  IN: 93.9 mL / OUT: 0 mL / NET: 93.9 mL    GENERAL: AAOx3, no acute distress.  HEAD:  Atraumatic, Normocephalic  EYES: conjunctiva and sclera clear  NECK: Supple, no JVD or thyromegaly  CHEST/LUNG: Clear to auscultation bilaterally; No wheeze, rhonchi, or rales  HEART: Regular rate and rhythm; normal S1, S2, No murmurs.  ABDOMEN: Soft, nontender, nondistended  NEUROLOGY: No asterixis or tremor.   SKIN: Intact, no jaundice     Data:                        10.4   11.29 )-----------( 511      ( 08 Jan 2023 04:30 )             32.1     Hgb trend:  10.4  01-08-23 @ 04:30  9.2  01-07-23 @ 09:25  9.4  01-06-23 @ 09:00        01-08    133<L>  |  95<L>  |  20  ----------------------------<  150<H>  4.4   |  23  |  0.6<L>    Ca    8.5      08 Jan 2023 04:30  Phos  3.0     01-07  Mg     2.2     01-08    TPro  5.4<L>  /  Alb  3.0<L>  /  TBili  <0.2  /  DBili  x   /  AST  13  /  ALT  12  /  AlkPhos  69  01-08    Liver panel trend:  TBili <0.2   /   AST 13   /   ALT 12   /   AlkP 69   /   Tptn 5.4   /   Alb 3.0    /   DBili --      01-08  TBili <0.2   /   AST 11   /   ALT 10   /   AlkP 67   /   Tptn 5.1   /   Alb 2.9    /   DBili --      01-07  TBili 0.3   /   AST 11   /   ALT 10   /   AlkP 71   /   Tptn 5.4   /   Alb 2.9    /   DBili --      01-06  TBili 0.2   /   AST 11   /   ALT 8   /   AlkP 80   /   Tptn 5.8   /   Alb 3.0    /   DBili --      01-04  TBili 0.2   /   AST 13   /   ALT 7   /   AlkP 85   /   Tptn 6.0   /   Alb 2.9    /   DBili -- 01-03             Radiology:

## 2023-01-09 LAB
ANION GAP SERPL CALC-SCNC: 10 MMOL/L — SIGNIFICANT CHANGE UP (ref 7–14)
BUN SERPL-MCNC: 23 MG/DL — HIGH (ref 10–20)
CALCIUM SERPL-MCNC: 8.7 MG/DL — SIGNIFICANT CHANGE UP (ref 8.4–10.5)
CHLORIDE SERPL-SCNC: 96 MMOL/L — LOW (ref 98–110)
CO2 SERPL-SCNC: 28 MMOL/L — SIGNIFICANT CHANGE UP (ref 17–32)
CREAT SERPL-MCNC: 0.7 MG/DL — SIGNIFICANT CHANGE UP (ref 0.7–1.5)
CRP SERPL-MCNC: 16.6 MG/L — HIGH
EGFR: 105 ML/MIN/1.73M2 — SIGNIFICANT CHANGE UP
GLUCOSE SERPL-MCNC: 101 MG/DL — HIGH (ref 70–99)
POTASSIUM SERPL-MCNC: 4.8 MMOL/L — SIGNIFICANT CHANGE UP (ref 3.5–5)
POTASSIUM SERPL-SCNC: 4.8 MMOL/L — SIGNIFICANT CHANGE UP (ref 3.5–5)
SODIUM SERPL-SCNC: 134 MMOL/L — LOW (ref 135–146)

## 2023-01-09 PROCEDURE — 99233 SBSQ HOSP IP/OBS HIGH 50: CPT

## 2023-01-09 PROCEDURE — 99232 SBSQ HOSP IP/OBS MODERATE 35: CPT

## 2023-01-09 RX ORDER — HYDROCORTISONE 20 MG
100 TABLET ORAL DAILY
Refills: 0 | Status: DISCONTINUED | OUTPATIENT
Start: 2023-01-09 | End: 2023-01-15

## 2023-01-09 RX ADMIN — Medication 20 MILLIGRAM(S): at 05:09

## 2023-01-09 RX ADMIN — Medication 20 MILLIGRAM(S): at 16:54

## 2023-01-09 RX ADMIN — HEPARIN SODIUM 5000 UNIT(S): 5000 INJECTION INTRAVENOUS; SUBCUTANEOUS at 16:55

## 2023-01-09 RX ADMIN — ONDANSETRON 4 MILLIGRAM(S): 8 TABLET, FILM COATED ORAL at 16:54

## 2023-01-09 RX ADMIN — HEPARIN SODIUM 5000 UNIT(S): 5000 INJECTION INTRAVENOUS; SUBCUTANEOUS at 05:10

## 2023-01-09 RX ADMIN — Medication 20 MILLIGRAM(S): at 21:21

## 2023-01-09 RX ADMIN — PANTOPRAZOLE SODIUM 40 MILLIGRAM(S): 20 TABLET, DELAYED RELEASE ORAL at 05:09

## 2023-01-09 RX ADMIN — Medication 30 MILLIGRAM(S): at 05:10

## 2023-01-09 RX ADMIN — PANTOPRAZOLE SODIUM 40 MILLIGRAM(S): 20 TABLET, DELAYED RELEASE ORAL at 16:55

## 2023-01-09 RX ADMIN — Medication 10 MILLIGRAM(S): at 19:04

## 2023-01-09 NOTE — PROGRESS NOTE ADULT - SUBJECTIVE AND OBJECTIVE BOX
Gastroenterology progress note:     Patient is a 62y old  Male who presents with a chief complaint of diarrhea (09 Jan 2023 11:44)       Admitted on: 01-03-23    We are following the patient for: colitis        Interval History:    No acute events overnight.   tolerating soft diet  pain is better  frequent liquid bm     PAST MEDICAL & SURGICAL HISTORY:  HTN (hypertension)      No significant past surgical history          MEDICATIONS  (STANDING):  heparin   Injectable 5000 Unit(s) SubCutaneous every 12 hours  methylPREDNISolone sodium succinate Injectable 20 milliGRAM(s) IV Push every 8 hours  NIFEdipine XL 30 milliGRAM(s) Oral daily  pantoprazole  Injectable 40 milliGRAM(s) IV Push two times a day    MEDICATIONS  (PRN):  acetaminophen     Tablet .. 650 milliGRAM(s) Oral every 6 hours PRN Temp greater or equal to 38C (100.4F), Mild Pain (1 - 3)  aluminum hydroxide/magnesium hydroxide/simethicone Suspension 30 milliLiter(s) Oral every 4 hours PRN Dyspepsia  melatonin 3 milliGRAM(s) Oral at bedtime PRN Insomnia  ondansetron Injectable 4 milliGRAM(s) IV Push every 8 hours PRN Nausea and/or Vomiting      Allergies  penicillin (Rash)      Review of Systems:   Cardiovascular:  No Chest Pain, No Palpitations  Respiratory:  No Cough, No Dyspnea  Gastrointestinal:  As described in HPI  Skin:  No Skin Lesions, No Jaundice  Neuro:  No Syncope, No Dizziness    Physical Examination:  T(C): 36.6 (01-09-23 @ 05:37), Max: 36.6 (01-09-23 @ 05:37)  HR: 57 (01-09-23 @ 05:37) (57 - 68)  BP: 110/55 (01-09-23 @ 05:37) (106/60 - 113/62)  RR: 18 (01-09-23 @ 05:37) (18 - 18)  SpO2: --        GENERAL: AAOx3, no acute distress.  HEAD:  Atraumatic, Normocephalic  EYES: conjunctiva and sclera clear  NECK: Supple, no JVD or thyromegaly  CHEST/LUNG: Clear to auscultation bilaterally; No wheeze, rhonchi, or rales  HEART: Regular rate and rhythm; normal S1, S2, No murmurs.  ABDOMEN: Soft, nontender, nondistended; Bowel sounds present  NEUROLOGY: No asterixis or tremor.   SKIN: Intact, no jaundice     Data:                        10.4   11.29 )-----------( 511      ( 08 Jan 2023 04:30 )             32.1     Hgb trend:  10.4  01-08-23 @ 04:30  9.2  01-07-23 @ 09:25        01-08    133<L>  |  95<L>  |  20  ----------------------------<  150<H>  4.4   |  23  |  0.6<L>    Ca    8.5      08 Jan 2023 04:30  Mg     2.2     01-08    TPro  5.4<L>  /  Alb  3.0<L>  /  TBili  <0.2  /  DBili  x   /  AST  13  /  ALT  12  /  AlkPhos  69  01-08    Liver panel trend:  TBili <0.2   /   AST 13   /   ALT 12   /   AlkP 69   /   Tptn 5.4   /   Alb 3.0    /   DBili --      01-08  TBili <0.2   /   AST 11   /   ALT 10   /   AlkP 67   /   Tptn 5.1   /   Alb 2.9    /   DBili --      01-07  TBili 0.3   /   AST 11   /   ALT 10   /   AlkP 71   /   Tptn 5.4   /   Alb 2.9    /   DBili --      01-06  TBili 0.2   /   AST 11   /   ALT 8   /   AlkP 80   /   Tptn 5.8   /   Alb 3.0    /   DBili --      01-04  TBili 0.2   /   AST 13   /   ALT 7   /   AlkP 85   /   Tptn 6.0   /   Alb 2.9    /   DBili --      01-03             Radiology:       Gastroenterology progress note:     Patient is a 62y old  Male who presents with a chief complaint of diarrhea (09 Jan 2023 11:44)       Admitted on: 01-03-23    We are following the patient for: colitis        Interval History:    No acute events overnight.   tolerating soft diet  pain is better  frequent liquid bms, non bloody    PAST MEDICAL & SURGICAL HISTORY:  HTN (hypertension)      No significant past surgical history          MEDICATIONS  (STANDING):  heparin   Injectable 5000 Unit(s) SubCutaneous every 12 hours  methylPREDNISolone sodium succinate Injectable 20 milliGRAM(s) IV Push every 8 hours  NIFEdipine XL 30 milliGRAM(s) Oral daily  pantoprazole  Injectable 40 milliGRAM(s) IV Push two times a day    MEDICATIONS  (PRN):  acetaminophen     Tablet .. 650 milliGRAM(s) Oral every 6 hours PRN Temp greater or equal to 38C (100.4F), Mild Pain (1 - 3)  aluminum hydroxide/magnesium hydroxide/simethicone Suspension 30 milliLiter(s) Oral every 4 hours PRN Dyspepsia  melatonin 3 milliGRAM(s) Oral at bedtime PRN Insomnia  ondansetron Injectable 4 milliGRAM(s) IV Push every 8 hours PRN Nausea and/or Vomiting      Allergies  penicillin (Rash)      Review of Systems:   Cardiovascular:  No Chest Pain, No Palpitations  Respiratory:  No Cough, No Dyspnea  Gastrointestinal:  As described in HPI  Skin:  No Skin Lesions, No Jaundice  Neuro:  No Syncope, No Dizziness    Physical Examination:  T(C): 36.6 (01-09-23 @ 05:37), Max: 36.6 (01-09-23 @ 05:37)  HR: 57 (01-09-23 @ 05:37) (57 - 68)  BP: 110/55 (01-09-23 @ 05:37) (106/60 - 113/62)  RR: 18 (01-09-23 @ 05:37) (18 - 18)  SpO2: --        GENERAL: AAOx3, no acute distress.  HEAD:  Atraumatic, Normocephalic  EYES: conjunctiva and sclera clear  NECK: Supple, no JVD or thyromegaly  CHEST/LUNG: Clear to auscultation bilaterally; No wheeze, rhonchi, or rales  HEART: Regular rate and rhythm; normal S1, S2, No murmurs.  ABDOMEN: Soft, nontender, nondistended; Bowel sounds present  NEUROLOGY: No asterixis or tremor.   SKIN: Intact, no jaundice     Data:                        10.4   11.29 )-----------( 511      ( 08 Jan 2023 04:30 )             32.1     Hgb trend:  10.4  01-08-23 @ 04:30  9.2  01-07-23 @ 09:25        01-08    133<L>  |  95<L>  |  20  ----------------------------<  150<H>  4.4   |  23  |  0.6<L>    Ca    8.5      08 Jan 2023 04:30  Mg     2.2     01-08    TPro  5.4<L>  /  Alb  3.0<L>  /  TBili  <0.2  /  DBili  x   /  AST  13  /  ALT  12  /  AlkPhos  69  01-08    Liver panel trend:  TBili <0.2   /   AST 13   /   ALT 12   /   AlkP 69   /   Tptn 5.4   /   Alb 3.0    /   DBili --      01-08  TBili <0.2   /   AST 11   /   ALT 10   /   AlkP 67   /   Tptn 5.1   /   Alb 2.9    /   DBili --      01-07  TBili 0.3   /   AST 11   /   ALT 10   /   AlkP 71   /   Tptn 5.4   /   Alb 2.9    /   DBili --      01-06  TBili 0.2   /   AST 11   /   ALT 8   /   AlkP 80   /   Tptn 5.8   /   Alb 3.0    /   DBili --      01-04  TBili 0.2   /   AST 13   /   ALT 7   /   AlkP 85   /   Tptn 6.0   /   Alb 2.9    /   DBili --      01-03             Radiology:

## 2023-01-09 NOTE — PROGRESS NOTE ADULT - ASSESSMENT
61 y/o M  w/ HTN, presenting to ED for persistent diarrhea and colitis seen on CT.  12/3 admitted after returning from a trip to [place] on 11/14 for bloody diarrhea & pancolitis on CTAP; GI PCR was positive for EPEC s/p 7 d Cipro/Flagyl (as per ID). 4 days after discharge, patient returns to ED due to unresolved symptoms s/p Colonoscopy 12/14/22 w/ Diffuse continuous ulceration, friability, erythema and abnormal vascularity with thick mucoid membrane with contact bleeding were noted in the rectum and sigmoid colon. Prelim results of biopsy were in favor of ulcerative colitis. Patient was started on solumedrol IV and will continue PO prednisone on discharged.  Diarrhea and abdominal pain improved significantly on steroids but returned 4 days after discharged home started having abdominal pain and 4-5 liquid stool, occasionally bloody, decrease po intake and fatigue and feeling dehydrated. hx is also remarkable for unintentional weight loss of 40 lbs since the onset of symptoms. CT abdomen showing persistent proctocolitis.     # Abdominal pain / diarrhea most likely secondary to active UC given chronicity of symptoms and lack of improvement w/ abx. partial improvement with steroids   - patient is hemodynamically stable  - no leucocytosis, no fever  - CT with persistent pancolitis  - C. diff and repeat GI PCR is negative  - was taking 40 mg po prednisone daily prior to admission   -  and   - prior work up reviewed pt up to date on all live vaccines but indeterminate quantiferon  - PPD done yesterday     recommendations:  - Await pathology results  - advance diet as tolerated  - Continue with IV solumedrol 20 mg q8h  - Supportive care per primary team  - Will consider starting Remicade pending PPD   - discussed with medical team , recommend /case management consult for inpatient Infliximab   - Repeat CRP    61 y/o M  w/ HTN, presenting to ED for persistent diarrhea and colitis seen on CT.  12/3 admitted after returning from a trip to [place] on 11/14 for bloody diarrhea & pancolitis on CTAP; GI PCR was positive for EPEC s/p 7 d Cipro/Flagyl (as per ID). 4 days after discharge, patient returns to ED due to unresolved symptoms s/p Colonoscopy 12/14/22 w/ Diffuse continuous ulceration, friability, erythema and abnormal vascularity with thick mucoid membrane with contact bleeding were noted in the rectum and sigmoid colon. Prelim results of biopsy were in favor of ulcerative colitis. Patient was started on solumedrol IV and will continue PO prednisone on discharged.  Diarrhea and abdominal pain improved significantly on steroids but returned 4 days after discharged home started having abdominal pain and 4-5 liquid stool, occasionally bloody, decrease po intake and fatigue and feeling dehydrated. hx is also remarkable for unintentional weight loss of 40 lbs since the onset of symptoms. CT abdomen showing persistent proctocolitis.     # Abdominal pain / diarrhea most likely secondary to active UC given chronicity of symptoms and lack of improvement w/ abx. partial improvement with steroids   - patient is hemodynamically stable  - no leucocytosis, no fever  - CT with persistent pancolitis  - C. diff and repeat GI PCR is negative  - was taking 40 mg po prednisone daily prior to admission   -  and   - prior work up reviewed pt up to date on all live vaccines but indeterminate quantiferon  - PPD done yesterday   - repeat flex sig 1/6 with Barbosa score 3 pancolitis suggestive of UC, follow up pathology results (discussed with GI pathology)  - Repeat CPR 16 > 166     recommendations:  - Await pathology results  - advance diet as tolerated  - Continue with IV solumedrol 20 mg q8h  - Supportive care per primary team  - Will consider starting Remicade pending PPD   - discussed with medical team , recommend /case management consult for inpatient Infliximab    61 y/o M  w/ HTN, presenting to ED for persistent diarrhea and colitis seen on CT.  12/3 admitted after returning from a trip to [place] on 11/14 for bloody diarrhea & pancolitis on CTAP; GI PCR was positive for EPEC s/p 7 d Cipro/Flagyl (as per ID). 4 days after discharge, patient returns to ED due to unresolved symptoms s/p Colonoscopy 12/14/22 w/ Diffuse continuous ulceration, friability, erythema and abnormal vascularity with thick mucoid membrane with contact bleeding were noted in the rectum and sigmoid colon. Prelim results of biopsy were in favor of ulcerative colitis. Patient was started on solumedrol IV and will continue PO prednisone on discharged.  Diarrhea and abdominal pain improved significantly on steroids but returned 4 days after discharged home started having abdominal pain and 4-5 liquid stool, occasionally bloody, decrease po intake and fatigue and feeling dehydrated. hx is also remarkable for unintentional weight loss of 40 lbs since the onset of symptoms. CT abdomen showing persistent proctocolitis.     # Abdominal pain / diarrhea most likely secondary to active UC given chronicity of symptoms and lack of improvement w/ abx. partial improvement with steroids   - patient is hemodynamically stable  - no leucocytosis, no fever  - CT with persistent pancolitis  - C. diff and repeat GI PCR is negative  - was taking 40 mg po prednisone daily prior to admission   -  and   - prior work up reviewed pt up to date on all live vaccines but indeterminate quantiferon  - PPD done yesterday   - repeat flex sig 1/6 with Barbosa score 3 pancolitis suggestive of UC, follow up pathology results (discussed with GI pathology)  - Repeat CPR 16 > 166     recommendations:  - Await pathology results  - advance diet as tolerated  - Continue with IV solumedrol 20 mg q8h  will add steroids enemas at BT  Start dicyclomine 10 mg PO tid  - Supportive care per primary team  - Will consider starting Remicade pending PPD   - discussed with medical team , recommend /case management consult for inpatient Infliximab

## 2023-01-09 NOTE — PROGRESS NOTE ADULT - SUBJECTIVE AND OBJECTIVE BOX
NUTRITION SUPPORT TEAM  -  PROGRESS NOTE   resting comfortably  on PO diet PPN d/c by staff  < from: Flexible Sigmoidoscopy (01.06.23 @ 17:49) >  Impressions:    Colonoscopy was introduced to the proximal ascending colon. Ulceration,  friability, erythema and abnormal vascularity with thick mucoid membrane and  deep ulcers noted in the the whole examined colon compatible with UC - Barbosa  score of 3 throughout the examined colon.    REVIEW OF SYSTEMS:  Negative except as noted above.     VITALS:  T(F): 97.8 (01-09 @ 05:37), Max: 97.8 (01-09 @ 05:37)  HR: 57 (01-09 @ 05:37) (57 - 57)  BP: 110/55 (01-09 @ 05:37) (110/55 - 110/55)  RR: 18 (01-09 @ 05:37) (18 - 18)  HEIGHT/WEIGHT/BMI:   Height (cm): 182.9 (01-06), 182.9 (12-14), 182.9 (12-03)  Weight (kg): 81.6 (01-06), 95.3 (12-14), 94.347 (12-03)  BMI (kg/m2): 24.4 (01-06), 28.5 (12-14), 28.2 (12-03)  STANDING MEDICATIONS:   heparin   Injectable 5000 Unit(s) SubCutaneous every 12 hours  methylPREDNISolone sodium succinate Injectable 20 milliGRAM(s) IV Push every 8 hours  NIFEdipine XL 30 milliGRAM(s) Oral daily  pantoprazole  Injectable 40 milliGRAM(s) IV Push two times a day      LABS:                         10.4   11.29 )-----------( 511      ( 08 Jan 2023 04:30 )             32.1     133<L>  |  95<L>  |  20  ----------------------------<  150<H>          (01-08-23 @ 04:30)  4.4   |  23  |  0.6<L>    Ca    8.5          (01-08-23 @ 04:30)  Mg     2.2         (01-08-23 @ 04:30)    TPro  5.4<L>  /  Alb  3.0<L>  /  TBili  <0.2  /  DBili  x   /  AST  13  /  ALT  12  /  AlkPhos  69       01-08-23 @ 04:30  Triglycerides, Serum: 123 mg/dL (01-04 @ 08:33)    DIET:   Diet, Regular:   Supplement Feeding Modality:  Oral  Ensure Pudding Cans or Servings Per Day:  2       Frequency:  Two Times a day  Ensure Plus High Protein Cans or Servings Per Day:  3       Frequency:  Three Times a day (01-08-23 @ 17:33) [Active]   NUTRITION SUPPORT TEAM  -  PROGRESS NOTE   resting comfortably  still c/o lower abdominal pain   +loose stool   on PO diet   PPN d/c by staff  < from: Flexible Sigmoidoscopy (01.06.23 @ 17:49) >  Impressions:    Colonoscopy was introduced to the proximal ascending colon. Ulceration,  friability, erythema and abnormal vascularity with thick mucoid membrane and  deep ulcers noted in the the whole examined colon compatible with UC - Barbosa  score of 3 throughout the examined colon.  GI f/u noted     REVIEW OF SYSTEMS:  Negative except as noted above.     VITALS:  T(F): 97.8 (01-09 @ 05:37), Max: 97.8 (01-09 @ 05:37)  HR: 57 (01-09 @ 05:37) (57 - 57)  BP: 110/55 (01-09 @ 05:37) (110/55 - 110/55)  RR: 18 (01-09 @ 05:37) (18 - 18)  HEIGHT/WEIGHT/BMI:   Height (cm): 182.9 (01-06), 182.9 (12-14), 182.9 (12-03)  Weight (kg): 81.6 (01-06), 95.3 (12-14), 94.347 (12-03)  BMI (kg/m2): 24.4 (01-06), 28.5 (12-14), 28.2 (12-03)  STANDING MEDICATIONS:   heparin   Injectable 5000 Unit(s) SubCutaneous every 12 hours  methylPREDNISolone sodium succinate Injectable 20 milliGRAM(s) IV Push every 8 hours  NIFEdipine XL 30 milliGRAM(s) Oral daily  pantoprazole  Injectable 40 milliGRAM(s) IV Push two times a day      LABS:                         10.4   11.29 )-----------( 511      ( 08 Jan 2023 04:30 )             32.1     133<L>  |  95<L>  |  20  ----------------------------<  150<H>          (01-08-23 @ 04:30)  4.4   |  23  |  0.6<L>    Ca    8.5          (01-08-23 @ 04:30)  Mg     2.2         (01-08-23 @ 04:30)    TPro  5.4<L>  /  Alb  3.0<L>  /  TBili  <0.2  /  DBili  x   /  AST  13  /  ALT  12  /  AlkPhos  69       01-08-23 @ 04:30  Triglycerides, Serum: 123 mg/dL (01-04 @ 08:33)    DIET:   Diet, Regular:   Supplement Feeding Modality:  Oral  Ensure Pudding Cans or Servings Per Day:  2       Frequency:  Two Times a day  Ensure Plus High Protein Cans or Servings Per Day:  3       Frequency:  Three Times a day (01-08-23 @ 17:33) [Active]

## 2023-01-09 NOTE — PROGRESS NOTE ADULT - ASSESSMENT
ASSESSMENT   Abdominal pain / diarrhea most likely secondary to UC flare: R/O infectious process:  -- CT with persistent pancolitis  - HbA1c mildly elevated, pt on prednisone PTA  - severe acute malnutrition    GI f/u noted  continue with current nutrition   check bmp/phos/mg and correct lytes   - suggest adding vital 1.0 x2 or Peptamen 1.5 x2 daily po - to sip slowly between meals     explained to pt that taste isn't great because it's hydrolyzed, but that SB will absorb all the nutrients  - check zinc level

## 2023-01-09 NOTE — PROGRESS NOTE ADULT - SUBJECTIVE AND OBJECTIVE BOX
HODABEATRIZ [employer]ICER  62y  Male      Patient is a 62y old  Male who presents with a chief complaint of diarrhea (08 Jan 2023 13:26)      INTERVAL HPI/OVERNIGHT EVENTS:  pt seen and examined this morning   -discussed with GI - patient will need to be on Remicade  -ppd placed left forearm 1/8/23 - negative so far - will need official read tomorrow   -oob to chair as tolerated     REVIEW OF SYSTEMS:  -no new complaints     Vital Signs Last 24 Hrs  T(C): 36.6 (09 Jan 2023 05:37), Max: 36.6 (09 Jan 2023 05:37)  T(F): 97.8 (09 Jan 2023 05:37), Max: 97.8 (09 Jan 2023 05:37)  HR: 57 (09 Jan 2023 05:37) (57 - 68)  BP: 110/55 (09 Jan 2023 05:37) (106/60 - 113/62)  BP(mean): --  RR: 18 (09 Jan 2023 05:37) (18 - 18)  SpO2: --        PHYSICAL EXAM:  GENERAL: Not in distress - speaking in full sentences   HEAD:  Atraumatic, Normocephalic  EYES: EOMI, PERRLA, conjunctiva and sclera clear  NERVOUS SYSTEM:  Alert & Oriented X 3  CHEST/LUNG: Clear b/l  CV/HEART: Regular rate and rhythm; No murmurs, rubs, or gallops  GI/ABDOMEN: Soft abdomen   EXTREMITIES:  2+ Peripheral Pulses, No clubbing, cyanosis, or edema  SKIN: No rashes or lesions    LAB:                        10.4   11.29 )-----------( 511      ( 08 Jan 2023 04:30 )             32.1     01-08    133<L>  |  95<L>  |  20  ----------------------------<  150<H>  4.4   |  23  |  0.6<L>    Ca    8.5      08 Jan 2023 04:30  Mg     2.2     01-08    TPro  5.4<L>  /  Alb  3.0<L>  /  TBili  <0.2  /  DBili  x   /  AST  13  /  ALT  12  /  AlkPhos  69  01-08    Daily   CAPILLARY BLOOD GLUCOSE    LIVER FUNCTIONS - ( 08 Jan 2023 04:30 )  Alb: 3.0 g/dL / Pro: 5.4 g/dL / ALK PHOS: 69 U/L / ALT: 12 U/L / AST: 13 U/L / GGT: x           RADIOLOGY:    Imaging Personally visualized and Reviewed:  [y ] YES  [ ] NO    HEALTH ISSUES - PROBLEM Dx:    meds;  acetaminophen     Tablet .. 650 milliGRAM(s) Oral every 6 hours PRN  aluminum hydroxide/magnesium hydroxide/simethicone Suspension 30 milliLiter(s) Oral every 4 hours PRN  heparin   Injectable 5000 Unit(s) SubCutaneous every 12 hours  melatonin 3 milliGRAM(s) Oral at bedtime PRN  methylPREDNISolone sodium succinate Injectable 20 milliGRAM(s) IV Push every 8 hours  NIFEdipine XL 30 milliGRAM(s) Oral daily  ondansetron Injectable 4 milliGRAM(s) IV Push every 8 hours PRN  pantoprazole  Injectable 40 milliGRAM(s) IV Push two times a day

## 2023-01-09 NOTE — PROGRESS NOTE ADULT - ASSESSMENT
62y M hx HTN, recent EPEC and then biopsy + UC on colonoscopy presents c continued UC flare c dehydration/ prerenal azotemia/erich with hyperkalemia, 2/2 inability to tolerate PO, and subsequent deconditioning    ·	Ulcerative colitis   ·	Resolved ERICH   ·	Hyponatermia   ·	HTN    -continue with IV steroids - will need Remicade dose inpatient  -check PPD read tomorrow (so far negative L forearm)  -BP stable on current meds   -pain control prn   -continue with oral diet + Ensure supplements     DISPO: home once clinically stable and cleared by GI   -spoke to patient about his plan and is agreeable     Attending Physician Dr. Penelope Soriano # [id number]

## 2023-01-10 LAB
ANION GAP SERPL CALC-SCNC: 7 MMOL/L — SIGNIFICANT CHANGE UP (ref 7–14)
BASOPHILS # BLD AUTO: 0.04 K/UL — SIGNIFICANT CHANGE UP (ref 0–0.2)
BASOPHILS NFR BLD AUTO: 0.4 % — SIGNIFICANT CHANGE UP (ref 0–1)
BUN SERPL-MCNC: 21 MG/DL — HIGH (ref 10–20)
CALCIUM SERPL-MCNC: 8.4 MG/DL — SIGNIFICANT CHANGE UP (ref 8.4–10.5)
CHLORIDE SERPL-SCNC: 97 MMOL/L — LOW (ref 98–110)
CO2 SERPL-SCNC: 29 MMOL/L — SIGNIFICANT CHANGE UP (ref 17–32)
CREAT SERPL-MCNC: 0.7 MG/DL — SIGNIFICANT CHANGE UP (ref 0.7–1.5)
CRP SERPL-MCNC: 24.8 MG/L — HIGH
EGFR: 105 ML/MIN/1.73M2 — SIGNIFICANT CHANGE UP
EOSINOPHIL # BLD AUTO: 0 K/UL — SIGNIFICANT CHANGE UP (ref 0–0.7)
EOSINOPHIL NFR BLD AUTO: 0 % — SIGNIFICANT CHANGE UP (ref 0–8)
GLUCOSE SERPL-MCNC: 113 MG/DL — HIGH (ref 70–99)
HCT VFR BLD CALC: 30 % — LOW (ref 42–52)
HGB BLD-MCNC: 9.8 G/DL — LOW (ref 14–18)
IMM GRANULOCYTES NFR BLD AUTO: 2 % — HIGH (ref 0.1–0.3)
LYMPHOCYTES # BLD AUTO: 1 K/UL — LOW (ref 1.2–3.4)
LYMPHOCYTES # BLD AUTO: 9.8 % — LOW (ref 20.5–51.1)
MAGNESIUM SERPL-MCNC: 2.2 MG/DL — SIGNIFICANT CHANGE UP (ref 1.8–2.4)
MCHC RBC-ENTMCNC: 28.8 PG — SIGNIFICANT CHANGE UP (ref 27–31)
MCHC RBC-ENTMCNC: 32.7 G/DL — SIGNIFICANT CHANGE UP (ref 32–37)
MCV RBC AUTO: 88.2 FL — SIGNIFICANT CHANGE UP (ref 80–94)
MONOCYTES # BLD AUTO: 0.38 K/UL — SIGNIFICANT CHANGE UP (ref 0.1–0.6)
MONOCYTES NFR BLD AUTO: 3.7 % — SIGNIFICANT CHANGE UP (ref 1.7–9.3)
NEUTROPHILS # BLD AUTO: 8.54 K/UL — HIGH (ref 1.4–6.5)
NEUTROPHILS NFR BLD AUTO: 84.1 % — HIGH (ref 42.2–75.2)
NRBC # BLD: 0 /100 WBCS — SIGNIFICANT CHANGE UP (ref 0–0)
PLATELET # BLD AUTO: 491 K/UL — HIGH (ref 130–400)
POTASSIUM SERPL-MCNC: 4.7 MMOL/L — SIGNIFICANT CHANGE UP (ref 3.5–5)
POTASSIUM SERPL-SCNC: 4.7 MMOL/L — SIGNIFICANT CHANGE UP (ref 3.5–5)
RBC # BLD: 3.4 M/UL — LOW (ref 4.7–6.1)
RBC # FLD: 13.7 % — SIGNIFICANT CHANGE UP (ref 11.5–14.5)
SODIUM SERPL-SCNC: 133 MMOL/L — LOW (ref 135–146)
SURGICAL PATHOLOGY STUDY: SIGNIFICANT CHANGE UP
WBC # BLD: 10.16 K/UL — SIGNIFICANT CHANGE UP (ref 4.8–10.8)
WBC # FLD AUTO: 10.16 K/UL — SIGNIFICANT CHANGE UP (ref 4.8–10.8)

## 2023-01-10 PROCEDURE — 99233 SBSQ HOSP IP/OBS HIGH 50: CPT

## 2023-01-10 PROCEDURE — 99232 SBSQ HOSP IP/OBS MODERATE 35: CPT

## 2023-01-10 RX ORDER — INFLIXIMAB-DYYB 120 MG/ML
800 INJECTION SUBCUTANEOUS ONCE
Refills: 0 | Status: COMPLETED | OUTPATIENT
Start: 2023-01-10 | End: 2023-01-11

## 2023-01-10 RX ORDER — INFLIXIMAB-DYYB 120 MG/ML
800 INJECTION SUBCUTANEOUS ONCE
Refills: 0 | Status: DISCONTINUED | OUTPATIENT
Start: 2023-01-10 | End: 2023-01-10

## 2023-01-10 RX ADMIN — HEPARIN SODIUM 5000 UNIT(S): 5000 INJECTION INTRAVENOUS; SUBCUTANEOUS at 17:22

## 2023-01-10 RX ADMIN — Medication 20 MILLIGRAM(S): at 15:09

## 2023-01-10 RX ADMIN — Medication 10 MILLIGRAM(S): at 06:30

## 2023-01-10 RX ADMIN — TUBERCULIN PURIFIED PROTEIN DERIVATIVE 5 UNIT(S): 5 INJECTION, SOLUTION INTRADERMAL at 10:37

## 2023-01-10 RX ADMIN — HEPARIN SODIUM 5000 UNIT(S): 5000 INJECTION INTRAVENOUS; SUBCUTANEOUS at 05:08

## 2023-01-10 RX ADMIN — Medication 100 MILLIGRAM(S): at 05:09

## 2023-01-10 RX ADMIN — Medication 20 MILLIGRAM(S): at 05:08

## 2023-01-10 RX ADMIN — PANTOPRAZOLE SODIUM 40 MILLIGRAM(S): 20 TABLET, DELAYED RELEASE ORAL at 05:08

## 2023-01-10 RX ADMIN — Medication 10 MILLIGRAM(S): at 11:31

## 2023-01-10 RX ADMIN — Medication 20 MILLIGRAM(S): at 23:12

## 2023-01-10 RX ADMIN — Medication 30 MILLIGRAM(S): at 05:08

## 2023-01-10 RX ADMIN — Medication 10 MILLIGRAM(S): at 16:24

## 2023-01-10 RX ADMIN — PANTOPRAZOLE SODIUM 40 MILLIGRAM(S): 20 TABLET, DELAYED RELEASE ORAL at 17:21

## 2023-01-10 NOTE — PROGRESS NOTE ADULT - ASSESSMENT
62y M hx HTN, recent EPEC and then biopsy + UC on colonoscopy presents c continued UC flare c dehydration/ prerenal azotemia/erich with hyperkalemia, 2/2 inability to tolerate PO, and subsequent deconditioning    ·	Ulcerative colitis   ·	Resolved ERICH   ·	Hyponatermia   ·	HTN    -continue with IV steroids - will need Remicade dose inpatient  -check PPD read tomorrow (so far negative L forearm)  -BP stable on current meds   -pain control prn   -continue with oral diet + Ensure supplements     DISPO: home once clinically stable and cleared by GI   -spoke to patient about his plan and is agreeable     Attending Physician Dr. Penelope Soriano # [id number]    62y M hx HTN, recent EPEC and then biopsy + UC on colonoscopy presents c continued UC flare c dehydration/ prerenal azotemia/erich with hyperkalemia, 2/2 inability to tolerate PO, and subsequent deconditioning    ·	Acute Ulcerative colitis Flare/abdominal spasms  ·	Resolved ERICH   ·	Hyponatermia   ·	HTN    -continue with IV steroids as per GI - Remicade dose placed   -PPD negative   -BP stable on current meds   -pain control prn   -added dicyclomine   -continue with oral diet as tolerated (hold Ensure supplement; likely added to underlying spasms)    DISPO: home once clinically stable and cleared by GI - not discharge ready today  -spoke to patient about his plan and is agreeable   -discussed with senior resident on the case     Attending Physician Dr. Penelope Soriano # [id number]

## 2023-01-10 NOTE — PROGRESS NOTE ADULT - ASSESSMENT
61 y/o M  w/ HTN, presenting to ED for persistent diarrhea and colitis seen on CT.  12/3 admitted after returning from a trip to [place] on 11/14 for bloody diarrhea & pancolitis on CTAP; GI PCR was positive for EPEC s/p 7 d Cipro/Flagyl (as per ID). 4 days after discharge, patient returns to ED due to unresolved symptoms s/p Colonoscopy 12/14/22 w/ Diffuse continuous ulceration, friability, erythema and abnormal vascularity with thick mucoid membrane with contact bleeding were noted in the rectum and sigmoid colon. Prelim results of biopsy were in favor of ulcerative colitis. Patient was started on solumedrol IV and will continue PO prednisone on discharged.  Diarrhea and abdominal pain improved significantly on steroids but returned 4 days after discharged home started having abdominal pain and 4-5 liquid stool, occasionally bloody, decrease po intake and fatigue and feeling dehydrated. hx is also remarkable for unintentional weight loss of 40 lbs since the onset of symptoms. CT abdomen showing persistent proctocolitis.     # Abdominal pain / diarrhea most likely secondary to active UC given chronicity of symptoms and lack of improvement w/ abx. partial improvement with steroids   - patient is hemodynamically stable  - no leucocytosis, no fever  - CT with persistent pancolitis  - C. diff and repeat GI PCR is negative  - was taking 40 mg po prednisone daily prior to admission   -  and   - prior work up reviewed pt up to date on all live vaccines but indeterminate quantiferon  - PPD done yesterday   - repeat flex sig 1/6 with Brabosa score 3 pancolitis suggestive of UC, follow up pathology results (discussed with GI pathology)  - Repeat CPR 16 > 166     recommendations:  - advance diet as tolerated  - Continue with IV solumedrol 20 mg q8h  - will add steroids enemas at BT  - Start dicyclomine 10 mg PO tid  - Supportive care per primary team  - Remicade - give 10mg/kg infusion today - discussed with pharmacy and medical team.    61 y/o M  w/ HTN, presenting to ED for persistent diarrhea and colitis seen on CT.  12/3 admitted after returning from a trip to [place] on 11/14 for bloody diarrhea & pancolitis on CTAP; GI PCR was positive for EPEC s/p 7 d Cipro/Flagyl (as per ID). 4 days after discharge, patient returns to ED due to unresolved symptoms s/p Colonoscopy 12/14/22 w/ Diffuse continuous ulceration, friability, erythema and abnormal vascularity with thick mucoid membrane with contact bleeding were noted in the rectum and sigmoid colon. Prelim results of biopsy were in favor of ulcerative colitis. Patient was started on solumedrol IV and will continue PO prednisone on discharged.  Diarrhea and abdominal pain improved significantly on steroids but returned 4 days after discharged home started having abdominal pain and 4-5 liquid stool, occasionally bloody, decrease po intake and fatigue and feeling dehydrated. hx is also remarkable for unintentional weight loss of 40 lbs since the onset of symptoms. CT abdomen showing persistent proctocolitis.     # Abdominal pain / diarrhea most likely secondary to active UC given chronicity of symptoms and lack of improvement w/ abx. partial improvement with steroids   - patient is hemodynamically stable  - no leucocytosis, no fever  - CT with persistent pancolitis  - C. diff and repeat GI PCR is negative  - was taking 40 mg po prednisone daily prior to admission   -  and   - prior work up reviewed pt up to date on all live vaccines but indeterminate quantiferon  - PPD done yesterday   - repeat flex sig 1/6 with Barbosa score 3 pancolitis suggestive of UC,  pathology results (discussed with GI pathology) confirmed UC  - Repeat CPR 16 > 166     recommendations:  - advance diet as tolerated  - Continue with IV solumedrol 20 mg q8h  - will add steroids enemas at BT  - Start dicyclomine 10 mg PO tid  - Supportive care per primary team  - Remicade - give 10mg/kg infusion today - discussed with pharmacy and medical team.   PPD: neg  Hep B serology negative  risks and benefits d/w pt  will follow up

## 2023-01-10 NOTE — PROGRESS NOTE ADULT - SUBJECTIVE AND OBJECTIVE BOX
Gastroenterology progress note:     Patient is a 62y old  Male who presents with a chief complaint of diarrhea (09 Jan 2023 11:44)       Admitted on: 01-03-23    We are following the patient for: UC       Interval History:    No acute events overnight.   endorses bm still diarrhea   endorses pain  denies bleeding     PAST MEDICAL & SURGICAL HISTORY:  HTN (hypertension)      No significant past surgical history          MEDICATIONS  (STANDING):  dicyclomine 10 milliGRAM(s) Oral three times a day before meals  heparin   Injectable 5000 Unit(s) SubCutaneous every 12 hours  hydrocortisone Enema 100 milliGRAM(s) Rectal daily  methylPREDNISolone sodium succinate Injectable 20 milliGRAM(s) IV Push every 8 hours  NIFEdipine XL 30 milliGRAM(s) Oral daily  pantoprazole  Injectable 40 milliGRAM(s) IV Push two times a day    MEDICATIONS  (PRN):  acetaminophen     Tablet .. 650 milliGRAM(s) Oral every 6 hours PRN Temp greater or equal to 38C (100.4F), Mild Pain (1 - 3)  aluminum hydroxide/magnesium hydroxide/simethicone Suspension 30 milliLiter(s) Oral every 4 hours PRN Dyspepsia  melatonin 3 milliGRAM(s) Oral at bedtime PRN Insomnia  ondansetron Injectable 4 milliGRAM(s) IV Push every 8 hours PRN Nausea and/or Vomiting      Allergies  penicillin (Rash)      Review of Systems:   Cardiovascular:  No Chest Pain, No Palpitations  Respiratory:  No Cough, No Dyspnea  Gastrointestinal:  As described in HPI  Skin:  No Skin Lesions, No Jaundice  Neuro:  No Syncope, No Dizziness    Physical Examination:  T(C): 36.4 (01-10-23 @ 04:27), Max: 36.4 (01-10-23 @ 04:27)  HR: 56 (01-10-23 @ 04:27) (56 - 62)  BP: 118/59 (01-10-23 @ 04:27) (110/74 - 120/69)  RR: 19 (01-10-23 @ 04:27) (17 - 19)  SpO2: --        GENERAL: AAOx3, no acute distress.  HEAD:  Atraumatic, Normocephalic  EYES: conjunctiva and sclera clear  NECK: Supple, no JVD or thyromegaly  CHEST/LUNG: Clear to auscultation bilaterally; No wheeze, rhonchi, or rales  HEART: Regular rate and rhythm; normal S1, S2, No murmurs.  ABDOMEN: Soft, nontender, nondistended; Bowel sounds present  NEUROLOGY: No asterixis or tremor.   SKIN: Intact, no jaundice     Data:                        9.8    10.16 )-----------( 491      ( 10 Brant 2023 07:45 )             30.0     Hgb trend:  9.8  01-10-23 @ 07:45  10.4  01-08-23 @ 04:30        01-10    133<L>  |  97<L>  |  21<H>  ----------------------------<  113<H>  4.7   |  29  |  0.7    Ca    8.4      10 Brant 2023 07:45  Mg     2.2     01-10      Liver panel trend:  TBili <0.2   /   AST 13   /   ALT 12   /   AlkP 69   /   Tptn 5.4   /   Alb 3.0    /   DBili --      01-08  TBili <0.2   /   AST 11   /   ALT 10   /   AlkP 67   /   Tptn 5.1   /   Alb 2.9    /   DBili --      01-07  TBili 0.3   /   AST 11   /   ALT 10   /   AlkP 71   /   Tptn 5.4   /   Alb 2.9    /   DBili --      01-06  TBili 0.2   /   AST 11   /   ALT 8   /   AlkP 80   /   Tptn 5.8   /   Alb 3.0    /   DBili --      01-04  TBili 0.2   /   AST 13   /   ALT 7   /   AlkP 85   /   Tptn 6.0   /   Alb 2.9    /   DBili --      01-03

## 2023-01-10 NOTE — PROGRESS NOTE ADULT - SUBJECTIVE AND OBJECTIVE BOX
BEATRIZ DRUMMOND  62y  Male      Patient is a 62y old  Male who presents with a chief complaint of diarrhea (08 Jan 2023 13:26)      INTERVAL HPI/OVERNIGHT EVENTS:  pt seen and examined this morning   -with abdominal spasms - dicyclomine started   -Remicade dose as per     REVIEW OF SYSTEMS:  -abdominal cramps after Ensure supplements     Vital Signs Last 24 Hrs  T(C): 36.6 (09 Jan 2023 05:37), Max: 36.6 (09 Jan 2023 05:37)  T(F): 97.8 (09 Jan 2023 05:37), Max: 97.8 (09 Jan 2023 05:37)  HR: 57 (09 Jan 2023 05:37) (57 - 68)  BP: 110/55 (09 Jan 2023 05:37) (106/60 - 113/62)  BP(mean): --  RR: 18 (09 Jan 2023 05:37) (18 - 18)  SpO2: --        PHYSICAL EXAM:  GENERAL: Not in distress - speaking in full sentences   HEAD:  Atraumatic, Normocephalic  EYES: EOMI, PERRLA, conjunctiva and sclera clear  NERVOUS SYSTEM:  Alert & Oriented X 3  CHEST/LUNG: Clear b/l  CV/HEART: Regular rate and rhythm; No murmurs, rubs, or gallops  GI/ABDOMEN: Soft abdomen   EXTREMITIES:  2+ Peripheral Pulses, No clubbing, cyanosis, or edema  SKIN: No rashes or lesions    LAB:                        10.4   11.29 )-----------( 511      ( 08 Jan 2023 04:30 )             32.1     01-08    133<L>  |  95<L>  |  20  ----------------------------<  150<H>  4.4   |  23  |  0.6<L>    Ca    8.5      08 Jan 2023 04:30  Mg     2.2     01-08    TPro  5.4<L>  /  Alb  3.0<L>  /  TBili  <0.2  /  DBili  x   /  AST  13  /  ALT  12  /  AlkPhos  69  01-08    Daily   CAPILLARY BLOOD GLUCOSE    LIVER FUNCTIONS - ( 08 Jan 2023 04:30 )  Alb: 3.0 g/dL / Pro: 5.4 g/dL / ALK PHOS: 69 U/L / ALT: 12 U/L / AST: 13 U/L / GGT: x           RADIOLOGY:    Imaging Personally visualized and Reviewed:  [y ] YES  [ ] NO    HEALTH ISSUES - PROBLEM Dx:    meds;  acetaminophen     Tablet .. 650 milliGRAM(s) Oral every 6 hours PRN  aluminum hydroxide/magnesium hydroxide/simethicone Suspension 30 milliLiter(s) Oral every 4 hours PRN  heparin   Injectable 5000 Unit(s) SubCutaneous every 12 hours  melatonin 3 milliGRAM(s) Oral at bedtime PRN  methylPREDNISolone sodium succinate Injectable 20 milliGRAM(s) IV Push every 8 hours  NIFEdipine XL 30 milliGRAM(s) Oral daily  ondansetron Injectable 4 milliGRAM(s) IV Push every 8 hours PRN  pantoprazole  Injectable 40 milliGRAM(s) IV Push two times a day         HODA BEATRIZ  62y  Male      Patient is a 62y old  Male who presents with a chief complaint of diarrhea (08 Jan 2023 13:26)      INTERVAL HPI/OVERNIGHT EVENTS:  pt seen and examined this morning   -with abdominal spasms - dicyclomine started   -Remicade dose as per GI team   -oob to chair as tolerated   -not discharge ready today     REVIEW OF SYSTEMS:  -abdominal cramps after Ensure supplements     Vital Signs Last 24 Hrs  T(C): 36.1 (10 Brant 2023 12:27), Max: 36.4 (10 Brant 2023 04:27)  T(F): 96.9 (10 Brant 2023 12:27), Max: 97.6 (10 Brant 2023 04:27)  HR: 65 (10 Brant 2023 12:27) (56 - 65)  BP: 118/68 (10 Brant 2023 12:27) (118/59 - 120/69)  BP(mean): --  RR: 18 (10 Brant 2023 12:27) (17 - 19)    PHYSICAL EXAM:  GENERAL: Not in distress - speaking in full sentences   HEAD:  Atraumatic, Normocephalic  EYES: EOMI, PERRLA, conjunctiva and sclera clear  NERVOUS SYSTEM:  Alert & Oriented X 3  CHEST/LUNG: Clear b/l  CV/HEART: Regular rate and rhythm; No murmurs, rubs, or gallops  GI/ABDOMEN: Soft abdomen   EXTREMITIES:  2+ Peripheral Pulses, No clubbing, cyanosis, or edema  SKIN: No rashes or lesions    LAB:                                 9.8    10.16 )-----------( 491      ( 10 Brant 2023 07:45 )             30.0   01-10    133<L>  |  97<L>  |  21<H>  ----------------------------<  113<H>  4.7   |  29  |  0.7    Ca    8.4      10 Brant 2023 07:45  Mg     2.2     01-10    Daily   CAPILLARY BLOOD GLUCOSE    LIVER FUNCTIONS - ( 08 Jan 2023 04:30 )  Alb: 3.0 g/dL / Pro: 5.4 g/dL / ALK PHOS: 69 U/L / ALT: 12 U/L / AST: 13 U/L / GGT: x           RADIOLOGY:    Imaging Personally visualized and Reviewed:  [y ] YES  [ ] NO    HEALTH ISSUES - PROBLEM Dx:    MEDICATIONS  (STANDING):  dicyclomine 10 milliGRAM(s) Oral three times a day before meals  heparin   Injectable 5000 Unit(s) SubCutaneous every 12 hours  hydrocortisone Enema 100 milliGRAM(s) Rectal daily  inFLIXimab (REMICADE) IVPB 800 milliGRAM(s) IV Intermittent once  methylPREDNISolone sodium succinate Injectable 20 milliGRAM(s) IV Push every 8 hours  NIFEdipine XL 30 milliGRAM(s) Oral daily  pantoprazole  Injectable 40 milliGRAM(s) IV Push two times a day    MEDICATIONS  (PRN):  acetaminophen     Tablet .. 650 milliGRAM(s) Oral every 6 hours PRN Temp greater or equal to 38C (100.4F), Mild Pain (1 - 3)  aluminum hydroxide/magnesium hydroxide/simethicone Suspension 30 milliLiter(s) Oral every 4 hours PRN Dyspepsia  melatonin 3 milliGRAM(s) Oral at bedtime PRN Insomnia  ondansetron Injectable 4 milliGRAM(s) IV Push every 8 hours PRN Nausea and/or Vomiting

## 2023-01-11 LAB
ALBUMIN SERPL ELPH-MCNC: 3.1 G/DL — LOW (ref 3.5–5.2)
ALP SERPL-CCNC: 70 U/L — SIGNIFICANT CHANGE UP (ref 30–115)
ALT FLD-CCNC: 13 U/L — SIGNIFICANT CHANGE UP (ref 0–41)
ANION GAP SERPL CALC-SCNC: 11 MMOL/L — SIGNIFICANT CHANGE UP (ref 7–14)
AST SERPL-CCNC: 8 U/L — SIGNIFICANT CHANGE UP (ref 0–41)
BASOPHILS # BLD AUTO: 0.05 K/UL — SIGNIFICANT CHANGE UP (ref 0–0.2)
BASOPHILS NFR BLD AUTO: 0.5 % — SIGNIFICANT CHANGE UP (ref 0–1)
BILIRUB SERPL-MCNC: 0.3 MG/DL — SIGNIFICANT CHANGE UP (ref 0.2–1.2)
BUN SERPL-MCNC: 20 MG/DL — SIGNIFICANT CHANGE UP (ref 10–20)
CALCIUM SERPL-MCNC: 8.8 MG/DL — SIGNIFICANT CHANGE UP (ref 8.4–10.5)
CHLORIDE SERPL-SCNC: 94 MMOL/L — LOW (ref 98–110)
CO2 SERPL-SCNC: 27 MMOL/L — SIGNIFICANT CHANGE UP (ref 17–32)
CREAT SERPL-MCNC: 0.8 MG/DL — SIGNIFICANT CHANGE UP (ref 0.7–1.5)
EGFR: 101 ML/MIN/1.73M2 — SIGNIFICANT CHANGE UP
EOSINOPHIL # BLD AUTO: 0 K/UL — SIGNIFICANT CHANGE UP (ref 0–0.7)
EOSINOPHIL NFR BLD AUTO: 0 % — SIGNIFICANT CHANGE UP (ref 0–8)
GLUCOSE SERPL-MCNC: 129 MG/DL — HIGH (ref 70–99)
HCT VFR BLD CALC: 32.1 % — LOW (ref 42–52)
HGB BLD-MCNC: 10.3 G/DL — LOW (ref 14–18)
IMM GRANULOCYTES NFR BLD AUTO: 1.5 % — HIGH (ref 0.1–0.3)
LACTOFERRIN STL-MCNC: 2.67 CD:794062635 — SIGNIFICANT CHANGE UP (ref 0–7.24)
LYMPHOCYTES # BLD AUTO: 1 K/UL — LOW (ref 1.2–3.4)
LYMPHOCYTES # BLD AUTO: 10.2 % — LOW (ref 20.5–51.1)
MAGNESIUM SERPL-MCNC: 2.3 MG/DL — SIGNIFICANT CHANGE UP (ref 1.8–2.4)
MCHC RBC-ENTMCNC: 28.6 PG — SIGNIFICANT CHANGE UP (ref 27–31)
MCHC RBC-ENTMCNC: 32.1 G/DL — SIGNIFICANT CHANGE UP (ref 32–37)
MCV RBC AUTO: 89.2 FL — SIGNIFICANT CHANGE UP (ref 80–94)
MONOCYTES # BLD AUTO: 0.38 K/UL — SIGNIFICANT CHANGE UP (ref 0.1–0.6)
MONOCYTES NFR BLD AUTO: 3.9 % — SIGNIFICANT CHANGE UP (ref 1.7–9.3)
NEUTROPHILS # BLD AUTO: 8.18 K/UL — HIGH (ref 1.4–6.5)
NEUTROPHILS NFR BLD AUTO: 83.9 % — HIGH (ref 42.2–75.2)
NRBC # BLD: 0 /100 WBCS — SIGNIFICANT CHANGE UP (ref 0–0)
PHOSPHATE SERPL-MCNC: 3.9 MG/DL — SIGNIFICANT CHANGE UP (ref 2.1–4.9)
PLATELET # BLD AUTO: 566 K/UL — HIGH (ref 130–400)
POTASSIUM SERPL-MCNC: 4.3 MMOL/L — SIGNIFICANT CHANGE UP (ref 3.5–5)
POTASSIUM SERPL-SCNC: 4.3 MMOL/L — SIGNIFICANT CHANGE UP (ref 3.5–5)
PROT SERPL-MCNC: 5.4 G/DL — LOW (ref 6–8)
RBC # BLD: 3.6 M/UL — LOW (ref 4.7–6.1)
RBC # FLD: 14.2 % — SIGNIFICANT CHANGE UP (ref 11.5–14.5)
SODIUM SERPL-SCNC: 132 MMOL/L — LOW (ref 135–146)
WBC # BLD: 9.76 K/UL — SIGNIFICANT CHANGE UP (ref 4.8–10.8)
WBC # FLD AUTO: 9.76 K/UL — SIGNIFICANT CHANGE UP (ref 4.8–10.8)

## 2023-01-11 PROCEDURE — 99233 SBSQ HOSP IP/OBS HIGH 50: CPT

## 2023-01-11 PROCEDURE — 99232 SBSQ HOSP IP/OBS MODERATE 35: CPT

## 2023-01-11 RX ADMIN — Medication 10 MILLIGRAM(S): at 06:27

## 2023-01-11 RX ADMIN — Medication 650 MILLIGRAM(S): at 13:13

## 2023-01-11 RX ADMIN — Medication 20 MILLIGRAM(S): at 13:13

## 2023-01-11 RX ADMIN — Medication 20 MILLIGRAM(S): at 21:32

## 2023-01-11 RX ADMIN — HEPARIN SODIUM 5000 UNIT(S): 5000 INJECTION INTRAVENOUS; SUBCUTANEOUS at 06:27

## 2023-01-11 RX ADMIN — PANTOPRAZOLE SODIUM 40 MILLIGRAM(S): 20 TABLET, DELAYED RELEASE ORAL at 06:27

## 2023-01-11 RX ADMIN — Medication 10 MILLIGRAM(S): at 10:37

## 2023-01-11 RX ADMIN — PANTOPRAZOLE SODIUM 40 MILLIGRAM(S): 20 TABLET, DELAYED RELEASE ORAL at 17:22

## 2023-01-11 RX ADMIN — HEPARIN SODIUM 5000 UNIT(S): 5000 INJECTION INTRAVENOUS; SUBCUTANEOUS at 17:21

## 2023-01-11 RX ADMIN — Medication 30 MILLIGRAM(S): at 06:27

## 2023-01-11 RX ADMIN — Medication 20 MILLIGRAM(S): at 06:27

## 2023-01-11 RX ADMIN — Medication 10 MILLIGRAM(S): at 15:55

## 2023-01-11 RX ADMIN — INFLIXIMAB-DYYB 165 MILLIGRAM(S): 120 INJECTION SUBCUTANEOUS at 13:16

## 2023-01-11 RX ADMIN — Medication 650 MILLIGRAM(S): at 13:00

## 2023-01-11 NOTE — PROGRESS NOTE ADULT - ASSESSMENT
62y M hx HTN, recent EPEC and then biopsy + UC on colonoscopy presents c continued UC flare c dehydration/ prerenal azotemia/erich with hyperkalemia, 2/2 inability to tolerate PO, and subsequent deconditioning    #Ulcerative colitis   #ERICH - resolved   #HYponatermia   c/w iv steroid -   received  remicade - PPD ordered and QuantiFeron gold   1/6 colonscopy done - multiple ulcerations seen, pathology sent   c/w ppin  held ARB for ERICH, -bp does not require the ARB currently  Was on PPN - stopped 1/7, regular diet started   Monitor sodium with regular diet     #Progress Note Handoff    Pending (specify): Na level, tolerance of diet, quantiferon gold, PPD to be planted by RT 1/8    Family discussion:     Disposition: likely home

## 2023-01-11 NOTE — PROGRESS NOTE ADULT - SUBJECTIVE AND OBJECTIVE BOX
BEATRIZ DRUMMOND 62y Male  MRN#: [id number]   Hospital Day: 8d    SUBJECTIVE  Patient is a 62y old Male who presents with a chief complaint of diarrhea (10 Brant 2023 16:04)  Currently admitted to medicine with the primary diagnosis of Severe diarrhea      INTERVAL HPI AND OVERNIGHT EVENTS:  Patient was examined and seen at bedside. This morning he is resting comfortably in bed and reports no issues or overnight events.    OBJECTIVE  PAST MEDICAL & SURGICAL HISTORY  HTN (hypertension)    No significant past surgical history      ALLERGIES:  penicillin (Rash)    MEDICATIONS:  STANDING MEDICATIONS  dicyclomine 10 milliGRAM(s) Oral three times a day before meals  heparin   Injectable 5000 Unit(s) SubCutaneous every 12 hours  hydrocortisone Enema 100 milliGRAM(s) Rectal daily  methylPREDNISolone sodium succinate Injectable 20 milliGRAM(s) IV Push every 8 hours  NIFEdipine XL 30 milliGRAM(s) Oral daily  pantoprazole  Injectable 40 milliGRAM(s) IV Push two times a day    PRN MEDICATIONS  acetaminophen     Tablet .. 650 milliGRAM(s) Oral every 6 hours PRN  aluminum hydroxide/magnesium hydroxide/simethicone Suspension 30 milliLiter(s) Oral every 4 hours PRN  melatonin 3 milliGRAM(s) Oral at bedtime PRN  ondansetron Injectable 4 milliGRAM(s) IV Push every 8 hours PRN      VITAL SIGNS: Last 24 Hours  T(C): 36.3 (11 Jan 2023 14:30), Max: 36.4 (11 Jan 2023 13:41)  T(F): 97.3 (11 Jan 2023 14:30), Max: 97.5 (11 Jan 2023 13:41)  HR: 60 (11 Jan 2023 14:30) (53 - 68)  BP: 116/64 (11 Jan 2023 14:30) (116/64 - 152/75)  BP(mean): --  RR: 18 (11 Jan 2023 14:30) (18 - 18)  SpO2: --    LABS:                        10.3   9.76  )-----------( 566      ( 11 Jan 2023 08:05 )             32.1     01-11    132<L>  |  94<L>  |  20  ----------------------------<  129<H>  4.3   |  27  |  0.8    Ca    8.8      11 Jan 2023 08:05  Phos  3.9     01-11  Mg     2.3     01-11    TPro  5.4<L>  /  Alb  3.1<L>  /  TBili  0.3  /  DBili  x   /  AST  8   /  ALT  13  /  AlkPhos  70  01-11                  RADIOLOGY:      PHYSICAL EXAM:  CONSTITUTIONAL: No acute distress, well-developed, well-groomed, AAOx3  HEAD: Atraumatic, normocephalic  EYES: EOM intact, PERRLA, conjunctiva and sclera clear  ENT: Supple, no masses, no thyromegaly, no bruits, no JVD; moist mucous membranes  PULMONARY: Clear to auscultation bilaterally; no wheezes, rales, or rhonchi  CARDIOVASCULAR: Regular rate and rhythm; no murmurs, rubs, or gallops  GASTROINTESTINAL: Soft, non-tender, non-distended; bowel sounds present  MUSCULOSKELETAL: 2+ peripheral pulses; no clubbing, no cyanosis, no edema  NEUROLOGY: non-focal  SKIN: No rashes or lesions; warm and dry

## 2023-01-11 NOTE — PROGRESS NOTE ADULT - ASSESSMENT
61 y/o M  w/ HTN, presenting to ED for persistent diarrhea and colitis seen on CT.  12/3 admitted after returning from a trip to [place] on 11/14 for bloody diarrhea & pancolitis on CTAP; GI PCR was positive for EPEC s/p 7 d Cipro/Flagyl (as per ID). 4 days after discharge, patient returns to ED due to unresolved symptoms s/p Colonoscopy 12/14/22 w/ Diffuse continuous ulceration, friability, erythema and abnormal vascularity with thick mucoid membrane with contact bleeding were noted in the rectum and sigmoid colon. Prelim results of biopsy were in favor of ulcerative colitis. Patient was started on solumedrol IV and will continue PO prednisone on discharged.  Diarrhea and abdominal pain improved significantly on steroids but returned 4 days after discharged home started having abdominal pain and 4-5 liquid stool, occasionally bloody, decrease po intake and fatigue and feeling dehydrated. hx is also remarkable for unintentional weight loss of 40 lbs since the onset of symptoms. CT abdomen showing persistent proctocolitis.     # Abdominal pain / diarrhea most likely secondary to active UC:  - patient is hemodynamically stable  - no leucocytosis, no fever  - CT with persistent pancolitis  - C. diff and repeat GI PCR is negative  - was taking 40 mg po prednisone daily prior to admission   -  and   - prior work up reviewed pt up to date on all live vaccines but indeterminate quantiferon  - repeat flex sig 1/6 with Barbosa score 3 pancolitis suggestive of UC,confirmed on pathology (discussed with Dr. Soriano GI pathology)    recommendations:  - advance diet as tolerated  - Continue with IV solumedrol 20 mg q8h  - c/w steriod enemas at bed time  - increase dicyclomine to 20 mg PO tid  - Supportive care per primary team  - Remicade - give 10mg/kg infusion today - discussed with pharmacy and medical team.   - PPD: neg  - Hep B serology negative  - risks and benefits d/w pt    will follow up

## 2023-01-11 NOTE — PROGRESS NOTE ADULT - SUBJECTIVE AND OBJECTIVE BOX
Gastroenterology progress note:     Patient is a 62y old  Male who presents with a chief complaint of diarrhea (10 Brant 2023 16:04)       Admitted on: 01-03-23    We are following the patient for: UC       Interval History:    No acute events overnight.   still complains of cramps  diarrhea is getting better  scheduled for remicade infusion today       PAST MEDICAL & SURGICAL HISTORY:  HTN (hypertension)      No significant past surgical history      MEDICATIONS  (STANDING):  dicyclomine 10 milliGRAM(s) Oral three times a day before meals  heparin   Injectable 5000 Unit(s) SubCutaneous every 12 hours  hydrocortisone Enema 100 milliGRAM(s) Rectal daily  methylPREDNISolone sodium succinate Injectable 20 milliGRAM(s) IV Push every 8 hours  NIFEdipine XL 30 milliGRAM(s) Oral daily  pantoprazole  Injectable 40 milliGRAM(s) IV Push two times a day    MEDICATIONS  (PRN):  acetaminophen     Tablet .. 650 milliGRAM(s) Oral every 6 hours PRN Temp greater or equal to 38C (100.4F), Mild Pain (1 - 3)  aluminum hydroxide/magnesium hydroxide/simethicone Suspension 30 milliLiter(s) Oral every 4 hours PRN Dyspepsia  melatonin 3 milliGRAM(s) Oral at bedtime PRN Insomnia  ondansetron Injectable 4 milliGRAM(s) IV Push every 8 hours PRN Nausea and/or Vomiting      Allergies  penicillin (Rash)      Review of Systems:   Cardiovascular:  No Chest Pain, No Palpitations  Respiratory:  No Cough, No Dyspnea  Gastrointestinal:  As described in HPI  Skin:  No Skin Lesions, No Jaundice  Neuro:  No Syncope, No Dizziness    Physical Examination:  T(C): 36.3 (01-11-23 @ 14:30), Max: 36.4 (01-11-23 @ 13:41)  HR: 60 (01-11-23 @ 14:30) (53 - 68)  BP: 116/64 (01-11-23 @ 14:30) (116/64 - 152/75)  RR: 18 (01-11-23 @ 14:30) (18 - 18)  SpO2: --        GENERAL: AAOx3, no acute distress.  HEAD:  Atraumatic, Normocephalic  EYES: conjunctiva and sclera clear  NECK: Supple, no JVD or thyromegaly  CHEST/LUNG: Clear to auscultation bilaterally; No wheeze, rhonchi, or rales  HEART: Regular rate and rhythm; normal S1, S2, No murmurs.  ABDOMEN: Soft, nontender, nondistended; Bowel sounds present  NEUROLOGY: No asterixis or tremor.   SKIN: Intact, no jaundice     Data:                        10.3   9.76  )-----------( 566      ( 11 Jan 2023 08:05 )             32.1     Hgb trend:  10.3  01-11-23 @ 08:05  9.8  01-10-23 @ 07:45      01-11    132<L>  |  94<L>  |  20  ----------------------------<  129<H>  4.3   |  27  |  0.8    Ca    8.8      11 Jan 2023 08:05  Phos  3.9     01-11  Mg     2.3     01-11    TPro  5.4<L>  /  Alb  3.1<L>  /  TBili  0.3  /  DBili  x   /  AST  8   /  ALT  13  /  AlkPhos  70  01-11    Liver panel trend:  TBili 0.3   /   AST 8   /   ALT 13   /   AlkP 70   /   Tptn 5.4   /   Alb 3.1    /   DBili --      01-11  TBili <0.2   /   AST 13   /   ALT 12   /   AlkP 69   /   Tptn 5.4   /   Alb 3.0    /   DBili --      01-08  TBili <0.2   /   AST 11   /   ALT 10   /   AlkP 67   /   Tptn 5.1   /   Alb 2.9    /   DBili --      01-07  TBili 0.3   /   AST 11   /   ALT 10   /   AlkP 71   /   Tptn 5.4   /   Alb 2.9    /   DBili --      01-06  TBili 0.2   /   AST 11   /   ALT 8   /   AlkP 80   /   Tptn 5.8   /   Alb 3.0    /   DBili --      01-04  TBili 0.2   /   AST 13   /   ALT 7   /   AlkP 85   /   Tptn 6.0   /   Alb 2.9    /   DBili --      01-03

## 2023-01-12 LAB
ALBUMIN SERPL ELPH-MCNC: 2.8 G/DL — LOW (ref 3.5–5.2)
ALP SERPL-CCNC: 63 U/L — SIGNIFICANT CHANGE UP (ref 30–115)
ALT FLD-CCNC: 11 U/L — SIGNIFICANT CHANGE UP (ref 0–41)
ANION GAP SERPL CALC-SCNC: 10 MMOL/L — SIGNIFICANT CHANGE UP (ref 7–14)
AST SERPL-CCNC: 9 U/L — SIGNIFICANT CHANGE UP (ref 0–41)
BASOPHILS # BLD AUTO: 0.02 K/UL — SIGNIFICANT CHANGE UP (ref 0–0.2)
BASOPHILS NFR BLD AUTO: 0.3 % — SIGNIFICANT CHANGE UP (ref 0–1)
BILIRUB SERPL-MCNC: 0.2 MG/DL — SIGNIFICANT CHANGE UP (ref 0.2–1.2)
BUN SERPL-MCNC: 19 MG/DL — SIGNIFICANT CHANGE UP (ref 10–20)
CALCIUM SERPL-MCNC: 8.2 MG/DL — LOW (ref 8.4–10.5)
CHLORIDE SERPL-SCNC: 99 MMOL/L — SIGNIFICANT CHANGE UP (ref 98–110)
CO2 SERPL-SCNC: 25 MMOL/L — SIGNIFICANT CHANGE UP (ref 17–32)
CREAT SERPL-MCNC: 0.6 MG/DL — LOW (ref 0.7–1.5)
EGFR: 110 ML/MIN/1.73M2 — SIGNIFICANT CHANGE UP
EOSINOPHIL # BLD AUTO: 0 K/UL — SIGNIFICANT CHANGE UP (ref 0–0.7)
EOSINOPHIL NFR BLD AUTO: 0 % — SIGNIFICANT CHANGE UP (ref 0–8)
GLUCOSE SERPL-MCNC: 109 MG/DL — HIGH (ref 70–99)
HCT VFR BLD CALC: 31.3 % — LOW (ref 42–52)
HGB BLD-MCNC: 9.8 G/DL — LOW (ref 14–18)
IMM GRANULOCYTES NFR BLD AUTO: 2.1 % — HIGH (ref 0.1–0.3)
LYMPHOCYTES # BLD AUTO: 0.91 K/UL — LOW (ref 1.2–3.4)
LYMPHOCYTES # BLD AUTO: 11.8 % — LOW (ref 20.5–51.1)
MAGNESIUM SERPL-MCNC: 2.3 MG/DL — SIGNIFICANT CHANGE UP (ref 1.8–2.4)
MCHC RBC-ENTMCNC: 28.2 PG — SIGNIFICANT CHANGE UP (ref 27–31)
MCHC RBC-ENTMCNC: 31.3 G/DL — LOW (ref 32–37)
MCV RBC AUTO: 90.2 FL — SIGNIFICANT CHANGE UP (ref 80–94)
MONOCYTES # BLD AUTO: 0.37 K/UL — SIGNIFICANT CHANGE UP (ref 0.1–0.6)
MONOCYTES NFR BLD AUTO: 4.8 % — SIGNIFICANT CHANGE UP (ref 1.7–9.3)
NEUTROPHILS # BLD AUTO: 6.27 K/UL — SIGNIFICANT CHANGE UP (ref 1.4–6.5)
NEUTROPHILS NFR BLD AUTO: 81 % — HIGH (ref 42.2–75.2)
NRBC # BLD: 0 /100 WBCS — SIGNIFICANT CHANGE UP (ref 0–0)
PLATELET # BLD AUTO: 504 K/UL — HIGH (ref 130–400)
POTASSIUM SERPL-MCNC: 4.8 MMOL/L — SIGNIFICANT CHANGE UP (ref 3.5–5)
POTASSIUM SERPL-SCNC: 4.8 MMOL/L — SIGNIFICANT CHANGE UP (ref 3.5–5)
PROT SERPL-MCNC: 4.9 G/DL — LOW (ref 6–8)
RBC # BLD: 3.47 M/UL — LOW (ref 4.7–6.1)
RBC # FLD: 14.4 % — SIGNIFICANT CHANGE UP (ref 11.5–14.5)
SODIUM SERPL-SCNC: 134 MMOL/L — LOW (ref 135–146)
WBC # BLD: 7.73 K/UL — SIGNIFICANT CHANGE UP (ref 4.8–10.8)
WBC # FLD AUTO: 7.73 K/UL — SIGNIFICANT CHANGE UP (ref 4.8–10.8)
ZINC SERPL-MCNC: 74 UG/DL — SIGNIFICANT CHANGE UP (ref 44–115)

## 2023-01-12 PROCEDURE — 99233 SBSQ HOSP IP/OBS HIGH 50: CPT

## 2023-01-12 PROCEDURE — 99231 SBSQ HOSP IP/OBS SF/LOW 25: CPT

## 2023-01-12 RX ORDER — PANTOPRAZOLE SODIUM 20 MG/1
40 TABLET, DELAYED RELEASE ORAL
Refills: 0 | Status: DISCONTINUED | OUTPATIENT
Start: 2023-01-12 | End: 2023-01-15

## 2023-01-12 RX ADMIN — HEPARIN SODIUM 5000 UNIT(S): 5000 INJECTION INTRAVENOUS; SUBCUTANEOUS at 17:35

## 2023-01-12 RX ADMIN — Medication 10 MILLIGRAM(S): at 12:09

## 2023-01-12 RX ADMIN — Medication 100 MILLIGRAM(S): at 17:32

## 2023-01-12 RX ADMIN — PANTOPRAZOLE SODIUM 40 MILLIGRAM(S): 20 TABLET, DELAYED RELEASE ORAL at 06:05

## 2023-01-12 RX ADMIN — Medication 30 MILLIGRAM(S): at 06:05

## 2023-01-12 RX ADMIN — HEPARIN SODIUM 5000 UNIT(S): 5000 INJECTION INTRAVENOUS; SUBCUTANEOUS at 06:04

## 2023-01-12 RX ADMIN — Medication 20 MILLIGRAM(S): at 14:55

## 2023-01-12 RX ADMIN — Medication 20 MILLIGRAM(S): at 22:11

## 2023-01-12 RX ADMIN — Medication 10 MILLIGRAM(S): at 09:04

## 2023-01-12 RX ADMIN — Medication 20 MILLIGRAM(S): at 06:04

## 2023-01-12 RX ADMIN — Medication 10 MILLIGRAM(S): at 17:34

## 2023-01-12 NOTE — PROGRESS NOTE ADULT - SUBJECTIVE AND OBJECTIVE BOX
Gastroenterology progress note:     Patient is a 62y old  Male who presents with a chief complaint of diarrhea (10 Brant 2023 16:04)       Admitted on: 01-03-23    We are following the patient for: UC       Interval History:    No acute events overnight.   s/p infliximab infusion   cramps is better with dicyclomine  no bleeding  po intake is better      PAST MEDICAL & SURGICAL HISTORY:  HTN (hypertension)      No significant past surgical history          MEDICATIONS  (STANDING):  dicyclomine 10 milliGRAM(s) Oral three times a day before meals  heparin   Injectable 5000 Unit(s) SubCutaneous every 12 hours  hydrocortisone Enema 100 milliGRAM(s) Rectal daily  methylPREDNISolone sodium succinate Injectable 20 milliGRAM(s) IV Push every 8 hours  NIFEdipine XL 30 milliGRAM(s) Oral daily  pantoprazole  Injectable 40 milliGRAM(s) IV Push two times a day    MEDICATIONS  (PRN):  acetaminophen     Tablet .. 650 milliGRAM(s) Oral every 6 hours PRN Temp greater or equal to 38C (100.4F), Mild Pain (1 - 3)  aluminum hydroxide/magnesium hydroxide/simethicone Suspension 30 milliLiter(s) Oral every 4 hours PRN Dyspepsia  melatonin 3 milliGRAM(s) Oral at bedtime PRN Insomnia  ondansetron Injectable 4 milliGRAM(s) IV Push every 8 hours PRN Nausea and/or Vomiting      Allergies  penicillin (Rash)      Review of Systems:   Cardiovascular:  No Chest Pain, No Palpitations  Respiratory:  No Cough, No Dyspnea  Gastrointestinal:  As described in HPI  Skin:  No Skin Lesions, No Jaundice  Neuro:  No Syncope, No Dizziness    Physical Examination:  T(C): 35.7 (01-12-23 @ 06:12), Max: 36.4 (01-11-23 @ 13:41)  HR: 54 (01-12-23 @ 06:12) (54 - 68)  BP: 125/54 (01-12-23 @ 06:12) (109/55 - 144/67)  RR: 18 (01-11-23 @ 20:51) (18 - 18)  SpO2: 98% (01-12-23 @ 06:12) (98% - 98%)        GENERAL: AAOx3, no acute distress.  HEAD:  Atraumatic, Normocephalic  EYES: conjunctiva and sclera clear  NECK: Supple, no JVD or thyromegaly  CHEST/LUNG: Clear to auscultation bilaterally; No wheeze, rhonchi, or rales  HEART: Regular rate and rhythm; normal S1, S2, No murmurs.  ABDOMEN: Soft, nontender, nondistended; Bowel sounds present  NEUROLOGY: No asterixis or tremor.   SKIN: Intact, no jaundice     Data:                        9.8    7.73  )-----------( 504      ( 12 Jan 2023 07:54 )             31.3     Hgb trend:  9.8  01-12-23 @ 07:54  10.3  01-11-23 @ 08:05  9.8  01-10-23 @ 07:45      01-12    134<L>  |  99  |  19  ----------------------------<  109<H>  4.8   |  25  |  0.6<L>    Ca    8.2<L>      12 Jan 2023 07:54  Phos  3.9     01-11  Mg     2.3     01-12    TPro  4.9<L>  /  Alb  2.8<L>  /  TBili  0.2  /  DBili  x   /  AST  9   /  ALT  11  /  AlkPhos  63  01-12    Liver panel trend:  TBili 0.2   /   AST 9   /   ALT 11   /   AlkP 63   /   Tptn 4.9   /   Alb 2.8    /   DBili --      01-12  TBili 0.3   /   AST 8   /   ALT 13   /   AlkP 70   /   Tptn 5.4   /   Alb 3.1    /   DBili --      01-11  TBili <0.2   /   AST 13   /   ALT 12   /   AlkP 69   /   Tptn 5.4   /   Alb 3.0    /   DBili --      01-08  TBili <0.2   /   AST 11   /   ALT 10   /   AlkP 67   /   Tptn 5.1   /   Alb 2.9    /   DBili --      01-07  TBili 0.3   /   AST 11   /   ALT 10   /   AlkP 71   /   Tptn 5.4   /   Alb 2.9    /   DBili --      01-06  TBili 0.2   /   AST 11   /   ALT 8   /   AlkP 80   /   Tptn 5.8   /   Alb 3.0    /   DBili --      01-04  TBili 0.2   /   AST 13   /   ALT 7   /   AlkP 85   /   Tptn 6.0   /   Alb 2.9    /   DBili --      01-03

## 2023-01-12 NOTE — PROGRESS NOTE ADULT - ASSESSMENT
62y M hx HTN, recent EPEC and then biopsy + UC on colonoscopy presents with continued UC flare c/b dehydration/ prerenal azotemia/erich with hyperkalemia, 2/2 inability to tolerate PO, and subsequent deconditioning    #Ulcerative colitis Flare  Still having 3-4 BMs daily   Continue IV Solumedrol for now   S/p first dose of remicade 1/11 - PPD ordered and QuantiFeron gold   1/6 colonscopy done - multiple ulcerations seen, pathology sent   c/w PPI  - GI following daily       #ERICH - resolved   #Hyponatermia - stable       Pending (specify): Improvement in UC Flare, possible dc 24-48       Total time spent to complete patient's bedside assessment, review medical chart, discuss medical plan of care with covering medical team was more than 25 minutes  with >50% of time spent face to face with patient, discussion with patient/family and/or coordination of care      Cheryl Brooke DO .

## 2023-01-12 NOTE — PROGRESS NOTE ADULT - SUBJECTIVE AND OBJECTIVE BOX
NUTRITION SUPPORT TEAM  -  PROGRESS NOTE   pt seen and evaluated   on po diet / po is better   less cramping     REVIEW OF SYSTEMS:  Negative except as noted above.     VITALS:  T(F): 97.5 (01-12 @ 14:49), Max: 97.5 (01-12 @ 14:49)  HR: 57 (01-12 @ 14:49) (54 - 57)  BP: 128/68 (01-12 @ 14:49) (125/54 - 128/68)  RR: 18 (01-12 @ 14:49) (18 - 18)  SpO2: 98% (01-12 @ 06:12) (98% - 98%)    HEIGHT/WEIGHT/BMI:   Height (cm): 182.9 (01-06), 182.9 (12-14), 182.9 (12-03)  Weight (kg): 81.6 (01-06), 95.3 (12-14), 94.347 (12-03)  BMI (kg/m2): 24.4 (01-06), 28.5 (12-14), 28.2 (12-03)  STANDING MEDICATIONS:   dicyclomine 10 milliGRAM(s) Oral three times a day before meals  heparin   Injectable 5000 Unit(s) SubCutaneous every 12 hours  hydrocortisone Enema 100 milliGRAM(s) Rectal daily  methylPREDNISolone sodium succinate Injectable 20 milliGRAM(s) IV Push every 8 hours  NIFEdipine XL 30 milliGRAM(s) Oral daily  pantoprazole  Injectable 40 milliGRAM(s) IV Push two times a day      LABS:                         9.8    7.73  )-----------( 504      ( 12 Jan 2023 07:54 )             31.3     134<L>  |  99  |  19  ----------------------------<  109<H>          (01-12-23 @ 07:54)  4.8   |  25  |  0.6<L>    Ca    8.2<L>          (01-12-23 @ 07:54)  Phos  3.9         (01-11-23 @ 08:05)  Mg     2.3         (01-12-23 @ 07:54)    TPro  4.9<L>  /  Alb  2.8<L>  /  TBili  0.2  /  DBili  x   /  AST  9   /  ALT  11  /  AlkPhos  63       01-12-23 @ 07:54  Triglycerides, Serum: 123 mg/dL (01-04 @ 08:33)  zinc Zinc Level, Plasma (01.06.23 @ 09:00)   Zinc Level, Plasma: 74: This test was developed and its performance characteristics     DIET:   Diet, Regular:   Supplement Feeding Modality:  Oral  Ensure Pudding Cans or Servings Per Day:  2       Frequency:  Two Times a day  Ensure Plus High Protein Cans or Servings Per Day:  3       Frequency:  Three Times a day (01-08-23 @ 17:33) [Active]

## 2023-01-12 NOTE — PROGRESS NOTE ADULT - SUBJECTIVE AND OBJECTIVE BOX
BEATRIZ DRUMMOND  62y, Male  Allergy: penicillin (Rash)    Hospital Day: 9d    Patient seen and examined earlier today. Feeling well, still having 3-4 BMS per day, soft not watery or mucoid. Scant blood present. Able to tolerate PO intake.     PMH/PSH:  PAST MEDICAL & SURGICAL HISTORY:  HTN (hypertension)      No significant past surgical history          LAST 24-Hr EVENTS:    VITALS:  T(F): 97.5 (01-12-23 @ 14:49), Max: 97.5 (01-11-23 @ 20:51)  HR: 57 (01-12-23 @ 14:49)  BP: 128/68 (01-12-23 @ 14:49) (109/55 - 128/68)  RR: 18 (01-12-23 @ 14:49)  SpO2: 98% (01-12-23 @ 06:12)        TESTS & MEASUREMENTS:  Weight (Kg):                             9.8    7.73  )-----------( 504      ( 12 Jan 2023 07:54 )             31.3       01-12    134<L>  |  99  |  19  ----------------------------<  109<H>  4.8   |  25  |  0.6<L>    Ca    8.2<L>      12 Jan 2023 07:54  Phos  3.9     01-11  Mg     2.3     01-12    TPro  4.9<L>  /  Alb  2.8<L>  /  TBili  0.2  /  DBili  x   /  AST  9   /  ALT  11  /  AlkPhos  63  01-12    LIVER FUNCTIONS - ( 12 Jan 2023 07:54 )  Alb: 2.8 g/dL / Pro: 4.9 g/dL / ALK PHOS: 63 U/L / ALT: 11 U/L / AST: 9 U/L / GGT: x                               RADIOLOGY, ECG, & ADDITIONAL TESTS:      RECENT DIAGNOSTIC ORDERS:  Diet, Regular:   Fiber/Residue Restricted  Supplement Feeding Modality:  Oral  Ensure Pudding Cans or Servings Per Day:  2       Frequency:  Two Times a day  Ensure Plus High Protein Cans or Servings Per Day:  3       Frequency:  Three Times a day (01-12-23 @ 17:24)      MEDICATIONS:  MEDICATIONS  (STANDING):  dicyclomine 10 milliGRAM(s) Oral three times a day before meals  heparin   Injectable 5000 Unit(s) SubCutaneous every 12 hours  hydrocortisone Enema 100 milliGRAM(s) Rectal daily  methylPREDNISolone sodium succinate Injectable 20 milliGRAM(s) IV Push every 8 hours  NIFEdipine XL 30 milliGRAM(s) Oral daily  pantoprazole  Injectable 40 milliGRAM(s) IV Push two times a day    MEDICATIONS  (PRN):  acetaminophen     Tablet .. 650 milliGRAM(s) Oral every 6 hours PRN Temp greater or equal to 38C (100.4F), Mild Pain (1 - 3)  aluminum hydroxide/magnesium hydroxide/simethicone Suspension 30 milliLiter(s) Oral every 4 hours PRN Dyspepsia  melatonin 3 milliGRAM(s) Oral at bedtime PRN Insomnia  ondansetron Injectable 4 milliGRAM(s) IV Push every 8 hours PRN Nausea and/or Vomiting      HOME MEDICATIONS:  olmesartan 5 mg oral tablet (12-10)      PHYSICAL EXAM:  CONSTITUTIONAL: No acute distress, well-developed, well-groomed, AAOx3  HEAD: Atraumatic, normocephalic  EYES: EOM intact, PERRLA, conjunctiva and sclera clear  ENT: Supple, no masses, no thyromegaly, no bruits, no JVD; moist mucous membranes  PULMONARY: Clear to auscultation bilaterally; no wheezes, rales, or rhonchi  CARDIOVASCULAR: Regular rate and rhythm; no murmurs, rubs, or gallops  GASTROINTESTINAL: Soft, non-tender, non-distended; bowel sounds present  MUSCULOSKELETAL: 2+ peripheral pulses; no clubbing, no cyanosis, no edema  NEUROLOGY: non-focal  SKIN: No rashes or lesions; warm and dry

## 2023-01-12 NOTE — PROGRESS NOTE ADULT - ASSESSMENT
ASSESSMENT   Abdominal pain / diarrhea most likely secondary to UC flare: R/O infectious process:  -- CT with persistent pancolitis  - HbA1c mildly elevated, pt on prednisone PTA  - severe acute malnutrition    PLAN  suggest a low residue, low fiber diet  continue with ensure enlive/ensure pudding   encourage po intake mostly protein  low fiber diet document printed and given to pt

## 2023-01-12 NOTE — PROGRESS NOTE ADULT - ASSESSMENT
61 y/o M  w/ HTN, presenting to ED for persistent diarrhea and colitis seen on CT.  12/3 admitted after returning from a trip to [place] on 11/14 for bloody diarrhea & pancolitis on CTAP; GI PCR was positive for EPEC s/p 7 d Cipro/Flagyl (as per ID). 4 days after discharge, patient returns to ED due to unresolved symptoms s/p Colonoscopy 12/14/22 w/ Diffuse continuous ulceration, friability, erythema and abnormal vascularity with thick mucoid membrane with contact bleeding were noted in the rectum and sigmoid colon. Prelim results of biopsy were in favor of ulcerative colitis. Patient was started on solumedrol IV and will continue PO prednisone on discharged.  Diarrhea and abdominal pain improved significantly on steroids but returned 4 days after discharged home started having abdominal pain and 4-5 liquid stool, occasionally bloody, decrease po intake and fatigue and feeling dehydrated. hx is also remarkable for unintentional weight loss of 40 lbs since the onset of symptoms. CT abdomen showing persistent proctocolitis.     # Abdominal pain / diarrhea most likely secondary to active UC:  - patient is hemodynamically stable  - no leucocytosis, no fever  - CT with persistent pancolitis  - C. diff and repeat GI PCR is negative  - was taking 40 mg po prednisone daily prior to admission   -  and   - prior work up reviewed pt up to date on all live vaccines but indeterminate quantiferon  - repeat flex sig 1/6 with Barbosa score 3 pancolitis suggestive of UC, confirmed on pathology (discussed with Dr. Soriano GI pathology)  - s/p Remicade infusion 10mg/kg on 1/11     recommendations:  - advance diet as tolerated  - Continue with IV solumedrol 20 mg q8h for now    - c/w steriod enemas at bed time  - c/w dicyclomine to 20 mg PO tid  - Supportive care per primary team  - will need nutrition follow up for optimization of the calories intake   - will need repeat Remicade infusion in 2 weeks as outpt      will follow

## 2023-01-13 LAB
ANION GAP SERPL CALC-SCNC: 13 MMOL/L — SIGNIFICANT CHANGE UP (ref 7–14)
BUN SERPL-MCNC: 20 MG/DL — SIGNIFICANT CHANGE UP (ref 10–20)
CALCIUM SERPL-MCNC: 9.3 MG/DL — SIGNIFICANT CHANGE UP (ref 8.4–10.5)
CHLORIDE SERPL-SCNC: 96 MMOL/L — LOW (ref 98–110)
CO2 SERPL-SCNC: 28 MMOL/L — SIGNIFICANT CHANGE UP (ref 17–32)
CREAT SERPL-MCNC: 0.8 MG/DL — SIGNIFICANT CHANGE UP (ref 0.7–1.5)
EGFR: 101 ML/MIN/1.73M2 — SIGNIFICANT CHANGE UP
GLUCOSE SERPL-MCNC: 140 MG/DL — HIGH (ref 70–99)
HCT VFR BLD CALC: 31.9 % — LOW (ref 42–52)
HGB BLD-MCNC: 10.4 G/DL — LOW (ref 14–18)
MAGNESIUM SERPL-MCNC: 2.3 MG/DL — SIGNIFICANT CHANGE UP (ref 1.8–2.4)
MCHC RBC-ENTMCNC: 29.1 PG — SIGNIFICANT CHANGE UP (ref 27–31)
MCHC RBC-ENTMCNC: 32.6 G/DL — SIGNIFICANT CHANGE UP (ref 32–37)
MCV RBC AUTO: 89.4 FL — SIGNIFICANT CHANGE UP (ref 80–94)
NRBC # BLD: 0 /100 WBCS — SIGNIFICANT CHANGE UP (ref 0–0)
PLATELET # BLD AUTO: 502 K/UL — HIGH (ref 130–400)
POTASSIUM SERPL-MCNC: 4.6 MMOL/L — SIGNIFICANT CHANGE UP (ref 3.5–5)
POTASSIUM SERPL-SCNC: 4.6 MMOL/L — SIGNIFICANT CHANGE UP (ref 3.5–5)
RBC # BLD: 3.57 M/UL — LOW (ref 4.7–6.1)
RBC # FLD: 14.6 % — HIGH (ref 11.5–14.5)
SODIUM SERPL-SCNC: 137 MMOL/L — SIGNIFICANT CHANGE UP (ref 135–146)
WBC # BLD: 10.87 K/UL — HIGH (ref 4.8–10.8)
WBC # FLD AUTO: 10.87 K/UL — HIGH (ref 4.8–10.8)

## 2023-01-13 PROCEDURE — 71045 X-RAY EXAM CHEST 1 VIEW: CPT | Mod: 26

## 2023-01-13 PROCEDURE — 99233 SBSQ HOSP IP/OBS HIGH 50: CPT

## 2023-01-13 PROCEDURE — 99232 SBSQ HOSP IP/OBS MODERATE 35: CPT

## 2023-01-13 RX ORDER — ALBUTEROL 90 UG/1
2.5 AEROSOL, METERED ORAL ONCE
Refills: 0 | Status: COMPLETED | OUTPATIENT
Start: 2023-01-13 | End: 2023-01-13

## 2023-01-13 RX ORDER — GUAIFENESIN/DEXTROMETHORPHAN 600MG-30MG
5 TABLET, EXTENDED RELEASE 12 HR ORAL ONCE
Refills: 0 | Status: DISCONTINUED | OUTPATIENT
Start: 2023-01-13 | End: 2023-01-15

## 2023-01-13 RX ADMIN — Medication 10 MILLIGRAM(S): at 11:07

## 2023-01-13 RX ADMIN — Medication 100 MILLIGRAM(S): at 17:22

## 2023-01-13 RX ADMIN — Medication 20 MILLIGRAM(S): at 07:01

## 2023-01-13 RX ADMIN — Medication 30 MILLIGRAM(S): at 07:01

## 2023-01-13 RX ADMIN — Medication 10 MILLIGRAM(S): at 15:34

## 2023-01-13 RX ADMIN — PANTOPRAZOLE SODIUM 40 MILLIGRAM(S): 20 TABLET, DELAYED RELEASE ORAL at 07:02

## 2023-01-13 RX ADMIN — HEPARIN SODIUM 5000 UNIT(S): 5000 INJECTION INTRAVENOUS; SUBCUTANEOUS at 07:01

## 2023-01-13 RX ADMIN — Medication 10 MILLIGRAM(S): at 07:02

## 2023-01-13 RX ADMIN — HEPARIN SODIUM 5000 UNIT(S): 5000 INJECTION INTRAVENOUS; SUBCUTANEOUS at 17:22

## 2023-01-13 RX ADMIN — ALBUTEROL 2.5 MILLIGRAM(S): 90 AEROSOL, METERED ORAL at 11:13

## 2023-01-13 RX ADMIN — Medication 20 MILLIGRAM(S): at 19:41

## 2023-01-13 RX ADMIN — Medication 20 MILLIGRAM(S): at 17:18

## 2023-01-13 RX ADMIN — Medication 20 MILLIGRAM(S): at 22:33

## 2023-01-13 NOTE — PROGRESS NOTE ADULT - ASSESSMENT
61 y/o M  w/ HTN, presenting to ED for persistent diarrhea and colitis seen on CT.  12/3 admitted after returning from a trip to [place] on 11/14 for bloody diarrhea & pancolitis on CTAP; GI PCR was positive for EPEC s/p 7 d Cipro/Flagyl (as per ID). 4 days after discharge, patient returns to ED due to unresolved symptoms s/p Colonoscopy 12/14/22 w/ Diffuse continuous ulceration, friability, erythema and abnormal vascularity with thick mucoid membrane with contact bleeding were noted in the rectum and sigmoid colon. Prelim results of biopsy were in favor of ulcerative colitis. Patient was started on solumedrol IV and will continue PO prednisone on discharged.  Diarrhea and abdominal pain improved significantly on steroids but returned 4 days after discharged home started having abdominal pain and 4-5 liquid stool, occasionally bloody, decrease po intake and fatigue and feeling dehydrated. hx is also remarkable for unintentional weight loss of 40 lbs since the onset of symptoms. CT abdomen showing persistent proctocolitis.     # Abdominal pain / diarrhea most likely secondary to active UC:  - patient is hemodynamically stable  - no leucocytosis, no fever  - CT with persistent pancolitis  - C. diff and repeat GI PCR is negative  - was taking 40 mg po prednisone daily prior to admission   -  and   - prior work up reviewed pt up to date on all live vaccines but indeterminate quantiferon  - repeat flex sig 1/6 with Barbosa score 3 pancolitis suggestive of UC, confirmed on pathology (discussed with Dr. Soriano GI pathology)  - s/p Remicade infusion 10mg/kg on 1/11     recommendations:  - advance diet as tolerated  - Continue with IV solumedrol 20 mg q8h, will consider switching to po steroids in AM  - c/w steroid enemas at bed time during hospitalization   - c/w dicyclomine to 20 mg PO tid  - Supportive care per primary team  - will need repeat Remicade infusion in 2 weeks as outpt  , has appointment with Dr. Souza Thrusday 1/19 at [address] [place] [address] at 3:30 PM (pt is aware)

## 2023-01-13 NOTE — PROGRESS NOTE ADULT - ATTENDING COMMENTS
62y M hx HTN, recent EPEC and then biopsy + UC on colonoscopy presents with continued UC flare c/b dehydration/ prerenal azotemia/erich with hyperkalemia, 2/2 inability to tolerate PO, and subsequent deconditioning    #Ulcerative colitis Flare  Still having 3-4 BMs daily   Continue IV Solumedrol for now   S/p first dose of remicade 1/11 - PPD ordered and QuantiFeron gold   1/6 colonscopy done - multiple ulcerations seen, pathology sent   c/w PPI      #ERICH - resolved   #Hyponatermia - stable       Pending (specify): Improvement in UC Flare, possible dc 24-48       Total time spent to complete patient's bedside assessment, review medical chart, discuss medical plan of care with covering medical team was more than 35 minutes  with >50% of time spent face to face with patient, discussion with patient/family and/or coordination of care      Cheryl Brooke, 
I edited the note
I edited the note
Suspected UC. On steroids with minimal to mild improvement. Awaiting work up (biopsies) and PPD results prior to initiation of rescue thearopy with biologics. WIll follow. COntinue to trend daily CRP
I edited the note
I edited the note
I edited the note.   Time-based billing (NON-critical care).   35 minutes spent on total encounter; more than 50% of the visit was spent counseling and / or coordinating care by the attending physician.  The necessity of the time spent during the encounter on this date of service was due to: Coordination of care.
I edited the note

## 2023-01-13 NOTE — CHART NOTE - NSCHARTNOTEFT_GEN_A_CORE
3 DAY CALORIE COUNT: initiated for 1/13, 1/14, 1/15. RN aware. RD to collect and post results on 1/16.     RD to remains available: Gaye Paul [phone] or TEAMS

## 2023-01-13 NOTE — PROGRESS NOTE ADULT - TIME BILLING
direct patient care and chart review   -coordinated current plan of care with medical staff on MDR
Coordination of care
direct patient care and chart review   -coordinated current plan of care with medical staff on MDR
Coordination of care

## 2023-01-13 NOTE — PROGRESS NOTE ADULT - ASSESSMENT
62y M hx HTN, recent EPEC and then biopsy + UC on colonoscopy presents with continued UC flare c/b dehydration/ prerenal azotemia/erich with hyperkalemia, 2/2 inability to tolerate PO, and subsequent deconditioning    #Ulcerative colitis Flare  Still having 3-4 BMs daily - but reports improvement   Continue IV Solumedrol for now - likely de-escalate tomorrow per GI   S/p first dose of remicade 1/11 1/6 colonscopy done - multiple ulcerations seen, pathology sent   c/w PPI  - GI following daily     #Congestion/Cough/ Wheezing   - suspect URI , RVP sent   - CXR unremarkable   - albuterol prn for wheezing     #ERICH - resolved   #Hyponatermia - stable       Pending (specify): likely de-escalation of steroids and dc over the weekend       Total time spent to complete patient's bedside assessment, review medical chart, discuss medical plan of care with covering medical team was more than 35 minutes  with >50% of time spent face to face with patient, discussion with patient/family and/or coordination of care      Cheryl Brooke DO .

## 2023-01-13 NOTE — PROGRESS NOTE ADULT - SUBJECTIVE AND OBJECTIVE BOX
BEATRIZ DRUMMOND  62y, Male  Allergy: penicillin (Rash)    Hospital Day: 10d    Patient seen and examined earlier today. Diarrhea continues to improve. Pt with new congestion and slight wheezing today.     PMH/PSH:  PAST MEDICAL & SURGICAL HISTORY:  HTN (hypertension)      No significant past surgical history          LAST 24-Hr EVENTS:    VITALS:  T(F): 97.3 (01-13-23 @ 12:28), Max: 97.6 (01-12-23 @ 19:49)  HR: 59 (01-13-23 @ 12:28)  BP: 102/58 (01-13-23 @ 12:28) (102/58 - 134/70)  RR: 18 (01-13-23 @ 12:28)  SpO2: --        TESTS & MEASUREMENTS:  Weight (Kg):                             10.4   10.87 )-----------( 502      ( 13 Jan 2023 08:27 )             31.9       01-13    137  |  96<L>  |  20  ----------------------------<  140<H>  4.6   |  28  |  0.8    Ca    9.3      13 Jan 2023 08:27  Mg     2.3     01-13    TPro  4.9<L>  /  Alb  2.8<L>  /  TBili  0.2  /  DBili  x   /  AST  9   /  ALT  11  /  AlkPhos  63  01-12    LIVER FUNCTIONS - ( 12 Jan 2023 07:54 )  Alb: 2.8 g/dL / Pro: 4.9 g/dL / ALK PHOS: 63 U/L / ALT: 11 U/L / AST: 9 U/L / GGT: x                               RADIOLOGY, ECG, & ADDITIONAL TESTS:      RECENT DIAGNOSTIC ORDERS:  Respiratory Viral Panel with COVID-19 by LUIS ANTONIO: Routine (01-13-23 @ 10:42)      MEDICATIONS:  MEDICATIONS  (STANDING):  dicyclomine 10 milliGRAM(s) Oral three times a day before meals  heparin   Injectable 5000 Unit(s) SubCutaneous every 12 hours  hydrocortisone Enema 100 milliGRAM(s) Rectal daily  methylPREDNISolone sodium succinate Injectable 20 milliGRAM(s) IV Push every 8 hours  NIFEdipine XL 30 milliGRAM(s) Oral daily  pantoprazole    Tablet 40 milliGRAM(s) Oral before breakfast    MEDICATIONS  (PRN):  acetaminophen     Tablet .. 650 milliGRAM(s) Oral every 6 hours PRN Temp greater or equal to 38C (100.4F), Mild Pain (1 - 3)  aluminum hydroxide/magnesium hydroxide/simethicone Suspension 30 milliLiter(s) Oral every 4 hours PRN Dyspepsia  guaifenesin/dextromethorphan Oral Liquid 5 milliLiter(s) Oral once PRN Cough  melatonin 3 milliGRAM(s) Oral at bedtime PRN Insomnia  ondansetron Injectable 4 milliGRAM(s) IV Push every 8 hours PRN Nausea and/or Vomiting      HOME MEDICATIONS:  olmesartan 5 mg oral tablet (12-10)      PHYSICAL EXAM:  CONSTITUTIONAL: No acute distress, well-developed, well-groomed, AAOx3  HEAD: Atraumatic, normocephalic  EYES: EOM intact, PERRLA, conjunctiva and sclera clear  ENT: Supple, no masses, no thyromegaly, no bruits, no JVD; moist mucous membranes  PULMONARY: mild bilateral wheezing   CARDIOVASCULAR: Regular rate and rhythm; no murmurs, rubs, or gallops  GASTROINTESTINAL: Soft, non-tender, non-distended; bowel sounds present  MUSCULOSKELETAL: 2+ peripheral pulses; no clubbing, no cyanosis, no edema  NEUROLOGY: non-focal  SKIN: No rashes or lesions; warm and dry

## 2023-01-13 NOTE — PROGRESS NOTE ADULT - SUBJECTIVE AND OBJECTIVE BOX
Gastroenterology progress note:     Patient is a 62y old  Male who presents with a chief complaint of diarrhea (10 Brant 2023 16:04)       Admitted on: 01-03-23    We are following the patient for: UC       Interval History:    No acute events overnight.   tolerating full breakfast  no blood  pain is better  BM improving     PAST MEDICAL & SURGICAL HISTORY:  HTN (hypertension)      No significant past surgical history          MEDICATIONS  (STANDING):  dicyclomine 10 milliGRAM(s) Oral three times a day before meals  heparin   Injectable 5000 Unit(s) SubCutaneous every 12 hours  hydrocortisone Enema 100 milliGRAM(s) Rectal daily  methylPREDNISolone sodium succinate Injectable 20 milliGRAM(s) IV Push every 8 hours  NIFEdipine XL 30 milliGRAM(s) Oral daily  pantoprazole    Tablet 40 milliGRAM(s) Oral before breakfast    MEDICATIONS  (PRN):  acetaminophen     Tablet .. 650 milliGRAM(s) Oral every 6 hours PRN Temp greater or equal to 38C (100.4F), Mild Pain (1 - 3)  aluminum hydroxide/magnesium hydroxide/simethicone Suspension 30 milliLiter(s) Oral every 4 hours PRN Dyspepsia  guaifenesin/dextromethorphan Oral Liquid 5 milliLiter(s) Oral once PRN Cough  melatonin 3 milliGRAM(s) Oral at bedtime PRN Insomnia  ondansetron Injectable 4 milliGRAM(s) IV Push every 8 hours PRN Nausea and/or Vomiting      Allergies  penicillin (Rash)      Review of Systems:   Cardiovascular:  No Chest Pain, No Palpitations  Respiratory:  No Cough, No Dyspnea  Gastrointestinal:  As described in HPI  Skin:  No Skin Lesions, No Jaundice  Neuro:  No Syncope, No Dizziness    Physical Examination:  T(C): 36.3 (01-13-23 @ 12:28), Max: 36.4 (01-12-23 @ 19:49)  HR: 59 (01-13-23 @ 12:28) (55 - 59)  BP: 102/58 (01-13-23 @ 12:28) (102/58 - 134/70)  RR: 18 (01-13-23 @ 12:28) (18 - 18)  SpO2: --        GENERAL: AAOx3, no acute distress.  HEAD:  Atraumatic, Normocephalic  EYES: conjunctiva and sclera clear  NECK: Supple, no JVD or thyromegaly  CHEST/LUNG: Clear to auscultation bilaterally; No wheeze, rhonchi, or rales  HEART: Regular rate and rhythm; normal S1, S2, No murmurs.  ABDOMEN: Soft, nontender, nondistended; Bowel sounds present  NEUROLOGY: No asterixis or tremor.   SKIN: Intact, no jaundice     Data:                        10.4   10.87 )-----------( 502      ( 13 Jan 2023 08:27 )             31.9     Hgb trend:  10.4  01-13-23 @ 08:27  9.8  01-12-23 @ 07:54  10.3  01-11-23 @ 08:05      01-13    137  |  96<L>  |  20  ----------------------------<  140<H>  4.6   |  28  |  0.8    Ca    9.3      13 Jan 2023 08:27  Mg     2.3     01-13    TPro  4.9<L>  /  Alb  2.8<L>  /  TBili  0.2  /  DBili  x   /  AST  9   /  ALT  11  /  AlkPhos  63  01-12    Liver panel trend:  TBili 0.2   /   AST 9   /   ALT 11   /   AlkP 63   /   Tptn 4.9   /   Alb 2.8    /   DBili --      01-12  TBili 0.3   /   AST 8   /   ALT 13   /   AlkP 70   /   Tptn 5.4   /   Alb 3.1    /   DBili --      01-11  TBili <0.2   /   AST 13   /   ALT 12   /   AlkP 69   /   Tptn 5.4   /   Alb 3.0    /   DBili --      01-08  TBili <0.2   /   AST 11   /   ALT 10   /   AlkP 67   /   Tptn 5.1   /   Alb 2.9    /   DBili --      01-07  TBili 0.3   /   AST 11   /   ALT 10   /   AlkP 71   /   Tptn 5.4   /   Alb 2.9    /   DBili --      01-06  TBili 0.2   /   AST 11   /   ALT 8   /   AlkP 80   /   Tptn 5.8   /   Alb 3.0    /   DBili --      01-04             Radiology:

## 2023-01-14 LAB
ALBUMIN SERPL ELPH-MCNC: 2.7 G/DL — LOW (ref 3.5–5.2)
ALP SERPL-CCNC: 67 U/L — SIGNIFICANT CHANGE UP (ref 30–115)
ALT FLD-CCNC: 17 U/L — SIGNIFICANT CHANGE UP (ref 0–41)
ANION GAP SERPL CALC-SCNC: 10 MMOL/L — SIGNIFICANT CHANGE UP (ref 7–14)
AST SERPL-CCNC: 14 U/L — SIGNIFICANT CHANGE UP (ref 0–41)
BILIRUB SERPL-MCNC: 0.3 MG/DL — SIGNIFICANT CHANGE UP (ref 0.2–1.2)
BUN SERPL-MCNC: 18 MG/DL — SIGNIFICANT CHANGE UP (ref 10–20)
CALCIUM SERPL-MCNC: 8.5 MG/DL — SIGNIFICANT CHANGE UP (ref 8.4–10.5)
CHLORIDE SERPL-SCNC: 98 MMOL/L — SIGNIFICANT CHANGE UP (ref 98–110)
CO2 SERPL-SCNC: 28 MMOL/L — SIGNIFICANT CHANGE UP (ref 17–32)
CREAT SERPL-MCNC: 0.6 MG/DL — LOW (ref 0.7–1.5)
EGFR: 110 ML/MIN/1.73M2 — SIGNIFICANT CHANGE UP
GAMMA INTERFERON BACKGROUND BLD IA-ACNC: 0.01 IU/ML — SIGNIFICANT CHANGE UP
GLUCOSE SERPL-MCNC: 118 MG/DL — HIGH (ref 70–99)
HCT VFR BLD CALC: 29.1 % — LOW (ref 42–52)
HGB BLD-MCNC: 9.4 G/DL — LOW (ref 14–18)
M TB IFN-G BLD-IMP: ABNORMAL
M TB IFN-G CD4+ BCKGRND COR BLD-ACNC: 0 IU/ML — SIGNIFICANT CHANGE UP
M TB IFN-G CD4+CD8+ BCKGRND COR BLD-ACNC: 0 IU/ML — SIGNIFICANT CHANGE UP
MAGNESIUM SERPL-MCNC: 2.2 MG/DL — SIGNIFICANT CHANGE UP (ref 1.8–2.4)
MCHC RBC-ENTMCNC: 29.2 PG — SIGNIFICANT CHANGE UP (ref 27–31)
MCHC RBC-ENTMCNC: 32.3 G/DL — SIGNIFICANT CHANGE UP (ref 32–37)
MCV RBC AUTO: 90.4 FL — SIGNIFICANT CHANGE UP (ref 80–94)
NRBC # BLD: 0 /100 WBCS — SIGNIFICANT CHANGE UP (ref 0–0)
PLATELET # BLD AUTO: 427 K/UL — HIGH (ref 130–400)
POTASSIUM SERPL-MCNC: 4.2 MMOL/L — SIGNIFICANT CHANGE UP (ref 3.5–5)
POTASSIUM SERPL-SCNC: 4.2 MMOL/L — SIGNIFICANT CHANGE UP (ref 3.5–5)
PROT SERPL-MCNC: 5 G/DL — LOW (ref 6–8)
QUANT TB PLUS MITOGEN MINUS NIL: 0 IU/ML — SIGNIFICANT CHANGE UP
RAPID RVP RESULT: DETECTED
RBC # BLD: 3.22 M/UL — LOW (ref 4.7–6.1)
RBC # FLD: 14.6 % — HIGH (ref 11.5–14.5)
SARS-COV-2 RNA SPEC QL NAA+PROBE: DETECTED
SODIUM SERPL-SCNC: 136 MMOL/L — SIGNIFICANT CHANGE UP (ref 135–146)
WBC # BLD: 9.2 K/UL — SIGNIFICANT CHANGE UP (ref 4.8–10.8)
WBC # FLD AUTO: 9.2 K/UL — SIGNIFICANT CHANGE UP (ref 4.8–10.8)

## 2023-01-14 PROCEDURE — 99232 SBSQ HOSP IP/OBS MODERATE 35: CPT

## 2023-01-14 RX ADMIN — PANTOPRAZOLE SODIUM 40 MILLIGRAM(S): 20 TABLET, DELAYED RELEASE ORAL at 06:53

## 2023-01-14 RX ADMIN — Medication 40 MILLIGRAM(S): at 10:43

## 2023-01-14 RX ADMIN — Medication 20 MILLIGRAM(S): at 17:18

## 2023-01-14 RX ADMIN — HEPARIN SODIUM 5000 UNIT(S): 5000 INJECTION INTRAVENOUS; SUBCUTANEOUS at 05:19

## 2023-01-14 RX ADMIN — Medication 20 MILLIGRAM(S): at 10:44

## 2023-01-14 RX ADMIN — ONDANSETRON 4 MILLIGRAM(S): 8 TABLET, FILM COATED ORAL at 21:43

## 2023-01-14 RX ADMIN — Medication 20 MILLIGRAM(S): at 06:53

## 2023-01-14 RX ADMIN — Medication 30 MILLIGRAM(S): at 05:19

## 2023-01-14 RX ADMIN — HEPARIN SODIUM 5000 UNIT(S): 5000 INJECTION INTRAVENOUS; SUBCUTANEOUS at 17:20

## 2023-01-14 RX ADMIN — Medication 20 MILLIGRAM(S): at 05:19

## 2023-01-14 NOTE — PROGRESS NOTE ADULT - PROVIDER SPECIALTY LIST ADULT
Gastroenterology
Internal Medicine
Nutrition Support
Nutrition Support
Gastroenterology
Hospitalist
Internal Medicine
Internal Medicine
Gastroenterology
Hospitalist
Internal Medicine
Internal Medicine
Hospitalist

## 2023-01-14 NOTE — PROGRESS NOTE ADULT - ASSESSMENT
63 y/o M  w/ HTN, presenting to ED for persistent diarrhea and colitis seen on CT.  12/3 admitted after returning from a trip to [place] on 11/14 for bloody diarrhea & pancolitis on CTAP; GI PCR was positive for EPEC s/p 7 d Cipro/Flagyl (as per ID). 4 days after discharge, patient returns to ED due to unresolved symptoms s/p Colonoscopy 12/14/22 w/ Diffuse continuous ulceration, friability, erythema and abnormal vascularity with thick mucoid membrane with contact bleeding were noted in the rectum and sigmoid colon. Prelim results of biopsy were in favor of ulcerative colitis. Patient was started on solumedrol IV and will continue PO prednisone on discharged.  Diarrhea and abdominal pain improved significantly on steroids but returned 4 days after discharged home started having abdominal pain and 4-5 liquid stool, occasionally bloody, decrease po intake and fatigue and feeling dehydrated. hx is also remarkable for unintentional weight loss of 40 lbs since the onset of symptoms. CT abdomen showing persistent proctocolitis.     # Abdominal pain / diarrhea most likely secondary to active UC:  - patient is hemodynamically stable  - no leucocytosis, no fever  - CT with persistent pancolitis  - C. diff and repeat GI PCR is negative  - was taking 40 mg po prednisone daily prior to admission   -  and   - prior work up reviewed pt up to date on all live vaccines but indeterminate quantiferon  - repeat flex sig 1/6 with Barbosa score 3 pancolitis suggestive of UC, confirmed on pathology (discussed with Dr. Soriano GI pathology)  - s/p Remicade infusion 10mg/kg on 1/11 with marked improvement in symp, tolerating po diet, no blood, pain is improved, diarrhea 3-4 times daily with stool is forming     recommendations:  - advance diet as tolerated  - switch to PO steroids 40 mg PO daily, will taper by 5 mg weekly, patient with close interval follow up appointment   - keep on PPI daily  - c/w steroid enemas at bed time during hospitalization, can stop on discharge   - c/w dicyclomine to 20 mg PO tid, continue on discharge   - Supportive care per primary team  - from GI perspective if remains stable on PO sterids and able to tolerate P O intake can d/c for outpt follow up   - will need repeat Remicade infusion in 2 weeks as outpt  , has appointment with Dr. Souza Thrusday 1/19 at [address] [facility] at 3:30 PM (pt is aware)    61 y/o M  w/ HTN, presenting to ED for persistent diarrhea and colitis seen on CT.  12/3 admitted after returning from a trip to [place] on 11/14 for bloody diarrhea & pancolitis on CTAP; GI PCR was positive for EPEC s/p 7 d Cipro/Flagyl (as per ID). 4 days after discharge, patient returns to ED due to unresolved symptoms s/p Colonoscopy 12/14/22 w/ Diffuse continuous ulceration, friability, erythema and abnormal vascularity with thick mucoid membrane with contact bleeding were noted in the rectum and sigmoid colon. Prelim results of biopsy were in favor of ulcerative colitis. Patient was started on solumedrol IV and will continue PO prednisone on discharged.  Diarrhea and abdominal pain improved significantly on steroids but returned 4 days after discharged home started having abdominal pain and 4-5 liquid stool, occasionally bloody, decrease po intake and fatigue and feeling dehydrated. hx is also remarkable for unintentional weight loss of 40 lbs since the onset of symptoms. CT abdomen showing persistent proctocolitis.     # Abdominal pain / diarrhea most likely secondary to active UC:  - patient is hemodynamically stable  - no leucocytosis, no fever  - CT with persistent pancolitis  - C. diff and repeat GI PCR is negative  - was taking 40 mg po prednisone daily prior to admission   -  and   - prior work up reviewed pt up to date on all live vaccines but indeterminate quantiferon  - repeat flex sig 1/6 with Barbosa score 3 pancolitis suggestive of UC, confirmed on pathology (discussed with Dr. Soriano GI pathology)  - s/p Remicade infusion 10mg/kg on 1/11 with marked improvement in symp, tolerating po diet, no blood, pain is improved, diarrhea 3-4 times daily with stool is forming     recommendations:  - advance diet as tolerated  - switch to PO steroids 40 mg PO daily, will taper by 5 mg weekly, patient with close interval follow up appointment   - keep on PPI daily  - c/w steroid enemas at bed time during hospitalization, can stop on discharge   - c/w dicyclomine to 20 mg PO tid, continue on discharge   - Supportive care per primary team  - will need repeat Remicade infusion in 2 weeks as outpt  , has appointment with Dr. Souza Thrusday 1/19 at [address] [place] [address] at 3:30 PM (pt is aware)   - from GI perspective if remains stable on PO sterids and able to tolerate P O intake can d/c for outpt follow up     recall as needed

## 2023-01-14 NOTE — PROGRESS NOTE ADULT - ASSESSMENT
62y M hx HTN, recent EPEC and then biopsy + UC on colonoscopy presents with continued UC flare c/b dehydration/ prerenal azotemia/erich with hyperkalemia, 2/2 inability to tolerate PO, and subsequent deconditioning    #Ulcerative colitis Flare  Still having 3-4 BMs daily - but reports improvement , more formed  Switch to PO Prednisone today per GI, and monitor for stability   S/p first dose of remicade 1/11 , repeat in 2 weeks   1/6 colonscopy done - multiple ulcerations seen, pathology sent   c/w PPI    #COVID positive   - URI symptoms but no hypoxia, symptoms improved today   - CXR unremarkable   - no intervention needed     #ERICH - resolved   #Hyponatermia - stable       Pending (specify): deescalation of steroids, dc tomorrow     Total time spent to complete patient's bedside assessment, review medical chart, discuss medical plan of care with covering medical team was more than 35 minutes  with >50% of time spent face to face with patient, discussion with patient/family and/or coordination of care      Cheryl Brooke DO .

## 2023-01-14 NOTE — PROGRESS NOTE ADULT - SUBJECTIVE AND OBJECTIVE BOX
Gastroenterology progress note:     Patient is a 62y old  Male who presents with a chief complaint of diarrhea (10 Brant 2023 16:04)       Admitted on: 01-03-23    We are following the patient for: UC       Interval History:    No acute events overnight.   tested positive for COVID      PAST MEDICAL & SURGICAL HISTORY:  HTN (hypertension)      No significant past surgical history          MEDICATIONS  (STANDING):  dicyclomine 20 milliGRAM(s) Oral three times a day before meals  heparin   Injectable 5000 Unit(s) SubCutaneous every 12 hours  hydrocortisone Enema 100 milliGRAM(s) Rectal daily  methylPREDNISolone sodium succinate Injectable 20 milliGRAM(s) IV Push every 8 hours  NIFEdipine XL 30 milliGRAM(s) Oral daily  pantoprazole    Tablet 40 milliGRAM(s) Oral before breakfast    MEDICATIONS  (PRN):  acetaminophen     Tablet .. 650 milliGRAM(s) Oral every 6 hours PRN Temp greater or equal to 38C (100.4F), Mild Pain (1 - 3)  aluminum hydroxide/magnesium hydroxide/simethicone Suspension 30 milliLiter(s) Oral every 4 hours PRN Dyspepsia  guaifenesin/dextromethorphan Oral Liquid 5 milliLiter(s) Oral once PRN Cough  melatonin 3 milliGRAM(s) Oral at bedtime PRN Insomnia  ondansetron Injectable 4 milliGRAM(s) IV Push every 8 hours PRN Nausea and/or Vomiting      Allergies  penicillin (Rash)      Review of Systems:   Cardiovascular:  No Chest Pain, No Palpitations  Respiratory:  No Cough, No Dyspnea  Gastrointestinal:  As described in HPI  Skin:  No Skin Lesions, No Jaundice  Neuro:  No Syncope, No Dizziness    Physical Examination:  T(C): 36.2 (01-14-23 @ 05:08), Max: 36.3 (01-13-23 @ 12:28)  HR: 52 (01-14-23 @ 05:08) (52 - 60)  BP: 110/58 (01-14-23 @ 05:08) (102/58 - 116/56)  RR: 19 (01-14-23 @ 05:08) (18 - 19)  SpO2: 99% (01-14-23 @ 05:08) (99% - 99%)        GENERAL: AAOx3, no acute distress.  HEAD:  Atraumatic, Normocephalic  EYES: conjunctiva and sclera clear  NECK: Supple, no JVD or thyromegaly  CHEST/LUNG: Clear to auscultation bilaterally; No wheeze, rhonchi, or rales  HEART: Regular rate and rhythm; normal S1, S2, No murmurs.  ABDOMEN: Soft, nontender, nondistended; Bowel sounds present  NEUROLOGY: No asterixis or tremor.   SKIN: Intact, no jaundice     Data:                        9.4    9.20  )-----------( 427      ( 14 Jan 2023 07:55 )             29.1     Hgb trend:  9.4  01-14-23 @ 07:55  10.4  01-13-23 @ 08:27  9.8  01-12-23 @ 07:54      01-14    136  |  98  |  18  ----------------------------<  118<H>  4.2   |  28  |  0.6<L>    Ca    8.5      14 Jan 2023 07:55  Mg     2.2     01-14    TPro  5.0<L>  /  Alb  2.7<L>  /  TBili  0.3  /  DBili  x   /  AST  14  /  ALT  17  /  AlkPhos  67  01-14    Liver panel trend:  TBili 0.3   /   AST 14   /   ALT 17   /   AlkP 67   /   Tptn 5.0   /   Alb 2.7    /   DBili --      01-14  TBili 0.2   /   AST 9   /   ALT 11   /   AlkP 63   /   Tptn 4.9   /   Alb 2.8    /   DBili --      01-12  TBili 0.3   /   AST 8   /   ALT 13   /   AlkP 70   /   Tptn 5.4   /   Alb 3.1    /   DBili --      01-11  TBili <0.2   /   AST 13   /   ALT 12   /   AlkP 69   /   Tptn 5.4   /   Alb 3.0    /   DBili --      01-08  TBili <0.2   /   AST 11   /   ALT 10   /   AlkP 67   /   Tptn 5.1   /   Alb 2.9    /   DBili --      01-07  TBili 0.3   /   AST 11   /   ALT 10   /   AlkP 71   /   Tptn 5.4   /   Alb 2.9    /   DBili --      01-06

## 2023-01-14 NOTE — PROGRESS NOTE ADULT - SUBJECTIVE AND OBJECTIVE BOX
BEATRIZ DRUMMOND  62y, Male  Allergy: penicillin (Rash)    Hospital Day: 11d    Patient seen and examined earlier today. Feeling well, eating is improved, agreeable to switching to oral steroids.     PMH/PSH:  PAST MEDICAL & SURGICAL HISTORY:  HTN (hypertension)      No significant past surgical history          LAST 24-Hr EVENTS:    VITALS:  T(F): 97.3 (01-14-23 @ 13:06), Max: 97.3 (01-14-23 @ 13:06)  HR: 64 (01-14-23 @ 13:06)  BP: 103/62 (01-14-23 @ 13:06) (103/62 - 116/56)  RR: 18 (01-14-23 @ 13:06)  SpO2: 99% (01-14-23 @ 13:06)        TESTS & MEASUREMENTS:  Weight (Kg):                             9.4    9.20  )-----------( 427      ( 14 Jan 2023 07:55 )             29.1       01-14    136  |  98  |  18  ----------------------------<  118<H>  4.2   |  28  |  0.6<L>    Ca    8.5      14 Jan 2023 07:55  Mg     2.2     01-14    TPro  5.0<L>  /  Alb  2.7<L>  /  TBili  0.3  /  DBili  x   /  AST  14  /  ALT  17  /  AlkPhos  67  01-14    LIVER FUNCTIONS - ( 14 Jan 2023 07:55 )  Alb: 2.7 g/dL / Pro: 5.0 g/dL / ALK PHOS: 67 U/L / ALT: 17 U/L / AST: 14 U/L / GGT: x                               RADIOLOGY, ECG, & ADDITIONAL TESTS:      RECENT DIAGNOSTIC ORDERS:      MEDICATIONS:  MEDICATIONS  (STANDING):  dicyclomine 20 milliGRAM(s) Oral three times a day before meals  heparin   Injectable 5000 Unit(s) SubCutaneous every 12 hours  hydrocortisone Enema 100 milliGRAM(s) Rectal daily  NIFEdipine XL 30 milliGRAM(s) Oral daily  pantoprazole    Tablet 40 milliGRAM(s) Oral before breakfast  predniSONE   Tablet 40 milliGRAM(s) Oral daily    MEDICATIONS  (PRN):  acetaminophen     Tablet .. 650 milliGRAM(s) Oral every 6 hours PRN Temp greater or equal to 38C (100.4F), Mild Pain (1 - 3)  aluminum hydroxide/magnesium hydroxide/simethicone Suspension 30 milliLiter(s) Oral every 4 hours PRN Dyspepsia  guaifenesin/dextromethorphan Oral Liquid 5 milliLiter(s) Oral once PRN Cough  melatonin 3 milliGRAM(s) Oral at bedtime PRN Insomnia  ondansetron Injectable 4 milliGRAM(s) IV Push every 8 hours PRN Nausea and/or Vomiting      HOME MEDICATIONS:  olmesartan 5 mg oral tablet (12-10)      PHYSICAL EXAM:  CONSTITUTIONAL: No acute distress, well-developed, well-groomed, AAOx3  HEAD: Atraumatic, normocephalic  EYES: EOM intact, PERRLA, conjunctiva and sclera clear  ENT: Supple, no masses, no thyromegaly, no bruits, no JVD; moist mucous membranes  PULMONARY: mild bilateral wheezing   CARDIOVASCULAR: Regular rate and rhythm; no murmurs, rubs, or gallops  GASTROINTESTINAL: Soft, non-tender, non-distended; bowel sounds present  MUSCULOSKELETAL: 2+ peripheral pulses; no clubbing, no cyanosis, no edema  NEUROLOGY: non-focal  SKIN: No rashes or lesions; warm and dry

## 2023-01-15 ENCOUNTER — TRANSCRIPTION ENCOUNTER (OUTPATIENT)
Age: 63
End: 2023-01-15

## 2023-01-15 VITALS — DIASTOLIC BLOOD PRESSURE: 54 MMHG | HEART RATE: 60 BPM | SYSTOLIC BLOOD PRESSURE: 102 MMHG

## 2023-01-15 LAB
ANION GAP SERPL CALC-SCNC: 9 MMOL/L — SIGNIFICANT CHANGE UP (ref 7–14)
BUN SERPL-MCNC: 20 MG/DL — SIGNIFICANT CHANGE UP (ref 10–20)
CALCIUM SERPL-MCNC: 8.3 MG/DL — LOW (ref 8.4–10.5)
CHLORIDE SERPL-SCNC: 100 MMOL/L — SIGNIFICANT CHANGE UP (ref 98–110)
CO2 SERPL-SCNC: 29 MMOL/L — SIGNIFICANT CHANGE UP (ref 17–32)
CREAT SERPL-MCNC: 0.6 MG/DL — LOW (ref 0.7–1.5)
EGFR: 110 ML/MIN/1.73M2 — SIGNIFICANT CHANGE UP
GLUCOSE SERPL-MCNC: 75 MG/DL — SIGNIFICANT CHANGE UP (ref 70–99)
HCT VFR BLD CALC: 30 % — LOW (ref 42–52)
HGB BLD-MCNC: 9.5 G/DL — LOW (ref 14–18)
MAGNESIUM SERPL-MCNC: 2.1 MG/DL — SIGNIFICANT CHANGE UP (ref 1.8–2.4)
MCHC RBC-ENTMCNC: 29.1 PG — SIGNIFICANT CHANGE UP (ref 27–31)
MCHC RBC-ENTMCNC: 31.7 G/DL — LOW (ref 32–37)
MCV RBC AUTO: 91.7 FL — SIGNIFICANT CHANGE UP (ref 80–94)
NRBC # BLD: 0 /100 WBCS — SIGNIFICANT CHANGE UP (ref 0–0)
PLATELET # BLD AUTO: 389 K/UL — SIGNIFICANT CHANGE UP (ref 130–400)
POTASSIUM SERPL-MCNC: 3.8 MMOL/L — SIGNIFICANT CHANGE UP (ref 3.5–5)
POTASSIUM SERPL-SCNC: 3.8 MMOL/L — SIGNIFICANT CHANGE UP (ref 3.5–5)
RBC # BLD: 3.27 M/UL — LOW (ref 4.7–6.1)
RBC # FLD: 14.9 % — HIGH (ref 11.5–14.5)
SODIUM SERPL-SCNC: 138 MMOL/L — SIGNIFICANT CHANGE UP (ref 135–146)
WBC # BLD: 12.43 K/UL — HIGH (ref 4.8–10.8)
WBC # FLD AUTO: 12.43 K/UL — HIGH (ref 4.8–10.8)

## 2023-01-15 PROCEDURE — 99239 HOSP IP/OBS DSCHRG MGMT >30: CPT

## 2023-01-15 RX ORDER — AMLODIPINE BESYLATE 2.5 MG/1
1 TABLET ORAL
Qty: 30 | Refills: 0
Start: 2023-01-15 | End: 2023-02-13

## 2023-01-15 RX ORDER — PANTOPRAZOLE SODIUM 20 MG/1
1 TABLET, DELAYED RELEASE ORAL
Qty: 30 | Refills: 0
Start: 2023-01-15 | End: 2023-02-13

## 2023-01-15 RX ORDER — OLMESARTAN MEDOXOMIL 5 MG/1
1 TABLET, FILM COATED ORAL
Qty: 0 | Refills: 0 | DISCHARGE

## 2023-01-15 RX ADMIN — HEPARIN SODIUM 5000 UNIT(S): 5000 INJECTION INTRAVENOUS; SUBCUTANEOUS at 06:24

## 2023-01-15 RX ADMIN — Medication 20 MILLIGRAM(S): at 06:24

## 2023-01-15 RX ADMIN — Medication 20 MILLIGRAM(S): at 10:30

## 2023-01-15 RX ADMIN — Medication 40 MILLIGRAM(S): at 06:25

## 2023-01-15 RX ADMIN — Medication 30 MILLIGRAM(S): at 06:25

## 2023-01-15 RX ADMIN — ONDANSETRON 4 MILLIGRAM(S): 8 TABLET, FILM COATED ORAL at 05:21

## 2023-01-15 RX ADMIN — PANTOPRAZOLE SODIUM 40 MILLIGRAM(S): 20 TABLET, DELAYED RELEASE ORAL at 06:25

## 2023-01-15 NOTE — DISCHARGE NOTE PROVIDER - NSDCCPCAREPLAN_GEN_ALL_CORE_FT
PRINCIPAL DISCHARGE DIAGNOSIS  Diagnosis: Acute ulcerative colitis  Assessment and Plan of Treatment: You presented with severe symptoms related to ulcerative colitis. You received a Remicade infusion, please follow up with GI for further infusions. You will continue Prednisone on discharge, and only taper down by 5mg per week. The taper has been sent to your pharmacy. Continue Protonix daily. Continue Dicyclomine daily.

## 2023-01-15 NOTE — DISCHARGE NOTE PROVIDER - NSDCFUADDAPPT_GEN_ALL_CORE_FT
- will need repeat Remicade infusion in 2 weeks as outpt  , has appointment with Dr. Souza Thrusday 1/19 at [address] [place] [address] at 3:30 PM    Follow up with PCP as needed

## 2023-01-15 NOTE — DISCHARGE NOTE PROVIDER - NSDCFUSCHEDAPPT_GEN_ALL_CORE_FT
Prosper Souza  [facility]  GASTRO Doc Off [address]  Scheduled Appointment: 01/19/2023

## 2023-01-15 NOTE — DISCHARGE NOTE NURSING/CASE MANAGEMENT/SOCIAL WORK - PATIENT PORTAL LINK FT
You can access the FollowMyHealth Patient Portal offered by [facility] by registering at the following website: [email or web address]. By joining [facility]’s FollowMyHealth portal, you will also be able to view your health information using other applications (apps) compatible with our system.

## 2023-01-15 NOTE — DISCHARGE NOTE PROVIDER - HOSPITAL COURSE
62y M hx HTN, recent EPEC and then biopsy + UC on colonoscopy presents with continued UC flare c/b dehydration/ prerenal azotemia/erich with hyperkalemia, 2/2 inability to tolerate PO, and subsequent deconditioning    #Ulcerative colitis Flare  S/p first dose of remicade 1/11 , repeat in 2 weeks   BMs now improved /more formed   GI has monitored closely this admission   - switch to PO steroids 40 mg PO daily, will taper by 5 mg weekly, patient with close interval follow up appointment   - keep on PPI daily  - c/w dicyclomine to 20 mg PO tid, continue on discharge   - will need repeat Remicade infusion in 2 weeks as outpt  , has appointment with Dr. Souza Thrusday 1/19 at [address] [facility] at 3:30 PM (pt is aware)     #COVID positive   - URI symptoms but no hypoxia, symptoms improved today   - CXR unremarkable   - no intervention needed     #ERICH - resolved   #Hyponatermia - stable

## 2023-01-15 NOTE — DISCHARGE NOTE PROVIDER - NSDCMRMEDTOKEN_GEN_ALL_CORE_FT
amLODIPine 5 mg oral tablet: 1 tab(s) orally once a day   dicyclomine 20 mg oral tablet: 1 tab(s) orally 3 times a day (before meals)  predniSONE 10 mg oral tablet: 3.5 tab(s) orally once a day from 1/21- 1/27  predniSONE 10 mg oral tablet: 3 tab(s) orally once a day from 1/28-2/3  predniSONE 10 mg oral tablet: 2.5 tab(s) orally once a day 2/4- 2/10  predniSONE 10 mg oral tablet: 1.5 tab(s) orally once a day from 2/18-2/24  predniSONE 10 mg oral tablet: 1 tab(s) orally once a day 2/25- 3/3  predniSONE 20 mg oral tablet: 1 tab(s) orally once a day from 2/11- 2/17  predniSONE 20 mg oral tablet: 2 tab(s) orally once a day from 1/16 - 1/20  predniSONE 5 mg oral tablet: 1 tab(s) orally once a day 3/4- 3/10   Protonix 40 mg oral delayed release tablet: 1 tab(s) orally once a day (before a meal)

## 2023-01-15 NOTE — DISCHARGE NOTE PROVIDER - CARE PROVIDER_API CALL
Prosper Souza)  Gastroenterology; Internal Medicine  [address] [place] [address]  Phone: [phone]  Fax: [phone]  Follow Up Time: 1-3 days

## 2023-01-15 NOTE — DISCHARGE NOTE PROVIDER - ATTENDING DISCHARGE PHYSICAL EXAMINATION:
CONSTITUTIONAL: No acute distress, well-developed, well-groomed, AAOx3  HEAD: Atraumatic, normocephalic  EYES: EOM intact, PERRLA, conjunctiva and sclera clear  ENT: Supple, no masses, no thyromegaly, no bruits, no JVD; moist mucous membranes  PULMONARY: clear to auscultation bilaterally   CARDIOVASCULAR: Regular rate and rhythm; no murmurs, rubs, or gallops  GASTROINTESTINAL: Soft, non-tender, non-distended; bowel sounds present  MUSCULOSKELETAL: 2+ peripheral pulses; no clubbing, no cyanosis, no edema  NEUROLOGY: non-focal  SKIN: No rashes or lesions; warm and dry

## 2023-01-19 ENCOUNTER — APPOINTMENT (OUTPATIENT)
Dept: GASTROENTEROLOGY | Facility: CLINIC | Age: 63
End: 2023-01-19
Payer: COMMERCIAL

## 2023-01-19 ENCOUNTER — TRANSCRIPTION ENCOUNTER (OUTPATIENT)
Age: 63
End: 2023-01-19

## 2023-01-19 DIAGNOSIS — R05.9 COUGH, UNSPECIFIED: ICD-10-CM

## 2023-01-19 PROCEDURE — 99215 OFFICE O/P EST HI 40 MIN: CPT | Mod: 95

## 2023-01-20 ENCOUNTER — OUTPATIENT (OUTPATIENT)
Dept: OUTPATIENT SERVICES | Facility: HOSPITAL | Age: 63
LOS: 1 days | Discharge: HOME | End: 2023-01-20
Payer: COMMERCIAL

## 2023-01-20 DIAGNOSIS — R05.9 COUGH, UNSPECIFIED: ICD-10-CM

## 2023-01-20 PROCEDURE — 71046 X-RAY EXAM CHEST 2 VIEWS: CPT | Mod: 26

## 2023-01-20 NOTE — PHYSICAL EXAM
[Normal] : alert, normal voice/communication, healthy appearing, no acute distress [Sclera] : the sclera and conjunctiva were normal [Hearing Threshold Finger Rub Not [place]] : hearing was normal [Normal Lips/Gums] : the lips and gums were normal [Normal Appearance] : the appearance of the neck was normal [No Respiratory Distress] : no respiratory distress [No Acc Muscle Use] : no accessory muscle use [Normal Color / Pigmentation] : normal skin color and pigmentation [No Focal Deficits] : no focal deficits [Oriented To Time, Place, And Person] : oriented to person, place, and time

## 2023-01-20 NOTE — HISTORY OF PRESENT ILLNESS
[Home] : at home, [unfilled] , at the time of the visit. [Medical Office: ([facility])___] : at the medical office located in  [Verbal consent obtained from patient] : the patient, [unfilled] [FreeTextEntry4] : JI MARCANO [FreeTextEntry1] : 1/6/23: up to the proximal ascending colon. Ulceration, friability, erythema and abnormal vascularity with thick mucoid membrane and deep ulcers noted in the the whole examined colon compatible with UC - Barbosa score of 3 throughout the examined colon.\par \par pathology: - Chronic colitis with ulceration, cryptitis and crypt abscesses, consistent with severely active ulcerative colitis.\par - Negative for granuloma and dysplasia.\par - Negative for viral inclusions.\par \par 12/14/22: Ulceration, friability, erythema and abnormal vascularity with thick mucoid membrane in the rectum and sigmoid colon compatible with CMV colitis vs UC vs C diff.\par \par path showed acute colitis, cryptitis & crypt abscesses; -ve for CMV. [de-identified] : 1/3/23: Since 12/10/2022, persistent/recurrent pan proctocolitis. No perforation or abscess.

## 2023-01-20 NOTE — ASSESSMENT
[FreeTextEntry1] : 61 y/o M w/ HTN, who was recently admitted for diarrhea and colitis seen on CT, being evaluated for UC. \par \par Patient initially had bloody diarrhea after a trip to [place] with CT showing colitis earlier in December that was managed as infectious colitis with IV antibiotics. After that he had multiple admissions including 2 sigmoidoscopies with the last procedure performed on 1/6/2023 confirming Barbosa 3 pancolitis thought to be secondary to UC (biopsy results were discussed with the pathologist).\par He was treated with IV steroids however, due to his severe UC, had a suboptimal response and was started on infliximab 10 mg/kg induction course during hospitalization.\par He was discharged on 1/15/2023 on a steroid taper and is doing better except for an episode of syncope on 1/28/23 after brisk change in position from seated to standing up with no significant trauma.\par He had a poor PO intake over the past week but encouraged to increase his caloric intake which he did and is feeling better.\par \par \par #ulcerative colitis with severe Barbosa 3 pancolitis \par prior work up reviewed pt up to date on all live vaccines (MMR, varicella) but indeterminate quantiferon\par he had PPD done in the hospital which was negative but pt was on steroids at the time \par he has no high risk features to concern for latent Tb, and he currently needs IFX for his symptoms \par normal TPMT activity\par HIV: -ve 12/2002\par HCV: -ve 12/2022\par DEXA: to be done later\par \par -pt started on steroids 40 mg PO daily on 1/15/23 to be tapered 5 mg weekly - continue current regimen\par -continue IFX 10 mg/kg induction course - next dose at week 2 on 1/25/23\par -pt had indeterminate quantiferon on multiple tests followed by negative PPD; however when PPD was negative, he was on steroids. no evidence that he has no latent Tb and he was started on IFX by the inpatient team. \par Although low risk of latent Tb given hx, will refer to ID for clearance as he will need long-term immunosupression\par -will ideally place patient on azathioprine to decrease rate of Ab to IFX formation but will await ID clearance\par -will check serum IFX levels on week 10\par -repeat colonoscopy w/ EGD in 6 mo\par -needs to be immunized to Hep A & B\par -Recommend influenza vaccination\par -Annual dermatological screening is recommended - will refer at a later visit\par -Patient advised to avoid NSAIDs and smoking\par -labs per below\par -f/u in 1 mo\par \par We had an extensive discussion with pt and wife about IFX related adverse events including his small risk of having latent TB and the risk of reactivation. I informed him that he has a low risk of harboring latent TB given his hx, so at this time, we will continue infliximab and monitor closely awaiting ID evaluation. \par He is fully understanding of the situation and will proceed with further work-up.  \par He understands that the benefits of infliximab at this time outweigh the risks.  \par \par #syncopal episode\par likely due to orthostatic hypotension in the setting of poor PO intake\par -encouraged increasing PO intake and adequate hydration\par -recommend 30-35 KCAL/KG/DAY\par \par #cough\par -cxr\par -check covid 19\par -empiric azithromycin 500 x 1 d followed by 250 x 4 d

## 2023-01-27 ENCOUNTER — APPOINTMENT (OUTPATIENT)
Dept: HEMATOLOGY ONCOLOGY | Facility: CLINIC | Age: 63
End: 2023-01-27
Payer: COMMERCIAL

## 2023-01-27 ENCOUNTER — APPOINTMENT (OUTPATIENT)
Dept: INFUSION THERAPY | Facility: CLINIC | Age: 63
End: 2023-01-27
Payer: COMMERCIAL

## 2023-01-27 ENCOUNTER — OUTPATIENT (OUTPATIENT)
Dept: OUTPATIENT SERVICES | Facility: HOSPITAL | Age: 63
LOS: 1 days | Discharge: HOME | End: 2023-01-27

## 2023-01-27 PROCEDURE — ZZZZZ: CPT

## 2023-01-27 RX ORDER — DIPHENHYDRAMINE HCL 50 MG
50 CAPSULE ORAL ONCE
Refills: 0 | Status: COMPLETED | OUTPATIENT
Start: 2023-01-27 | End: 2023-01-27

## 2023-01-27 RX ORDER — INFLIXIMAB-DYYB 120 MG/ML
1100 INJECTION SUBCUTANEOUS ONCE
Refills: 0 | Status: COMPLETED | OUTPATIENT
Start: 2023-01-27 | End: 2023-01-27

## 2023-01-27 RX ORDER — ACETAMINOPHEN 500 MG
650 TABLET ORAL ONCE
Refills: 0 | Status: COMPLETED | OUTPATIENT
Start: 2023-01-27 | End: 2023-01-27

## 2023-01-27 RX ADMIN — INFLIXIMAB-DYYB 250 MILLIGRAM(S): 120 INJECTION SUBCUTANEOUS at 11:09

## 2023-01-27 RX ADMIN — Medication 102 MILLIGRAM(S): at 11:01

## 2023-01-27 RX ADMIN — Medication 650 MILLIGRAM(S): at 11:00

## 2023-01-30 DIAGNOSIS — E55.9 VITAMIN D DEFICIENCY, UNSPECIFIED: ICD-10-CM

## 2023-01-30 DIAGNOSIS — E05.90 THYROTOXICOSIS, UNSPECIFIED W/OUT THYROTOXIC CRISIS OR STORM: ICD-10-CM

## 2023-01-30 LAB
25(OH)D3 SERPL-MCNC: 17 NG/ML
FERRITIN SERPL-MCNC: 253 NG/ML
INR PPP: 0.86 RATIO
IRON SATN MFR SERPL: 9 %
IRON SERPL-MCNC: 21 UG/DL
PT BLD: 9.8 SEC
TIBC SERPL-MCNC: 242 UG/DL
TSH SERPL-ACNC: 0.23 UIU/ML
UIBC SERPL-MCNC: 221 UG/DL

## 2023-01-30 RX ORDER — MELATONIN 5 MG
25 MCG TABLET,DISINTEGRATING ORAL
Qty: 60 | Refills: 1 | Status: ACTIVE | COMMUNITY
Start: 2023-01-30 | End: 1900-01-01

## 2023-01-31 DIAGNOSIS — K51.90 ULCERATIVE COLITIS, UNSPECIFIED, WITHOUT COMPLICATIONS: ICD-10-CM

## 2023-02-04 LAB
CHOLEST SERPL-MCNC: 234 MG/DL
ESTIMATED AVERAGE GLUCOSE: 103 MG/DL
HBA1C MFR BLD HPLC: 5.2 %
HDLC SERPL-MCNC: 76 MG/DL
LDLC SERPL CALC-MCNC: 124 MG/DL
NONHDLC SERPL-MCNC: 158 MG/DL
T3FREE SERPL-MCNC: 2.24 PG/ML
T4 FREE SERPL-MCNC: 1 NG/DL
TRIGL SERPL-MCNC: 171 MG/DL

## 2023-02-16 ENCOUNTER — APPOINTMENT (OUTPATIENT)
Dept: GASTROENTEROLOGY | Facility: CLINIC | Age: 63
End: 2023-02-16
Payer: COMMERCIAL

## 2023-02-16 PROCEDURE — 99215 OFFICE O/P EST HI 40 MIN: CPT | Mod: 95

## 2023-02-19 NOTE — HISTORY OF PRESENT ILLNESS
[Home] : at home, [unfilled] , at the time of the visit. [Medical Office: ([facility])___] : at the medical office located in  [FreeTextEntry1] : 63 y/o M w/ HTN, who was recently admitted for diarrhea and colitis seen on CT, being followed for UC. \par \par He is still in his Remicade induction course w/ first infusion 10mg/kg on 1/11/23 in the inpatient setting, and dose 2 on 1/27/23 with marked improvement in symp. tolerating po diet, no blood, no pain, has formed BMs 4-5 times daily but c/o frequency as he has to pass a BM after every meal.\par he is still on prednisone and is down to 15 mg daily for now - will finish his taper in the first week of 3/2023.\par otherwise, he is slowly gaining his weight back and has an improved appetite.\par \par no other GI complaints at this time. \par He was scheduled to follow-up with ID as outpatient the patient had a phone conversation with Dr. Steel.\par Review of systems otherwise negative. \par \par RECAP:\par 12/3/22 admitted after returning from a trip to [place] on 11/14/22 for bloody diarrhea & pancolitis on CTAP; GI PCR was positive for EPEC s/p 7 d Cipro/Flagyl (as per ID). 4 days after discharge, patient returns to ED due to unresolved symptoms s/p Colonoscopy 12/14/22 w/ Diffuse continuous ulceration, friability, erythema and abnormal vascularity with thick mucoid membrane with contact bleeding were noted in the rectum and sigmoid colon. biopsy results were in favor of ulcerative colitis. Patient was started on solumedrol IV was placed on PO prednisone on discharge. \par \par Diarrhea and abdominal pain improved significantly on steroids but returned 4 d after discharge. he started having abdominal pain and 4-5 liquid stools, occasionally bloody, decrease po intake and fatigue w/ dehydration. \par unintentional weight loss of 40 lbs since the onset of symptoms. \par CT abdomen w/ persistent proctocolitis. \par C. diff and repeat GI PCR is negative\par \par prior work up reviewed pt up to date on all live vaccines but indeterminate quantiferon\par repeat flex sig 1/6/23 with Barbosa score 3 pancolitis suggestive of UC, confirmed on pathology (discussed with Dr. Soriano GI pathology). \par \par \par \par 1/6/23 Colonoscopy: up to the proximal ascending colon. Ulceration, friability, erythema and abnormal vascularity with thick mucoid membrane and deep ulcers noted in the the whole examined colon compatible with UC - Barbosa score of 3 throughout the examined colon.\par -pathology: Chronic colitis with ulceration, cryptitis and crypt abscesses, consistent with severely active ulcerative colitis. Negative for granuloma, dysplasia, and viral inclusions.\par \par 12/14/22: Ulceration, friability, erythema and abnormal vascularity with thick mucoid membrane in the rectum and sigmoid colon compatible with CMV colitis vs UC vs C diff.\par -pathology showed acute colitis, cryptitis & crypt abscesses; -ve for CMV. \par \par Radiology Summary \par CT: 1/3/23: Since 12/10/2022, persistent/recurrent pan proctocolitis. No perforation or abscess.

## 2023-02-19 NOTE — ASSESSMENT
[FreeTextEntry1] : 63 y/o M w/ HTN, who was recently admitted for diarrhea and colitis seen on CT, being followed for UC. \par \par multiple admissions w/ colitis in 12/2023 after a trip to [place], managed as infectious colitis first, then 2 sigmoidoscopies with the last procedure performed on 1/6/2023 confirming Barbosa 3 pancolitis thought to be secondary to UC (biopsy results were discussed with the pathologist).\par treated with IV steroids however, due to his severe UC, had a suboptimal response and was started on infliximab 10 mg/kg induction course during hospitalization on 1/11/23 and had his 2nd dose as outpatient on 1/27/23 w/ clinical improvement. He was discharged on 1/15/2023 on a steroid taper and is doing better and is gaining weight.\par \par \par #ulcerative colitis with severe Barbosa 3 pancolitis \par prior work up reviewed pt up to date on all live vaccines (MMR, varicella) but indeterminate quantiferon\par he had PPD done in the hospital which was negative but pt was on steroids at the time \par he has no high risk features to concern for latent Tb, and he currently needs IFX for his symptoms - d/w ID and he is low risk for latent Tb \par normal TPMT activity\par HIV: -ve 12/2002\par HCV: -ve 12/2022\par DEXA: to be done later\par \par -pt started on steroids 40 mg PO daily on 1/15/23 to be tapered 5 mg weekly - continue current regimen (currently on 15 mg q24h and will finish course in early march)\par -continue IFX 10 mg/kg induction course - next dose at week 6 on 2/24/23\par -pt had indeterminate quantiferon on multiple tests followed by negative PPD; however when PPD was negative, he was on steroids. no evidence that he has no latent Tb and he was started on IFX by the inpatient team. \par low risk of latent Tb given hx, d/w ID. also, d/w pt risks and benefits and pt understands. \par \par -will place patient on azathioprine 50 mg q24h to decrease rate of Ab to IFX formation \par -will check serum IFX levels and Ab on week 10\par -repeat colonoscopy w/ EGD in 7/2023\par -needs to be immunized to Hep A & B with PCP\par -Recommend influenza vaccination\par -Patient advised to avoid NSAIDs and smoking\par -labs per below (incl ESR, CRP, fecal calpro, CMP, CBC, iron studies)\par -encouraged increasing PO intake and adequate hydration\par -recommend 30-35 KCAL/KG/DAY\par -dicyclomine PRN for cramping\par -could use imodium PRN to decrease BM frequency\par -Annual dermatological screening is recommended - will refer at a later visit as pt has multiple ongoing issues now\par -will need DEXA scan in the near future\par -f/u in 1 mo and get labs prior to f/u\par \par We had an extensive discussion with pt and wife about IFX, azathioprine, and immunosuppression related adverse events including his small risk of having latent TB and the risk of reactivation. I informed him that he has a low risk of harboring latent TB given his hx, so at this time, we will continue therapy and monitor closely.\par He is fully understanding of the situation and will proceed with close follow-up.  \par He understands that the benefits of therapy at this time outweigh the risks.

## 2023-02-19 NOTE — PHYSICAL EXAM
[Normal] : alert, normal voice/communication, healthy appearing, no acute distress [Sclera] : the sclera and conjunctiva were normal [Hearing Threshold Finger Rub Not [place]] : hearing was normal [Normal Lips/Gums] : the lips and gums were normal [Normal Appearance] : the appearance of the neck was normal [No Respiratory Distress] : no respiratory distress [No Acc Muscle Use] : no accessory muscle use [Normal Color / Pigmentation] : normal skin color and pigmentation [No Focal Deficits] : no focal deficits [Oriented To Time, Place, And Person] : oriented to person, place, and time

## 2023-02-24 ENCOUNTER — APPOINTMENT (OUTPATIENT)
Age: 63
End: 2023-02-24
Payer: COMMERCIAL

## 2023-02-24 ENCOUNTER — APPOINTMENT (OUTPATIENT)
Dept: INFUSION THERAPY | Facility: CLINIC | Age: 63
End: 2023-02-24

## 2023-02-24 ENCOUNTER — APPOINTMENT (OUTPATIENT)
Dept: HEMATOLOGY ONCOLOGY | Facility: CLINIC | Age: 63
End: 2023-02-24

## 2023-02-24 ENCOUNTER — APPOINTMENT (OUTPATIENT)
Dept: HEMATOLOGY ONCOLOGY | Facility: CLINIC | Age: 63
End: 2023-02-24
Payer: COMMERCIAL

## 2023-02-24 ENCOUNTER — APPOINTMENT (OUTPATIENT)
Dept: INFUSION THERAPY | Facility: CLINIC | Age: 63
End: 2023-02-24
Payer: COMMERCIAL

## 2023-02-24 ENCOUNTER — OUTPATIENT (OUTPATIENT)
Dept: OUTPATIENT SERVICES | Facility: HOSPITAL | Age: 63
LOS: 1 days | Discharge: HOME | End: 2023-02-24
Payer: COMMERCIAL

## 2023-02-24 VITALS
TEMPERATURE: 98.8 F | WEIGHT: 198 LBS | HEART RATE: 93 BPM | BODY MASS INDEX: 26.82 KG/M2 | RESPIRATION RATE: 18 BRPM | SYSTOLIC BLOOD PRESSURE: 135 MMHG | DIASTOLIC BLOOD PRESSURE: 70 MMHG | HEIGHT: 72 IN

## 2023-02-24 DIAGNOSIS — Z00.00 ENCOUNTER FOR GENERAL ADULT MEDICAL EXAMINATION WITHOUT ABNORMAL FINDINGS: ICD-10-CM

## 2023-02-24 PROCEDURE — 96375 TX/PRO/DX INJ NEW DRUG ADDON: CPT

## 2023-02-24 PROCEDURE — ZZZZZ: CPT

## 2023-02-24 PROCEDURE — 96413 CHEMO IV INFUSION 1 HR: CPT

## 2023-02-24 PROCEDURE — 96365 THER/PROPH/DIAG IV INF INIT: CPT

## 2023-02-24 PROCEDURE — 96415 CHEMO IV INFUSION ADDL HR: CPT

## 2023-02-24 RX ORDER — DIPHENHYDRAMINE HCL 50 MG
50 CAPSULE ORAL ONCE
Refills: 0 | Status: COMPLETED | OUTPATIENT
Start: 2023-02-24 | End: 2023-02-24

## 2023-02-24 RX ORDER — INFLIXIMAB-DYYB 120 MG/ML
900 INJECTION SUBCUTANEOUS ONCE
Refills: 0 | Status: COMPLETED | OUTPATIENT
Start: 2023-02-24 | End: 2023-02-24

## 2023-02-24 RX ORDER — ACETAMINOPHEN 500 MG
650 TABLET ORAL ONCE
Refills: 0 | Status: COMPLETED | OUTPATIENT
Start: 2023-02-24 | End: 2023-02-24

## 2023-02-24 RX ADMIN — Medication 650 MILLIGRAM(S): at 09:54

## 2023-02-24 RX ADMIN — INFLIXIMAB-DYYB 170 MILLIGRAM(S): 120 INJECTION SUBCUTANEOUS at 09:55

## 2023-02-24 RX ADMIN — Medication 102 MILLIGRAM(S): at 09:54

## 2023-02-25 DIAGNOSIS — Z00.00 ENCOUNTER FOR GENERAL ADULT MEDICAL EXAMINATION WITHOUT ABNORMAL FINDINGS: ICD-10-CM

## 2023-03-07 LAB
ALBUMIN SERPL ELPH-MCNC: 3.8 G/DL
ALP BLD-CCNC: 67 U/L
ALT SERPL-CCNC: 13 U/L
ANION GAP SERPL CALC-SCNC: 15 MMOL/L
AST SERPL-CCNC: 18 U/L
BASOPHILS # BLD AUTO: 0.05 K/UL
BASOPHILS NFR BLD AUTO: 0.9 %
BILIRUB SERPL-MCNC: <0.2 MG/DL
BUN SERPL-MCNC: 15 MG/DL
CALCIUM SERPL-MCNC: 9.2 MG/DL
CHLORIDE SERPL-SCNC: 101 MMOL/L
CO2 SERPL-SCNC: 24 MMOL/L
CREAT SERPL-MCNC: 0.8 MG/DL
EGFR: 101 ML/MIN/1.73M2
EOSINOPHIL # BLD AUTO: 0.12 K/UL
EOSINOPHIL NFR BLD AUTO: 2.1 %
ERYTHROCYTE [SEDIMENTATION RATE] IN BLOOD BY WESTERGREN METHOD: 70 MM/HR
GLUCOSE SERPL-MCNC: 118 MG/DL
HCT VFR BLD CALC: 36.8 %
HGB BLD-MCNC: 11.2 G/DL
IMM GRANULOCYTES NFR BLD AUTO: 0.9 %
IRON SATN MFR SERPL: 48 %
IRON SERPL-MCNC: 152 UG/DL
LYMPHOCYTES # BLD AUTO: 1.73 K/UL
LYMPHOCYTES NFR BLD AUTO: 30.4 %
MAN DIFF?: NORMAL
MCHC RBC-ENTMCNC: 29.7 PG
MCHC RBC-ENTMCNC: 30.4 G/DL
MCV RBC AUTO: 97.6 FL
MONOCYTES # BLD AUTO: 0.72 K/UL
MONOCYTES NFR BLD AUTO: 12.6 %
NEUTROPHILS # BLD AUTO: 3.03 K/UL
NEUTROPHILS NFR BLD AUTO: 53.1 %
PLATELET # BLD AUTO: 427 K/UL
POTASSIUM SERPL-SCNC: 4.7 MMOL/L
PROT SERPL-MCNC: 6.5 G/DL
RBC # BLD: 3.77 M/UL
RBC # FLD: 15.6 %
SODIUM SERPL-SCNC: 140 MMOL/L
TIBC SERPL-MCNC: 315 UG/DL
UIBC SERPL-MCNC: 163 UG/DL
WBC # FLD AUTO: 5.7 K/UL

## 2023-03-09 LAB
CRP SERPL-MCNC: 9 MG/L
FERRITIN SERPL-MCNC: 69 NG/ML

## 2023-03-15 DIAGNOSIS — R10.9 UNSPECIFIED ABDOMINAL PAIN: ICD-10-CM

## 2023-03-16 ENCOUNTER — APPOINTMENT (OUTPATIENT)
Dept: GASTROENTEROLOGY | Facility: CLINIC | Age: 63
End: 2023-03-16
Payer: COMMERCIAL

## 2023-03-16 LAB — CALPROTECTIN FECAL: 1139 UG/G

## 2023-03-16 PROCEDURE — 99215 OFFICE O/P EST HI 40 MIN: CPT | Mod: 95

## 2023-03-20 NOTE — PHYSICAL EXAM
[Normal] : alert, normal voice/communication, healthy appearing, no acute distress [Sclera] : the sclera and conjunctiva were normal [Hearing Threshold Finger Rub Not [place]] : hearing was normal [Normal Lips/Gums] : the lips and gums were normal [Normal Appearance] : the appearance of the neck was normal [No Respiratory Distress] : no respiratory distress [No Acc Muscle Use] : no accessory muscle use [Normal Color / Pigmentation] : normal skin color and pigmentation [No Focal Deficits] : no focal deficits [Oriented To Time, Place, And Person] : oriented to person, place, and time

## 2023-03-20 NOTE — ASSESSMENT
[FreeTextEntry1] : 63 y/o M w/ HTN, recent diagnosis of UC pancolitis, being followed for UC. \par \par multiple admissions w/ colitis in 12/2023 after a trip to [place], managed as infectious colitis first, then 2 sigmoidoscopies with the last procedure performed on 1/6/2023 confirming Barbosa 3 pancolitis 2/2 UC (biopsy results were discussed with the pathologist).\par treated with IV steroids however, due to his severe UC, had a suboptimal response and was started on infliximab 10 mg/kg induction course during hospitalization on 1/11/23, discharged on 1/15/23, and had his 2nd and 3rd dose as outpatient on 1/27/23 and 2/24/23 w/ clinical improvement. \par started on azathioprine 50 mg q24h last visit - tolerating well. \par finished steroid taper earlier this month.\par \par #ulcerative colitis with severe Barbosa 3 pancolitis \par clinically improved since initiation of biologic therapy despite elevated recent calprotectin - which has overall trended down along w/ CRP\par \par -continue IFX 10 mg/kg q8 weeks for now (next dose will be first maintenance dose)\par -pending results for serum IFX levels and Ab on week 10 (pt had tests performed)\par -will repeat IFX levels and Ab 1 week before next dose\par -will increase azathioprine to 75 mg q24h to decrease rate of Ab to IFX formation and help w/ disesae control\par -repeat colonoscopy w/ EGD in 7/2023\par -needs to be immunized to Hep A & B with PCP\par -Recommend influenza vaccination\par -Patient advised to avoid NSAIDs and smoking\par -labs per below (incl ESR, CRP, fecal calpro, CMP, CBC, iron studies)\par -encouraged increasing PO intake and adequate hydration\par -recommend 30-35 KCAL/KG/DAY\par -dicyclomine PRN for cramping (renewed)\par -could use imodium PRN to decrease BM frequency\par -metamucil PRN\par -Annual dermatological screening is recommended - will refer at a later visit as pt has multiple ongoing issues now\par -will need DEXA scan in the near future\par -f/u in 1 mo and get labs prior to f/u\par \par We had an extensive discussion with pt and wife about IFX, azathioprine, and immunosuppression related adverse events including slight increased risk of infections overall, as well as his small risk of having latent TB and the risk of reactivation. I informed him that he has a low risk of harboring latent TB given his hx, so at this time, we will continue therapy and monitor closely.\par He is fully understanding of the situation and will proceed with close follow-up.  \par He understands that the benefits of therapy outweigh the risks.

## 2023-03-20 NOTE — HISTORY OF PRESENT ILLNESS
[Home] : at home, [unfilled] , at the time of the visit. [Medical Office: ([facility])___] : at the medical office located in  [Verbal consent obtained from patient] : the patient, [unfilled] [FreeTextEntry4] : JI MARCANO [FreeTextEntry1] : 61 y/o M w/ HTN, recent diagnosis of UC pancolitis, being followed for UC. \par \par He finished his Remicade induction course w/ first infusion 10mg/kg on 1/11/23 in the inpatient setting, and dose 2 on 1/27/23 with marked improvement in symptoms. 3rd infusion dose 2/24/23.\par \par tolerating po diet, no blood, no pain, has formed BMs 4-6 times daily but c/o frequency as he has to pass a BM after every meal. finished prednisone taper. \par started on azathioprine 50 mg last visit and tolerated well w/ no issues.\par \par otherwise, feels well and has good energy levels. \par he is slowly gaining his weight back and has an improved appetite.\par no other GI complaints at this time. \par Review of systems otherwise negative. \par \par RECAP:\par 12/3/22 admitted after returning from a trip to [place] on 11/14/22 for bloody diarrhea & pancolitis on CTAP; GI PCR was positive for EPEC s/p 7 d Cipro/Flagyl (as per ID). 4 days after discharge, patient returns to ED due to unresolved symptoms s/p Colonoscopy 12/14/22 w/ Diffuse continuous ulceration, friability, erythema and abnormal vascularity with thick mucoid membrane with contact bleeding were noted in the rectum and sigmoid colon. biopsy results were in favor of ulcerative colitis. Patient was started on solumedrol IV was placed on PO prednisone on discharge. \par \par Diarrhea and abdominal pain improved significantly on steroids but returned 4 d after discharge. he started having abdominal pain and 4-5 liquid stools, occasionally bloody, decrease po intake and fatigue w/ dehydration. \par unintentional weight loss of 40 lbs since the onset of symptoms. \par CT abdomen w/ persistent proctocolitis. \par C. diff and repeat GI PCR is negative\par \par repeat flex sig 1/6/23 with Barbosa score 3 pancolitis suggestive of UC, confirmed on pathology (discussed with Dr. Soriano GI pathology). \par \par prior work up reviewed pt up to date on all live vaccines (MMR, varicella) but indeterminate quantiferon\par he had PPD done in the hospital which was negative but pt was on steroids at the time \par he has no high risk features to concern for latent Tb, and he currently needs IFX for his symptoms - d/w ID and he is low risk for latent Tb  - d/w pt risks and benefits and pt understands. \par \par 1/6/23 Colonoscopy: up to the proximal ascending colon. Ulceration, friability, erythema and abnormal vascularity with thick mucoid membrane and deep ulcers noted in the the whole examined colon compatible with UC - Barbosa score of 3 throughout the examined colon.\par -pathology: Chronic colitis with ulceration, cryptitis and crypt abscesses, consistent with severely active ulcerative colitis. Negative for granuloma, dysplasia, and viral inclusions.\par \par 12/14/22: Ulceration, friability, erythema and abnormal vascularity with thick mucoid membrane in the rectum and sigmoid colon compatible with CMV colitis vs UC vs C diff.\par -pathology showed acute colitis, cryptitis & crypt abscesses; -ve for CMV. \par \par Radiology Summary \par CT: 1/3/23: Since 12/10/2022, persistent/recurrent pan proctocolitis. No perforation or abscess. \par \par normal TPMT activity\par HIV: -ve 12/2002\par HCV: -ve 12/2022

## 2023-04-03 LAB
INFLIXIMAB AB SERPL-MCNC: <22 NG/ML
INFLIXIMAB SERPL-MCNC: 58 UG/ML

## 2023-04-20 ENCOUNTER — APPOINTMENT (OUTPATIENT)
Dept: GASTROENTEROLOGY | Facility: CLINIC | Age: 63
End: 2023-04-20

## 2023-04-21 ENCOUNTER — APPOINTMENT (OUTPATIENT)
Age: 63
End: 2023-04-21

## 2023-04-25 LAB
25(OH)D3 SERPL-MCNC: 23 NG/ML
ALBUMIN SERPL ELPH-MCNC: 4.4 G/DL
ALP BLD-CCNC: 69 U/L
ALT SERPL-CCNC: 8 U/L
ANION GAP SERPL CALC-SCNC: 13 MMOL/L
AST SERPL-CCNC: 12 U/L
BASOPHILS # BLD AUTO: 0.04 K/UL
BASOPHILS NFR BLD AUTO: 0.7 %
BILIRUB SERPL-MCNC: <0.2 MG/DL
BUN SERPL-MCNC: 15 MG/DL
CALCIUM SERPL-MCNC: 10.4 MG/DL
CHLORIDE SERPL-SCNC: 101 MMOL/L
CO2 SERPL-SCNC: 27 MMOL/L
CREAT SERPL-MCNC: 0.8 MG/DL
CRP SERPL-MCNC: 5 MG/L
EGFR: 101 ML/MIN/1.73M2
EOSINOPHIL # BLD AUTO: 0.12 K/UL
EOSINOPHIL NFR BLD AUTO: 2.2 %
ERYTHROCYTE [SEDIMENTATION RATE] IN BLOOD BY WESTERGREN METHOD: 30 MM/HR
FERRITIN SERPL-MCNC: 17 NG/ML
GLUCOSE SERPL-MCNC: 87 MG/DL
HCT VFR BLD CALC: 41.5 %
HGB BLD-MCNC: 12.6 G/DL
IMM GRANULOCYTES NFR BLD AUTO: 0.4 %
IRON SATN MFR SERPL: 18 %
IRON SERPL-MCNC: 66 UG/DL
LYMPHOCYTES # BLD AUTO: 1.8 K/UL
LYMPHOCYTES NFR BLD AUTO: 33 %
MAN DIFF?: NORMAL
MCHC RBC-ENTMCNC: 28.7 PG
MCHC RBC-ENTMCNC: 30.4 G/DL
MCV RBC AUTO: 94.5 FL
MONOCYTES # BLD AUTO: 0.72 K/UL
MONOCYTES NFR BLD AUTO: 13.2 %
NEUTROPHILS # BLD AUTO: 2.75 K/UL
NEUTROPHILS NFR BLD AUTO: 50.5 %
PLATELET # BLD AUTO: 363 K/UL
POTASSIUM SERPL-SCNC: 5 MMOL/L
PROT SERPL-MCNC: 7.1 G/DL
RBC # BLD: 4.39 M/UL
RBC # FLD: 13.9 %
SODIUM SERPL-SCNC: 141 MMOL/L
TIBC SERPL-MCNC: 366 UG/DL
UIBC SERPL-MCNC: 300 UG/DL
WBC # FLD AUTO: 5.45 K/UL

## 2023-04-28 LAB
INFLIXIMAB AB SERPL-MCNC: 93 NG/ML
INFLIXIMAB SERPL-MCNC: 5.1 UG/ML

## 2023-05-02 ENCOUNTER — OUTPATIENT (OUTPATIENT)
Dept: OUTPATIENT SERVICES | Facility: HOSPITAL | Age: 63
LOS: 1 days | End: 2023-05-02
Payer: COMMERCIAL

## 2023-05-02 PROCEDURE — 83993 ASSAY FOR CALPROTECTIN FECAL: CPT

## 2023-05-03 ENCOUNTER — NON-APPOINTMENT (OUTPATIENT)
Age: 63
End: 2023-05-03

## 2023-05-03 RX ORDER — INFLIXIMAB 100 MG/10ML
100 INJECTION, POWDER, LYOPHILIZED, FOR SOLUTION INTRAVENOUS
Qty: 6 | Refills: 0 | Status: DISCONTINUED | COMMUNITY
Start: 2023-03-27 | End: 2023-05-03

## 2023-05-03 RX ORDER — INFLIXIMAB 100 MG/10ML
100 INJECTION, POWDER, LYOPHILIZED, FOR SOLUTION INTRAVENOUS
Qty: 1 | Refills: 0 | Status: DISCONTINUED | COMMUNITY
Start: 2023-01-19 | End: 2023-05-03

## 2023-05-03 RX ORDER — INFLIXIMAB 100 MG/10ML
100 INJECTION, POWDER, LYOPHILIZED, FOR SOLUTION INTRAVENOUS
Qty: 12 | Refills: 0 | Status: DISCONTINUED | COMMUNITY
Start: 2023-04-28 | End: 2023-05-03

## 2023-05-04 ENCOUNTER — APPOINTMENT (OUTPATIENT)
Dept: GASTROENTEROLOGY | Facility: CLINIC | Age: 63
End: 2023-05-04
Payer: COMMERCIAL

## 2023-05-04 PROCEDURE — 99215 OFFICE O/P EST HI 40 MIN: CPT | Mod: 95

## 2023-05-04 RX ORDER — POLYETHYLENE GLYCOL 3350 AND ELECTROLYTES WITH LEMON FLAVOR 236; 22.74; 6.74; 5.86; 2.97 G/4L; G/4L; G/4L; G/4L; G/4L
236 POWDER, FOR SOLUTION ORAL
Qty: 1 | Refills: 0 | Status: ACTIVE | COMMUNITY
Start: 2023-05-04 | End: 1900-01-01

## 2023-05-04 NOTE — ASSESSMENT
[FreeTextEntry1] : 63 y/o M w/ HTN, recent diagnosis of UC pancolitis, being followed for UC. \par \par multiple admissions w/ colitis in 12/2023 after a trip to [place], managed as infectious colitis first, then 2 sigmoidoscopies with the last procedure performed on 1/6/2023 confirming Barbosa 3 pancolitis 2/2 UC (biopsy results were discussed with the pathologist).\par treated with IV steroids however, due to his severe UC, had a suboptimal response and was started on infliximab 10 mg/kg induction course during hospitalization on 1/11/23, discharged on 1/15/23, and had his 2nd and 3rd dose as outpatient on 1/27/23 and 2/24/23 w/ clinical improvement. first maintenance dose was given to patient on 4/21/23. \par on azathioprine 75 mg q24h - tolerating well. \par finished steroid taper in early 3/2023.\par \par #ulcerative colitis with severe Barbosa 3 pancolitis \par clinically improved since initiation of biologic therapy despite elevated recent calprotectin - which has overall trended down along w/ CRP\par \par most recent trough drug levels and Ab on week 10 on 4/20/23 (1 d prior to infusion) revealed: IFX level of 5.1 and anti-IFX ab 93 (low titer)\par given low levels and slowly increasing ab compared to prior levels, decision was made to increase the dose of IFX to 10 mg/kg [id number] to overcome Abs and increase drug levels.\par \par -IFX 10 mg/kg q4 weeks for now\par -pending results for serum IFX levels and Ab on week 10 (pt had tests performed)\par -will give 2 doses and repeat IFX levels and Ab in 7/2023\par -c/w azathioprine to 75 mg q24h to decrease rate of Ab to IFX formation and help w/ disease control\par -repeat colonoscopy w/ EGD in 7/2023\par -needs to be immunized to Hep A & B with PCP\par -Recommend influenza vaccination\par -Patient advised to avoid NSAIDs and smoking\par -labs per below (incl ESR, CRP, fecal calpro, CMP, CBC, iron studies) to be done later in 5/2023\par -encouraged increasing PO intake and adequate hydration\par -recommend 30-35 KCAL/KG/DAY\par -dicyclomine PRN for cramping (renewed)\par -could use imodium PRN to decrease BM frequency\par -metamucil PRN\par -Annual dermatological screening is recommended - will refer at a later visit as pt has multiple ongoing issues now\par -will need DEXA scan - ordered\par -f/u in 2-3 mo after EGD/colonoscopy\par \par We previously had an extensive discussion with pt and wife about IFX, azathioprine, and immunosuppression related adverse events including slight increased risk of infections overall, as well as his small risk of having latent TB and the risk of reactivation. I informed him that he has a low risk of harboring latent TB given his hx, so at this time, we will continue therapy and monitor closely.\par He is fully understanding of the situation and will proceed with close follow-up.  \par He understands that the benefits of therapy outweigh the risks.

## 2023-05-04 NOTE — PHYSICAL EXAM
[Normal] : alert, normal voice/communication, healthy appearing, no acute distress [Sclera] : the sclera and conjunctiva were normal [Hearing Threshold Finger Rub Not [place]] : hearing was normal [Normal Lips/Gums] : the lips and gums were normal [Normal Appearance] : the appearance of the neck was normal [No Respiratory Distress] : no respiratory distress [No Acc Muscle Use] : no accessory muscle use [Normal Color / Pigmentation] : normal skin color and pigmentation [No Focal Deficits] : no focal deficits [Oriented To Time, Place, And Person] : oriented to person, place, and time

## 2023-05-04 NOTE — HISTORY OF PRESENT ILLNESS
[Home] : at home, [unfilled] , at the time of the visit. [Medical Office: ([facility])___] : at the medical office located in  [Verbal consent obtained from patient] : the patient, [unfilled] [FreeTextEntry4] : JI MARCANO [FreeTextEntry1] : 63 y/o M w/ HTN, recent diagnosis of UC pancolitis, being followed for UC. \par \par He finished his Remicade induction course w/ first infusion 10mg/kg on 1/11/23 in the inpatient setting, and dose 2 on 1/27/23 with marked improvement in symptoms. 3rd infusion dose 2/24/23. \par first maintenance dose was given to patient on 4/21/23. \par \par tolerating po diet, no blood, no pain, has formed BMs 4 times daily which is significant improvement compared to prior. started on azathioprine 50 mg 2 visits ago and tolerated well w/ no issues but we increased it to 75 mg q24h last visit and he is doing well on it. \par \par otherwise, feels well and has good energy levels. \par he is slowly gaining his weight back and has an improved appetite.\par no other GI complaints at this time. \par Review of systems otherwise negative. \par \par RECAP:\par 12/3/22 admitted after returning from a trip to [place] on 11/14/22 for bloody diarrhea & pancolitis on CTAP; GI PCR was positive for EPEC s/p 7 d Cipro/Flagyl (as per ID). 4 days after discharge, patient returns to ED due to unresolved symptoms s/p Colonoscopy 12/14/22 w/ Diffuse continuous ulceration, friability, erythema and abnormal vascularity with thick mucoid membrane with contact bleeding were noted in the rectum and sigmoid colon. biopsy results were in favor of ulcerative colitis. Patient was started on solumedrol IV was placed on PO prednisone on discharge. \par \par Diarrhea and abdominal pain improved significantly on steroids but returned 4 d after discharge. he started having abdominal pain and 4-5 liquid stools, occasionally bloody, decrease po intake and fatigue w/ dehydration. \par unintentional weight loss of 40 lbs since the onset of symptoms. \par CT abdomen w/ persistent proctocolitis. \par C. diff and repeat GI PCR is negative\par \par repeat flex sig 1/6/23 with Barbosa score 3 pancolitis suggestive of UC, confirmed on pathology (discussed with Dr. Soriano GI pathology). \par \par prior work up reviewed pt up to date on all live vaccines (MMR, varicella) but indeterminate quantiferon\par he had PPD done in the hospital which was negative but pt was on steroids at the time \par he has no high risk features to concern for latent Tb, and he currently needs IFX for his symptoms - d/w ID and he is low risk for latent Tb  - d/w pt risks and benefits and pt understands. \par \par 1/6/23 Colonoscopy: up to the proximal ascending colon. Ulceration, friability, erythema and abnormal vascularity with thick mucoid membrane and deep ulcers noted in the the whole examined colon compatible with UC - Barbosa score of 3 throughout the examined colon.\par -pathology: Chronic colitis with ulceration, cryptitis and crypt abscesses, consistent with severely active ulcerative colitis. Negative for granuloma, dysplasia, and viral inclusions.\par \par 12/14/22: Ulceration, friability, erythema and abnormal vascularity with thick mucoid membrane in the rectum and sigmoid colon compatible with CMV colitis vs UC vs C diff.\par -pathology showed acute colitis, cryptitis & crypt abscesses; -ve for CMV. \par \par Radiology Summary \par CT: 1/3/23: Since 12/10/2022, persistent/recurrent pan proctocolitis. No perforation or abscess. \par \par normal TPMT activity\par HIV: -ve 12/2002\par HCV: -ve 12/2022

## 2023-05-10 ENCOUNTER — RX RENEWAL (OUTPATIENT)
Age: 63
End: 2023-05-10

## 2023-05-13 LAB — CALPROTECTIN FECAL: 496 UG/G

## 2023-05-26 ENCOUNTER — RESULT REVIEW (OUTPATIENT)
Age: 63
End: 2023-05-26

## 2023-05-26 ENCOUNTER — OUTPATIENT (OUTPATIENT)
Dept: OUTPATIENT SERVICES | Facility: HOSPITAL | Age: 63
LOS: 1 days | End: 2023-05-26
Payer: COMMERCIAL

## 2023-05-26 DIAGNOSIS — Z13.820 ENCOUNTER FOR SCREENING FOR OSTEOPOROSIS: ICD-10-CM

## 2023-05-26 DIAGNOSIS — Z00.8 ENCOUNTER FOR OTHER GENERAL EXAMINATION: ICD-10-CM

## 2023-05-26 PROCEDURE — 77080 DXA BONE DENSITY AXIAL: CPT

## 2023-05-27 DIAGNOSIS — Z13.820 ENCOUNTER FOR SCREENING FOR OSTEOPOROSIS: ICD-10-CM

## 2023-07-11 ENCOUNTER — OUTPATIENT (OUTPATIENT)
Dept: INPATIENT UNIT | Facility: HOSPITAL | Age: 63
LOS: 1 days | Discharge: HOME | End: 2023-07-11

## 2023-07-11 ENCOUNTER — TRANSCRIPTION ENCOUNTER (OUTPATIENT)
Age: 63
End: 2023-07-11

## 2023-07-11 ENCOUNTER — RESULT REVIEW (OUTPATIENT)
Age: 63
End: 2023-07-11

## 2023-07-11 ENCOUNTER — OUTPATIENT (OUTPATIENT)
Dept: INPATIENT UNIT | Facility: HOSPITAL | Age: 63
LOS: 1 days | Discharge: HOME | End: 2023-07-11
Payer: COMMERCIAL

## 2023-07-11 VITALS
SYSTOLIC BLOOD PRESSURE: 148 MMHG | RESPIRATION RATE: 17 BRPM | DIASTOLIC BLOOD PRESSURE: 83 MMHG | OXYGEN SATURATION: 99 % | HEART RATE: 52 BPM

## 2023-07-11 VITALS — HEIGHT: 72 IN | WEIGHT: 220.46 LBS

## 2023-07-11 DIAGNOSIS — Z13.820 ENCOUNTER FOR SCREENING FOR OSTEOPOROSIS: ICD-10-CM

## 2023-07-11 DIAGNOSIS — Z98.1 ARTHRODESIS STATUS: Chronic | ICD-10-CM

## 2023-07-11 DIAGNOSIS — Z96.659 PRESENCE OF UNSPECIFIED ARTIFICIAL KNEE JOINT: Chronic | ICD-10-CM

## 2023-07-11 DIAGNOSIS — Z98.890 OTHER SPECIFIED POSTPROCEDURAL STATES: Chronic | ICD-10-CM

## 2023-07-11 DIAGNOSIS — Z98.49 CATARACT EXTRACTION STATUS, UNSPECIFIED EYE: Chronic | ICD-10-CM

## 2023-07-11 PROCEDURE — 88312 SPECIAL STAINS GROUP 1: CPT | Mod: 26

## 2023-07-11 PROCEDURE — 45380 COLONOSCOPY AND BIOPSY: CPT

## 2023-07-11 PROCEDURE — 88305 TISSUE EXAM BY PATHOLOGIST: CPT

## 2023-07-11 PROCEDURE — 88305 TISSUE EXAM BY PATHOLOGIST: CPT | Mod: 26

## 2023-07-11 PROCEDURE — 88312 SPECIAL STAINS GROUP 1: CPT

## 2023-07-11 PROCEDURE — 43239 EGD BIOPSY SINGLE/MULTIPLE: CPT | Mod: XS

## 2023-07-11 RX ORDER — ONDANSETRON 8 MG/1
4 TABLET, FILM COATED ORAL ONCE
Refills: 0 | Status: DISCONTINUED | OUTPATIENT
Start: 2023-07-11 | End: 2023-07-11

## 2023-07-11 RX ORDER — HYDROMORPHONE HYDROCHLORIDE 2 MG/ML
0.5 INJECTION INTRAMUSCULAR; INTRAVENOUS; SUBCUTANEOUS
Refills: 0 | Status: DISCONTINUED | OUTPATIENT
Start: 2023-07-11 | End: 2023-07-11

## 2023-07-11 RX ORDER — SODIUM CHLORIDE 9 MG/ML
1000 INJECTION, SOLUTION INTRAVENOUS
Refills: 0 | Status: DISCONTINUED | OUTPATIENT
Start: 2023-07-11 | End: 2023-07-11

## 2023-07-11 NOTE — ASU DISCHARGE PLAN (ADULT/PEDIATRIC) - PATIENT EDUCATION MATERIALS PROVIED
Subjective:       Patient ID: Maricarmne Rizvi is a 17 y.o. female accompanied by mother for evaluation and management of abdominal pain at the request of Dr. Borja.    A  was used to communicate with mom in this clinic visit    Chief Complaint: Abdominal Pain, Nausea, and Constipation    HPI    17-year-old otherwise healthy female who says that she has had nausea, abdominal discomfort especially in the mornings for as long as she can remember.  Symptoms do get better as the day goes on.  No vomiting, dysphagia.  Sleep is undisturbed.  No missed school.  She is not in too much distress.  Appetite is preserved.  She is intentionally trying to lose weight.    She reports that she has a bowel movement every 3 days often they look normal to small.  There is no pain or bleeding.      Review of patient's allergies indicates:  No Known Allergies       Patient Active Problem List   Diagnosis   (none) - all problems resolved or deleted     Family History   Problem Relation Age of Onset    Diabetes Maternal Grandfather     Breast cancer Neg Hx     Colon cancer Neg Hx     Ovarian cancer Neg Hx      Social History: Maricarmen BROTHERS reports no history of drug use. She reports no history of alcohol use. She reports being sexually active and has had partner(s) who are male. She reports using the following method of birth control/protection: Condom.  No family history of H pylori.  Mother has constipation    Outpatient Encounter Medications as of 12/8/2022   Medication Sig Dispense Refill    fluticasone propionate (FLONASE) 50 mcg/actuation nasal spray       amitriptyline (ELAVIL) 10 MG tablet Take 1 tablet (10 mg total) by mouth every evening. 60 tablet 0     Facility-Administered Encounter Medications as of 12/8/2022   Medication Dose Route Frequency Provider Last Rate Last Admin    levonorgestreL (MIRENA) 20 mcg/24 hours (7 yrs) 52 mg IUD 1 Intra Uterine Device  1 Intra Uterine Device Intrauterine  Noah  "YEYO Woodward Jr., MD   1 Intra Uterine Device at 08/25/22 1045     Review of Systems  Constitutional:  Negative for activity change, appetite change, fatigue, fever and unexpected weight change.   HENT:  Negative for mouth sores and trouble swallowing.    Gastrointestinal:  Negative for abdominal distention, blood in stool, diarrhea and vomiting.   Endocrine: Negative for polyphagia and polyuria.   Genitourinary:  Negative for decreased urine volume.   Musculoskeletal:  Negative for arthralgias and joint swelling.   Integumentary:  Negative for rash.   Neurological:  Negative for dizziness, weakness and headaches.        Objective:      Wt Readings from Last 3 Encounters:   12/08/22 89 kg (196 lb 5.1 oz) (98 %, Z= 1.98)*   09/26/22 90 kg (198 lb 6.6 oz) (98 %, Z= 2.02)*   08/25/22 90.7 kg (200 lb) (98 %, Z= 2.05)*     * Growth percentiles are based on [facility] (Girls, 2-20 Years) data.     Vital Signs: /70 (BP Location: Right arm, Patient Position: Sitting)   Pulse 81   Temp 97.3 °F (36.3 °C) (Temporal)   Ht 5' 4.92" (1.649 m)   Wt 89 kg (196 lb 5.1 oz)   SpO2 99%   BMI 32.75 kg/m²     Physical Exam    Constitutional:       General: She is not in acute distress.     Appearance: Normal appearance. She is not ill-appearing.   HENT:      Head: Normocephalic.      Mouth/Throat:      Mouth: Mucous membranes are moist.   Eyes:      Conjunctiva/sclera: Conjunctivae normal.   Cardiovascular:      Rate and Rhythm: Normal rate.   Pulmonary:      Effort: Pulmonary effort is normal. No respiratory distress.   Abdominal:      General: Abdomen is flat. There is no distension.      Palpations: Abdomen is soft.      Tenderness:  Complains of tenderness in the lower right quadrant, no peritoneal signs such as rebound, involuntary guarding   Genitourinary:     Comments: Perianal exam not performed  Skin:     Capillary Refill: Capillary refill takes less than 2 seconds.      Coloration: Skin is not jaundiced.      Findings: No rash. "   Neurological:      Mental Status: She is alert.      Labs/Imaging:    Assessment and Plan:       Maricarmen Rizvi is a 17 y.o., female presenting for evaluation for nausea, abdominal discomfort especially in the morning.  Symptoms do improve as the day goes on.  No unintentional weight loss.  No vomiting, dysphagia, blood in stool.  Symptoms are very likely functional in origin, most indicative of functional dyspepsia.    I will obtain blood work and an ultrasound of the right lower quadrant.  Treatment options were discussed including medications such as Periactin and Elavil.  She is trying to lose weight and does not want any medication that may increase chances of weight gain.  We decided to start Elavil after obtain an EKG for treatment of functional dyspepsia.  If symptoms do not improve in a reasonable amount of time, we will consider an endoscopy with biopsy and/or addition of a PPI    Problem List Items Addressed This Visit    None  Visit Diagnoses       Generalized abdominal pain    -  Primary    Relevant Orders    CBC Auto Differential    COMPREHENSIVE METABOLIC PANEL    Sedimentation rate    IGA    TISSUE TRANSGLUTAMINASE, IGA    TSH    T4, FREE    US Abdomen Limited    Ekg 12-lead pediatric    Right lower quadrant pain        Relevant Orders    CBC Auto Differential    COMPREHENSIVE METABOLIC PANEL    Sedimentation rate    IGA    TISSUE TRANSGLUTAMINASE, IGA    TSH    T4, FREE    US Abdomen Limited    Ekg 12-lead pediatric    Nausea        Relevant Orders    CBC Auto Differential    COMPREHENSIVE METABOLIC PANEL    Sedimentation rate    IGA    TISSUE TRANSGLUTAMINASE, IGA    TSH    T4, FREE    US Abdomen Limited    Ekg 12-lead pediatric               Orders Placed This Encounter    US Abdomen Limited    CBC Auto Differential    COMPREHENSIVE METABOLIC PANEL    Sedimentation rate    IGA    TISSUE TRANSGLUTAMINASE, IGA    TSH    T4, FREE    Ekg 12-lead pediatric    amitriptyline (ELAVIL) 10 MG  tablet       I spent a total of 45 minutes on the day of the visit.This includes face to face time and non-face to face time preparing to see the patient (eg, review of tests), obtaining and/or reviewing separately obtained history, documenting clinical information in the electronic or other health record, independently interpreting results and communicating results to the patient/family/caregiver, or care coordinator.        Provider pre-printed instructions given

## 2023-07-11 NOTE — ASU PATIENT PROFILE, ADULT - ABILITY TO HEAR (WITH HEARING AID OR HEARING APPLIANCE IF NORMALLY USED):
Adequate: hears normal conversation without difficulty Mastoid Interpolation Flap Text: Due to the full-thickness nature of the wound and to restore structure/function, a decision was made to reconstruct the defect utilizing an interpolation axial flap and a staged reconstruction.  A telfa template was made of the defect.  This telfa template was then used to outline the mastoid interpolation flap.  The donor area for the pedicle flap was then injected with anesthesia.  The flap was excised through the skin and subcutaneous tissue down to the layer of the underlying musculature.  The pedicle flap was carefully excised within this deep plane to maintain its blood supply.  The edges of the donor site were undermined.   The donor site was closed in a primary fashion.  The pedicle was then rotated into position and sutured.  Once the tube was sutured into place, adequate blood supply was confirmed with blanching and refill.  The pedicle was then wrapped with xeroform gauze and dressed appropriately with a telfa and gauze bandage to ensure continued blood supply and protect the attached pedicle.

## 2023-07-11 NOTE — ASU PATIENT PROFILE, ADULT - AS SC BRADEN MOBILITY
(4) no limitation How Severe Are Your Spot(S)?: moderate Have Your Spot(S) Been Treated In The Past?: has not been treated Hpi Title: Evaluation of Skin Lesions

## 2023-07-11 NOTE — ASU PATIENT PROFILE, ADULT - NSICDXPASTSURGICALHX_GEN_ALL_CORE_FT
PAST SURGICAL HISTORY:  History of ankle surgery right x2    History of cataract surgery     History of fusion of cervical spine     History of partial knee replacement left

## 2023-07-12 LAB — SURGICAL PATHOLOGY STUDY: SIGNIFICANT CHANGE UP

## 2023-07-13 LAB — SURGICAL PATHOLOGY STUDY: SIGNIFICANT CHANGE UP

## 2023-07-14 DIAGNOSIS — K51.90 ULCERATIVE COLITIS, UNSPECIFIED, WITHOUT COMPLICATIONS: ICD-10-CM

## 2023-07-14 DIAGNOSIS — D12.2 BENIGN NEOPLASM OF ASCENDING COLON: ICD-10-CM

## 2023-07-14 DIAGNOSIS — K29.50 UNSPECIFIED CHRONIC GASTRITIS WITHOUT BLEEDING: ICD-10-CM

## 2023-07-14 DIAGNOSIS — K62.1 RECTAL POLYP: ICD-10-CM

## 2023-07-14 DIAGNOSIS — K52.9 NONINFECTIVE GASTROENTERITIS AND COLITIS, UNSPECIFIED: ICD-10-CM

## 2023-07-14 DIAGNOSIS — D12.0 BENIGN NEOPLASM OF CECUM: ICD-10-CM

## 2023-07-14 DIAGNOSIS — K44.9 DIAPHRAGMATIC HERNIA WITHOUT OBSTRUCTION OR GANGRENE: ICD-10-CM

## 2023-07-14 DIAGNOSIS — Z88.0 ALLERGY STATUS TO PENICILLIN: ICD-10-CM

## 2023-07-14 DIAGNOSIS — I10 ESSENTIAL (PRIMARY) HYPERTENSION: ICD-10-CM

## 2023-07-14 DIAGNOSIS — K56.609 UNSPECIFIED INTESTINAL OBSTRUCTION, UNSPECIFIED AS TO PARTIAL VERSUS COMPLETE OBSTRUCTION: ICD-10-CM

## 2023-07-14 DIAGNOSIS — D12.5 BENIGN NEOPLASM OF SIGMOID COLON: ICD-10-CM

## 2023-08-10 ENCOUNTER — APPOINTMENT (OUTPATIENT)
Dept: GASTROENTEROLOGY | Facility: CLINIC | Age: 63
End: 2023-08-10

## 2023-08-12 ENCOUNTER — RX RENEWAL (OUTPATIENT)
Age: 63
End: 2023-08-12

## 2023-08-14 NOTE — PHYSICAL EXAM
[Normal] : alert, normal voice/communication, healthy appearing, no acute distress [Sclera] : the sclera and conjunctiva were normal [Hearing Threshold Finger Rub Not [place]] : hearing was normal [Normal Lips/Gums] : the lips and gums were normal [Normal Appearance] : the appearance of the neck was normal [No Respiratory Distress] : no respiratory distress [No Acc Muscle Use] : no accessory muscle use [Normal Color / Pigmentation] : normal skin color and pigmentation [No Focal Deficits] : no focal deficits [Oriented To Time, Place, And Person] : oriented to person, place, and time

## 2023-08-14 NOTE — ASSESSMENT
[FreeTextEntry1] : 61 y/o M w/ HTN, UC pancolitis, being followed for UC.   #ulcerative colitis with severe Barbosa 3 pancolitis  clinically improved since initiation of biologic therapy despite elevated recent calprotectin - which has overall trended down along w/ CRP most recent trough drug levels and Ab on week 10 on 4/20/23 (1 d prior to infusion) revealed: IFX level of 5.1 and anti-IFX ab 93 (low titer) given low levels and slowly increasing ab compared to prior levels, decision was made to increase the dose of IFX to 10 mg/kg [id number] to overcome Abs and increase drug levels. 7/11/23 colonoscopy shows signs of healing with  extensive amount of pseudopolyps - unable to assess whether more TAs in the background of pseudopolyps.  increased Azathioprine to 100 mg after last colonoscopy.  -c/w IFX 10 mg/kg q4 weeks -rpt IFX levels and ab  -c/w azathioprine to 100 mg q24h to decrease rate of Ab to IFX formation and help w/ disease control -repeat colonoscopy in 1/2024 -needs to be immunized to Hep A & B with PCP -Recommend influenza vaccination -Patient advised to avoid NSAIDs and smoking -labs per below (incl ESR, CRP, fecal calpro, CMP, CBC, iron studies) -encouraged increasing PO intake and adequate hydration -recommend 30-35 KCAL/KG/DAY -dicyclomine PRN for cramping (renewed) -could use imodium PRN to decrease BM frequency -metamucil PRN -Annual dermatological screening is recommended - pt sees dermatologist yearly - up to date -DEXA scan reviewed - good bone health - rpt in 3 yrs -advised exercising -c/w vit d 1000 u/d -f/u in 3 mo  We previously had an extensive discussion with pt and wife about IFX, azathioprine, and immunosuppression related adverse events including slight increased risk of infections overall, as well as his small risk of having latent TB and the risk of reactivation. I informed him that he has a low risk of harboring latent TB given his hx, so at this time, we will continue therapy and monitor closely. He is fully understanding of the situation and will proceed with close follow-up.   He understands that the benefits of therapy outweigh the risks.

## 2023-08-14 NOTE — HISTORY OF PRESENT ILLNESS
[Home] : at home, [unfilled] , at the time of the visit. [Medical Office: ([facility])___] : at the medical office located in  [Verbal consent obtained from patient] : the patient, [unfilled] [de-identified] : 7/11/23 [FreeTextEntry4] : JI MARCANO [FreeTextEntry1] : 63 y/o M w/ HTN, UC pancolitis, being followed for UC.   He finished his Remicade induction course w/ first infusion 10mg/kg on 1/11/23 in the inpatient setting, and dose 2 on 1/27/23 with marked improvement in symptoms. 3rd infusion dose 2/24/23. first maintenance dose was given to patient on 4/21/23.  very compliant with meds. now also on Azathioprine 100 mg q24h.  tolerating po diet, no blood, no pain, has formed BMs 2-3 times daily which is significant improvement compared to prior. had DEXA scan showing good bone health. otherwise, feels well and has good energy levels.  he has no other GI complaints at this time.  Review of systems otherwise negative.   normal TPMT activity HIV: -ve 12/2002 HCV: -ve 12/2022  -----------------------------------------------  RECAP: 12/3/22 admitted after returning from a trip to [place] on 11/14/22 for bloody diarrhea & pancolitis on CTAP; GI PCR was positive for EPEC s/p 7 d Cipro/Flagyl (as per ID). 4 days after discharge, patient returns to ED due to unresolved symptoms s/p Colonoscopy 12/14/22 w/ Diffuse continuous ulceration, friability, erythema and abnormal vascularity with thick mucoid membrane with contact bleeding were noted in the rectum and sigmoid colon. biopsy results were in favor of ulcerative colitis.  Patient was started on solumedrol IV was placed on PO prednisone on discharge.   Diarrhea and abdominal pain improved significantly on steroids but returned 4 d after discharge. he started having abdominal pain and 4-5 liquid stools, occasionally bloody, decrease po intake and fatigue w/ dehydration.  unintentional weight loss of 40 lbs since the onset of symptoms.  CT abdomen w/ persistent proctocolitis.  C. diff and repeat GI PCR is negative  repeat flex sig 1/6/23 with Barbosa score 3 pancolitis suggestive of UC, confirmed on pathology (discussed with Dr. Soriano GI pathology).  treated with IV steroids however, due to his severe UC, had a suboptimal response and was started on infliximab 10 mg/kg induction course during  finished steroid taper in early 3/2023.  prior work up reviewed pt up to date on all live vaccines (MMR, varicella) but indeterminate quantiferon he had PPD done in the hospital which was negative but pt was on steroids at the time  he has no high risk features to concern for latent Tb, and he currently needs IFX for his symptoms - d/w ID and he is low risk for latent Tb  - d/w pt risks and benefits and pt understands.   -----------------------------------------------  Radiology Summary  CT: 1/3/23: Since 12/10/2022, persistent/recurrent pan proctocolitis. No perforation or abscess.   -----------------------------------------------  7/11/2023:  -EGD: medium size HH, non-erosive gastritis. (Biopsy unremarkable), nl duodenum. (Biopsy unremarkable). -Colonoscopy: normal mucosa in the TI (Biopsy unremarkable). Healed ulcers were noted in the rectum and sigmoid colon up to 30 cm. A stricture was noted at 30 cm along with inflammatory pseudo-polyps. We were able to traverse the stricture without any difficulty. Inflammatory pseudo-polyps were noted throughout the colon from 30 cm all the way to the cecum. We examined all segments of the colon under white light and NBI. Multiple cold forceps biopsies were performed in the cecum, ascending colon starting from 80 cm to every 10 cm up to the rectum.  Path reveals TAs in the cecum, ascending and sigmoid colon.  acute on chronic inflammation in the left colon.  1/6/23 Colonoscopy: up to the proximal ascending colon. Ulceration, friability, erythema and abnormal vascularity with thick mucoid membrane and deep ulcers noted in the the whole examined colon compatible with UC - Barbosa score of 3 throughout the examined colon. -pathology: Chronic colitis with ulceration, cryptitis and crypt abscesses, consistent with severely active ulcerative colitis. Negative for granuloma, dysplasia, and viral inclusions.  12/14/22: Ulceration, friability, erythema and abnormal vascularity with thick mucoid membrane in the rectum and sigmoid colon compatible with CMV colitis vs UC vs C diff. -pathology showed acute colitis, cryptitis & crypt abscesses; -ve for CMV.

## 2023-08-15 LAB
25(OH)D3 SERPL-MCNC: 43 NG/ML
ALBUMIN SERPL ELPH-MCNC: 4.3 G/DL
ALP BLD-CCNC: 58 U/L
ALT SERPL-CCNC: 8 U/L
ANION GAP SERPL CALC-SCNC: 10 MMOL/L
AST SERPL-CCNC: 14 U/L
BILIRUB SERPL-MCNC: 0.2 MG/DL
BUN SERPL-MCNC: 18 MG/DL
CALCIUM SERPL-MCNC: 9.9 MG/DL
CHLORIDE SERPL-SCNC: 100 MMOL/L
CO2 SERPL-SCNC: 27 MMOL/L
CREAT SERPL-MCNC: 1 MG/DL
CRP SERPL-MCNC: <3 MG/L
EGFR: 86 ML/MIN/1.73M2
ERYTHROCYTE [SEDIMENTATION RATE] IN BLOOD BY WESTERGREN METHOD: 20 MM/HR
FERRITIN SERPL-MCNC: 32 NG/ML
GLUCOSE SERPL-MCNC: 90 MG/DL
IRON SATN MFR SERPL: 21 %
IRON SERPL-MCNC: 75 UG/DL
POTASSIUM SERPL-SCNC: 4.2 MMOL/L
PROT SERPL-MCNC: 6.6 G/DL
PSA SERPL-MCNC: 1.09 NG/ML
SODIUM SERPL-SCNC: 137 MMOL/L
TIBC SERPL-MCNC: 356 UG/DL
TSH SERPL-ACNC: 0.46 UIU/ML
UIBC SERPL-MCNC: 281 UG/DL

## 2023-08-19 LAB — CALPROTECTIN FECAL: 299 UG/G

## 2023-09-01 NOTE — PATIENT PROFILE ADULT - PUBLIC BENEFITS
acetaminophen 650 mg oral tablet, extended release: 1 tab(s) orally every 6 hours as needed for  mild pain  aspirin 81 mg oral tablet, chewable: 1 tab(s) chewed once a day  citalopram 10 mg oral tablet: 1 tab(s) orally once a day  Depakote 500 mg oral delayed release tablet: 1.5 tab(s) orally 2 times a day  famotidine 40 mg oral tablet: 1 tab(s) orally 2 times a day  ferrous sulfate 300 mg/5 mL (60 mg/5 mL elemental iron) oral liquid: 5 milliliter(s) orally every other day  finasteride 5 mg oral tablet: 1 tab(s) orally once a day  haloperidol 1 mg oral tablet: 1 tab(s) orally every 6 hours as needed for  agitation as needed  hyoscyamine 0.125 mg oral tablet: 1 tab(s) orally every 6 hours as needed for  congestion 1 tab(s) orally by mouth or under the tongue every 6 hours, As Needed for terminal/excessive secretions. MDD: 4 tabs  LORazepam 0.5 mg oral tablet: 1 tab(s) orally every 6 hours as needed for -for anxiety MDD: 4 tablets  LORazepam 2 mg/mL oral concentrate: 0.5 milliliter(s) orally every 6 hours as needed for  anxiety Take 0.5 milliliter(s) orally every 6 hours, As Needed -for anxiety MDD: 2 mL  morphine 20 mg/mL oral concentrate: 0.25 milliliter(s) sublingual every 6 hours as needed for  shortness of breath and/or wheezing Place 0.25 milliliter(s) under tongue every 6 hours, As Needed for pain or shortness of breath MDD: 1 mL  prochlorperazine 10 mg oral tablet: 1 tab(s) orally every 12 hours  tamsulosin 0.4 mg oral capsule: 1 tab(s) orally once a day   acetaminophen 650 mg oral tablet, extended release: 1 tab(s) orally every 6 hours as needed for  mild pain  aspirin 81 mg oral tablet, chewable: 1 tab(s) chewed once a day  atorvastatin 20 mg oral tablet: 1 tab(s) orally once a day  citalopram 10 mg oral tablet: 1 tab(s) orally once a day  Depakote 500 mg oral delayed release tablet: 1.5 tab(s) orally 2 times a day  disulfiram 500 mg oral tablet: 1 tab(s) orally once a day  famotidine 40 mg oral tablet: 1 tab(s) orally 2 times a day  famotidine 40 mg oral tablet: 1 tab(s) orally once a day  ferrous sulfate 300 mg/5 mL (60 mg/5 mL elemental iron) oral liquid: 5 milliliter(s) orally every other day  finasteride 5 mg oral tablet: 1 tab(s) orally once a day  finasteride 5 mg oral tablet: 1 tab(s) orally once a day  folic acid 1 mg oral tablet: 1 tab(s) orally once a day  haloperidol 1 mg oral tablet: 1 tab(s) orally every 6 hours as needed for  agitation as needed  hyoscyamine 0.125 mg oral tablet: 1 tab(s) orally every 6 hours as needed for  congestion 1 tab(s) orally by mouth or under the tongue every 6 hours, As Needed for terminal/excessive secretions. MDD: 4 tabs  Januvia 25 mg oral tablet: 1 tab(s) orally once a day  LORazepam 0.5 mg oral tablet: 1 tab(s) orally every 6 hours as needed for -for anxiety MDD: 4 tablets  LORazepam 2 mg/mL oral concentrate: 0.5 milliliter(s) orally every 6 hours as needed for  anxiety Take 0.5 milliliter(s) orally every 6 hours, As Needed -for anxiety MDD: 2 mL  morphine 20 mg/mL oral concentrate: 0.25 milliliter(s) sublingual every 6 hours as needed for  shortness of breath and/or wheezing Place 0.25 milliliter(s) under tongue every 6 hours, As Needed for pain or shortness of breath MDD: 1 mL  Outpatient physical therapy: Please continue to receive physical therapy outpatient  Physical therapy services: Please follow up with outpatient physical therapy services  prochlorperazine 10 mg oral tablet: 1 tab(s) orally every 12 hours  tamsulosin 0.4 mg oral capsule: 1 tab(s) orally once a day  walker: Please use walker for your walking needs at home  walker: use as needed   acetaminophen 650 mg oral tablet, extended release: 1 tab(s) orally every 6 hours as needed for  mild pain  aspirin 81 mg oral tablet, chewable: 1 tab(s) chewed once a day  atorvastatin 20 mg oral tablet: 1 tab(s) orally once a day  citalopram 10 mg oral tablet: 1 tab(s) orally once a day  Depakote 500 mg oral delayed release tablet: 1.5 tab(s) orally 2 times a day  disulfiram 500 mg oral tablet: 1 tab(s) orally once a day  famotidine 40 mg oral tablet: 1 tab(s) orally 2 times a day  famotidine 40 mg oral tablet: 1 tab(s) orally once a day  ferrous sulfate 300 mg/5 mL (60 mg/5 mL elemental iron) oral liquid: 5 milliliter(s) orally every other day  finasteride 5 mg oral tablet: 1 tab(s) orally once a day  folic acid 1 mg oral tablet: 1 tab(s) orally once a day  haloperidol 1 mg oral tablet: 1 tab(s) orally every 6 hours as needed for  agitation as needed  hyoscyamine 0.125 mg oral tablet: 1 tab(s) orally every 6 hours as needed for  congestion 1 tab(s) orally by mouth or under the tongue every 6 hours, As Needed for terminal/excessive secretions. MDD: 4 tabs  Januvia 25 mg oral tablet: 1 tab(s) orally once a day  LORazepam 0.5 mg oral tablet: 1 tab(s) orally every 6 hours as needed for -for anxiety MDD: 4 tablets  LORazepam 2 mg/mL oral concentrate: 0.5 milliliter(s) orally every 6 hours as needed for  anxiety Take 0.5 milliliter(s) orally every 6 hours, As Needed -for anxiety MDD: 2 mL  morphine 20 mg/mL oral concentrate: 0.25 milliliter(s) sublingual every 6 hours as needed for  shortness of breath and/or wheezing Place 0.25 milliliter(s) under tongue every 6 hours, As Needed for pain or shortness of breath MDD: 1 mL  Outpatient physical therapy: Please continue to receive physical therapy outpatient  Physical therapy services: Please follow up with outpatient physical therapy services  prochlorperazine 10 mg oral tablet: 1 tab(s) orally every 12 hours  tamsulosin 0.4 mg oral capsule: 1 tab(s) orally once a day  walker: Please use walker for your walking needs at home  walker: use as needed   acetaminophen 650 mg oral tablet, extended release: 1 tab(s) orally every 6 hours as needed for  mild pain  aspirin 81 mg oral tablet, chewable: 1 tab(s) chewed once a day  finasteride 5 mg oral tablet: 1 tab(s) orally once a day  Physical therapy services: Please follow up with outpatient physical therapy services  tamsulosin 0.4 mg oral capsule: 1 tab(s) orally once a day  valproic acid 250 mg/5 mL oral liquid: 15 milliliter(s) orally 2 times a day  walker: Please use walker for your walking needs at home  walker: use as needed   no

## 2023-09-06 ENCOUNTER — RX RENEWAL (OUTPATIENT)
Age: 63
End: 2023-09-06

## 2023-09-06 RX ORDER — DICYCLOMINE HYDROCHLORIDE 10 MG/1
10 CAPSULE ORAL
Qty: 90 | Refills: 5 | Status: ACTIVE | COMMUNITY
Start: 2023-03-15 | End: 1900-01-01

## 2023-09-08 LAB
INFLIXIMAB AB SERPL-MCNC: 138 NG/ML
INFLIXIMAB SERPL-MCNC: 33 UG/ML

## 2023-10-18 ENCOUNTER — RX RENEWAL (OUTPATIENT)
Age: 63
End: 2023-10-18

## 2023-10-26 DIAGNOSIS — K51.90 ULCERATIVE COLITIS, UNSPECIFIED, WITHOUT COMPLICATIONS: ICD-10-CM

## 2023-10-27 DIAGNOSIS — K51.90 ULCERATIVE COLITIS, UNSPECIFIED, WITHOUT COMPLICATIONS: ICD-10-CM

## 2024-01-08 ENCOUNTER — RX RENEWAL (OUTPATIENT)
Age: 64
End: 2024-01-08

## 2024-01-15 ENCOUNTER — RX RENEWAL (OUTPATIENT)
Age: 64
End: 2024-01-15

## 2024-01-16 PROBLEM — K51.90 ULCERATIVE COLITIS, UNSPECIFIED, WITHOUT COMPLICATIONS: Chronic | Status: ACTIVE | Noted: 2023-07-11

## 2024-03-15 ENCOUNTER — APPOINTMENT (OUTPATIENT)
Dept: GASTROENTEROLOGY | Facility: CLINIC | Age: 64
End: 2024-03-15
Payer: COMMERCIAL

## 2024-03-15 VITALS — HEIGHT: 72 IN | BODY MASS INDEX: 31.83 KG/M2 | WEIGHT: 235 LBS

## 2024-03-15 PROCEDURE — 99215 OFFICE O/P EST HI 40 MIN: CPT

## 2024-03-15 RX ORDER — SODIUM SULFATE, MAGNESIUM SULFATE, AND POTASSIUM CHLORIDE 17.75; 2.7; 2.25 G/1; G/1; G/1
1479-225-188 TABLET ORAL TWICE DAILY
Qty: 24 | Refills: 0 | Status: ACTIVE | COMMUNITY
Start: 2024-03-15 | End: 1900-01-01

## 2024-03-15 RX ORDER — SODIUM SULFATE, POTASSIUM SULFATE AND MAGNESIUM SULFATE 1.6; 3.13; 17.5 G/177ML; G/177ML; G/177ML
17.5-3.13-1.6 SOLUTION ORAL
Qty: 2 | Refills: 0 | Status: ACTIVE | COMMUNITY
Start: 2024-03-15 | End: 1900-01-01

## 2024-03-15 NOTE — ASSESSMENT
[FreeTextEntry1] : 64 y/o M w/ HTN, UC pancolitis on IFX and azathioprine (dx in 1/2023), being followed for UC.   #ulcerative colitis with severe Barbosa 3 pancolitis - in remission clinically improved since initiation of biologic therapy despite elevated recent calprotectin - which has overall trended down along w/ CRP  Calprotectin in 8/2023 was 299 and had significantly improved compared to May (496) and March (1139)  remains on IFX 10 mg/kg [id number] + 100 mg azathioprine to overcome Abs and increase drug levels. 7/11/23 colonoscopy shows signs of healing with  extensive amount of pseudopolyps - unable to assess whether more TAs in the background of pseudopolyps.   Completed hepatitis A and B vaccines with PCP  -c/w IFX 10 mg/kg q4 weeks -rpt IFX levels and ab  -c/w azathioprine to 100 mg q24h  -repeat colonoscopy to be scheduled -Patient advised to avoid NSAIDs and smoking -labs per below (incl ESR, CRP, fecal calpro, CMP, CBC, iron studies, vit D) -recommend 30-35 KCAL/KG/DAY -Annual dermatological screening is recommended - pt sees dermatologist yearly - up to date -DEXA scan reviewed - good bone health - rpt in 3 yrs -advised exercising -c/w vit d 1000 u/d -f/u after colonoscopy  We previously had an extensive discussion with pt and wife about IFX, azathioprine, and immunosuppression related adverse events including slight increased risk of infections overall, as well as his small risk of having latent TB and the risk of reactivation. I informed him that he has a low risk of harboring latent TB given his hx, so at this time, we will continue therapy and monitor closely. He is fully understanding of the situation and will proceed with close follow-up.   He understands that the benefits of therapy outweigh the risks.

## 2024-03-15 NOTE — PHYSICAL EXAM
[Normal] : alert, normal voice/communication, healthy appearing, no acute distress [Sclera] : the sclera and conjunctiva were normal [Hearing Threshold Finger Rub Not [place]] : hearing was normal [Normal Lips/Gums] : the lips and gums were normal [Normal Appearance] : the appearance of the neck was normal [No Respiratory Distress] : no respiratory distress [No Acc Muscle Use] : no accessory muscle use [Normal Color / Pigmentation] : normal skin color and pigmentation [No Focal Deficits] : no focal deficits [Oriented To Time, Place, And Person] : oriented to person, place, and time

## 2024-03-15 NOTE — HISTORY OF PRESENT ILLNESS
[Home] : at home, [unfilled] , at the time of the visit. [Medical Office: ([facility])___] : at the medical office located in  [Verbal consent obtained from patient] : the patient, [unfilled] [FreeTextEntry1] : 64 y/o M w/ HTN, UC pancolitis on IFX and azathioprine (dx in 1/2023), being followed for UC.   Patient has been doing well since his last office visit in August 2023.   He had no complaints since then and has been compliant with Remicade.  He has not missed any doses since then. He continues to take Imuran 100 mg daily.  He has excessive gas but otherwise denies any GI symptoms and has no diarrhea, has no abdominal pain, nausea or vomiting, no weight loss or appetite changes.  He is doing well and stopped taking dicyclomine and Metamucil.  tolerating po diet, no blood, no pain, has formed BMs 1-2 times daily. had DEXA scan showing good bone health. He is following with dermatology yearly.  otherwise, feels well and has good energy levels.  he has no other GI complaints at this time.  Review of systems otherwise negative.   normal TPMT activity HIV: -ve 12/2002 HCV: -ve 12/2022  -----------------------------------------------  RECAP: 12/3/22 admitted after returning from a trip to [place] on 11/14/22 for bloody diarrhea & pancolitis on CTAP; GI PCR was positive for EPEC s/p 7 d Cipro/Flagyl (as per ID). 4 days after discharge, patient returns to ED due to unresolved symptoms s/p Colonoscopy 12/14/22 w/ Diffuse continuous ulceration, friability, erythema and abnormal vascularity with thick mucoid membrane with contact bleeding were noted in the rectum and sigmoid colon. biopsy results were in favor of ulcerative colitis.  Patient was started on solumedrol IV was placed on PO prednisone on discharge.   Diarrhea and abdominal pain improved significantly on steroids but returned 4 d after discharge. he started having abdominal pain and 4-5 liquid stools, occasionally bloody, decrease po intake and fatigue w/ dehydration.  unintentional weight loss of 40 lbs since the onset of symptoms.  CT abdomen w/ persistent proctocolitis.  C. diff and repeat GI PCR is negative  repeat flex sig 1/6/23 with Barbosa score 3 pancolitis suggestive of UC, confirmed on pathology (discussed with Dr. Soriano GI pathology).  treated with IV steroids however, due to his severe UC, had a suboptimal response and was started on infliximab 10 mg/kg induction course during  finished steroid taper in early 3/2023.  prior work up reviewed pt up to date on all live vaccines (MMR, varicella) but indeterminate quantiferon he had PPD done in the hospital which was negative but pt was on steroids at the time  he has no high risk features to concern for latent Tb, and he currently needs IFX for his symptoms - d/w ID and he is low risk for latent Tb  - d/w pt risks and benefits and pt understands.   -----------------------------------------------  Radiology Summary  CT: 1/3/23: Since 12/10/2022, persistent/recurrent pan proctocolitis. No perforation or abscess.   -----------------------------------------------  7/11/2023:  -EGD: medium size HH, non-erosive gastritis. (Biopsy unremarkable), nl duodenum. (Biopsy unremarkable). -Colonoscopy: normal mucosa in the TI (Biopsy unremarkable). Healed ulcers were noted in the rectum and sigmoid colon up to 30 cm. A stricture was noted at 30 cm along with inflammatory pseudo-polyps. We were able to traverse the stricture without any difficulty. Inflammatory pseudo-polyps were noted throughout the colon from 30 cm all the way to the cecum. We examined all segments of the colon under white light and NBI. Multiple cold forceps biopsies were performed in the cecum, ascending colon starting from 80 cm to every 10 cm up to the rectum.  Path reveals TAs in the cecum, ascending and sigmoid colon.  acute on chronic inflammation in the left colon.  1/6/23 Colonoscopy: up to the proximal ascending colon. Ulceration, friability, erythema and abnormal vascularity with thick mucoid membrane and deep ulcers noted in the the whole examined colon compatible with UC - Barbosa score of 3 throughout the examined colon. -pathology: Chronic colitis with ulceration, cryptitis and crypt abscesses, consistent with severely active ulcerative colitis. Negative for granuloma, dysplasia, and viral inclusions.  12/14/22: Ulceration, friability, erythema and abnormal vascularity with thick mucoid membrane in the rectum and sigmoid colon compatible with CMV colitis vs UC vs C diff. -pathology showed acute colitis, cryptitis & crypt abscesses; -ve for CMV.  [FreeTextEntry4] : JI MARCANO

## 2024-03-25 ENCOUNTER — APPOINTMENT (OUTPATIENT)
Dept: ORTHOPEDIC SURGERY | Facility: CLINIC | Age: 64
End: 2024-03-25
Payer: COMMERCIAL

## 2024-03-25 VITALS — HEIGHT: 72 IN | WEIGHT: 240 LBS | BODY MASS INDEX: 32.51 KG/M2

## 2024-03-25 DIAGNOSIS — M85.89 OTHER SPECIFIED DISORDERS OF BONE DENSITY AND STRUCTURE, MULTIPLE SITES: ICD-10-CM

## 2024-03-25 PROCEDURE — 99204 OFFICE O/P NEW MOD 45 MIN: CPT

## 2024-03-25 PROCEDURE — 73503 X-RAY EXAM HIP UNI 4/> VIEWS: CPT | Mod: RT

## 2024-03-25 RX ORDER — LIDOCAINE 5% 700 MG/1
5 PATCH TOPICAL
Qty: 30 | Refills: 1 | Status: ACTIVE | COMMUNITY
Start: 2024-03-25 | End: 1900-01-01

## 2024-03-25 RX ORDER — ACETAMINOPHEN 500 MG/1
500 TABLET ORAL 3 TIMES DAILY
Qty: 120 | Refills: 0 | Status: ACTIVE | COMMUNITY
Start: 2024-03-25 | End: 1900-01-01

## 2024-03-29 ENCOUNTER — LABORATORY RESULT (OUTPATIENT)
Age: 64
End: 2024-03-29

## 2024-04-03 NOTE — DISCUSSION/SUMMARY
[de-identified] : Just distal severe right hip arthritis evident by his history  I have I think I did the wrong

## 2024-04-12 LAB
25(OH)D3 SERPL-MCNC: 26 NG/ML
ALBUMIN SERPL ELPH-MCNC: 4.7 G/DL
ALP BLD-CCNC: 68 U/L
ALT SERPL-CCNC: 13 U/L
ANION GAP SERPL CALC-SCNC: 9 MMOL/L
AST SERPL-CCNC: 20 U/L
BASOPHILS # BLD AUTO: 0.02 K/UL
BASOPHILS NFR BLD AUTO: 0.3 %
BILIRUB SERPL-MCNC: 0.3 MG/DL
BUN SERPL-MCNC: 18 MG/DL
CALCIUM SERPL-MCNC: 9.9 MG/DL
CALPROTECTIN FECAL: 51 UG/G
CHLORIDE SERPL-SCNC: 101 MMOL/L
CHOLEST SERPL-MCNC: 213 MG/DL
CO2 SERPL-SCNC: 27 MMOL/L
CREAT SERPL-MCNC: 1 MG/DL
CRP SERPL-MCNC: 3.3 MG/L
EGFR: 85 ML/MIN/1.73M2
EOSINOPHIL # BLD AUTO: 0.09 K/UL
EOSINOPHIL NFR BLD AUTO: 1.4 %
ERYTHROCYTE [SEDIMENTATION RATE] IN BLOOD BY WESTERGREN METHOD: 45 MM/HR
ESTIMATED AVERAGE GLUCOSE: 111 MG/DL
FERRITIN SERPL-MCNC: 39 NG/ML
GLUCOSE SERPL-MCNC: 109 MG/DL
HBA1C MFR BLD HPLC: 5.5 %
HCT VFR BLD CALC: 41.5 %
HDLC SERPL-MCNC: 45 MG/DL
HGB BLD-MCNC: 13.5 G/DL
IMM GRANULOCYTES NFR BLD AUTO: 0.5 %
INFLIXIMAB AB SERPL-MCNC: <22 NG/ML
INFLIXIMAB SERPL-MCNC: 63 UG/ML
IRON SATN MFR SERPL: 24 %
IRON SERPL-MCNC: 95 UG/DL
LDLC SERPL CALC-MCNC: 143 MG/DL
LYMPHOCYTES # BLD AUTO: 1.31 K/UL
LYMPHOCYTES NFR BLD AUTO: 21.1 %
MAN DIFF?: NORMAL
MCHC RBC-ENTMCNC: 32.5 G/DL
MCHC RBC-ENTMCNC: 34.3 PG
MCV RBC AUTO: 105.3 FL
MONOCYTES # BLD AUTO: 0.5 K/UL
MONOCYTES NFR BLD AUTO: 8.1 %
NEUTROPHILS # BLD AUTO: 4.26 K/UL
NEUTROPHILS NFR BLD AUTO: 68.6 %
NONHDLC SERPL-MCNC: 168 MG/DL
PLATELET # BLD AUTO: 329 K/UL
PMV BLD AUTO: 0 /100 WBCS
POTASSIUM SERPL-SCNC: 5 MMOL/L
PROT SERPL-MCNC: 7.1 G/DL
RBC # BLD: 3.94 M/UL
RBC # FLD: 13.2 %
SODIUM SERPL-SCNC: 137 MMOL/L
TIBC SERPL-MCNC: 393 UG/DL
TRIGL SERPL-MCNC: 126 MG/DL
TSH SERPL-ACNC: 0.48 UIU/ML
UIBC SERPL-MCNC: 298 UG/DL
WBC # FLD AUTO: 6.21 K/UL

## 2024-04-17 ENCOUNTER — RESULT REVIEW (OUTPATIENT)
Age: 64
End: 2024-04-17

## 2024-04-17 ENCOUNTER — OUTPATIENT (OUTPATIENT)
Dept: OUTPATIENT SERVICES | Facility: HOSPITAL | Age: 64
LOS: 1 days | Discharge: HOME | End: 2024-04-17
Payer: COMMERCIAL

## 2024-04-17 ENCOUNTER — TRANSCRIPTION ENCOUNTER (OUTPATIENT)
Age: 64
End: 2024-04-17

## 2024-04-17 VITALS
DIASTOLIC BLOOD PRESSURE: 67 MMHG | HEIGHT: 72 IN | TEMPERATURE: 99 F | RESPIRATION RATE: 18 BRPM | HEART RATE: 76 BPM | SYSTOLIC BLOOD PRESSURE: 131 MMHG | WEIGHT: 235.01 LBS

## 2024-04-17 VITALS
DIASTOLIC BLOOD PRESSURE: 78 MMHG | OXYGEN SATURATION: 96 % | RESPIRATION RATE: 18 BRPM | HEART RATE: 65 BPM | SYSTOLIC BLOOD PRESSURE: 153 MMHG

## 2024-04-17 DIAGNOSIS — K51.90 ULCERATIVE COLITIS, UNSPECIFIED, WITHOUT COMPLICATIONS: ICD-10-CM

## 2024-04-17 DIAGNOSIS — Z98.1 ARTHRODESIS STATUS: Chronic | ICD-10-CM

## 2024-04-17 DIAGNOSIS — Z98.890 OTHER SPECIFIED POSTPROCEDURAL STATES: Chronic | ICD-10-CM

## 2024-04-17 DIAGNOSIS — Z96.659 PRESENCE OF UNSPECIFIED ARTIFICIAL KNEE JOINT: Chronic | ICD-10-CM

## 2024-04-17 DIAGNOSIS — Z98.49 CATARACT EXTRACTION STATUS, UNSPECIFIED EYE: Chronic | ICD-10-CM

## 2024-04-17 PROCEDURE — 88305 TISSUE EXAM BY PATHOLOGIST: CPT | Mod: 26

## 2024-04-17 PROCEDURE — 45380 COLONOSCOPY AND BIOPSY: CPT

## 2024-04-17 PROCEDURE — 88305 TISSUE EXAM BY PATHOLOGIST: CPT

## 2024-04-17 RX ORDER — OLMESARTAN MEDOXOMIL 5 MG/1
1 TABLET, FILM COATED ORAL
Refills: 0 | DISCHARGE

## 2024-04-17 RX ORDER — INFLIXIMAB-DYYB 120 MG/ML
5 INJECTION SUBCUTANEOUS
Refills: 0 | DISCHARGE

## 2024-04-17 RX ORDER — FERROUS SULFATE 325(65) MG
1 TABLET ORAL
Refills: 0 | DISCHARGE

## 2024-04-17 RX ORDER — AZATHIOPRINE 100 MG/1
1 TABLET ORAL
Refills: 0 | DISCHARGE

## 2024-04-17 RX ORDER — DULOXETINE HYDROCHLORIDE 30 MG/1
1 CAPSULE, DELAYED RELEASE ORAL
Refills: 0 | DISCHARGE

## 2024-04-17 NOTE — ASU PATIENT PROFILE, ADULT - FALL HARM RISK - UNIVERSAL INTERVENTIONS
Bed in lowest position, wheels locked, appropriate side rails in place/Call bell, personal items and telephone in reach/Instruct patient to call for assistance before getting out of bed or chair/Non-slip footwear when patient is out of bed/[place] to call system/Physically safe environment - no spills, clutter or unnecessary equipment/Purposeful Proactive Rounding/Room/bathroom lighting operational, light cord in reach

## 2024-04-17 NOTE — H&P PST ADULT - ASSESSMENT
64 y/o M w/ HTN, UC pancolitis on IFX and azathioprine (dx in 1/2023), being followed for UC here for colonoscopy

## 2024-04-17 NOTE — H&P PST ADULT - HISTORY OF PRESENT ILLNESS
64 y/o M w/ HTN, UC pancolitis on IFX and azathioprine (dx in 1/2023), being followed for UC here for colonoscopy    #ulcerative colitis with severe Barbosa 3 pancolitis - in remission  clinically improved since initiation of biologic therapy despite elevated recent calprotectin - which has overall trended down along w/ CRP   Calprotectin in 8/2023 was 299 and had significantly improved compared to May (496) and March (1139)    remains on IFX 10 mg/kg [id number] + 100 mg azathioprine to overcome Abs and increase drug levels.  7/11/23 colonoscopy shows signs of healing with extensive amount of pseudopolyps - unable to assess whether more TAs in the background of pseudopolyps.     Completed hepatitis A and B vaccines with PCP    -c/w IFX 10 mg/kg q4 weeks  -rpt IFX levels and ab   -c/w azathioprine to 100 mg q24h

## 2024-04-17 NOTE — ASU DISCHARGE PLAN (ADULT/PEDIATRIC) - CARE PROVIDER_API CALL
Prosper Souza  Gastroenterology  [address] [place] [id number]  Phone: [phone]  Fax: [phone]  Established Patient  Follow Up Time: 2 weeks

## 2024-04-17 NOTE — PRE-ANESTHESIA EVALUATION ADULT - RESPIRATORY RATE (BREATHS/MIN)
Date & Time: 7/17/2019, 7:04 PM  Patient: Bi Mayberry  Encounter Provider(s):    Amber Ritchie MD       To Whom It May Concern:    Elly Veras was seen and treated in our department on 7/17/2019. He should not return to work until 7/19/2019.
18

## 2024-04-18 LAB — SURGICAL PATHOLOGY STUDY: SIGNIFICANT CHANGE UP

## 2024-04-19 RX ORDER — AZATHIOPRINE 100 MG/1
100 TABLET ORAL DAILY
Qty: 90 | Refills: 0 | Status: ACTIVE | COMMUNITY
Start: 2023-02-16 | End: 1900-01-01

## 2024-04-22 DIAGNOSIS — K64.4 RESIDUAL HEMORRHOIDAL SKIN TAGS: ICD-10-CM

## 2024-04-22 DIAGNOSIS — Z88.0 ALLERGY STATUS TO PENICILLIN: ICD-10-CM

## 2024-04-22 DIAGNOSIS — I10 ESSENTIAL (PRIMARY) HYPERTENSION: ICD-10-CM

## 2024-04-22 DIAGNOSIS — K51.90 ULCERATIVE COLITIS, UNSPECIFIED, WITHOUT COMPLICATIONS: ICD-10-CM

## 2024-04-22 DIAGNOSIS — K52.89 OTHER SPECIFIED NONINFECTIVE GASTROENTERITIS AND COLITIS: ICD-10-CM

## 2024-05-07 ENCOUNTER — APPOINTMENT (OUTPATIENT)
Dept: ORTHOPEDIC SURGERY | Facility: CLINIC | Age: 64
End: 2024-05-07
Payer: COMMERCIAL

## 2024-05-07 DIAGNOSIS — M16.10 UNILATERAL PRIMARY OSTEOARTHRITIS, UNSPECIFIED HIP: ICD-10-CM

## 2024-05-07 PROCEDURE — 99213 OFFICE O/P EST LOW 20 MIN: CPT

## 2024-05-07 RX ORDER — MELOXICAM 15 MG/1
15 TABLET ORAL
Qty: 30 | Refills: 1 | Status: ACTIVE | COMMUNITY
Start: 2024-05-07 | End: 1900-01-01

## 2024-05-07 NOTE — HISTORY OF PRESENT ILLNESS
[de-identified] : Follow-up of right hip  63-year-old gentleman presents for follow-up evaluation of his right hip. He previously saw Dr. Fong. X-rays were taken at that point, severe arthritis of the right hip was demonstrated on the x-rays. His symptoms correlate with the x-ray, he has pain in the groin and limited flexion internal rotation.  Strength appears normal, no swelling, no bruising or ecchymosis, no other abnormalities noted. No shortening or contracture on exam.  We discussed treatment options including a total hip replacement.  At this point he is not quite ready, he would like to try a course of anti-inflammatories first, we discussed the surgery but he does not feel quite ready to move forward with that although he knows he probably will need it sometime in the future. He wants to follow-up in August, at that point he will make a decision depending on how he does over the summer.

## 2024-05-10 ENCOUNTER — APPOINTMENT (OUTPATIENT)
Dept: GASTROENTEROLOGY | Facility: CLINIC | Age: 64
End: 2024-05-10
Payer: COMMERCIAL

## 2024-05-10 VITALS
WEIGHT: 228 LBS | HEART RATE: 104 BPM | BODY MASS INDEX: 30.88 KG/M2 | SYSTOLIC BLOOD PRESSURE: 111 MMHG | HEIGHT: 72 IN | DIASTOLIC BLOOD PRESSURE: 77 MMHG | OXYGEN SATURATION: 98 %

## 2024-05-10 DIAGNOSIS — K51.90 ULCERATIVE COLITIS, UNSPECIFIED, W/OUT COMPLICATIONS: ICD-10-CM

## 2024-05-10 DIAGNOSIS — Z86.79 PERSONAL HISTORY OF OTHER DISEASES OF THE CIRCULATORY SYSTEM: ICD-10-CM

## 2024-05-10 DIAGNOSIS — Z78.9 OTHER SPECIFIED HEALTH STATUS: ICD-10-CM

## 2024-05-10 PROCEDURE — G2211 COMPLEX E/M VISIT ADD ON: CPT

## 2024-05-10 PROCEDURE — 99215 OFFICE O/P EST HI 40 MIN: CPT

## 2024-05-10 RX ORDER — OLMESARTAN MEDOXOMIL 5 MG/1
5 TABLET, FILM COATED ORAL
Refills: 0 | Status: ACTIVE | COMMUNITY

## 2024-05-10 RX ORDER — CHLORHEXIDINE GLUCONATE 4 %
325 (65 FE) LIQUID (ML) TOPICAL DAILY
Qty: 90 | Refills: 0 | Status: DISCONTINUED | COMMUNITY
Start: 2023-01-30 | End: 2024-05-10

## 2024-05-10 RX ORDER — AZATHIOPRINE SODIUM 100 MG
100 VIAL (EA) INJECTION
Refills: 0 | Status: ACTIVE | COMMUNITY

## 2024-05-10 RX ORDER — AZITHROMYCIN 250 MG/1
250 TABLET, FILM COATED ORAL
Qty: 1 | Refills: 0 | Status: DISCONTINUED | COMMUNITY
Start: 2023-01-19 | End: 2024-05-10

## 2024-05-10 RX ORDER — DICLOFENAC SODIUM 1% 10 MG/G
1 GEL TOPICAL DAILY
Qty: 1 | Refills: 4 | Status: DISCONTINUED | COMMUNITY
Start: 2024-03-25 | End: 2024-05-10

## 2024-05-10 NOTE — PHYSICAL EXAM
[Alert] : alert [Normal Voice/Communication] : normal voice/communication [No Acute Distress] : no acute distress [Well Developed] : well developed [Obese (BMI >= 30)] : obese (BMI >= 30) [Sclera] : the sclera and conjunctiva were normal [Hearing Threshold Finger Rub Not [place]] : hearing was normal [Normal Lips/Gums] : the lips and gums were normal [Normal Appearance] : the appearance of the neck was normal [No Respiratory Distress] : no respiratory distress [No Acc Muscle Use] : no accessory muscle use [Respiration, Rhythm And Depth] : normal respiratory rhythm and effort [Auscultation Breath Sounds / Voice Sounds] : lungs were clear to auscultation bilaterally [Heart Rate And Rhythm] : heart rate was normal and rhythm regular [Normal S1, S2] : normal S1 and S2 [Bowel Sounds] : normal bowel sounds [No Masses] : no abdominal mass palpated [Abdomen Soft] : soft [Abnormal Walk] : normal gait [Normal Color / Pigmentation] : normal skin color and pigmentation [Oriented To Time, Place, And Person] : oriented to person, place, and time

## 2024-05-11 NOTE — END OF VISIT
[Time Spent: ___ minutes] : I have spent [unfilled] minutes of time on the encounter. [id number]: || ||00\01||False;

## 2024-05-11 NOTE — REASON FOR VISIT
[Post-op/Procedure: _________] : a [unfilled] post-op/procedure visit [FreeTextEntry1] : RPA - POST PROCEDURE F/U

## 2024-05-11 NOTE — REVIEW OF SYSTEMS
[Recent Weight Loss (___ Lbs)] : recent [unfilled] ~Ulb weight loss [As Noted in HPI] : as noted in HPI [Abdominal Pain] : abdominal pain [Diarrhea] : diarrhea [Bloating (gassiness)] : bloating [Negative] : Heme/Lymph [Vomiting] : no vomiting [Constipation] : no constipation [Heartburn] : no heartburn [Melena (black stool)] : no melena [Bleeding] : no bleeding [Fecal Incontinence (soiling)] : no fecal incontinence

## 2024-05-11 NOTE — HISTORY OF PRESENT ILLNESS
[FreeTextEntry1] : 62 y/o M w/ HTN, UC pancolitis on IFX and azathioprine (dx in 1/2023), being followed for UC is here for F/U post colonoscopy.   Patient has been off the remicade x almost 3 weeks due to insurance issues. He C/O weight loss of 10 lbs in the past week due to poor appetite, generalized abdominal cramps, diarrhea, 2 loose BMs a day.  No rectal bleeding. He denied any recent sick contacts. Still gets flatulence. he is planned for his infusion on Monday 5/13/24.  He recently started meloxicam for hip pain. He continues to take Imuran 100 mg daily. He has been taking dicyclomine for the past 2 days without improvement in the abdominal cramps yet.  had DEXA scan showing good bone health. He is following with dermatology yearly. ROS otherwise neg. -----------------------------------------------  CBC (3/29/24): WBC 6.21 13.5/41.5 plts 329K TSH normal      Total cholesterol 213 triglycerides 126 HDL 45  Non- Iron 95 TIBC 393 Unsat  % sat  24 CMP glucose 109, otherwise normal ESR 45   CRP 3.3  Ferritin 39    Vit D 26  A1C 5.5 Fecal Calprotectin 51   Infliximab drug level 63  Anti-Infliximab Ab <22 ng/ml  normal TPMT activity HIV: -ve 12/2002 HCV: -ve 12/2022   ----------------------------------------------- 4/17/24 colonoscopy: inflammatory pseudo-polyps throughout the colon all the way to the cecum. All segments of the colon were examined under white light and NBI. The previously noted ulcers and stricture were no longer visible. Pathology of all areas of the colon showed no active colitis, granulomas, or dysplasia  7/11/2023: -EGD: medium size HH, non-erosive gastritis. (Biopsy unremarkable), nl duodenum. (Biopsy unremarkable). -Colonoscopy: normal mucosa in the TI (Biopsy unremarkable). Healed ulcers were noted in the rectum and sigmoid colon up to 30 cm. A stricture was noted at 30 cm along with inflammatory pseudo-polyps. We were able to traverse the stricture without any difficulty. Inflammatory pseudo-polyps were noted throughout the colon from 30 cm all the way to the cecum. We examined all segments of the colon under white light and NBI. Multiple cold forceps biopsies were performed in the cecum, ascending colon starting from 80 cm to every 10 cm up to the rectum.  Path reveals TAs in the cecum, ascending and sigmoid colon. acute on chronic inflammation in the left colon.  1/6/23 Colonoscopy: up to the proximal ascending colon. Ulceration, friability, erythema and abnormal vascularity with thick mucoid membrane and deep ulcers noted in the the whole examined colon compatible with UC - Barbosa score of 3 throughout the examined colon. -pathology: Chronic colitis with ulceration, cryptitis and crypt abscesses, consistent with severely active ulcerative colitis. Negative for granuloma, dysplasia, and viral inclusions.  12/14/22: Ulceration, friability, erythema and abnormal vascularity with thick mucoid membrane in the rectum and sigmoid colon compatible with CMV colitis vs UC vs C diff. -pathology showed acute colitis, cryptitis & crypt abscesses; -ve for CMV.  ----------------------------------------------- RECAP: 12/3/22 admitted after returning from a trip to [place] on 11/14/22 for bloody diarrhea & pancolitis on CTAP; GI PCR was positive for EPEC s/p 7 d Cipro/Flagyl (as per ID). 4 days after discharge, patient returns to ED due to unresolved symptoms s/p Colonoscopy 12/14/22 w/ Diffuse continuous ulceration, friability, erythema and abnormal vascularity with thick mucoid membrane with contact bleeding were noted in the rectum and sigmoid colon. biopsy results were in favor of ulcerative colitis. Patient was started on solumedrol IV was placed on PO prednisone on discharge.  Diarrhea and abdominal pain improved significantly on steroids but returned 4 d after discharge. he started having abdominal pain and 4-5 liquid stools, occasionally bloody, decrease po intake and fatigue w/ dehydration. unintentional weight loss of 40 lbs since the onset of symptoms. CT abdomen w/ persistent proctocolitis. C. diff and repeat GI PCR is negative  repeat flex sig 1/6/23 with Barbosa score 3 pancolitis suggestive of UC, confirmed on pathology (discussed with Dr. Soriano GI pathology). treated with IV steroids however, due to his severe UC, had a suboptimal response and was started on infliximab 10 mg/kg induction course during finished steroid taper in early 3/2023.  prior work up reviewed pt up to date on all live vaccines (MMR, varicella) but indeterminate quantiferon he had PPD done in the hospital which was negative but pt was on steroids at the time he has no high risk features to concern for latent Tb, and he currently needs IFX for his symptoms - d/w ID and he is low risk for latent Tb - d/w pt risks and benefits and pt understands.  -----------------------------------------------  Radiology Summary CT: 1/3/23: Since 12/10/2022, persistent/recurrent pan proctocolitis. No perforation or abscess.

## 2024-05-11 NOTE — ASSESSMENT
[FreeTextEntry1] : 64 y/o M w/ HTN, UC pancolitis on IFX and azathioprine (dx in 1/2023), being followed for UC is here for F/U post colonoscopy.   #ulcerative colitis with severe Barbosa 3 pancolitis - in remission but with recurrence of abdominal cramping, poor appetite, weight loss due to being off of the Remicade x almost 3 weeks clinically improved since initiation of biologic therapy despite elevated recent calprotectin - which has overall trended down along w/ CRP Calprotectin improved over time: 1139 (3/2023) -> 496 (5/2023) -> 299 (8/2023) -> 51 (3/2024)  Significant improvement on IFX 10 mg/kg q6ewrbe + 100 mg azathioprine to overcome Abs and increase drug levels. now w/ recurrent sx after missing the IV remicade infusions x 3 w due to insurance issues.  7/11/23 colonoscopy shows signs of healing with extensive amount of pseudopolyps - unable to assess whether more TAs in the background of pseudopolyps. 4/2024 colonoscopy showed pseudo-polyps throughout the colon but the ulcers and stricture were no longer present and the pathology was negative for active colitis, granulomas, and dysplasia.   Completed hepatitis A and B vaccines with PCP  -Resume IFX 10 mg/kg on Monday, 5/13/24 and continue q 4 weeks -in the meantime, can take imodium PRN for diarrhea if needed -c/w azathioprine to 100 mg q24h -repeat colonoscopy in 6 -Patient advised to avoid NSAIDs (use the meloxicam for hip pain sparingly) and avoid smoking -recommend 30-35 KCAL/KG/DAY -Annual dermatological screening is recommended - pt sees dermatologist yearly - up to date -DEXA scan reviewed - good bone health - rpt in 3 yrs -advised exercising -c/w vit d 1000 u/d -avoid NSAIDs due to risk of disease flare -F/U in 3 months   We previously had an extensive discussion with pt and wife about IFX, azathioprine, and immunosuppression related adverse events including slight increased risk of infections overall, as well as his small risk of having latent TB and the risk of reactivation. I informed him that he has a low risk of harboring latent TB given his hx, so at this time, we will continue therapy and monitor closely. He is fully understanding of the situation and will proceed with close follow-up. He understands that the benefits of therapy outweigh the risks.

## 2024-05-28 NOTE — ASU PREOP CHECKLIST - PATIENT PROBLEMS/NEEDS
From: Chaitanya Browne  To: Dirk Russ  Sent: 5/28/2024 12:58 PM EDT  Subject: refill prescription    Dr. Russ     I need a refill for my Pravastatin (40mg) sent to [facility] ([phone]). I have enough left for this week. Thank you  
Patient expressed no known problems or needs

## 2024-06-12 RX ORDER — INFLIXIMAB 100 MG/10ML
100 INJECTION, POWDER, LYOPHILIZED, FOR SOLUTION INTRAVENOUS
Qty: 1 | Refills: 6 | Status: ACTIVE | COMMUNITY
Start: 2023-05-03 | End: 1900-01-01

## 2024-07-03 ENCOUNTER — RX RENEWAL (OUTPATIENT)
Age: 64
End: 2024-07-03

## 2024-07-05 ENCOUNTER — APPOINTMENT (OUTPATIENT)
Dept: ORTHOPEDIC SURGERY | Facility: CLINIC | Age: 64
End: 2024-07-05
Payer: COMMERCIAL

## 2024-07-05 VITALS — WEIGHT: 235 LBS | HEIGHT: 72 IN | BODY MASS INDEX: 31.83 KG/M2

## 2024-07-05 DIAGNOSIS — M17.12 UNILATERAL PRIMARY OSTEOARTHRITIS, LEFT KNEE: ICD-10-CM

## 2024-07-05 DIAGNOSIS — M25.50 PAIN IN UNSPECIFIED JOINT: ICD-10-CM

## 2024-07-05 PROCEDURE — 73562 X-RAY EXAM OF KNEE 3: CPT | Mod: LT

## 2024-07-05 PROCEDURE — 99213 OFFICE O/P EST LOW 20 MIN: CPT

## 2024-08-02 NOTE — CONSULT NOTE ADULT - CONSULT REQUESTED BY NAME
EPAT - Social Work Psychiatric Assessment    Arrival Details  Mode of Arrival: Ambulatory  Admission Source: Home  Admission Type: Involuntary  EPAT Assessment Start Date: 08/02/24  EPAT Assessment Start Time: 0741  Name of : Josiah Avilezeen    History of Present Illness  Admission Reason: Psychosis, Agitation  HPI: Patient is a 41 year old male who presented to the ED agitated, confused and disorganized. He was found in the community wandering and disorganized. He was agitated upon arrival to the ED and needed medications and restraints. The patient denied any suicidal or homicidal ideations. A review of his Provider and Triage note was conducted.    SW Readmission Information   Readmission within 30 Days: No    Psychiatric Symptoms  Anxiety Symptoms: Generalized  Depression Symptoms: No problems reported or observed.  Brittanie Symptoms: Flight of ideas, Poor judgment, Pressured speech    Psychosis Symptoms  Hallucination Type: No problems reported or observed.  Delusion Type: Controlled    Additional Symptoms - Adult  Generalized Anxiety Disorder: Excessive anxiety/worry  Obsessive Compulsive Disorder: No problems reported or observed.  Panic Attack: No problems reported or observed.  Post Traumatic Stress Disorder: No problems reported or observed.  Delirium: No problems reported or observed.  Review of Symptoms Comments: Please see above    Past Psychiatric History/Meds/Treatments  Past Psychiatric History: Patient has a history of Schizophrenia. He states he goes to [facility] in [place]. He reports a history of substance use and treatment but was guarded and did not want to give additional details. He denied any history of self harm.  Past Psychiatric Meds/Treatments: See med list. patient states he is compliant.  Past Violence/Victimization History: hx of agitation and aggressive behaviors.    Current Mental Health Contacts   Name/Phone Number: [facility]   Last 
Appointment Date: unknown  Provider Name/Phone Number: [facility]  Provider Last Appointment Date: Unknown    Support System: Immediate family    Living Arrangement: Apartment    Home Safety  Feels Safe Living in Home: Yes         Miltary Service/Education History  Current or Previous  Service: None  Education Level: Less than high school  History of Learning Problems: No  History of School Behavior Problems: No  School History: see above    Social/Cultural History  Social History: Patient is his own guardian.  Important Activities: Other (Comment)    Legal  Legal Concerns: unknown    Drug Screening  Have you used any substances (canabis, cocaine, heroin, hallucinogens, inhalants, etc.) in the past 12 months?: Yes  Have you used any prescription drugs other than prescribed in the past 12 months?: Yes  Is a toxicology screen needed?: Yes    Stage of Change  Stage of Change: Precontemplation  History of Treatment:  (responded yes to past treatment but did not disclose the type.)  Type of Treatment Offered: Inpatient, IOP, Individual, AA/NA meeting resource  Treatment Offered: Declined  Duration of Substance Use: daily  Frequency of Substance Use: daily  Age of First Substance Use: n/a    Psychosocial  Psychosocial (WDL): Exceptions to WDL  Behaviors/Mood: Agitated, Flight of ideas, Guarded, Hyper-verbal, Restless  Affect: Inconsistent with mood  Parent/Guardian/Significant Other Involvement: No involvement    Orientation  Orientation Level: Oriented X4    General Appearance  Motor Activity: Restlessness  Speech Pattern: Pressured  General Attitude: Guarded  Appearance/Hygiene: Disheveled, Body odor    Thought Process  Coherency: Disorganized, Flight of ideas  Content: Preoccupation  Delusions: Controlled  Perception: Not altered  Hallucination: None  Judgment/Insight: Poor  Confusion: Mild  Cognition: Poor judgement, Poor safety awareness, Poor attention/concentration    Sleep Pattern  Sleep 
Pattern: Other (Comment)    Risk Factors  Self Harm/Suicidal Ideation Plan: Patient deneis  Previous Self Harm/Suicidal Plans: patient denies  Risk Factors: None    Violence Risk Assessment  Assessment of Violence: On admission  Thoughts of Harm to Others: No    Ability to Assess Risk Screen  Risk Screen - Ability to Assess: Able to be screened  Ask Suicide-Screening Questions  1. In the past few weeks, have you wished you were dead?: No  2. In the past few weeks, have you felt that you or your family would be better off if you were dead?: No  3. In the past week, have you been having thoughts about killing yourself?: No  4. Have you ever tried to kill yourself?: No  5. Are you having thoughts of killing yourself right now?: No  Calculated Risk Score: No intervention is necessary  [place] Suicide Severity Rating Scale (Screener/Recent Self-Report)  1. Wish to be Dead (Past 1 Month): No  2. Non-Specific Active Suicidal Thoughts (Past 1 Month): No  6. Suicidal Behavior (Lifetime): No  Calculated C-SSRS Risk Score (Lifetime/Recent): No Risk Indicated  Step 1: Risk Factors  Current & Past Psychiatric Dx: Mood disorder, Psychotic disorder, Alcohol/substance abuse disorders  Presenting Symptoms: Anxiety and/or panic  Precipitants/Stressors: Triggering events leading to humiliation, shame, and/or despair (e.g. loss of relationship, financial or health status) (real or anticipated), Substance intoxication or withdrawal  Change in Treatment: Non-compliant or not receiving treatment  Access to Lethal Methods : No  Step 2: Protective Factors   Protective Factors Internal: Ability to cope with stress, Identifies reasons for living  Protective Factors External: Engaged in work or school  Step 3: Suicidal Ideation Intensity  How Many Times Have You Had These Thoughts: Less than once a week  When You Have the Thoughts How Long do They Last : Fleeting - few seconds or minutes  Could/Can You Stop Thinking About Killing Yourself or 
"Wanting to Die if You Want to: Does not attempt to control thoughts  Are There Things - Anyone or Anything - That Stopped You From Wanting to Die or Acting on: Does not apply  What Sort of Reasons Did You Have For Thinking About Wanting to Die or Killing Yourself: Does not apply  Total Score: 2  Step 5: Documentation  Risk Level: Low suicide risk (Patient is low risk. Reviewed with the provider.)    Psychiatric Impression and Plan of Care  Assessment and Plan: Patient is a 41 year old AA male with a history of Psychosis and Polysubstance Use who presented to the ED with police. The patient was found in the community disorganized and confused. Once in the ED he became agitated and was medicated and restrained. He currently is out of restraints. The patient is guarded and evasive. Upon arrival he refused to give any of his personal information . During the assessment he gave a name and date of birth. The patient was pacing around the room. He stated \" I took too many drugs\" and then stated \" they are gone now\". He refused to state what he took if anything. He is preoccupied with getting breakfast and leaving. He shared he goes to [facility] in [place] and sees a psychiatrist. He refused to give the name of the provider or what medications he took. He did state he \"yes\" when asked if he takes them as prescribed. The patient had poor eye contact, focus and concentration. The patient denied any suicidal thoughts or history of self harm. He denies any current thoughts to harm others or homicidal thoughts. He does not appear internally stimulated. Overall the patient is a limited historian.  Specific Resources Provided to Patient: [facility] Notified: no  PHP/IOP Recommended: none  Specific Information Provided for PHP/IOP: none  Plan Comments: none    Outcome/Disposition  Patient's Perception of Outcome Achieved: n/a  Assessment, Recommendations and Risk Level Reviewed with: discharge  Contact Name: "
Carmelo Church  Contact Number(s): [phone]  Contact Relationship: other  EPAT Assessment Completed Date: 08/02/24  EPAT Assessment Completed Time: 0816  Patient Disposition: Home      
ER
Lisa Jacobs

## 2024-08-06 ENCOUNTER — APPOINTMENT (OUTPATIENT)
Dept: ORTHOPEDIC SURGERY | Facility: CLINIC | Age: 64
End: 2024-08-06

## 2024-08-06 PROCEDURE — 99215 OFFICE O/P EST HI 40 MIN: CPT

## 2024-08-06 NOTE — HISTORY OF PRESENT ILLNESS
[de-identified] : 63M with right hip oa notes worsening pain considering josé has  left knee uni, for the most part that is fine  exam shows pain with flx and decreased flx ir  x-rays show advanced arthritis  we discussed josé We discussed the surgery, including a description of the surgery in layman's terms, preoperative evaluation, the hospital stay, anesthesia, and expected outcome. Models equivalent to the actual hip replacement prosthesis were used to demonstrate the magnitude and scope of the surgery.  Various types of implant fixation including cement and biologic fixation were described.  The patient shared in the decision making and agreed to the use of implants.   The patient will require a rolling walker for 2-4 weeks postoperativley due to gait instability to help with mobility related ADL's and a commode as they confined to a one level without access to a bathroom.  Patient is able to safely use the walker and functional mobility deficit can be sufficiently resolved with use of a walker.   Additionally surgical risks were discussed.  The patient has good understanding of the inherent risks of surgery which include bleeding, pain, and infection, blood clots, and any medical issues that may arise in the perioperative period.  Additionally, we discussed risks uniquely pertinent to hip replacement surgery, including leg length discrepancy, instability, loosening of components, numbness around the incision, nerve palsy, and possible need for revision surgery should the replacement not function optimally.  All questions were answered and the patient verbalized understanding.  I encouraged the patient to contact me should any further questions are issues arise.

## 2024-09-03 ENCOUNTER — RX RENEWAL (OUTPATIENT)
Age: 64
End: 2024-09-03

## 2024-11-02 ENCOUNTER — RX RENEWAL (OUTPATIENT)
Age: 64
End: 2024-11-02

## 2024-12-23 NOTE — ED PROVIDER NOTE - PROGRESS NOTE DETAILS
Patient arrived with daughter for BM bx.   
Patient discharged home with daughter via wheelchair.  Tolerated procedure well, VSS, drsg CDI.   
Patient returned from procedure.   
Patient to procedure    
I was directly involved in the management of this patient. Case was discussed with PA Fellow Grant

## 2025-01-04 ENCOUNTER — RX RENEWAL (OUTPATIENT)
Age: 65
End: 2025-01-04

## 2025-01-21 ENCOUNTER — RX RENEWAL (OUTPATIENT)
Age: 65
End: 2025-01-21

## 2025-01-24 DIAGNOSIS — K51.90 ULCERATIVE COLITIS, UNSPECIFIED, W/OUT COMPLICATIONS: ICD-10-CM

## 2025-01-24 RX ORDER — SODIUM SULFATE, MAGNESIUM SULFATE, AND POTASSIUM CHLORIDE 17.75; 2.7; 2.25 G/1; G/1; G/1
1479-225-188 TABLET ORAL TWICE DAILY
Qty: 24 | Refills: 0 | Status: ACTIVE | COMMUNITY
Start: 2025-01-24 | End: 1900-01-01

## 2025-03-11 ENCOUNTER — TRANSCRIPTION ENCOUNTER (OUTPATIENT)
Age: 65
End: 2025-03-11

## 2025-03-11 ENCOUNTER — RESULT REVIEW (OUTPATIENT)
Age: 65
End: 2025-03-11

## 2025-03-11 ENCOUNTER — OUTPATIENT (OUTPATIENT)
Dept: OUTPATIENT SERVICES | Facility: HOSPITAL | Age: 65
LOS: 1 days | Discharge: HOME | End: 2025-03-11
Payer: COMMERCIAL

## 2025-03-11 VITALS
DIASTOLIC BLOOD PRESSURE: 81 MMHG | WEIGHT: 225.09 LBS | HEIGHT: 72 IN | HEART RATE: 68 BPM | TEMPERATURE: 98 F | OXYGEN SATURATION: 97 % | SYSTOLIC BLOOD PRESSURE: 160 MMHG | RESPIRATION RATE: 18 BRPM

## 2025-03-11 VITALS
OXYGEN SATURATION: 97 % | HEART RATE: 51 BPM | DIASTOLIC BLOOD PRESSURE: 74 MMHG | SYSTOLIC BLOOD PRESSURE: 132 MMHG | RESPIRATION RATE: 16 BRPM | TEMPERATURE: 97 F

## 2025-03-11 DIAGNOSIS — K51.90 ULCERATIVE COLITIS, UNSPECIFIED, WITHOUT COMPLICATIONS: ICD-10-CM

## 2025-03-11 DIAGNOSIS — Z96.659 PRESENCE OF UNSPECIFIED ARTIFICIAL KNEE JOINT: Chronic | ICD-10-CM

## 2025-03-11 DIAGNOSIS — Z96.641 PRESENCE OF RIGHT ARTIFICIAL HIP JOINT: Chronic | ICD-10-CM

## 2025-03-11 DIAGNOSIS — Z98.49 CATARACT EXTRACTION STATUS, UNSPECIFIED EYE: Chronic | ICD-10-CM

## 2025-03-11 DIAGNOSIS — Z98.1 ARTHRODESIS STATUS: Chronic | ICD-10-CM

## 2025-03-11 DIAGNOSIS — Z98.890 OTHER SPECIFIED POSTPROCEDURAL STATES: Chronic | ICD-10-CM

## 2025-03-11 PROCEDURE — 88305 TISSUE EXAM BY PATHOLOGIST: CPT

## 2025-03-11 PROCEDURE — 45380 COLONOSCOPY AND BIOPSY: CPT | Mod: XU

## 2025-03-11 PROCEDURE — 45385 COLONOSCOPY W/LESION REMOVAL: CPT

## 2025-03-11 PROCEDURE — 88305 TISSUE EXAM BY PATHOLOGIST: CPT | Mod: 26

## 2025-03-11 NOTE — ASU DISCHARGE PLAN (ADULT/PEDIATRIC) - FINANCIAL ASSISTANCE
[facility] provides services at a reduced cost to those who are determined to be eligible through [facility]’s financial assistance program. Information regarding [facility]’s financial assistance program can be found by going to [email or web address] or by calling [phone].

## 2025-03-11 NOTE — ASU PATIENT PROFILE, ADULT - FALL HARM RISK - UNIVERSAL INTERVENTIONS
Bed in lowest position, wheels locked, appropriate side rails in place/Call bell, personal items and telephone in reach/Instruct patient to call for assistance before getting out of bed or chair/Non-slip footwear when patient is out of bed/[place] to call system/Physically safe environment - no spills, clutter or unnecessary equipment/Purposeful Proactive Rounding/Room/bathroom lighting operational, light cord in reach

## 2025-03-11 NOTE — ASU PATIENT PROFILE, ADULT - NSICDXPASTSURGICALHX_GEN_ALL_CORE_FT
PAST SURGICAL HISTORY:  History of ankle surgery right x2    History of cataract surgery     History of fusion of cervical spine     History of partial knee replacement left    History of right hip replacement     
n/a

## 2025-03-11 NOTE — H&P ADULT - NSHPPOAPULMEMBOLUS_GEN_A_CORE
ER Provider Note    Primary Care Provider: JONATHAN Houston    CHIEF COMPLAINT  Chief Complaint   Patient presents with    Rash    Allergic Reaction       HPI/ROS  LIMITATION TO HISTORY   Select: : None    OUTSIDE HISTORIAN(S):  Parent mother and father provided all history    Praneeth Salinas is a 3 y.o. female who presents to the ED with her parents for evaluation of a rash onset earlier today. The patient's mother reports she had a few small hives yesterday, but after eating a pumpkin muffin at [employer] this morning her face became red. She reports associated ankle swelling, hand swelling, 3 episodes of diarrhea, and white colored stools, but denies any fever, cough, congestion, or vomiting. She states she gave the patient Benadryl 1 hour ago with some alleviation. She notes the patient's father had COVID recently, but quarantined at his mother's house. The patient has no major past medical history, takes no daily medications, and has no allergies to medication. Vaccinations are up to date.     PAST MEDICAL HISTORY  History reviewed. No pertinent past medical history.  Vaccinations are UTD.     SURGICAL HISTORY  No past surgical history noted.    FAMILY HISTORY  No family history noted.    SOCIAL HISTORY   Patient is accompanied by her parents, whom she lives with.     CURRENT MEDICATIONS  No current outpatient medications    ALLERGIES  Patient has no known allergies.    PHYSICAL EXAM  BP (!) 116/74   Pulse 124   Temp 36.8 °C (98.2 °F) (Temporal)   Resp 25   Wt 11.6 kg (25 lb 9.2 oz)   SpO2 97%   Constitutional: Well developed, Well nourished, No acute distress, Non-toxic appearance.   HENT: Normocephalic, Atraumatic, Bilateral external ears normal, Oropharynx moist, No oral exudates, Nose normal.   Eyes: PERRL, EOMI, Conjunctiva normal, No discharge.  Neck: Neck has normal range of motion, no tenderness, and is supple.   Lymphatic: No cervical lymphadenopathy noted.   Cardiovascular: Normal heart rate,  Normal rhythm, No murmurs, No rubs, No gallops.   Thorax & Lungs: Normal breath sounds, No respiratory distress, No wheezing, No chest tenderness, No accessory muscle use, No stridor.  Skin: Warm, Dry, Mild erythema to the hands and feet, No rash.   Abdomen: Soft, No tenderness, No masses.  Neurologic: Alert & Moves all extremities equally.     COURSE & MEDICAL DECISION MAKING    ED Observation Status? No; Patient does not meet criteria for ED Observation.     INITIAL ASSESSMENT AND PLAN  Care Narrative:     1:25 PM - Patient was evaluated; Patient presents for evaluation of a rash. The patient is well appearing here with reassuring vitals and exam. Exam reveals mild erythema to the hands and feet. Exam is not consistent with anaphylaxis. She most likely has hives which may be secondary to a viral illness. Discussed plan of care, including discharging the patient home and monitoring her symptoms there with ibuprofen or Tylenol as needed for pain or fever and drink plenty of fluids. I informed the patient's parents she likely has a viral illness and the rash is an allergic response to the virus. Seek medical care for worsening symptoms or if symptoms don't improve. Mom and dad agree to plan of care. I discussed plan for discharge and follow up as outlined below. The patient is stable for discharge at this time and will return for any new or worsening symptoms. Patient's mother verbalizes understanding and support with my plan for discharge.      DISPOSITION:  Patient will be discharged home with parent in stable condition.    FOLLOW UP:  Ammon Momin, A.P.N.  [address] [place] [address] [phone]      As needed, If symptoms worsen    Guardian was given return precautions and verbalizes understanding. They will return for new or worsening symptoms.      FINAL IMPRESSION  1. Hives         Isaias COREA (Scribe), am scribing for, and in the presence of, Javier Ceron M.D..    Electronically  signed by: Isaias Garcia (Scribe), 12/24/2023    I, Javier Ceron M.D. personally performed the services described in this documentation, as scribed by Isaias Garcia in my presence, and it is both accurate and complete.      The note accurately reflects work and decisions made by me.  Javier Ceron M.D.  12/24/2023  8:27 PM    no

## 2025-03-13 LAB — SURGICAL PATHOLOGY STUDY: SIGNIFICANT CHANGE UP

## 2025-03-18 DIAGNOSIS — K51.40 INFLAMMATORY POLYPS OF COLON WITHOUT COMPLICATIONS: ICD-10-CM

## 2025-03-18 DIAGNOSIS — I10 ESSENTIAL (PRIMARY) HYPERTENSION: ICD-10-CM

## 2025-03-18 DIAGNOSIS — K64.4 RESIDUAL HEMORRHOIDAL SKIN TAGS: ICD-10-CM

## 2025-03-18 DIAGNOSIS — Z88.0 ALLERGY STATUS TO PENICILLIN: ICD-10-CM

## 2025-03-18 DIAGNOSIS — K62.1 RECTAL POLYP: ICD-10-CM

## 2025-03-18 DIAGNOSIS — K63.5 POLYP OF COLON: ICD-10-CM

## 2025-03-21 ENCOUNTER — APPOINTMENT (OUTPATIENT)
Dept: GASTROENTEROLOGY | Facility: CLINIC | Age: 65
End: 2025-03-21
Payer: COMMERCIAL

## 2025-03-21 VITALS — HEIGHT: 72 IN | BODY MASS INDEX: 29.53 KG/M2 | WEIGHT: 218 LBS

## 2025-03-21 DIAGNOSIS — K51.90 ULCERATIVE COLITIS, UNSPECIFIED, W/OUT COMPLICATIONS: ICD-10-CM

## 2025-03-21 DIAGNOSIS — E55.9 VITAMIN D DEFICIENCY, UNSPECIFIED: ICD-10-CM

## 2025-03-21 PROCEDURE — 99215 OFFICE O/P EST HI 40 MIN: CPT

## 2025-03-21 PROCEDURE — G2211 COMPLEX E/M VISIT ADD ON: CPT | Mod: NC

## 2025-03-28 LAB
25(OH)D3 SERPL-MCNC: 82 NG/ML
ALBUMIN SERPL ELPH-MCNC: 4.5 G/DL
ALP BLD-CCNC: 75 U/L
ALT SERPL-CCNC: 13 U/L
ANION GAP SERPL CALC-SCNC: 11 MMOL/L
AST SERPL-CCNC: 15 U/L
BASOPHILS # BLD AUTO: 0.03 K/UL
BASOPHILS NFR BLD AUTO: 0.5 %
BILIRUB SERPL-MCNC: 0.3 MG/DL
BUN SERPL-MCNC: 15 MG/DL
CALCIUM SERPL-MCNC: 9.7 MG/DL
CHLORIDE SERPL-SCNC: 100 MMOL/L
CHOLEST SERPL-MCNC: 242 MG/DL
CO2 SERPL-SCNC: 27 MMOL/L
CREAT SERPL-MCNC: 1.1 MG/DL
CRP SERPL-MCNC: <3 MG/L
EGFRCR SERPLBLD CKD-EPI 2021: 75 ML/MIN/1.73M2
EOSINOPHIL # BLD AUTO: 0.11 K/UL
EOSINOPHIL NFR BLD AUTO: 1.7 %
ERYTHROCYTE [SEDIMENTATION RATE] IN BLOOD BY WESTERGREN METHOD: 30 MM/HR
ESTIMATED AVERAGE GLUCOSE: 111 MG/DL
FERRITIN SERPL-MCNC: 32 NG/ML
GLUCOSE SERPL-MCNC: 93 MG/DL
HBA1C MFR BLD HPLC: 5.5 %
HCT VFR BLD CALC: 39.4 %
HDLC SERPL-MCNC: 50 MG/DL
HGB BLD-MCNC: 13.1 G/DL
IMM GRANULOCYTES NFR BLD AUTO: 0.6 %
INR PPP: 0.86 RATIO
IRON SATN MFR SERPL: 24 %
IRON SERPL-MCNC: 93 UG/DL
LDLC SERPL-MCNC: 168 MG/DL
LYMPHOCYTES # BLD AUTO: 1.58 K/UL
LYMPHOCYTES NFR BLD AUTO: 24.3 %
M TB IFN-G BLD-IMP: NEGATIVE
MAN DIFF?: NORMAL
MCHC RBC-ENTMCNC: 33.2 G/DL
MCHC RBC-ENTMCNC: 34.1 PG
MCV RBC AUTO: 102.6 FL
MONOCYTES # BLD AUTO: 0.42 K/UL
MONOCYTES NFR BLD AUTO: 6.5 %
NEUTROPHILS # BLD AUTO: 4.31 K/UL
NEUTROPHILS NFR BLD AUTO: 66.4 %
NONHDLC SERPL-MCNC: 192 MG/DL
PLATELET # BLD AUTO: 334 K/UL
PMV BLD AUTO: 0 /100 WBCS
PMV BLD: 9.5 FL
POTASSIUM SERPL-SCNC: 4.9 MMOL/L
PROT SERPL-MCNC: 7.3 G/DL
PT BLD: 10.1 SEC
QUANTIFERON TB PLUS MITOGEN MINUS NIL: 3.02 IU/ML
QUANTIFERON TB PLUS NIL: 0.03 IU/ML
QUANTIFERON TB PLUS TB1 MINUS NIL: 0 IU/ML
QUANTIFERON TB PLUS TB2 MINUS NIL: 0.04 IU/ML
RBC # BLD: 3.84 M/UL
RBC # FLD: 13.4 %
SODIUM SERPL-SCNC: 138 MMOL/L
TIBC SERPL-MCNC: 394 UG/DL
TRIGL SERPL-MCNC: 134 MG/DL
TSH SERPL-ACNC: 0.67 UIU/ML
UIBC SERPL-MCNC: 301 UG/DL
WBC # FLD AUTO: 6.49 K/UL

## 2025-04-09 NOTE — CHART NOTE - NSCHARTNOTEFT_GEN_A_CORE
PACU ANESTHESIA ADMISSION NOTE      Procedure:   Post op diagnosis:    __x__  Patent Airway    __x__  Full return of protective reflexes    __x__  Full recovery from anesthesia / back to baseline status    Vitals:  T(C): 36.7 (07-11-23 @ 07:37), Max: 36.7 (07-11-23 @ 07:37)  HR: 66 (07-11-23 @ 07:37) (66 - 66)  BP: 135/89 (07-11-23 @ 07:37) (135/89 - 135/89)  RR: 18 (07-11-23 @ 07:37) (18 - 18)  SpO2: 100%    Mental Status:  __x__ Awake   ___x__ Alert   _____ Drowsy   _____ Sedated    Nausea/Vomiting:  __x__ NO  ______Yes,   See Post - Op Orders          Pain Scale (0-10):  _0____    Treatment: ____ None    __x__ See Post - Op/PCA Orders    Post - Operative Fluids:   ____ Oral   __x__ See Post - Op Orders    Plan: Discharge:   ___x_Home       _____Floor     _____Critical Care    _____  Other:_________________    Comments: Patient had smooth intraoperative event, no anesthesia complication.  PACU Vital signs: HR:  55          BP:        133/74          RR: 14            O2 Sat:       99%     Temp 97.3 Other specify

## 2025-05-20 ENCOUNTER — RX RENEWAL (OUTPATIENT)
Age: 65
End: 2025-05-20

## 2025-06-06 ENCOUNTER — INPATIENT (INPATIENT)
Facility: HOSPITAL | Age: 65
LOS: 1 days | Discharge: HOME | DRG: 392 | End: 2025-06-08
Attending: INTERNAL MEDICINE | Admitting: INTERNAL MEDICINE
Payer: MEDICARE

## 2025-06-06 VITALS
TEMPERATURE: 98 F | HEIGHT: 72 IN | DIASTOLIC BLOOD PRESSURE: 83 MMHG | RESPIRATION RATE: 18 BRPM | HEART RATE: 80 BPM | SYSTOLIC BLOOD PRESSURE: 169 MMHG | WEIGHT: 243.61 LBS | OXYGEN SATURATION: 99 %

## 2025-06-06 DIAGNOSIS — Z59.71 INSUFFICIENT HEALTH INSURANCE COVERAGE: ICD-10-CM

## 2025-06-06 DIAGNOSIS — T39.4X6A: ICD-10-CM

## 2025-06-06 DIAGNOSIS — F17.210 NICOTINE DEPENDENCE, CIGARETTES, UNCOMPLICATED: ICD-10-CM

## 2025-06-06 DIAGNOSIS — Z98.890 OTHER SPECIFIED POSTPROCEDURAL STATES: Chronic | ICD-10-CM

## 2025-06-06 DIAGNOSIS — Z98.1 ARTHRODESIS STATUS: ICD-10-CM

## 2025-06-06 DIAGNOSIS — Z96.641 PRESENCE OF RIGHT ARTIFICIAL HIP JOINT: ICD-10-CM

## 2025-06-06 DIAGNOSIS — K51.00 ULCERATIVE (CHRONIC) PANCOLITIS WITHOUT COMPLICATIONS: ICD-10-CM

## 2025-06-06 DIAGNOSIS — I12.9 HYPERTENSIVE CHRONIC KIDNEY DISEASE WITH STAGE 1 THROUGH STAGE 4 CHRONIC KIDNEY DISEASE, OR UNSPECIFIED CHRONIC KIDNEY DISEASE: ICD-10-CM

## 2025-06-06 DIAGNOSIS — N18.2 CHRONIC KIDNEY DISEASE, STAGE 2 (MILD): ICD-10-CM

## 2025-06-06 DIAGNOSIS — Z96.641 PRESENCE OF RIGHT ARTIFICIAL HIP JOINT: Chronic | ICD-10-CM

## 2025-06-06 DIAGNOSIS — E87.5 HYPERKALEMIA: ICD-10-CM
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
## 2025-06-06 DIAGNOSIS — Z88.0 ALLERGY STATUS TO PENICILLIN: ICD-10-CM

## 2025-06-06 DIAGNOSIS — Z91.138 PATIENT'S UNINTENTIONAL UNDERDOSING OF MEDICATION REGIMEN FOR OTHER REASON: ICD-10-CM

## 2025-06-06 DIAGNOSIS — Z98.1 ARTHRODESIS STATUS: Chronic | ICD-10-CM

## 2025-06-06 DIAGNOSIS — T45.1X5A ADVERSE EFFECT OF ANTINEOPLASTIC AND IMMUNOSUPPRESSIVE DRUGS, INITIAL ENCOUNTER: ICD-10-CM

## 2025-06-06 DIAGNOSIS — D84.821 IMMUNODEFICIENCY DUE TO DRUGS: ICD-10-CM

## 2025-06-06 DIAGNOSIS — Z86.0102 PERSONAL HISTORY OF HYPERPLASTIC COLON POLYPS: ICD-10-CM

## 2025-06-06 DIAGNOSIS — K52.9 NONINFECTIVE GASTROENTERITIS AND COLITIS, UNSPECIFIED: ICD-10-CM

## 2025-06-06 DIAGNOSIS — Z96.659 PRESENCE OF UNSPECIFIED ARTIFICIAL KNEE JOINT: Chronic | ICD-10-CM

## 2025-06-06 DIAGNOSIS — Z98.49 CATARACT EXTRACTION STATUS, UNSPECIFIED EYE: Chronic | ICD-10-CM

## 2025-06-06 LAB
ANION GAP SERPL CALC-SCNC: 10 MMOL/L — SIGNIFICANT CHANGE UP (ref 7–14)
ANION GAP SERPL CALC-SCNC: 10 MMOL/L — SIGNIFICANT CHANGE UP (ref 7–14)
BASOPHILS # BLD AUTO: 0 K/UL — SIGNIFICANT CHANGE UP (ref 0–0.2)
BASOPHILS NFR BLD AUTO: 0 % — SIGNIFICANT CHANGE UP (ref 0–1)
BUN SERPL-MCNC: 13 MG/DL — SIGNIFICANT CHANGE UP (ref 10–20)
BUN SERPL-MCNC: 14 MG/DL — SIGNIFICANT CHANGE UP (ref 10–20)
CALCIUM SERPL-MCNC: 9.2 MG/DL — SIGNIFICANT CHANGE UP (ref 8.4–10.5)
CALCIUM SERPL-MCNC: 9.5 MG/DL — SIGNIFICANT CHANGE UP (ref 8.4–10.5)
CHLORIDE SERPL-SCNC: 101 MMOL/L — SIGNIFICANT CHANGE UP (ref 98–110)
CHLORIDE SERPL-SCNC: 99 MMOL/L — SIGNIFICANT CHANGE UP (ref 98–110)
CO2 SERPL-SCNC: 24 MMOL/L — SIGNIFICANT CHANGE UP (ref 17–32)
CO2 SERPL-SCNC: 29 MMOL/L — SIGNIFICANT CHANGE UP (ref 17–32)
CREAT SERPL-MCNC: 1 MG/DL — SIGNIFICANT CHANGE UP (ref 0.7–1.5)
CREAT SERPL-MCNC: 1 MG/DL — SIGNIFICANT CHANGE UP (ref 0.7–1.5)
EGFR: 85 ML/MIN/1.73M2 — SIGNIFICANT CHANGE UP
EOSINOPHIL # BLD AUTO: 0 K/UL — SIGNIFICANT CHANGE UP (ref 0–0.7)
EOSINOPHIL NFR BLD AUTO: 0 % — SIGNIFICANT CHANGE UP (ref 0–8)
GLUCOSE SERPL-MCNC: 106 MG/DL — HIGH (ref 70–99)
GLUCOSE SERPL-MCNC: 90 MG/DL — SIGNIFICANT CHANGE UP (ref 70–99)
HCT VFR BLD CALC: 37.7 % — LOW (ref 42–52)
HGB BLD-MCNC: 12.9 G/DL — LOW (ref 14–18)
LYMPHOCYTES # BLD AUTO: 1.56 K/UL — SIGNIFICANT CHANGE UP (ref 1.2–3.4)
LYMPHOCYTES # BLD AUTO: 28.1 % — SIGNIFICANT CHANGE UP (ref 20.5–51.1)
MCHC RBC-ENTMCNC: 34.2 G/DL — SIGNIFICANT CHANGE UP (ref 32–37)
MCHC RBC-ENTMCNC: 35.2 PG — HIGH (ref 27–31)
MCV RBC AUTO: 103 FL — HIGH (ref 80–94)
MONOCYTES # BLD AUTO: 0.44 K/UL — SIGNIFICANT CHANGE UP (ref 0.1–0.6)
MONOCYTES NFR BLD AUTO: 7.9 % — SIGNIFICANT CHANGE UP (ref 1.7–9.3)
NEUTROPHILS # BLD AUTO: 3.46 K/UL — SIGNIFICANT CHANGE UP (ref 1.4–6.5)
NEUTROPHILS NFR BLD AUTO: 61.4 % — SIGNIFICANT CHANGE UP (ref 42.2–75.2)
PLATELET # BLD AUTO: 257 K/UL — SIGNIFICANT CHANGE UP (ref 130–400)
PMV BLD: 9.2 FL — SIGNIFICANT CHANGE UP (ref 7.4–10.4)
POTASSIUM SERPL-MCNC: 4.8 MMOL/L — SIGNIFICANT CHANGE UP (ref 3.5–5)
POTASSIUM SERPL-MCNC: SIGNIFICANT CHANGE UP MMOL/L (ref 3.5–5)
POTASSIUM SERPL-SCNC: 4.8 MMOL/L — SIGNIFICANT CHANGE UP (ref 3.5–5)
POTASSIUM SERPL-SCNC: SIGNIFICANT CHANGE UP MMOL/L (ref 3.5–5)
RBC # BLD: 3.66 M/UL — LOW (ref 4.7–6.1)
RBC # FLD: 13.9 % — SIGNIFICANT CHANGE UP (ref 11.5–14.5)
SODIUM SERPL-SCNC: 133 MMOL/L — LOW (ref 135–146)
SODIUM SERPL-SCNC: 140 MMOL/L — SIGNIFICANT CHANGE UP (ref 135–146)
WBC # BLD: 5.56 K/UL — SIGNIFICANT CHANGE UP (ref 4.8–10.8)
WBC # FLD AUTO: 5.56 K/UL — SIGNIFICANT CHANGE UP (ref 4.8–10.8)

## 2025-06-06 PROCEDURE — 99222 1ST HOSP IP/OBS MODERATE 55: CPT

## 2025-06-06 PROCEDURE — 83735 ASSAY OF MAGNESIUM: CPT

## 2025-06-06 PROCEDURE — 36415 COLL VENOUS BLD VENIPUNCTURE: CPT

## 2025-06-06 PROCEDURE — 99223 1ST HOSP IP/OBS HIGH 75: CPT

## 2025-06-06 PROCEDURE — 85025 COMPLETE CBC W/AUTO DIFF WBC: CPT

## 2025-06-06 PROCEDURE — 99285 EMERGENCY DEPT VISIT HI MDM: CPT | Mod: FS

## 2025-06-06 PROCEDURE — 80048 BASIC METABOLIC PNL TOTAL CA: CPT

## 2025-06-06 RX ORDER — AZATHIOPRINE 50 MG/1
50 TABLET ORAL DAILY
Refills: 0 | Status: DISCONTINUED | OUTPATIENT
Start: 2025-06-06 | End: 2025-06-06

## 2025-06-06 RX ORDER — LOSARTAN POTASSIUM 100 MG/1
25 TABLET, FILM COATED ORAL DAILY
Refills: 0 | Status: DISCONTINUED | OUTPATIENT
Start: 2025-06-06 | End: 2025-06-07

## 2025-06-06 RX ORDER — BUDESONIDE 3 MG/1
3 CAPSULE, COATED PELLETS ORAL
Qty: 126 | Refills: 0 | Status: ACTIVE | COMMUNITY
Start: 2025-06-06 | End: 1900-01-01

## 2025-06-06 RX ORDER — AZATHIOPRINE 50 MG/1
100 TABLET ORAL DAILY
Refills: 0 | Status: DISCONTINUED | OUTPATIENT
Start: 2025-06-06 | End: 2025-06-08

## 2025-06-06 RX ORDER — DULOXETINE 20 MG/1
60 CAPSULE, DELAYED RELEASE ORAL DAILY
Refills: 0 | Status: DISCONTINUED | OUTPATIENT
Start: 2025-06-06 | End: 2025-06-08

## 2025-06-06 RX ORDER — ENOXAPARIN SODIUM 100 MG/ML
40 INJECTION SUBCUTANEOUS EVERY 24 HOURS
Refills: 0 | Status: DISCONTINUED | OUTPATIENT
Start: 2025-06-06 | End: 2025-06-08

## 2025-06-06 RX ADMIN — Medication 10 MILLIGRAM(S): at 12:47

## 2025-06-06 NOTE — ED PROVIDER NOTE - ATTENDING APP SHARED VISIT CONTRIBUTION OF CARE
64-year-old male past medical history of hypertension and ulcerative colitis presents for UC exacerbation sent in by Dr. Souza gastroenterologist.  Due to insurance changed to Medicare patient has had a lapse in the medication approval of Remicade (2 weeks late). pt endorses diarrhea denies blood in stool.   CONSTITUTIONAL: Well developed; in no acute distress  HEAD: Normocephalic; atraumatic  EYES: No conjunctival injection, no scleral icterus  ENT:No nasal discharge; airway clear.  NECK: Supple; non tender.  CARD: Warm and well perfused, not tachycardic  RESP: Breathing comfortably on RA, speaking in full sentences w/o distress  Abdomen: Soft, non-tender, non-distended   EXT: No gross deformities, normal ROM.  NEURO: Alert, oriented, motor and sensory grossly intact  medication is non-formulary, will arrange with  and pharmacy to arrange for infusion.

## 2025-06-06 NOTE — H&P ADULT - NSHPLABSRESULTS_GEN_ALL_CORE
LABS:                          12.9   5.56  )-----------( 257      ( 06 Jun 2025 12:15 )             37.7     06-06    140  |  101  |  13  ----------------------------<  106[H]  4.8   |  29  |  1.0    Ca    9.5      06 Jun 2025 16:10

## 2025-06-06 NOTE — H&P ADULT - HISTORY OF PRESENT ILLNESS
Patient is a 64-year-old male past medical history of hypertension and ulcerative colitis presents for UC exacerbation sent in by Dr. Souza gastroenterologist.  Due to insurance change to Medicare, patient has had a lapse in the medication approval of Remicade (is now 2 weeks late). Last dose 4/23/25. Next dose was due 5/23/25 but never given. He admits to some generalized abdominal pain with episodes of non bloody diarrhea.  He denies any other complaints at this time.    T(F): 98.3 (06 Jun 2025 10:40), Max: 98.3 (06 Jun 2025 10:40)  HR: 71 (06 Jun 2025 17:49) (71 - 80)  BP: 131/84 (06 Jun 2025 17:49) (131/84 - 169/83)  RR: 20 (06 Jun 2025 17:49) (18 - 20)  SpO2: 96% (06 Jun 2025 17:49) (96% - 99%)    Notable labs: afebrile WBC 5.56, hgb 12.9, BUN/Cr 13/1.0  Admitted for UC exacerbation and remicade infusion

## 2025-06-06 NOTE — ED PROVIDER NOTE - PROGRESS NOTE DETAILS
KA - Filled out nonformulary pharmacy form.  Dropped it off at [facility] pharmacy.  Pending confirmation of medication approval for final disposition. Case also discussed with Case management. STACY - Discussed case with Dr Winkler and Dr Souza. Insurance issue appears to have been resolved however it takes 2 business days to take effect. Dr Souza agreeable and prefers discharge if patient is not going to receive biologic. Budesonide and Prednisone already sent to pharmacy and he will follow. KA - patient was approved for infusion of Remicade. Earliest time would likely be tomorrow 4pm pending order of medication + arrival of Heme/Onc certified nurse for biological medication administration. Dr Souza aware. No recommended medication for UC flare treatment. Continue Bentyl as needed for pain control. Will place official document with past testing results/ rec for Remicade order. Clinical Pharmacy Dr Winkler has Non Formulary paperwork and will have order completed when the medication is received to pharmacy. KA - patient was approved for infusion of Remicade. Earliest time would likely be tomorrow 4pm pending order of medication + arrival of Heme/Onc certified nurse for biological medication administration. Dr Souza aware. No recommended medication for UC flare treatment. Continue Bentyl as needed for pain control. Will place official document with past testing results/ rec for Remicade order. Clinical Pharmacy Dr Winkler has Non Formulary paperwork and will have order completed when the medication is received to pharmacy. 50 mg Benadryl IV typically given as premedication. KA - patient was approved for infusion of Remicade. Earliest time would likely be tomorrow 4pm pending order of medication + arrival of Heme/Onc certified nurse for biological medication administration.   Dr Souza aware. No other recommended medication for UC flare treatment. Continue Bentyl as needed for pain control. Will place official document with past testing results/ rec for Remicade order.   Clinical Pharmacy Dr Winkler has Non Formulary paperwork and will have order completed when the medication is received to pharmacy. 50 mg Benadryl IV typically given as premedication.

## 2025-06-06 NOTE — ED PROVIDER NOTE - CONSIDERATION OF ADMISSION OBSERVATION
Consideration of Admission/Observation Patient requires admission to hospital in order to receive Remicade infusion for ulcerative colitis flare.

## 2025-06-06 NOTE — ED ADULT TRIAGE NOTE - CHIEF COMPLAINT QUOTE
Pt sent in by MD Bird for IV infusion. Pt gets Remicade infusions every 4 weeks and is 2 weeks late on receiving it. Sent in by MD.

## 2025-06-06 NOTE — CONSULT NOTE ADULT - ASSESSMENT
63 y/o M w/ HTN, UC pancolitis on IFX and azathioprine (dx in 1/2023), being followed for UC is here for F/U post colonoscopy.  ?  #ulcerative colitis with severe Barbosa 3 pancolitis dx in 2022 - in remission on IFX  clinically improved since initiation of biologic therapy  Calprotectin improved over time: 1139 (3/2023) -> 496 (5/2023) -> 299 (8/2023) -> 51 (3/2024)  ?  Significant improvement on IFX 10 mg/kg [id number] + 100 mg azathioprine to overcome Abs and increase drug levels.  ?  7/11/23 colonoscopy shows signs of healing with extensive amount of pseudopolyps - unable to assess whether more TAs in the background of pseudopolyps.  4/2024 colonoscopy showed pseudo-polyps throughout the colon but the ulcers and stricture were no longer present and the pathology was negative for active colitis, granulomas, and dysplasia.  3/2025 colonoscopy with multiple pseudopolyps but no stricture. Biopsies with hyperplastic changes.  Completed hepatitis A and B vaccines with PCP  normal TPMT activity  HIV: -ve 12/2002  HCV: -ve 12/2022    Rec  - Please admit for inpatient infusion of infliximab 10 mg/kg --> The pre-biologic workup has been previously obtained and has been cleared for Infliximab infusion  -c/w azathioprine to 100 mg q24h  - Diet as tolerated, recommend 30-35 KCAL/KG/DAY  - c/w vit d 1000 u/d  - avoid NSAIDs due to risk of disease flare  - Plan to follow with Dr. Souza as outpatient   ?      65 y/o M w/ HTN, UC pancolitis on IFX and azathioprine (dx in 1/2023), being followed for UC is here for F/U post colonoscopy.  ?  #ulcerative colitis with severe Barbosa 3 pancolitis dx in 2022 - in remission on IFX  clinically improved since initiation of biologic therapy  Calprotectin improved over time: 1139 (3/2023) -> 496 (5/2023) -> 299 (8/2023) -> 51 (3/2024)  ?  Significant improvement on IFX 10 mg/kg [id number] + 100 mg azathioprine to overcome Abs and increase drug levels.  ?  7/11/23 colonoscopy shows signs of healing with extensive amount of pseudopolyps - unable to assess whether more TAs in the background of pseudopolyps.  4/2024 colonoscopy showed pseudo-polyps throughout the colon but the ulcers and stricture were no longer present and the pathology was negative for active colitis, granulomas, and dysplasia.  3/2025 colonoscopy with multiple pseudopolyps but no stricture. Biopsies with hyperplastic changes.  Completed hepatitis A and B vaccines with PCP  normal TPMT activity  HIV: -ve 12/2002  HCV: -ve 12/2022    Rec  - Please admit for inpatient infusion of infliximab 10 mg/kg --> The pre-biologic workup has been previously obtained and has been cleared for Infliximab infusion  - c/w azathioprine to 100 mg q24h  - Diet as tolerated, recommend 30-35 KCAL/KG/DAY  - c/w vit d 1000 u/d  - avoid NSAIDs due to risk of disease flare  - GI prophylaxis  - DVT prophylaxis  - Plan to follow with Dr. Souza as outpatient   ?

## 2025-06-06 NOTE — ED PROVIDER NOTE - NSFOLLOWUPINSTRUCTIONS_ED_ALL_ED_FT
Follow up with Dr Souza.     Take your prescribed medications and return to the Emergency Department if they were not sent to your pharmacy by Dr Souza.     Medical Screening Exam  A medical screening exam helps determine whether or not you need emergency medical treatment.    During the medical screening exam, a health care provider does a short physical exam and medical history to assess:    Your current symptoms.  Your overall health.    Depending on your symptoms, you may need additional tests.    What are the possible outcomes of a medical screening exam?  Your medical screening exam may determine that:    You do not need emergency treatment at this time.  You need treatment right away.  You need to be transferred to another medical center.    When should I seek medical care?  If you have a regular health care provider, make an appointment for a follow-up visit with him or her. If you do not have a regular health care provider, ask about resources in your community.    Get help right away if:  Your condition may change over time. If your condition gets worse or you develop new or troubling symptoms before you see your health care provider, go to an emergency department right away.    In an emergency:     Call 911 or have someone drive you to the nearest hospital.  Do not drive yourself.  This information is not intended to replace advice given to you by your health care provider. Make sure you discuss any questions you have with your health care provider.

## 2025-06-06 NOTE — H&P ADULT - TIME BILLING
review of labs, imaging studies, old records, obtaining history, personally examining patient, counselling and communicating with patient/ family, entering orders for medications/tests/etc, discussions with other health care providers, documentation in electronic health records, independent interpretation of labs, imaging/procedure results and care coordination.

## 2025-06-06 NOTE — H&P ADULT - NSHPPHYSICALEXAM_GEN_ALL_CORE
T(C): 36.8 (06-06-25 @ 10:40), Max: 36.8 (06-06-25 @ 10:40)  HR: 71 (06-06-25 @ 17:49) (71 - 80)  BP: 131/84 (06-06-25 @ 17:49) (131/84 - 169/83)  RR: 20 (06-06-25 @ 17:49) (18 - 20)  SpO2: 96% (06-06-25 @ 17:49) (96% - 99%)    CONSTITUTIONAL: Well groomed, no apparent distress  EYES: PERRLA and symmetric, EOMI, No conjunctival or scleral injection, non-icteric  ENMT: Oral mucosa with moist membranes. Normal dentition; no pharyngeal injection or exudates             NECK: Supple, symmetric and without tracheal deviation   RESP: No respiratory distress, no use of accessory muscles; CTA b/l, no WRR  CV: RRR, +S1S2, no MRG; no JVD; no peripheral edema  GI: Soft, NT, ND, no rebound, no guarding; no palpable masses; no hepatosplenomegaly; no hernia palpated  LYMPH: No cervical LAD or tenderness; no axillary LAD or tenderness; no inguinal LAD or tenderness  MSK: Normal gait; No digital clubbing or cyanosis; examination of the (head/neck/spine/ribs/pelvis, RUE, LUE, RLE, LLE) without misalignment,            Normal ROM without pain, no spinal tenderness, normal muscle strength/tone  SKIN: No rashes or ulcers noted; no subcutaneous nodules or induration palpable  NEURO: CN II-XII intact; normal reflexes in upper and lower extremities, sensation intact in upper and lower extremities b/l to light touch   PSYCH: Appropriate insight/judgment; A+O x 3, mood and affect appropriate, recent/remote memory intact

## 2025-06-06 NOTE — H&P ADULT - ATTENDING COMMENTS
64-year-old male past medical history of hypertension and ulcerative colitis presents for UC exacerbation sent in by Dr. Souza gastroenterologist.  Due to insurance change to Medicare, patient has had a lapse in the medication approval of Remicade (is now 2 weeks late). Last dose 4/23/25. Next dose was due 5/23/25 but never given. He admits to some generalized abdominal pain with episodes of non bloody diarrhea.  He denies any other complaints at this time. Pt reports control on his regimen of azathioprine and usual Remicade dose.    PHYSICAL EXAM  GENERAL: NAD, lying in bed comfortably  HEENT:  Atraumatic, normocephalic, no JVD  HEART: Regular rate and rhythm, no murmurs, rubs, or gallops  LUNGS: Unlabored respirations.  Clear to auscultation bilaterally, no crackles, wheezing, or rhonchi  ABDOMEN: Soft, mild tenderness to palpation RLQ, nondistended, +BS  EXTREMITIES: 2+ peripheral pulses bilaterally. No clubbing, cyanosis, or edema  NERVOUS SYSTEM:  A&Ox3, no focal deficits   SKIN: No rashes or bruises      #Ulcerative Colitis with pancolitis  - Controlled on home azathioprine 100mg qd and IFX, has not had exacerbation except now d/t missing dose, denies bloody diarrhea   - Missed last dose of IFX 2 weeks ago  - In ED: HD stable, afebrile, no leukocytosis   - GI following  - Last colonoscopy 3/11/25: Ext hemorrhoids, Normal TI, HP polyp sigmoid, multiple HP polyps and chr inflammation throughout cecum  - Per GI -> Pt cleared for biologics   - Give 1 time Remicade infusion 10mg/kg in AM  - c/w azathioprine 100mg qd  - Avoid NSAIDs due to risk of disease flare  - Pantoprazole 40mg QD  - diet as tolerated    #HTN  - Benicar 5mg QD --> losartan 25mg QD    # Misc  - GI ppx: protonix  - DVT ppx: Lovenox  - Diet: DASH  - Activity: IAT

## 2025-06-06 NOTE — H&P ADULT - NSICDXPASTSURGICALHX_GEN_ALL_CORE_FT
PAST SURGICAL HISTORY:  History of ankle surgery right x2    History of cataract surgery     History of fusion of cervical spine     History of partial knee replacement left    History of right hip replacement

## 2025-06-06 NOTE — ED PROVIDER NOTE - CLINICAL SUMMARY MEDICAL DECISION MAKING FREE TEXT BOX
Patient signed out to me from Dr. Smallwood > 64-year-old man with h/o ulcerative colitis, sent to ER by his GI doctor (Marge), for Remicade infusion > had a lapse in medication due to insurance change.  Patient with abdominal cramping and diarrhea, denies any blood in stool.  Labs reviewed: WBC 5.5, Hgb 12.9, BMP unremarkable.  Case d/w pharmacy as well as Dr. Bird > pt approved for Remicade infusion, medication ordered but will not be available until tomorrow and will need certified nurse for administration.  Patient admitted to medicine to receive infusion for UC flare.

## 2025-06-06 NOTE — ED PROVIDER NOTE - PROGRESS NOTE ADDITIONAL1
[Home] : at home, [unfilled] , at the time of the visit. [Medical Office: ([facility])___] : at the medical office located in  [Verbal consent obtained from patient] : the patient, [unfilled] [[id number]] : Pt presents with flare of sciatica.\par Had been doing better with PT (which ran out).\par Last weekend, he walked more than usual and then, could "barely move". Pain radiates down leg. Taking NSAIDs. Occasional FLexeril.\par Had MRI. \par \par Plan:\par -start Neurotin 300 mg nightly. Titrate up as needed.\par -refer to Physiatry\par -PT renewed. Additional Progress Note...

## 2025-06-06 NOTE — CONSULT NOTE ADULT - SUBJECTIVE AND OBJECTIVE BOX
Gastroenterology Consultation:    Patient is a 64y old  Male who presents with a chief complaint of       Admitted on: 06-06-25      HPI:64-year-old male past medical history of hypertension and ulcerative colitis presents for UC exacerbation sent in by Dr. Souza gastroenterologist.  Due to insurance change to Medicare, patient has had a lapse in the medication approval of Remicade (is now 2 weeks late). Last dose 4/23/25. Next dose was due 5/23/25 but never given. He admits to some generalized abdominal pain with episodes of non bloody diarrhea.  He denies any other complaints at this time.    PAST MEDICAL & SURGICAL HISTORY:  HTN (hypertension)      Ulcerative colitis      History of fusion of cervical spine      History of partial knee replacement  left      History of ankle surgery  right x2      History of cataract surgery      History of right hip replacement            FAMILY HISTORY:  non-pertinent     Social History:  Tobacco: denies  Alcohol: denies  Drugs: denies    Home Medications:  Benicar 5 mg oral tablet: 1 orally once a day (11 Mar 2025 09:34)  DULoxetine 60 mg oral delayed release capsule: 1 orally once a day (11 Mar 2025 09:34)  Imuran 50 mg oral tablet: 1 orally once a day (11 Mar 2025 09:34)  Remicade 100 mg intravenous injection: 5 intravenously once a month (11 Mar 2025 09:34)        MEDICATIONS  (STANDING):    MEDICATIONS  (PRN):      Allergies  penicillin (Rash)      Review of Systems:   Constitutional:  No Fever, No Chills  ENT/Mouth:  No Hearing Changes,  No Difficulty Swallowing  Eyes:  No Eye Pain, No Vision Changes  Cardiovascular:  No Chest Pain, No Palpitations  Respiratory:  No Cough, No Dyspnea  Gastrointestinal:  As described in HPI  Musculoskeletal:  No Joint Swelling, No Back Pain  Skin:  No Skin Lesions, No Jaundice  Neuro:  No Syncope, No Dizziness  Heme/Lymph:  No Bruising, No Bleeding.          Physical Examination:  T(C): 36.8 (06-06-25 @ 10:40), Max: 36.8 (06-06-25 @ 10:40)  HR: 80 (06-06-25 @ 10:40) (80 - 80)  BP: 169/83 (06-06-25 @ 10:40) (169/83 - 169/83)  RR: 18 (06-06-25 @ 10:40) (18 - 18)  SpO2: 99% (06-06-25 @ 10:40) (99% - 99%)  Height (cm): 182.9 (06-06-25 @ 10:40)  Weight (kg): 110.5 (06-06-25 @ 10:40)        GENERAL: AAOx3, no acute distress.  HEAD:  Atraumatic, Normocephalic  EYES: conjunctiva and sclera clear  NECK: Supple, no JVD or thyromegaly  CHEST/LUNG: Clear to auscultation bilaterally; No wheeze, rhonchi, or rales  HEART: Regular rate and rhythm; normal S1, S2, No murmurs.  ABDOMEN: Soft, nontender, nondistended; Bowel sounds present  NEUROLOGY: No asterixis or tremor.   SKIN: Intact, no jaundice        Data:                        12.9   5.56  )-----------( 257      ( 06 Jun 2025 12:15 )             37.7     Hgb Trend:  12.9  06-06-25 @ 12:15      06-06    140  |  101  |  13  ----------------------------<  106[H]  4.8   |  29  |  1.0    Ca    9.5      06 Jun 2025 16:10      Liver panel trend:              Radiology:       Gastroenterology Consultation:    Patient is a 64y old  Male who presents with a chief complaint of       Admitted on: 06-06-25      HPI:64-year-old male past medical history of hypertension and ulcerative colitis presents for UC exacerbation sent in by Dr. Souza gastroenterologist.  Due to insurance change to Medicare, patient has had a lapse in the medication approval of Remicade (is now 2 weeks late). Last dose 4/23/25. Next dose was due 5/23/25 but never given. He admits to some generalized abdominal pain with episodes of non bloody diarrhea.  He denies any other complaints at this time.    PAST MEDICAL & SURGICAL HISTORY:  HTN (hypertension)      Ulcerative colitis      History of fusion of cervical spine      History of partial knee replacement  left      History of ankle surgery  right x2      History of cataract surgery      History of right hip replacement            FAMILY HISTORY:  non-pertinent     Social History:  Tobacco: denies  Alcohol: denies  Drugs: denies    Home Medications:  Benicar 5 mg oral tablet: 1 orally once a day (11 Mar 2025 09:34)  DULoxetine 60 mg oral delayed release capsule: 1 orally once a day (11 Mar 2025 09:34)  Imuran 50 mg oral tablet: 1 orally once a day (11 Mar 2025 09:34)  Remicade 100 mg intravenous injection: 5 intravenously once a month (11 Mar 2025 09:34)        MEDICATIONS  (STANDING):    MEDICATIONS  (PRN):      Allergies  penicillin (Rash)      Review of Systems:   Constitutional:  No Fever, No Chills  ENT/Mouth:  No Hearing Changes,  No Difficulty Swallowing  Eyes:  No Eye Pain, No Vision Changes  Cardiovascular:  No Chest Pain, No Palpitations  Respiratory:  No Cough, No Dyspnea  Gastrointestinal:  As described in HPI  Musculoskeletal:  No Joint Swelling, No Back Pain  Skin:  No Skin Lesions, No Jaundice  Neuro:  No Syncope, No Dizziness  Heme/Lymph:  No Bruising, No Bleeding.          Physical Examination:  T(C): 36.8 (06-06-25 @ 10:40), Max: 36.8 (06-06-25 @ 10:40)  HR: 80 (06-06-25 @ 10:40) (80 - 80)  BP: 169/83 (06-06-25 @ 10:40) (169/83 - 169/83)  RR: 18 (06-06-25 @ 10:40) (18 - 18)  SpO2: 99% (06-06-25 @ 10:40) (99% - 99%)  Height (cm): 182.9 (06-06-25 @ 10:40)  Weight (kg): 110.5 (06-06-25 @ 10:40)        GENERAL: AAOx3, no acute distress.  HEAD:  Atraumatic, Normocephalic  EYES: conjunctiva and sclera clear  NECK: Supple, no JVD or thyromegaly  CHEST/LUNG: Clear to auscultation bilaterally; No wheeze, rhonchi, or rales  HEART: Regular rate and rhythm; normal S1, S2, No murmurs.  ABDOMEN: Soft, nontender, nondistended; Bowel sounds present  NEUROLOGY: No asterixis or tremor.   SKIN: Intact, no jaundice        Data:                        12.9   5.56  )-----------( 257      ( 06 Jun 2025 12:15 )             37.7     Hgb Trend:  12.9  06-06-25 @ 12:15      06-06    140  |  101  |  13  ----------------------------<  106[H]  4.8   |  29  |  1.0    Ca    9.5      06 Jun 2025 16:10          < from: Colonoscopy (03.11.25 @ 09:30) >  External hemorrhoids.    Normal mucosa in the terminal ileum.    Polyps (8 mm to 10 mm) in the sigmoid colon. (Polypectomy).    Multiple erythematous pseudo-polyps were seen in the cecum measuring 5-10 mm  (Biopsy).    Multiple pseudo-polyps were noted throughout the colon, some of which were  sampled using cold biopsy forceps.    We examined all segments of the colon under white light, NBI. Multiple cold  forceps biopsies were performed every 10 cm until the rectum was reached. The  colon was otherwise unremarkable.    < end of copied text >  1. Sigmoid colon polyp, biopsy:   - Hyperplastic polyp.   2. Cecal polyp, biopsy:   - Foci of hyperplastic change in the mucosa, chronic inflammation and   lymphoid aggregates.   3. Ascending colon, biopsy:   - Hyperplastic polyp.   4. Transverse colon, biopsy:   - Hyperplastic polyp.   5. Descending colon, biopsy:   - Hyperplastic polyp.   6. Sigmoid colon, biopsy:   - Hyperplastic polyp.   7. Rectal, biopsy:   - Hyperplastic polyp.

## 2025-06-06 NOTE — ED ADULT TRIAGE NOTE - ESI TRIAGE ACUITY LEVEL, MLM
3 74 yo F, pmhx DM, recently DVT on eliquis, hypothyroid, psoriasis, asthma, vitiligo, hypothyroidism, HTN, presenting to the ED w/ persistent bl LE swelling and pain. Patient states that her swelling/pain has never improved, but actually gotten worse. Reports compliance w/ eliquis. Of note, recently admitted x 2 months ago for newly dx DVT above and below knee and placed on eliquis. Patient states she also went to podiatrist because it has been too painful to cut nails. Procedure was performed and patient subsequently developed a bump on her L great toe 1 week later. No fever, no sob, no cp, no other complaints.

## 2025-06-06 NOTE — H&P ADULT - ASSESSMENT
63 y/o M w/ HTN, UC pancolitis on IFX and azathioprine (dx in 1/2023), being followed for UC is here for F/U post colonoscopy after missing remicade infusion.    #ulcerative colitis with severe Barbosa 3 pancolitis dx in 2022 - in remission on IFX  - afebrile, WBC 5.56  - GI following  - give 1 time remicade infusion 10mg/kg  - Avoid NSAIDS due to risk of disease flare    #HTN  - Benicar 5mg QD --> losartan 25mg QD    Activity: IAT  diet: DASH  DVT PPX: Lovenox  GI PPX: Pantoprazole  Code: Full   65 y/o M w/ HTN, UC pancolitis on IFX and azathioprine (dx in 1/2023), being followed for UC is here for F/U post colonoscopy after missing remicade infusion.    #ulcerative colitis with severe Barbosa 3 pancolitis dx in 2022 - in remission on IFX  - afebrile, WBC 5.56  - GI following  - f/u with GI in AM for 1 time remicade infusion 10mg/kg  - Avoid NSAIDS due to risk of disease flare  - f/u outpt  - Pantoprazole 40mg QD  - diet as tolerated    #HTN  - Benicar 5mg QD --> losartan 25mg QD    Activity: IAT  diet: DASH  DVT PPX: Lovenox  GI PPX: Pantoprazole  Code: Full   63 y/o M w/ HTN, UC pancolitis on IFX and azathioprine (dx in 1/2023), being followed for UC is here for F/U post colonoscopy after missing remicade infusion.    #ulcerative colitis with severe Barbosa 3 pancolitis dx in 2022 - in remission on IFX  - afebrile, WBC 5.56  - GI following  - f/u with GI in AM for 1 time remicade infusion 10mg/kg  - Avoid NSAIDS due to risk of disease flare  - f/u outpt  - Pantoprazole 40mg QD  - diet as tolerated  - c/w azathioprine 100mg QD    #HTN  - Benicar 5mg QD --> losartan 25mg QD    Activity: IAT  diet: DASH  DVT PPX: Lovenox  GI PPX: Pantoprazole  Code: Full

## 2025-06-06 NOTE — ED PROVIDER NOTE - PHYSICAL EXAMINATION
As Follows:  CONST: Well appearing in NAD  EYES: PERRL, EOMI, Sclera and conjunctiva clear.   CARD: No murmurs, rubs, or gallops; Normal rate and rhythm  RESP: BS Equal B/L, No wheezes, rhonchi or rales. No distress or accessory breathing  GI: Generalized abdominal discomfort. Soft, non-distended.  SKIN: Warm, dry, no acute rashes. MMM  NEURO: Alert and Oriented, No focal deficits.

## 2025-06-06 NOTE — CONSULT NOTE ADULT - ATTENDING COMMENTS
I edited the note. Patient seen and examined during the GI rounds, case discussed with the GI team (Fellow / Resident / PA), plan communicated to the primary team, assessment, recommendations and plan as above.     Time-based billing (NON-critical care).   55 minutes spent on total encounter which excludes teaching time and/or separately reported services; more than 50% of the visit was spent counseling and / or coordinating care by the attending physician.  The necessity of the time spent during the encounter on this date of service was due to: Coordination of care.

## 2025-06-06 NOTE — ED ADULT NURSE NOTE - OBJECTIVE STATEMENT
Pt from home with C/O generalized abdomen pain on and off for 1 week ,denies fever chills no N/V/D or constipation .

## 2025-06-06 NOTE — ED PROVIDER NOTE - OBJECTIVE STATEMENT
Patient is a 64-year-old male past medical history of hypertension and ulcerative colitis presents for UC exacerbation sent in by Dr. Souza gastroenterologist.  Due to insurance changed to Medicare patient has had a lapse in the medication approval of Remicade (2 weeks late).  He admits to some generalized abdominal pain with episodes of diarrhea.  He denies any other complaints at this time. Patient is a 64-year-old male past medical history of hypertension and ulcerative colitis presents for UC exacerbation sent in by Dr. Souza gastroenterologist.  Due to insurance change to Medicare, patient has had a lapse in the medication approval of Remicade (is now 2 weeks late). Last dose 4/23/25. Next dose was due 5/23/25 but never given. He admits to some generalized abdominal pain with episodes of non bloody diarrhea.  He denies any other complaints at this time.

## 2025-06-07 ENCOUNTER — TRANSCRIPTION ENCOUNTER (OUTPATIENT)
Age: 65
End: 2025-06-07

## 2025-06-07 LAB
ANION GAP SERPL CALC-SCNC: 10 MMOL/L — SIGNIFICANT CHANGE UP (ref 7–14)
ANION GAP SERPL CALC-SCNC: 10 MMOL/L — SIGNIFICANT CHANGE UP (ref 7–14)
BASOPHILS # BLD AUTO: 0.02 K/UL — SIGNIFICANT CHANGE UP (ref 0–0.2)
BASOPHILS NFR BLD AUTO: 0.3 % — SIGNIFICANT CHANGE UP (ref 0–1)
BUN SERPL-MCNC: 19 MG/DL — SIGNIFICANT CHANGE UP (ref 10–20)
BUN SERPL-MCNC: 19 MG/DL — SIGNIFICANT CHANGE UP (ref 10–20)
CALCIUM SERPL-MCNC: 8.7 MG/DL — SIGNIFICANT CHANGE UP (ref 8.4–10.5)
CALCIUM SERPL-MCNC: 9.2 MG/DL — SIGNIFICANT CHANGE UP (ref 8.4–10.5)
CHLORIDE SERPL-SCNC: 104 MMOL/L — SIGNIFICANT CHANGE UP (ref 98–110)
CHLORIDE SERPL-SCNC: 106 MMOL/L — SIGNIFICANT CHANGE UP (ref 98–110)
CO2 SERPL-SCNC: 26 MMOL/L — SIGNIFICANT CHANGE UP (ref 17–32)
CO2 SERPL-SCNC: 28 MMOL/L — SIGNIFICANT CHANGE UP (ref 17–32)
CREAT SERPL-MCNC: 1 MG/DL — SIGNIFICANT CHANGE UP (ref 0.7–1.5)
CREAT SERPL-MCNC: 1.2 MG/DL — SIGNIFICANT CHANGE UP (ref 0.7–1.5)
EGFR: 68 ML/MIN/1.73M2 — SIGNIFICANT CHANGE UP
EGFR: 68 ML/MIN/1.73M2 — SIGNIFICANT CHANGE UP
EGFR: 85 ML/MIN/1.73M2 — SIGNIFICANT CHANGE UP
EGFR: 85 ML/MIN/1.73M2 — SIGNIFICANT CHANGE UP
EOSINOPHIL # BLD AUTO: 0.18 K/UL — SIGNIFICANT CHANGE UP (ref 0–0.7)
EOSINOPHIL NFR BLD AUTO: 3.1 % — SIGNIFICANT CHANGE UP (ref 0–8)
GLUCOSE SERPL-MCNC: 111 MG/DL — HIGH (ref 70–99)
GLUCOSE SERPL-MCNC: 94 MG/DL — SIGNIFICANT CHANGE UP (ref 70–99)
HCT VFR BLD CALC: 38.4 % — LOW (ref 42–52)
HGB BLD-MCNC: 12.9 G/DL — LOW (ref 14–18)
IMM GRANULOCYTES NFR BLD AUTO: 0.5 % — HIGH (ref 0.1–0.3)
LYMPHOCYTES # BLD AUTO: 1.71 K/UL — SIGNIFICANT CHANGE UP (ref 1.2–3.4)
LYMPHOCYTES # BLD AUTO: 29.8 % — SIGNIFICANT CHANGE UP (ref 20.5–51.1)
MAGNESIUM SERPL-MCNC: 2.1 MG/DL — SIGNIFICANT CHANGE UP (ref 1.8–2.4)
MCHC RBC-ENTMCNC: 33.6 G/DL — SIGNIFICANT CHANGE UP (ref 32–37)
MCHC RBC-ENTMCNC: 35.4 PG — HIGH (ref 27–31)
MCV RBC AUTO: 105.5 FL — HIGH (ref 80–94)
MONOCYTES # BLD AUTO: 0.57 K/UL — SIGNIFICANT CHANGE UP (ref 0.1–0.6)
MONOCYTES NFR BLD AUTO: 9.9 % — HIGH (ref 1.7–9.3)
NEUTROPHILS # BLD AUTO: 3.23 K/UL — SIGNIFICANT CHANGE UP (ref 1.4–6.5)
NEUTROPHILS NFR BLD AUTO: 56.4 % — SIGNIFICANT CHANGE UP (ref 42.2–75.2)
NRBC BLD AUTO-RTO: 0 /100 WBCS — SIGNIFICANT CHANGE UP (ref 0–0)
PLATELET # BLD AUTO: 259 K/UL — SIGNIFICANT CHANGE UP (ref 130–400)
PMV BLD: 9.3 FL — SIGNIFICANT CHANGE UP (ref 7.4–10.4)
POTASSIUM SERPL-MCNC: 4.4 MMOL/L — SIGNIFICANT CHANGE UP (ref 3.5–5)
POTASSIUM SERPL-MCNC: 5.5 MMOL/L — HIGH (ref 3.5–5)
POTASSIUM SERPL-SCNC: 4.4 MMOL/L — SIGNIFICANT CHANGE UP (ref 3.5–5)
POTASSIUM SERPL-SCNC: 5.5 MMOL/L — HIGH (ref 3.5–5)
RBC # BLD: 3.64 M/UL — LOW (ref 4.7–6.1)
RBC # FLD: 13.8 % — SIGNIFICANT CHANGE UP (ref 11.5–14.5)
SODIUM SERPL-SCNC: 140 MMOL/L — SIGNIFICANT CHANGE UP (ref 135–146)
SODIUM SERPL-SCNC: 144 MMOL/L — SIGNIFICANT CHANGE UP (ref 135–146)
WBC # BLD: 5.74 K/UL — SIGNIFICANT CHANGE UP (ref 4.8–10.8)
WBC # FLD AUTO: 5.74 K/UL — SIGNIFICANT CHANGE UP (ref 4.8–10.8)

## 2025-06-07 PROCEDURE — 99232 SBSQ HOSP IP/OBS MODERATE 35: CPT

## 2025-06-07 RX ORDER — SODIUM ZIRCONIUM CYCLOSILICATE 5 G/5G
10 POWDER, FOR SUSPENSION ORAL ONCE
Refills: 0 | Status: COMPLETED | OUTPATIENT
Start: 2025-06-07 | End: 2025-06-07

## 2025-06-07 RX ORDER — INFLIXIMAB-DYYB 120 MG/ML
1100 INJECTION SUBCUTANEOUS ONCE
Refills: 0 | Status: DISCONTINUED | OUTPATIENT
Start: 2025-06-07 | End: 2025-06-07

## 2025-06-07 RX ORDER — INFLIXIMAB-DYYB 120 MG/ML
1100 INJECTION SUBCUTANEOUS ONCE
Refills: 0 | Status: COMPLETED | OUTPATIENT
Start: 2025-06-07 | End: 2025-06-07

## 2025-06-07 RX ORDER — DIPHENHYDRAMINE HCL 12.5MG/5ML
25 ELIXIR ORAL ONCE
Refills: 0 | Status: DISCONTINUED | OUTPATIENT
Start: 2025-06-07 | End: 2025-06-07

## 2025-06-07 RX ORDER — DIPHENHYDRAMINE HCL 12.5MG/5ML
50 ELIXIR ORAL ONCE
Refills: 0 | Status: COMPLETED | OUTPATIENT
Start: 2025-06-07 | End: 2025-06-07

## 2025-06-07 RX ORDER — ACETAMINOPHEN 500 MG/5ML
1000 LIQUID (ML) ORAL ONCE
Refills: 0 | Status: COMPLETED | OUTPATIENT
Start: 2025-06-07 | End: 2025-06-07

## 2025-06-07 RX ADMIN — Medication 20 MILLIGRAM(S): at 17:08

## 2025-06-07 RX ADMIN — AZATHIOPRINE 100 MILLIGRAM(S): 50 TABLET ORAL at 11:29

## 2025-06-07 RX ADMIN — Medication 20 MILLIGRAM(S): at 11:29

## 2025-06-07 RX ADMIN — LOSARTAN POTASSIUM 25 MILLIGRAM(S): 100 TABLET, FILM COATED ORAL at 05:06

## 2025-06-07 RX ADMIN — INFLIXIMAB-DYYB 180 MILLIGRAM(S): 120 INJECTION SUBCUTANEOUS at 16:01

## 2025-06-07 RX ADMIN — DULOXETINE 60 MILLIGRAM(S): 20 CAPSULE, DELAYED RELEASE ORAL at 11:29

## 2025-06-07 RX ADMIN — Medication 50 MILLIGRAM(S): at 15:19

## 2025-06-07 RX ADMIN — SODIUM ZIRCONIUM CYCLOSILICATE 10 GRAM(S): 5 POWDER, FOR SUSPENSION ORAL at 08:56

## 2025-06-07 RX ADMIN — Medication 400 MILLIGRAM(S): at 15:08

## 2025-06-07 RX ADMIN — ENOXAPARIN SODIUM 40 MILLIGRAM(S): 100 INJECTION SUBCUTANEOUS at 11:30

## 2025-06-07 RX ADMIN — Medication 40 MILLIGRAM(S): at 05:06

## 2025-06-07 RX ADMIN — Medication 1000 MILLIGRAM(S): at 16:34

## 2025-06-07 NOTE — PATIENT PROFILE ADULT - FALL HARM RISK - UNIVERSAL INTERVENTIONS
Noted Dr. Paulino previously addressed hold orders per 6-29-23 phone note:    6/29/23  3:23 PM  Note  May hold Eliquis 2 days prior to procedure.  No need for bridging.      Barb Paulino MD      6/29/23  3:35 PM  Note  Left detailed msg for [facility] clinical support team with Dr. Paulino's response / recommendations.  mg        --------------------------------------------------------------------    Return call to [facility] - informed nurse that Dr. Paulino had previously provided hold orders - nurse explained that patient's procedure was rescheduled and hold orders are only good for 90 days - informed nurse that Dr. Paulino's orders are still applicable now - understanding verbalized.  mg   Bed in lowest position, wheels locked, appropriate side rails in place/Call bell, personal items and telephone in reach/Instruct patient to call for assistance before getting out of bed or chair/Non-slip footwear when patient is out of bed/[place] to call system/Physically safe environment - no spills, clutter or unnecessary equipment/Purposeful Proactive Rounding/Room/bathroom lighting operational, light cord in reach

## 2025-06-07 NOTE — PROGRESS NOTE ADULT - SUBJECTIVE AND OBJECTIVE BOX
Patient is a 64y old  Male who presents with a chief complaint of abd pain, diarrhea    Patient was seen and examined.  c/o lower abd pain, had 2 episodes of loose BM's  Denies any other complaints.  All systems reviewed positive history as mentioned above.    PAST MEDICAL & SURGICAL HISTORY:  HTN (hypertension)  Ulcerative colitis  History of fusion of cervical spine  History of partial knee replacement, left  History of ankle surgery right x2  History of cataract surgery  History of right hip replacement    Allergies  penicillin (Rash)    MEDICATIONS  (STANDING):  acetaminophen   IVPB .. 1000 milliGRAM(s) IV Intermittent once  azaTHIOprine 100 milliGRAM(s) Oral daily  dicyclomine 20 milliGRAM(s) Oral three times a day before meals  diphenhydrAMINE Injectable 25 milliGRAM(s) IV Push once  DULoxetine 60 milliGRAM(s) Oral daily  enoxaparin Injectable 40 milliGRAM(s) SubCutaneous every 24 hours  inFLIXimab IVPB 1100 milliGRAM(s) IV Intermittent once  pantoprazole    Tablet 40 milliGRAM(s) Oral before breakfast    MEDICATIONS  (PRN):    Vital Signs Last 24 Hrs  T(C): 37  T(F): 98.6  HR: 71  BP: 149/66  BP(mean): --  RR: 18  SpO2: 97%    O/E:  Awake, alert, not in distress.  HEENT: atraumatic, EOMI.  Chest: clear.  CVS: SIS2 +, no murmur.  P/A: Soft, BS+  CNS: awake, alert  Ext: no edema feet.  All systems reviewed positive findings as above.                         12.9[L]  5.74  )-----------( 259      ( 07 Jun 2025 05:26 )             38.4[L]                        12.9[L]  5.56  )-----------( 257      ( 06 Jun 2025 12:15 )             37.7[L]    06-07    144  |  106  |  19  ----------------------------<  94  5.5[H]   |  28  |  1.2  06-06    140  |  101  |  13  ----------------------------<  106[H]  4.8   |  29  |  1.0    Ca    9.2      07 Jun 2025 05:26  Ca    9.5      06 Jun 2025 16:10  Ca    9.2      06 Jun 2025 12:15  Mg     2.1     06-07              Urinalysis Basic - ( 07 Jun 2025 05:26 )    Color: x / Appearance: x / SG: x / pH: x  Gluc: 94 mg/dL / Ketone: x  / Bili: x / Urobili: x   Blood: x / Protein: x / Nitrite: x   Leuk Esterase: x / RBC: x / WBC x   Sq Epi: x / Non Sq Epi: x / Bacteria: x

## 2025-06-07 NOTE — DISCHARGE NOTE PROVIDER - NSDCMRMEDTOKEN_GEN_ALL_CORE_FT
Benicar 5 mg oral tablet: 1 orally once a day  DULoxetine 60 mg oral delayed release capsule: 1 orally once a day  Imuran 50 mg oral tablet: 2 tablet orally once a day  Remicade 100 mg intravenous injection: 5 intravenously once a month   dicyclomine 20 mg oral tablet: 1 tab(s) orally every 8 hours  DULoxetine 60 mg oral delayed release capsule: 1 orally once a day  Imuran 50 mg oral tablet: 2 tablet orally once a day  Remicade 100 mg intravenous injection: 1 dose(s) intravenously once a month  Vitamin D3 25 mcg (1000 intl units) oral tablet: 1 tab(s) orally once a day

## 2025-06-07 NOTE — DISCHARGE NOTE PROVIDER - NSDCFUADDINST_GEN_ALL_CORE_FT
you BP med was Benicar was held sec to high potassium level, Repeat BMP with your PCP and monitor BP , before starting Benicar

## 2025-06-07 NOTE — DISCHARGE NOTE PROVIDER - NSDCCPCAREPLAN_GEN_ALL_CORE_FT
PRINCIPAL DISCHARGE DIAGNOSIS  Diagnosis: Ulcerative colitis  Assessment and Plan of Treatment: Ulcerative colitis is a type of inflammatory bowel disease (IBD) that causes inflammation and sores, called ulcers, in part of the digestive tract. Ulcerative colitis (JD-hcj-lv-tiv koe-LIE-tis) affects the innermost lining of the large intestine, called the colon, and rectum. Symptoms usually develop over time, rather than coming on suddenly  Symptoms may include:  Diarrhea, often with blood or pus.  Rectal bleeding — passing a small amount of blood with stool.  Belly pain and cramping.  Rectal pain.  Urgency to pass stool.  Not being able to pass stool despite urgency.  Weight loss.  Fatigue.  Fever.  In children, failure to grow.  Ulcerative colitis treatment usually involves either drug therapy or surgery.  Several categories of medicines may be effective in treating ulcerative colitis. The type you take depends on the severity of your condition. The medicines that work well for some people may not work for others, so it may take time to find a medicine that helps you.  In addition, because some medicines have serious side effects, you need to weigh the benefits and risks of any treatment.

## 2025-06-07 NOTE — DISCHARGE NOTE PROVIDER - CARE PROVIDER_API CALL
Prosper Souza  Gastroenterology  [address] [place] [id number]  Phone: [phone]  Fax: [phone]  Follow Up Time: 1 week    Salty Phan  Internal Medicine  [address] [place] [id number]  Phone: [phone]  Fax: [phone]  Follow Up Time: 1 week

## 2025-06-07 NOTE — PROGRESS NOTE ADULT - ASSESSMENT
64-year-old male past medical history of hypertension and ulcerative colitis presents for UC exacerbation sent in by Dr. Souza gastroenterologist.  Due to insurance change to Medicare, patient has had a lapse in the medication approval of Remicade (is now 2 weeks late). Last dose 4/23/25. Next dose was due 5/23/25 but never given. He admits to some generalized abdominal pain with episodes of non bloody diarrhea.  He denies any other complaints at this time.    # Flare of Ulcerative Colitis with pancolitis sec to missed dose of remicade  - on home azathioprine 100mg qd and IFX  - 3/2025 colonoscopy with multiple pseudopolyps but no stricture. Biopsies with hyperplastic changes.  - GI eval: Please admit for inpatient infusion of infliximab 10 mg/kg --> The pre-biologic workup has been previously obtained and has been cleared for Infliximab infusion, c/w azathioprine to 100 mg q24h- Give 1 time Remicade infusion 10mg/kg in AM  - c/w azathioprine 100mg qd  - Avoid NSAIDs due to risk of disease flare  -  c/w PPI  -  start bentyl    # Hyperkalemia  - low k diet  - hold  losartan   - monitor BMP    # Hypertension - stable    # CKD stage 2- stable    # DVT prophylaxis    # Full code    # Pending: monitor BMP, Pt to get his infusion of infliximab today  # Discussed plan of care with patient  # Disposition: Home when stable

## 2025-06-07 NOTE — DISCHARGE NOTE PROVIDER - ATTENDING DISCHARGE PHYSICAL EXAMINATION:
T(C): 36.6 (06-08-25 @ 08:00), Max: 37 (06-08-25 @ 06:28)  HR: 56 (06-08-25 @ 08:00) (56 - 78)  BP: 130/70 (06-08-25 @ 08:00) (121/69 - 153/67)  RR: 18 (06-08-25 @ 08:00) (18 - 18)  SpO2: 98% (06-08-25 @ 08:00) (97% - 98%)    O/E:  Awake, alert, not in distress.  HEENT: atraumatic, EOMI.  Chest: clear.  CVS: SIS2 +, no murmur.  P/A: Soft, BS+  CNS: awake, alert  Ext: no edema feet.  All systems reviewed positive findings as above.

## 2025-06-07 NOTE — DISCHARGE NOTE PROVIDER - CARE PROVIDERS DIRECT ADDRESSES
,DirectAddress_[email or web address]

## 2025-06-07 NOTE — DISCHARGE NOTE PROVIDER - HOSPITAL COURSE
64-year-old male past medical history of hypertension and ulcerative colitis presents for UC exacerbation sent in by Dr. Souza gastroenterologist.  Due to insurance change to Medicare, patient has had a lapse in the medication approval of Remicade (is now 2 weeks late). Last dose 4/23/25. Next dose was due 5/23/25 but never given. He admits to some generalized abdominal pain with episodes of non bloody diarrhea.  He denies any other complaints at this time.    # Flare of Ulcerative Colitis with pancolitis sec to missed dose of remicade  - on home azathioprine 100mg qd and IFX  - 3/2025 colonoscopy with multiple pseudopolyps but no stricture. Biopsies with hyperplastic changes.  - GI eval: Please admit for inpatient infusion of infliximab 10 mg/kg --> The pre-biologic workup has been previously obtained and has been cleared for Infliximab infusion, c/w azathioprine to 100 mg q24h- Give 1 time Remicade infusion 10mg/kg in AM  - c/w azathioprine 100mg qd  - Avoid NSAIDs due to risk of disease flare  -  c/w PPI  -  start bentyl    # Hyperkalemia  - low k diet  - hold  losartan   - monitor BMP    # Hypertension - stable    # CKD stage 2- stable   64-year-old male past medical history of hypertension and ulcerative colitis presents for UC exacerbation sent in by Dr. Souza gastroenterologist.  Due to insurance change to Medicare, patient has had a lapse in the medication approval of Remicade (is now 2 weeks late). Last dose 4/23/25. Next dose was due 5/23/25 but never given. He admits to some generalized abdominal pain with episodes of non bloody diarrhea.  He denies any other complaints at this time.    # Flare of Ulcerative Colitis with pancolitis sec to missed dose of remicade  - on home azathioprine 100mg qd and IFX  - 3/2025 colonoscopy with multiple pseudopolyps but no stricture. Biopsies with hyperplastic changes.  - GI eval: Please admit for inpatient infusion of infliximab 10 mg/kg --> The pre-biologic workup has been previously obtained and has been cleared for Infliximab infusion, c/w azathioprine to 100 mg q24h- Give 1 time Remicade infusion 10mg/kg in AM  - c/w azathioprine 100mg qd  - S/p Remicade infusion  - Avoid NSAIDs due to risk of disease flare  -  c/w PPI  - bentyl prn    # Hyperkalemia-resolveing   - low k diet  - hold benicar    # Hypertension - stable offf meds    # CKD stage 2- stable

## 2025-06-07 NOTE — DISCHARGE NOTE PROVIDER - PROVIDER TOKENS
PROVIDER:[TOKEN:[[id number]],FOLLOWUP:[1 week]],PROVIDER:[TOKEN:[[id number]],FOLLOWUP:[1 week]]

## 2025-06-08 VITALS
HEART RATE: 56 BPM | OXYGEN SATURATION: 98 % | RESPIRATION RATE: 18 BRPM | SYSTOLIC BLOOD PRESSURE: 130 MMHG | TEMPERATURE: 98 F | DIASTOLIC BLOOD PRESSURE: 70 MMHG

## 2025-06-08 LAB
ANION GAP SERPL CALC-SCNC: 6 MMOL/L — LOW (ref 7–14)
BASOPHILS # BLD AUTO: 0.03 K/UL — SIGNIFICANT CHANGE UP (ref 0–0.2)
BASOPHILS NFR BLD AUTO: 0.5 % — SIGNIFICANT CHANGE UP (ref 0–1)
BUN SERPL-MCNC: 18 MG/DL — SIGNIFICANT CHANGE UP (ref 10–20)
CALCIUM SERPL-MCNC: 9.3 MG/DL — SIGNIFICANT CHANGE UP (ref 8.4–10.5)
CHLORIDE SERPL-SCNC: 106 MMOL/L — SIGNIFICANT CHANGE UP (ref 98–110)
CO2 SERPL-SCNC: 30 MMOL/L — SIGNIFICANT CHANGE UP (ref 17–32)
CREAT SERPL-MCNC: 1 MG/DL — SIGNIFICANT CHANGE UP (ref 0.7–1.5)
EGFR: 85 ML/MIN/1.73M2 — SIGNIFICANT CHANGE UP
EGFR: 85 ML/MIN/1.73M2 — SIGNIFICANT CHANGE UP
EOSINOPHIL # BLD AUTO: 0.15 K/UL — SIGNIFICANT CHANGE UP (ref 0–0.7)
EOSINOPHIL NFR BLD AUTO: 2.6 % — SIGNIFICANT CHANGE UP (ref 0–8)
GLUCOSE SERPL-MCNC: 101 MG/DL — HIGH (ref 70–99)
HCT VFR BLD CALC: 38.1 % — LOW (ref 42–52)
HGB BLD-MCNC: 12.8 G/DL — LOW (ref 14–18)
IMM GRANULOCYTES NFR BLD AUTO: 0.5 % — HIGH (ref 0.1–0.3)
LYMPHOCYTES # BLD AUTO: 1.43 K/UL — SIGNIFICANT CHANGE UP (ref 1.2–3.4)
LYMPHOCYTES # BLD AUTO: 24.7 % — SIGNIFICANT CHANGE UP (ref 20.5–51.1)
MCHC RBC-ENTMCNC: 33.6 G/DL — SIGNIFICANT CHANGE UP (ref 32–37)
MCHC RBC-ENTMCNC: 35.7 PG — HIGH (ref 27–31)
MCV RBC AUTO: 106.1 FL — HIGH (ref 80–94)
MONOCYTES # BLD AUTO: 0.47 K/UL — SIGNIFICANT CHANGE UP (ref 0.1–0.6)
MONOCYTES NFR BLD AUTO: 8.1 % — SIGNIFICANT CHANGE UP (ref 1.7–9.3)
NEUTROPHILS # BLD AUTO: 3.68 K/UL — SIGNIFICANT CHANGE UP (ref 1.4–6.5)
NEUTROPHILS NFR BLD AUTO: 63.6 % — SIGNIFICANT CHANGE UP (ref 42.2–75.2)
NRBC BLD AUTO-RTO: 0 /100 WBCS — SIGNIFICANT CHANGE UP (ref 0–0)
PLATELET # BLD AUTO: 260 K/UL — SIGNIFICANT CHANGE UP (ref 130–400)
PMV BLD: 9.1 FL — SIGNIFICANT CHANGE UP (ref 7.4–10.4)
POTASSIUM SERPL-MCNC: 5.2 MMOL/L — HIGH (ref 3.5–5)
POTASSIUM SERPL-SCNC: 5.2 MMOL/L — HIGH (ref 3.5–5)
RBC # BLD: 3.59 M/UL — LOW (ref 4.7–6.1)
RBC # FLD: 13.4 % — SIGNIFICANT CHANGE UP (ref 11.5–14.5)
SODIUM SERPL-SCNC: 142 MMOL/L — SIGNIFICANT CHANGE UP (ref 135–146)
WBC # BLD: 5.79 K/UL — SIGNIFICANT CHANGE UP (ref 4.8–10.8)
WBC # FLD AUTO: 5.79 K/UL — SIGNIFICANT CHANGE UP (ref 4.8–10.8)

## 2025-06-08 PROCEDURE — 99239 HOSP IP/OBS DSCHRG MGMT >30: CPT

## 2025-06-08 RX ADMIN — Medication 1 APPLICATION(S): at 11:27

## 2025-06-08 RX ADMIN — AZATHIOPRINE 100 MILLIGRAM(S): 50 TABLET ORAL at 11:24

## 2025-06-08 RX ADMIN — Medication 40 MILLIGRAM(S): at 05:17

## 2025-06-08 RX ADMIN — DULOXETINE 60 MILLIGRAM(S): 20 CAPSULE, DELAYED RELEASE ORAL at 11:23

## 2025-06-08 RX ADMIN — Medication 20 MILLIGRAM(S): at 05:17

## 2025-06-08 RX ADMIN — Medication 20 MILLIGRAM(S): at 11:23

## 2025-06-08 NOTE — DISCHARGE NOTE NURSING/CASE MANAGEMENT/SOCIAL WORK - NSDCVIVACCINE_GEN_ALL_CORE_FT
Tdap; 26-Apr-2021 11:41; Fabiola Vargas (RN); [employer]; [id number] (Exp. Date: 18-Nov-2022); IntraMuscular; Deltoid Right.; 0.5 milliLiter(s); VIS (VIS Published: 09-May-2013, VIS Presented: 26-Apr-2021);

## 2025-06-08 NOTE — DISCHARGE NOTE NURSING/CASE MANAGEMENT/SOCIAL WORK - PATIENT PORTAL LINK FT
You can access the FollowMyHealth Patient Portal offered by [facility] by registering at the following website: [email or web address]. By joining [employer]’s FollowMyHealth portal, you will also be able to view your health information using other applications (apps) compatible with our system.

## 2025-06-08 NOTE — DISCHARGE NOTE NURSING/CASE MANAGEMENT/SOCIAL WORK - FINANCIAL ASSISTANCE
[facility] provides services at a reduced cost to those who are determined to be eligible through [facility]’s financial assistance program. Information regarding [facility]’s financial assistance program can be found by going to [email or web address] or by calling [phone].

## 2025-06-14 NOTE — CDI QUERY NOTE - NSCDIOTHERTXTBX_GEN_ALL_CORE_HH
Clinical documentation and/or evidence of the patient’s presentation, evaluation, and medical management, as evidenced below, may support a diagnosis that is not documented in the medical record.  In order to ensure accurate coding and accuracy of the clinical record, the documentation in this patient’s medical record requires additional clarification.    If you think the supporting documentation and/or clinical evidence supports a more specific diagnosis, please include more specific documentation of a diagnosis associated with these findings in your progress note and/or discharge summary.                                                      ===============================    Please clarify this patient’s immune status:    • The patient is in an immunocompromised state associated with Azathioprine use.  • Other (specify)      Supporting documentation and/or clinical evidence:   ** 6/7 Discharge note:      presents for UC exacerbation sent in by Dr. Souza gastroenterologist.  Due to insurance change to Medicare, patient has had a lapse in the medication approval of Remicade (is now 2 weeks late).       # Flare of Ulcerative Colitis with pancolitis sec to missed dose of remicade       - on home azathioprine 100mg qd and IFX     - GI eval: Please admit for inpatient infusion of infliximab 10 mg/kg --> The pre-biologic workup has been previously obtained and has been cleared for Infliximab infusion, c/w azathioprine to 100 mg q24h-          Give 1 time Remicade infusion 10mg/kg in AM    ** Orders:   - 6/6-8: Azathioprine 100 mg oral daily

## 2025-07-28 ENCOUNTER — RX RENEWAL (OUTPATIENT)
Age: 65
End: 2025-07-28